# Patient Record
Sex: MALE | Race: WHITE | NOT HISPANIC OR LATINO | Employment: FULL TIME | URBAN - METROPOLITAN AREA
[De-identification: names, ages, dates, MRNs, and addresses within clinical notes are randomized per-mention and may not be internally consistent; named-entity substitution may affect disease eponyms.]

---

## 2018-11-07 ENCOUNTER — OFFICE VISIT (OUTPATIENT)
Dept: FAMILY MEDICINE | Facility: CLINIC | Age: 33
End: 2018-11-07
Payer: COMMERCIAL

## 2018-11-07 VITALS
WEIGHT: 220 LBS | HEIGHT: 70 IN | HEART RATE: 83 BPM | OXYGEN SATURATION: 97 % | DIASTOLIC BLOOD PRESSURE: 96 MMHG | TEMPERATURE: 98 F | SYSTOLIC BLOOD PRESSURE: 140 MMHG | BODY MASS INDEX: 31.5 KG/M2

## 2018-11-07 DIAGNOSIS — E78.2 MIXED HYPERLIPIDEMIA: ICD-10-CM

## 2018-11-07 DIAGNOSIS — I10 ESSENTIAL HYPERTENSION: Primary | ICD-10-CM

## 2018-11-07 PROCEDURE — 3080F DIAST BP >= 90 MM HG: CPT | Mod: ,,, | Performed by: INTERNAL MEDICINE

## 2018-11-07 PROCEDURE — 3008F BODY MASS INDEX DOCD: CPT | Mod: ,,, | Performed by: INTERNAL MEDICINE

## 2018-11-07 PROCEDURE — 3077F SYST BP >= 140 MM HG: CPT | Mod: ,,, | Performed by: INTERNAL MEDICINE

## 2018-11-07 PROCEDURE — 99202 OFFICE O/P NEW SF 15 MIN: CPT | Mod: ,,, | Performed by: INTERNAL MEDICINE

## 2018-11-07 RX ORDER — OXYCODONE AND ACETAMINOPHEN 5; 325 MG/1; MG/1
1 TABLET ORAL EVERY 6 HOURS PRN
Refills: 0 | COMMUNITY
Start: 2018-08-15 | End: 2019-08-13

## 2018-11-07 RX ORDER — SUCRALFATE 1 G/1
TABLET ORAL
Refills: 0 | COMMUNITY
Start: 2018-08-15 | End: 2019-08-13

## 2018-11-07 RX ORDER — AMLODIPINE BESYLATE 10 MG/1
10 TABLET ORAL DAILY
Qty: 30 TABLET | Refills: 3 | Status: SHIPPED | OUTPATIENT
Start: 2018-11-07 | End: 2019-08-13 | Stop reason: SDUPTHER

## 2018-11-07 NOTE — PROGRESS NOTES
Subjective:       Patient ID: Gerardo Ramirez Jr. is a 33 y.o. male.    Chief Complaint: Establish Care (new patient establishment); Hypertension; and ADHD    Here to establish care with me; previously seeing Dr. Ortiz but is looking for a new PCP.  He reports a prior history of HTN and hyperlipidemia which was treated with medications.  His weight at the time was around 240.  He was able to lose some weight and got off the meds around 3 years ago.  He would periodically monitor blood pressure.  Weight has slowly crept up in the last year; up 20-25 pounds.  Not exercising as much or following diet.  BP has been going up along with his weight.  He also reports that Dr. Ortiz gave him a questionnaire and diagnosed him with ADHD earlier this year when he complained of issues with focus and concentration.  He was on meds then but not currently taking anything.   He was adderall and it seemed to help for a few days and would then lose effectiveness.  He reports it is something that seems to be worsening with age starting in late teens.         Review of Systems   Constitutional: Negative for chills, fatigue, fever and unexpected weight change.   HENT: Positive for rhinorrhea and sinus pressure. Negative for congestion, hearing loss, postnasal drip, trouble swallowing and voice change.    Eyes: Negative for photophobia and visual disturbance.   Respiratory: Negative for apnea, cough, choking, chest tightness, shortness of breath and wheezing.    Cardiovascular: Negative for chest pain, palpitations and leg swelling.   Gastrointestinal: Negative for abdominal pain, blood in stool, constipation, diarrhea, nausea, rectal pain and vomiting.   Endocrine: Negative for cold intolerance, heat intolerance, polydipsia and polyuria.   Genitourinary: Negative for decreased urine volume, difficulty urinating, discharge, dysuria, flank pain, frequency, genital sores, hematuria, testicular pain and urgency.   Musculoskeletal:  Negative for arthralgias, back pain, gait problem, joint swelling, myalgias and neck pain.   Skin: Negative for color change, rash and wound.   Allergic/Immunologic: Negative for environmental allergies and food allergies.   Neurological: Negative for dizziness, tremors, seizures, syncope, facial asymmetry, speech difficulty, weakness, light-headedness, numbness and headaches.   Hematological: Negative for adenopathy. Does not bruise/bleed easily.   Psychiatric/Behavioral: Negative for confusion, hallucinations, sleep disturbance and suicidal ideas. The patient is not nervous/anxious.        Past Medical History:   Diagnosis Date    Anxiety     Hyperlipidemia     Hypertension     History reviewed. No pertinent surgical history.    Family History   Problem Relation Age of Onset    Diabetes Mother     Hypertension Mother     Hypertension Sister     Hyperlipidemia Sister     Anxiety disorder Sister     Coronary artery disease Maternal Grandfather     No Known Problems Father        Social History     Socioeconomic History    Marital status:      Spouse name: None    Number of children: None    Years of education: None    Highest education level: None   Social Needs    Financial resource strain: None    Food insecurity - worry: None    Food insecurity - inability: None    Transportation needs - medical: None    Transportation needs - non-medical: None   Occupational History    Occupation: [employer]   Tobacco Use    Smoking status: Current Every Day Smoker     Packs/day: 0.50     Types: Cigarettes    Smokeless tobacco: Never Used   Substance and Sexual Activity    Alcohol use: Yes     Frequency: 2-4 times a month     Drinks per session: 7 to 9     Binge frequency: Weekly    Drug use: No    Sexual activity: Yes     Partners: Female   Other Topics Concern    None   Social History Narrative    LIVE WITH MICHA       Current Outpatient Medications   Medication Sig Dispense Refill     "oxyCODONE-acetaminophen (PERCOCET) 5-325 mg per tablet Take 1 tablet by mouth every 6 (six) hours as needed.  0    sucralfate (CARAFATE) 1 gram tablet TAKE 1 TABLET BY MOUTH 4 TIMES A DAY FOR 4 DAYS AS NEEDED  0    amLODIPine (NORVASC) 10 MG tablet Take 1 tablet (10 mg total) by mouth once daily. 30 tablet 3     No current facility-administered medications for this visit.        Review of patient's allergies indicates:   Allergen Reactions    Amoxicillin Hives     Objective:    HPI     Establish Care      Additional comments: new patient establishment          Last edited by Ramila Hernandez MA on 11/7/2018  2:35 PM. (History)      Blood pressure (!) 140/96, pulse 83, temperature 97.9 °F (36.6 °C), temperature source Temporal, height 5' 10" (1.778 m), weight 99.8 kg (220 lb), SpO2 97 %. Body mass index is 31.57 kg/m².   Physical Exam   Constitutional: He appears well-developed.   Obese although he is also muscular       HENT:   Head: Normocephalic and atraumatic.   Right Ear: Hearing, tympanic membrane, external ear and ear canal normal.   Left Ear: Hearing, tympanic membrane, external ear and ear canal normal.   Nose: Nose normal.   Mouth/Throat: Uvula is midline and oropharynx is clear and moist.   Eyes: Conjunctivae, EOM and lids are normal. Pupils are equal, round, and reactive to light. Right eye exhibits no discharge. Left eye exhibits no discharge. Right conjunctiva is not injected. Right conjunctiva has no hemorrhage. Left conjunctiva is not injected. Left conjunctiva has no hemorrhage. No scleral icterus.   Neck: Carotid bruit is not present. No thyromegaly present.   Cardiovascular: Normal rate, regular rhythm and normal heart sounds. Exam reveals no gallop and no friction rub.   No murmur heard.  Pulses:       Dorsalis pedis pulses are 2+ on the right side, and 2+ on the left side.   Pulmonary/Chest: Effort normal and breath sounds normal. No respiratory distress. He has no wheezes. He has no rhonchi. " He has no rales.   Abdominal: Soft. Bowel sounds are normal. He exhibits no distension, no abdominal bruit, no pulsatile midline mass and no mass. There is no hepatosplenomegaly. There is no tenderness. There is no rebound and no guarding. No hernia.   Musculoskeletal: He exhibits no edema.   Lymphadenopathy:     He has no cervical adenopathy.   Neurological: He is alert. He has normal reflexes. He displays no tremor. No cranial nerve deficit.   Skin: Skin is warm and dry.   Psychiatric: He has a normal mood and affect. His speech is normal and behavior is normal.   Nursing note and vitals reviewed.          Assessment:       1. Essential hypertension    2. Mixed hyperlipidemia        Plan:       Gerardo was seen today for establish care, hypertension and adhd.    Diagnoses and all orders for this visit:    Essential hypertension  Comments:  Low salt diet.  Resume regular exercise.   He thinks he was on lisinopril in the past and had ED  Orders:  -     Comprehensive metabolic panel; Future  -     Lipid panel; Future  -     Urinalysis; Future  -     amLODIPine (NORVASC) 10 MG tablet; Take 1 tablet (10 mg total) by mouth once daily.  -     Comprehensive metabolic panel  -     Lipid panel  -     Urinalysis    Mixed hyperlipidemia  -     Lipid panel; Future  -     Lipid panel

## 2019-02-20 ENCOUNTER — OFFICE VISIT (OUTPATIENT)
Dept: FAMILY MEDICINE | Facility: CLINIC | Age: 34
End: 2019-02-20
Payer: COMMERCIAL

## 2019-02-20 VITALS
TEMPERATURE: 99 F | HEIGHT: 70 IN | WEIGHT: 243 LBS | HEART RATE: 102 BPM | SYSTOLIC BLOOD PRESSURE: 128 MMHG | DIASTOLIC BLOOD PRESSURE: 84 MMHG | OXYGEN SATURATION: 98 % | BODY MASS INDEX: 34.79 KG/M2

## 2019-02-20 DIAGNOSIS — J02.0 STREP PHARYNGITIS: ICD-10-CM

## 2019-02-20 DIAGNOSIS — E78.2 MIXED HYPERLIPIDEMIA: ICD-10-CM

## 2019-02-20 DIAGNOSIS — I10 ESSENTIAL HYPERTENSION: Primary | ICD-10-CM

## 2019-02-20 LAB
CTP QC/QA: YES
S PYO RRNA THROAT QL PROBE: POSITIVE

## 2019-02-20 PROCEDURE — 3008F PR BODY MASS INDEX (BMI) DOCUMENTED: ICD-10-PCS | Mod: ,,, | Performed by: INTERNAL MEDICINE

## 2019-02-20 PROCEDURE — 87880 STREP A ASSAY W/OPTIC: CPT | Mod: QW,,, | Performed by: INTERNAL MEDICINE

## 2019-02-20 PROCEDURE — 1111F PR DISCHARGE MEDS RECONCILED W/ CURRENT OUTPATIENT MED LIST: ICD-10-PCS | Mod: ,,, | Performed by: INTERNAL MEDICINE

## 2019-02-20 PROCEDURE — 3074F PR MOST RECENT SYSTOLIC BLOOD PRESSURE < 130 MM HG: ICD-10-PCS | Mod: ,,, | Performed by: INTERNAL MEDICINE

## 2019-02-20 PROCEDURE — 87880 POCT RAPID STREP A: ICD-10-PCS | Mod: QW,,, | Performed by: INTERNAL MEDICINE

## 2019-02-20 PROCEDURE — 3074F SYST BP LT 130 MM HG: CPT | Mod: ,,, | Performed by: INTERNAL MEDICINE

## 2019-02-20 PROCEDURE — 3079F PR MOST RECENT DIASTOLIC BLOOD PRESSURE 80-89 MM HG: ICD-10-PCS | Mod: ,,, | Performed by: INTERNAL MEDICINE

## 2019-02-20 PROCEDURE — 3079F DIAST BP 80-89 MM HG: CPT | Mod: ,,, | Performed by: INTERNAL MEDICINE

## 2019-02-20 PROCEDURE — 99213 PR OFFICE/OUTPT VISIT, EST, LEVL III, 20-29 MIN: ICD-10-PCS | Mod: ,,, | Performed by: INTERNAL MEDICINE

## 2019-02-20 PROCEDURE — 1111F DSCHRG MED/CURRENT MED MERGE: CPT | Mod: ,,, | Performed by: INTERNAL MEDICINE

## 2019-02-20 PROCEDURE — 99213 OFFICE O/P EST LOW 20 MIN: CPT | Mod: ,,, | Performed by: INTERNAL MEDICINE

## 2019-02-20 PROCEDURE — 3008F BODY MASS INDEX DOCD: CPT | Mod: ,,, | Performed by: INTERNAL MEDICINE

## 2019-02-20 RX ORDER — NAPROXEN SODIUM 220 MG/1
81 TABLET, FILM COATED ORAL DAILY
COMMUNITY
End: 2019-08-13

## 2019-02-20 RX ORDER — ATORVASTATIN CALCIUM 20 MG/1
20 TABLET, FILM COATED ORAL NIGHTLY
Qty: 90 TABLET | Refills: 1 | Status: SHIPPED | OUTPATIENT
Start: 2019-02-20 | End: 2019-08-13 | Stop reason: SDUPTHER

## 2019-02-20 RX ORDER — ATORVASTATIN CALCIUM 10 MG/1
10 TABLET, FILM COATED ORAL NIGHTLY
Refills: 1 | COMMUNITY
Start: 2019-02-09 | End: 2019-02-20 | Stop reason: SDUPTHER

## 2019-02-20 RX ORDER — AZITHROMYCIN 250 MG/1
TABLET, FILM COATED ORAL
Qty: 6 TABLET | Refills: 0 | Status: SHIPPED | OUTPATIENT
Start: 2019-02-20 | End: 2019-02-25

## 2019-02-20 NOTE — PROGRESS NOTES
Subjective:       Patient ID: Gerardo Ramirez Jr. is a 34 y.o. male.    Chief Complaint: Follow-up (hospital followup); Fever (101 x 2 days, chills); Generalized Body Aches; Nasal Congestion (sinus pressure); Sore Throat; and Fatigue    Here for hospital follow up.  He went in because he had been having some chest pain off and on for a couple of weeks.  He had stopped his amlodipine because he heard about a recall and his blood pressure was high.  He was admitted overnight; workup including stress test was negative.  He had resumed amlodipine at that point and was continued on that at discharge.  Woke up yesterday feeling achy all over and noted fever yesterday afternoon and this morning.  He has minimal congestion, rhinorrhea.  Biggest complaint is sore throat and mild headache.  Symptoms are improving.        Review of Systems   Constitutional: Positive for chills, fatigue, fever and unexpected weight change (has gained 20+ pounds since November).   HENT: Positive for congestion, sinus pressure, sneezing, sore throat and trouble swallowing. Negative for hearing loss, postnasal drip, rhinorrhea and voice change.    Eyes: Positive for pain (pressure). Negative for photophobia and visual disturbance.   Respiratory: Negative for apnea, cough, choking, chest tightness, shortness of breath and wheezing.    Cardiovascular: Negative for chest pain, palpitations and leg swelling.   Gastrointestinal: Positive for constipation. Negative for abdominal pain, blood in stool, diarrhea, nausea, rectal pain and vomiting.   Endocrine: Negative for cold intolerance, heat intolerance, polydipsia and polyuria.   Genitourinary: Negative for decreased urine volume, difficulty urinating, discharge, dysuria, flank pain, frequency, genital sores, hematuria, testicular pain and urgency.   Musculoskeletal: Positive for arthralgias, myalgias, neck pain and neck stiffness. Negative for back pain, gait problem and joint swelling.   Skin:  Negative for color change, rash and wound.   Allergic/Immunologic: Negative for environmental allergies and food allergies.   Neurological: Positive for headaches (ocassional headaches). Negative for dizziness, tremors, seizures, syncope, facial asymmetry, speech difficulty, weakness, light-headedness and numbness.        Slight disorientation   Hematological: Negative for adenopathy. Does not bruise/bleed easily.   Psychiatric/Behavioral: Positive for sleep disturbance. Negative for confusion, hallucinations and suicidal ideas. The patient is not nervous/anxious.        Past Medical History:   Diagnosis Date    Anxiety     Hyperlipidemia     Hypertension     History reviewed. No pertinent surgical history.    Family History   Problem Relation Age of Onset    Diabetes Mother     Hypertension Mother     Hypertension Sister     Hyperlipidemia Sister     Anxiety disorder Sister     Coronary artery disease Maternal Grandfather     No Known Problems Father        Social History     Socioeconomic History    Marital status:      Spouse name: None    Number of children: None    Years of education: None    Highest education level: None   Social Needs    Financial resource strain: None    Food insecurity - worry: None    Food insecurity - inability: None    Transportation needs - medical: None    Transportation needs - non-medical: None   Occupational History    Occupation: [employer]   Tobacco Use    Smoking status: Current Every Day Smoker     Packs/day: 0.50     Types: Cigarettes    Smokeless tobacco: Never Used   Substance and Sexual Activity    Alcohol use: Yes     Frequency: 2-4 times a month     Drinks per session: 7 to 9     Binge frequency: Weekly    Drug use: No    Sexual activity: Yes     Partners: Female   Other Topics Concern    None   Social History Narrative    LIVE WITH FIROSALINA       Current Outpatient Medications   Medication Sig Dispense Refill    amLODIPine (NORVASC) 10 MG  "tablet Take 1 tablet (10 mg total) by mouth once daily. 30 tablet 3    aspirin 81 MG Chew Take 81 mg by mouth once daily.      atorvastatin (LIPITOR) 20 MG tablet Take 1 tablet (20 mg total) by mouth nightly. 90 tablet 1    chlorphenir/phenyleph/aspirin (CINDY-SELTZER PLUS COLD, PE, ORAL) Take 2 capsules by mouth 4 (four) times daily as needed.      oxyCODONE-acetaminophen (PERCOCET) 5-325 mg per tablet Take 1 tablet by mouth every 6 (six) hours as needed.  0    sucralfate (CARAFATE) 1 gram tablet TAKE 1 TABLET BY MOUTH 4 TIMES A DAY FOR 4 DAYS AS NEEDED  0     No current facility-administered medications for this visit.        Review of patient's allergies indicates:   Allergen Reactions    Amoxicillin Hives     Objective:    HPI     Follow-up      Additional comments: hospital followup              Fever      Additional comments: 101 x 2 days, chills              Nasal Congestion      Additional comments: sinus pressure          Last edited by Ramila Hernandez MA on 2/20/2019  4:25 PM. (History)      Blood pressure 128/84, pulse 102, temperature 98.6 °F (37 °C), temperature source Temporal, height 5' 10" (1.778 m), weight 110.2 kg (243 lb), SpO2 98 %. Body mass index is 34.87 kg/m².   Physical Exam   Constitutional: He appears well-developed. He is active.  Non-toxic appearance. He does not have a sickly appearance. He does not appear ill. No distress.   HENT:   Head: Normocephalic.   Right Ear: Tympanic membrane, external ear and ear canal normal.   Left Ear: Tympanic membrane, external ear and ear canal normal.   Nose: Nose normal. No rhinorrhea. Right sinus exhibits no maxillary sinus tenderness and no frontal sinus tenderness. Left sinus exhibits no maxillary sinus tenderness and no frontal sinus tenderness.   Mouth/Throat: Mucous membranes are normal. Oropharyngeal exudate and posterior oropharyngeal erythema present. Tonsils are 2+ on the right. Tonsils are 2+ on the left. Tonsillar exudate. "   Cardiovascular: Regular rhythm and normal heart sounds. Tachycardia present. Exam reveals no gallop and no friction rub.   No murmur heard.  Pulmonary/Chest: Effort normal and breath sounds normal. No accessory muscle usage. No tachypnea. No respiratory distress. He has no wheezes. He has no rhonchi. He has no rales.   Lymphadenopathy:     He has cervical adenopathy.   Neurological: He is alert.   Skin: He is not diaphoretic.           Assessment:       1. Essential hypertension    2. Strep pharyngitis    3. Mixed hyperlipidemia        Plan:       Gerardo was seen today for follow-up, fever, generalized body aches, nasal congestion, sore throat and fatigue.    Diagnoses and all orders for this visit:    Essential hypertension    Strep pharyngitis  -     POCT rapid strep A    Mixed hyperlipidemia  Comments:   on hospital labs so will increase lipitor    Orders:  -     atorvastatin (LIPITOR) 20 MG tablet; Take 1 tablet (20 mg total) by mouth nightly.

## 2019-04-08 ENCOUNTER — HISTORICAL (OUTPATIENT)
Dept: ADMINISTRATIVE | Facility: HOSPITAL | Age: 34
End: 2019-04-08

## 2019-04-10 ENCOUNTER — TELEPHONE (OUTPATIENT)
Dept: SURGERY | Facility: CLINIC | Age: 34
End: 2019-04-10

## 2019-04-17 ENCOUNTER — OFFICE VISIT (OUTPATIENT)
Dept: SURGERY | Facility: CLINIC | Age: 34
End: 2019-04-17
Payer: COMMERCIAL

## 2019-04-17 VITALS
BODY MASS INDEX: 34.07 KG/M2 | TEMPERATURE: 98 F | HEIGHT: 70 IN | DIASTOLIC BLOOD PRESSURE: 96 MMHG | HEART RATE: 85 BPM | SYSTOLIC BLOOD PRESSURE: 147 MMHG | WEIGHT: 238 LBS

## 2019-04-17 DIAGNOSIS — K81.2 ACUTE ON CHRONIC CHOLECYSTITIS: Primary | ICD-10-CM

## 2019-04-17 PROCEDURE — 99024 POSTOP FOLLOW-UP VISIT: CPT | Mod: ,,, | Performed by: SURGERY

## 2019-04-17 PROCEDURE — 99024 PR POST-OP FOLLOW-UP VISIT: ICD-10-PCS | Mod: ,,, | Performed by: SURGERY

## 2019-04-17 NOTE — LETTER
April 19, 2019      [facility] - ED  [address]  [address]  [place] [address]           [facility] - General Surgery  [address]  [facility] [address]  Phone: [phone]  Fax: [phone]          Patient: Gerardo Ramirez Jr.   MR Number: [id number]   YOB: 1985   Date of Visit: 4/17/2019       Dear Replaced by [facility] - [facility]:    Thank you for referring Gerardo Ramirez to me for evaluation. Attached you will find relevant portions of my assessment and plan of care.    If you have questions, please do not hesitate to call me. I look forward to following Gerardo Ramirez along with you.    Sincerely,    Willy Lanier III, MD    Enclosure  CC:  No Recipients    If you would like to receive this communication electronically, please contact [email or web address] or [phone] to request more information on [employer] access.    For providers and/or their staff who would like to refer a patient to [facility], please contact us through our one-stop-shop provider referral line, [facility], at [phone].    If you feel you have received this communication in error or would no longer like to receive these types of communications, please e-mail [email or web address]

## 2019-04-19 NOTE — PROGRESS NOTES
Subjective:       Patient ID: Gerardo Ramirez Jr. is a 34 y.o. male.    Chief Complaint: Post-op Evaluation ([facility] DOS 4/8/19 Lap sophie & Umbilical hernia repair )      HPI:  S/p lap sophie for acute cholecystitis.  Path returned acute on chronic calculous cholecystitis.  He is feeling well. No complaints. He also had small umbilical hernia that was repaired during surgery.       Review of Systems    Objective:      Physical Exam   Constitutional: He is oriented to person, place, and time. He is cooperative. No distress.   Pulmonary/Chest: Effort normal. No respiratory distress.   Abdominal: Soft. He exhibits no distension. There is no tenderness. There is no rebound and no guarding.   Neurological: He is alert and oriented to person, place, and time.   Skin:   Incisions are clean, dry and intact  There is no evidence of infection, hematoma or seroma        Assessment/Plan:   Gerardo was seen today for post-op evaluation.    Diagnoses and all orders for this visit:    Acute on chronic cholecystitis      Doing well.  S/p lap sophie.  RTC PRN

## 2019-08-12 DIAGNOSIS — I10 HYPERTENSION: Primary | ICD-10-CM

## 2019-08-13 ENCOUNTER — OFFICE VISIT (OUTPATIENT)
Dept: FAMILY MEDICINE | Facility: CLINIC | Age: 34
End: 2019-08-13
Payer: COMMERCIAL

## 2019-08-13 VITALS
BODY MASS INDEX: 36.08 KG/M2 | WEIGHT: 252 LBS | OXYGEN SATURATION: 97 % | SYSTOLIC BLOOD PRESSURE: 114 MMHG | DIASTOLIC BLOOD PRESSURE: 80 MMHG | TEMPERATURE: 98 F | HEIGHT: 70 IN | HEART RATE: 84 BPM

## 2019-08-13 DIAGNOSIS — I10 ESSENTIAL HYPERTENSION: Primary | ICD-10-CM

## 2019-08-13 DIAGNOSIS — E78.2 MIXED HYPERLIPIDEMIA: ICD-10-CM

## 2019-08-13 PROCEDURE — 99213 OFFICE O/P EST LOW 20 MIN: CPT | Mod: S$GLB,,, | Performed by: INTERNAL MEDICINE

## 2019-08-13 PROCEDURE — 99213 PR OFFICE/OUTPT VISIT, EST, LEVL III, 20-29 MIN: ICD-10-PCS | Mod: S$GLB,,, | Performed by: INTERNAL MEDICINE

## 2019-08-13 RX ORDER — AMLODIPINE BESYLATE 10 MG/1
10 TABLET ORAL DAILY
Qty: 30 TABLET | Refills: 3 | Status: SHIPPED | OUTPATIENT
Start: 2019-08-13 | End: 2019-10-17 | Stop reason: SDUPTHER

## 2019-08-13 RX ORDER — ATORVASTATIN CALCIUM 20 MG/1
20 TABLET, FILM COATED ORAL NIGHTLY
Qty: 90 TABLET | Refills: 1 | Status: SHIPPED | OUTPATIENT
Start: 2019-08-13 | End: 2019-12-18 | Stop reason: SDUPTHER

## 2019-08-13 NOTE — PROGRESS NOTES
Subjective:       Patient ID: Gerardo Ramirez Jr. is a 34 y.o. male.    Chief Complaint: Hypertension (continuity of care and med refill) and Hyperlipidemia    Hypertension   This is a chronic problem. The problem is controlled (as long as he takes meds). Associated symptoms include chest pain (chest tightness for a couple of days after he smokes; no issues if doesn't smoke) and headaches (occasional). Pertinent negatives include no neck pain, palpitations, peripheral edema or shortness of breath. Risk factors for coronary artery disease include obesity, male gender, dyslipidemia and smoking/tobacco exposure. Past treatments include calcium channel blockers. Compliance problems: often forgets it.      Review of Systems   Constitutional: Positive for unexpected weight change (gaining weight; has steel toed boots on today). Negative for chills, fatigue and fever.   HENT: Negative for congestion, hearing loss, postnasal drip, rhinorrhea, trouble swallowing and voice change.    Eyes: Negative for photophobia and visual disturbance.   Respiratory: Positive for chest tightness. Negative for apnea, cough, choking, shortness of breath and wheezing.    Cardiovascular: Positive for chest pain (chest tightness for a couple of days after he smokes; no issues if doesn't smoke). Negative for palpitations and leg swelling.   Gastrointestinal: Negative for abdominal pain, blood in stool, constipation, diarrhea, nausea, rectal pain and vomiting.   Endocrine: Negative for cold intolerance, heat intolerance, polydipsia and polyuria.   Genitourinary: Negative for decreased urine volume, difficulty urinating, discharge, dysuria, flank pain, frequency, genital sores, hematuria, testicular pain and urgency.   Musculoskeletal: Negative for arthralgias, back pain, gait problem, joint swelling, myalgias and neck pain.   Skin: Negative for color change, rash and wound.   Allergic/Immunologic: Negative for environmental allergies and  food allergies.   Neurological: Positive for headaches (occasional). Negative for dizziness, tremors, seizures, syncope, facial asymmetry, speech difficulty, weakness, light-headedness and numbness.   Hematological: Negative for adenopathy. Does not bruise/bleed easily.   Psychiatric/Behavioral: Negative for confusion, hallucinations, sleep disturbance and suicidal ideas. The patient is not nervous/anxious.        Past Medical History:   Diagnosis Date    Anxiety     Hyperlipidemia     Hypertension       Past Surgical History:   Procedure Laterality Date    CHOLECYSTECTOMY  04/08/2019    w/ Small Umbilical hernia rep-     UMBILICAL HERNIA REPAIR  04/08/2019           Family History   Problem Relation Age of Onset    Diabetes Mother     Hypertension Mother     Hypertension Sister     Hyperlipidemia Sister     Anxiety disorder Sister     Coronary artery disease Maternal Grandfather     No Known Problems Father        Social History     Socioeconomic History    Marital status:      Spouse name: Not on file    Number of children: Not on file    Years of education: Not on file    Highest education level: Not on file   Occupational History    Occupation: [employer]   Social Needs    Financial resource strain: Not on file    Food insecurity:     Worry: Not on file     Inability: Not on file    Transportation needs:     Medical: Not on file     Non-medical: Not on file   Tobacco Use    Smoking status: Current Some Day Smoker     Packs/day: 0.50     Types: Cigarettes    Smokeless tobacco: Never Used    Tobacco comment: trying to quit   Substance and Sexual Activity    Alcohol use: Yes     Frequency: 2-4 times a month     Drinks per session: 7 to 9     Binge frequency: Weekly    Drug use: No    Sexual activity: Yes     Partners: Female   Lifestyle    Physical activity:     Days per week: Not on file     Minutes per session: Not on file    Stress: Not on file   Relationships  "   Social connections:     Talks on phone: Not on file     Gets together: Not on file     Attends [employer] service: Not on file     Active member of club or organization: Not on file     Attends meetings of clubs or organizations: Not on file     Relationship status: Not on file   Other Topics Concern    Not on file   Social History Narrative    LIVE WITH MICHA       Current Outpatient Medications   Medication Sig Dispense Refill    amLODIPine (NORVASC) 10 MG tablet Take 1 tablet (10 mg total) by mouth once daily. 30 tablet 3    atorvastatin (LIPITOR) 20 MG tablet Take 1 tablet (20 mg total) by mouth nightly. 90 tablet 1     No current facility-administered medications for this visit.        Review of patient's allergies indicates:   Allergen Reactions    Amoxicillin Hives     Objective:    HPI     Hypertension      Additional comments: continuity of care and med refill          Last edited by Ramila Hernandez MA on 8/13/2019  4:24 PM. (History)      Blood pressure 114/80, pulse 84, temperature 98.2 °F (36.8 °C), temperature source Temporal, height 5' 10" (1.778 m), weight 114.3 kg (252 lb), SpO2 97 %. Body mass index is 36.16 kg/m².   Physical Exam   Constitutional: He appears well-developed. No distress.   Obese     HENT:   Nose: Nose normal.   Mouth/Throat: Oropharynx is clear and moist.   Eyes: Conjunctivae are normal. Right eye exhibits no discharge. Left eye exhibits no discharge. No scleral icterus.   Neck: Carotid bruit is not present.   Cardiovascular: Normal rate, regular rhythm and normal heart sounds.   No murmur heard.  Pulmonary/Chest: Effort normal and breath sounds normal. No respiratory distress. He has no decreased breath sounds. He has no wheezes. He has no rhonchi. He has no rales.   Abdominal: Soft. He exhibits no distension. There is no tenderness. There is no rebound and no guarding.   Musculoskeletal: He exhibits no edema.   Neurological: He is alert. He displays no tremor.   Skin: Skin " is warm and dry.   Psychiatric: He has a normal mood and affect. His speech is normal.   Nursing note and vitals reviewed.          Assessment:       1. Essential hypertension    2. Mixed hyperlipidemia        Plan:       Gerardo was seen today for hypertension and hyperlipidemia.    Diagnoses and all orders for this visit:    Essential hypertension  Comments:  Low salt diet.  Blood pressure looks good today since he has been taking meds regularly last few days  Orders:  -     amLODIPine (NORVASC) 10 MG tablet; Take 1 tablet (10 mg total) by mouth once daily.  -     Lipid panel; Future  -     Urinalysis; Future  -     Comprehensive metabolic panel; Future  -     Lipid panel  -     Urinalysis  -     Comprehensive metabolic panel    Mixed hyperlipidemia  Comments:  Has been out of meds a couple of months so will defer labs until he is back on meds    Orders:  -     atorvastatin (LIPITOR) 20 MG tablet; Take 1 tablet (20 mg total) by mouth nightly.  -     Lipid panel; Future  -     Lipid panel

## 2019-08-29 ENCOUNTER — OFFICE VISIT (OUTPATIENT)
Dept: FAMILY MEDICINE | Facility: CLINIC | Age: 34
End: 2019-08-29
Payer: COMMERCIAL

## 2019-08-29 VITALS
TEMPERATURE: 98 F | HEIGHT: 70 IN | SYSTOLIC BLOOD PRESSURE: 124 MMHG | HEART RATE: 71 BPM | WEIGHT: 243 LBS | DIASTOLIC BLOOD PRESSURE: 86 MMHG | BODY MASS INDEX: 34.79 KG/M2 | OXYGEN SATURATION: 98 %

## 2019-08-29 DIAGNOSIS — R07.89 ATYPICAL CHEST PAIN: ICD-10-CM

## 2019-08-29 DIAGNOSIS — I10 ESSENTIAL HYPERTENSION: Primary | ICD-10-CM

## 2019-08-29 PROCEDURE — 99213 PR OFFICE/OUTPT VISIT, EST, LEVL III, 20-29 MIN: ICD-10-PCS | Mod: S$GLB,,, | Performed by: INTERNAL MEDICINE

## 2019-08-29 PROCEDURE — 99213 OFFICE O/P EST LOW 20 MIN: CPT | Mod: S$GLB,,, | Performed by: INTERNAL MEDICINE

## 2019-08-29 NOTE — PROGRESS NOTES
Subjective:       Patient ID: Gerardo Ramirez Jr. is a 34 y.o. male.    Chief Complaint: Hypertension (continuity of care); Dizziness; and Chest Pain    Here for followup.  He is concerned because he continues to have episodes of chest pain intermittently throughout the day.  It is usually one spot but moves around to different areas; it isn't the same spot each time.  Aching sensation, sometimes with a sharp component.  Seems to radiate to jaw.  Not related to activity; ran 2 miles recently without pain.  He remembers not feeling quite right on BP meds in the past so in last few days, he has started to take half tablet BID and hasn't had any chest discomfort.   Always feels like he has tension in the back of his neck like he is about to get a tension headache but rarely gets an actual headache.  Lightheaded at times.  Checked BP once earlier this week and it was 168/108.  Negative stress test earlier this year.   Has been on other BP meds and had side effects (especially ED) but not sure exactly what he was on.      Review of Systems   Constitutional: Negative for chills, fatigue, fever and unexpected weight change.   HENT: Negative for congestion, hearing loss, postnasal drip, rhinorrhea, trouble swallowing and voice change.    Eyes: Negative for photophobia and visual disturbance.   Respiratory: Positive for chest tightness. Negative for apnea, cough, choking, shortness of breath and wheezing.    Cardiovascular: Positive for chest pain. Negative for palpitations and leg swelling.   Gastrointestinal: Negative for abdominal pain, blood in stool, constipation, diarrhea, nausea, rectal pain and vomiting.   Endocrine: Negative for cold intolerance, heat intolerance, polydipsia and polyuria.   Genitourinary: Negative for decreased urine volume, difficulty urinating, discharge, dysuria, flank pain, frequency, genital sores, hematuria, testicular pain and urgency.   Musculoskeletal: Positive for neck stiffness.  Negative for arthralgias, back pain, gait problem, joint swelling, myalgias and neck pain.   Skin: Negative for color change, rash and wound.   Allergic/Immunologic: Negative for environmental allergies and food allergies.   Neurological: Positive for light-headedness and headaches. Negative for dizziness, tremors, seizures, syncope, facial asymmetry, speech difficulty, weakness and numbness.   Hematological: Negative for adenopathy. Does not bruise/bleed easily.   Psychiatric/Behavioral: Negative for confusion, hallucinations, sleep disturbance and suicidal ideas. The patient is not nervous/anxious.        Past Medical History:   Diagnosis Date    Anxiety     Hyperlipidemia     Hypertension       Past Surgical History:   Procedure Laterality Date    CHOLECYSTECTOMY  04/08/2019    w/ Small Umbilical hernia rep-     UMBILICAL HERNIA REPAIR  04/08/2019           Family History   Problem Relation Age of Onset    Diabetes Mother     Hypertension Mother     Hypertension Sister     Hyperlipidemia Sister     Anxiety disorder Sister     Coronary artery disease Maternal Grandfather     No Known Problems Father        Social History     Socioeconomic History    Marital status:      Spouse name: Not on file    Number of children: Not on file    Years of education: Not on file    Highest education level: Not on file   Occupational History    Occupation: [employer]   Social Needs    Financial resource strain: Not on file    Food insecurity:     Worry: Not on file     Inability: Not on file    Transportation needs:     Medical: Not on file     Non-medical: Not on file   Tobacco Use    Smoking status: Current Some Day Smoker     Packs/day: 0.50     Types: Cigarettes    Smokeless tobacco: Never Used    Tobacco comment: trying to quit   Substance and Sexual Activity    Alcohol use: Yes     Frequency: 2-4 times a month     Drinks per session: 7 to 9     Binge frequency: Weekly    Drug  "use: No    Sexual activity: Yes     Partners: Female   Lifestyle    Physical activity:     Days per week: Not on file     Minutes per session: Not on file    Stress: Only a little   Relationships    Social connections:     Talks on phone: Not on file     Gets together: Not on file     Attends Bahai service: Not on file     Active member of club or organization: Not on file     Attends meetings of clubs or organizations: Not on file     Relationship status: Not on file   Other Topics Concern    Not on file   Social History Narrative    LIVE WITH FIANCE       Current Outpatient Medications   Medication Sig Dispense Refill    amLODIPine (NORVASC) 10 MG tablet Take 1 tablet (10 mg total) by mouth once daily. 30 tablet 3    atorvastatin (LIPITOR) 20 MG tablet Take 1 tablet (20 mg total) by mouth nightly. 90 tablet 1     No current facility-administered medications for this visit.        Review of patient's allergies indicates:   Allergen Reactions    Amoxicillin Hives     Objective:    HPI     Hypertension      Additional comments: continuity of care          Last edited by Galindo Berrios Jr., MD on 8/29/2019  9:47 AM. (History)        Blood pressure 124/86, pulse 71, temperature 98 °F (36.7 °C), temperature source Temporal, height 5' 10" (1.778 m), weight 110.2 kg (243 lb), SpO2 98 %. Body mass index is 34.87 kg/m².   Physical Exam   Constitutional: He appears well-developed. No distress.   Obese     HENT:   Nose: Nose normal.   Mouth/Throat: Oropharynx is clear and moist.   Eyes: Conjunctivae are normal. Right eye exhibits no discharge. Left eye exhibits no discharge. No scleral icterus.   Neck: Carotid bruit is not present.   Cardiovascular: Normal rate, regular rhythm and normal heart sounds.   No murmur heard.  Pulmonary/Chest: Effort normal and breath sounds normal. No respiratory distress. He has no decreased breath sounds. He has no wheezes. He has no rhonchi. He has no rales.   Abdominal: Soft. He " exhibits no distension. There is no tenderness. There is no rebound and no guarding.   Musculoskeletal: He exhibits no edema.   Neurological: He is alert. He displays no tremor.   Skin: Skin is warm and dry.   Psychiatric: He has a normal mood and affect. His speech is normal.   Nursing note and vitals reviewed.          Assessment:       1. Essential hypertension    2. Atypical chest pain        Plan:       Gerardo was seen today for hypertension, dizziness and chest pain.    Diagnoses and all orders for this visit:    Essential hypertension  Comments:  Discussed trying a different med vs giving this one more time to see if symptoms resolve.  Can stop in for BP check here periodically. Need a list of prior meds    Atypical chest pain  Comments:  I doubt this is cardiac.  Unclear if it is r/t BP or possibly a side effect of med

## 2019-10-17 DIAGNOSIS — I10 ESSENTIAL HYPERTENSION: ICD-10-CM

## 2019-10-17 RX ORDER — AMLODIPINE BESYLATE 10 MG/1
TABLET ORAL
Qty: 90 TABLET | Refills: 1 | Status: SHIPPED | OUTPATIENT
Start: 2019-10-17 | End: 2019-12-18 | Stop reason: SDUPTHER

## 2019-11-04 DIAGNOSIS — G47.19 EXCESSIVE DAYTIME SLEEPINESS: ICD-10-CM

## 2019-11-04 DIAGNOSIS — G47.9 FATIGUE DUE TO SLEEP PATTERN DISTURBANCE: ICD-10-CM

## 2019-11-04 DIAGNOSIS — G47.33 OSA (OBSTRUCTIVE SLEEP APNEA): Primary | ICD-10-CM

## 2019-11-04 DIAGNOSIS — R53.83 FATIGUE DUE TO SLEEP PATTERN DISTURBANCE: ICD-10-CM

## 2019-11-04 DIAGNOSIS — I10 HTN (HYPERTENSION): ICD-10-CM

## 2019-11-04 DIAGNOSIS — R06.83 SNORING: ICD-10-CM

## 2019-12-14 LAB
ALBUMIN SERPL-MCNC: 4.3 G/DL (ref 3.5–5.5)
ALBUMIN/GLOB SERPL: 1.3 {RATIO} (ref 1.2–2.2)
ALP SERPL-CCNC: 108 IU/L (ref 39–117)
ALT SERPL-CCNC: 73 IU/L (ref 0–44)
APPEARANCE UR: CLEAR
AST SERPL-CCNC: 35 IU/L (ref 0–40)
BILIRUB SERPL-MCNC: 1 MG/DL (ref 0–1.2)
BILIRUB UR QL STRIP: NEGATIVE
BUN SERPL-MCNC: 13 MG/DL (ref 6–20)
BUN/CREAT SERPL: 15 (ref 9–20)
CALCIUM SERPL-MCNC: 9.5 MG/DL (ref 8.7–10.2)
CHLORIDE SERPL-SCNC: 101 MMOL/L (ref 96–106)
CHOLEST SERPL-MCNC: 119 MG/DL (ref 100–199)
CO2 SERPL-SCNC: 19 MMOL/L (ref 20–29)
COLOR UR: YELLOW
CREAT SERPL-MCNC: 0.89 MG/DL (ref 0.76–1.27)
GLOBULIN SER CALC-MCNC: 3.4 G/DL (ref 1.5–4.5)
GLUCOSE SERPL-MCNC: 96 MG/DL (ref 65–99)
GLUCOSE UR QL: NEGATIVE
HDLC SERPL-MCNC: 36 MG/DL
HGB UR QL STRIP: NEGATIVE
KETONES UR QL STRIP: NEGATIVE
LDLC SERPL CALC-MCNC: 58 MG/DL (ref 0–99)
LEUKOCYTE ESTERASE UR QL STRIP: NEGATIVE
MICRO URNS: NORMAL
NITRITE UR QL STRIP: NEGATIVE
PH UR STRIP: 5 [PH] (ref 5–7.5)
POTASSIUM SERPL-SCNC: 4.4 MMOL/L (ref 3.5–5.2)
PROT SERPL-MCNC: 7.7 G/DL (ref 6–8.5)
PROT UR QL STRIP: NEGATIVE
SODIUM SERPL-SCNC: 136 MMOL/L (ref 134–144)
SP GR UR: 1.02 (ref 1–1.03)
TRIGL SERPL-MCNC: 125 MG/DL (ref 0–149)
UROBILINOGEN UR STRIP-MCNC: 0.2 MG/DL (ref 0.2–1)
VLDLC SERPL CALC-MCNC: 25 MG/DL (ref 5–40)

## 2019-12-18 ENCOUNTER — OFFICE VISIT (OUTPATIENT)
Dept: FAMILY MEDICINE | Facility: CLINIC | Age: 34
End: 2019-12-18
Payer: COMMERCIAL

## 2019-12-18 VITALS
BODY MASS INDEX: 36.08 KG/M2 | HEIGHT: 70 IN | OXYGEN SATURATION: 97 % | SYSTOLIC BLOOD PRESSURE: 140 MMHG | WEIGHT: 252 LBS | TEMPERATURE: 98 F | DIASTOLIC BLOOD PRESSURE: 84 MMHG | HEART RATE: 86 BPM

## 2019-12-18 DIAGNOSIS — I10 ESSENTIAL HYPERTENSION: Primary | ICD-10-CM

## 2019-12-18 DIAGNOSIS — K40.90 RIGHT INGUINAL HERNIA: ICD-10-CM

## 2019-12-18 DIAGNOSIS — E78.2 MIXED HYPERLIPIDEMIA: ICD-10-CM

## 2019-12-18 DIAGNOSIS — R74.01 ELEVATED ALT MEASUREMENT: ICD-10-CM

## 2019-12-18 PROCEDURE — 99214 PR OFFICE/OUTPT VISIT, EST, LEVL IV, 30-39 MIN: ICD-10-PCS | Mod: S$GLB,,, | Performed by: INTERNAL MEDICINE

## 2019-12-18 PROCEDURE — 99214 OFFICE O/P EST MOD 30 MIN: CPT | Mod: S$GLB,,, | Performed by: INTERNAL MEDICINE

## 2019-12-18 RX ORDER — AMLODIPINE BESYLATE 10 MG/1
10 TABLET ORAL DAILY
Qty: 90 TABLET | Refills: 1 | Status: SHIPPED | OUTPATIENT
Start: 2019-12-18 | End: 2020-03-09

## 2019-12-18 RX ORDER — ATORVASTATIN CALCIUM 20 MG/1
20 TABLET, FILM COATED ORAL NIGHTLY
Qty: 90 TABLET | Refills: 1 | Status: SHIPPED | OUTPATIENT
Start: 2019-12-18 | End: 2020-10-16 | Stop reason: SDUPTHER

## 2019-12-18 NOTE — PROGRESS NOTES
Subjective:       Patient ID: Gerardo Ramirez Jr. is a 34 y.o. male.    Chief Complaint: Hypertension (followup labs); Hyperlipidemia; Mass (right groin area); and Abdominal Pain (abdominal discomfort)    Here for routine follow up.   He reports some occasional lower abdomen discomfort and a bulge above his right testicle.  Has been told in the past that he has a umbilical and inguinal hernia.  He reports the umbilical hernia was repaired when he had gallbladder surgery but inguinal hernia has never been addressed.   Worse if he has to exert himself a lot at work.      Hypertension   This is a chronic problem. Associated symptoms include chest pain (usually only if he smokes several days in a row). Pertinent negatives include no headaches, neck pain, palpitations, peripheral edema or shortness of breath. Risk factors for coronary artery disease include obesity, male gender, dyslipidemia and smoking/tobacco exposure. Past treatments include calcium channel blockers. Compliance problems: takes half twice daily; occasionally forgets a dose.    Hyperlipidemia   This is a chronic problem. The problem is controlled. Recent lipid tests were reviewed and are normal. Exacerbating diseases include obesity. Associated symptoms include chest pain (usually only if he smokes several days in a row). Pertinent negatives include no myalgias or shortness of breath. Current antihyperlipidemic treatment includes statins. The current treatment provides significant improvement of lipids. There are no compliance problems.      Review of Systems   Constitutional: Positive for unexpected weight change (wt. gain). Negative for chills, fatigue and fever.   HENT: Positive for postnasal drip. Negative for congestion, hearing loss, rhinorrhea, trouble swallowing and voice change.    Eyes: Negative for photophobia and visual disturbance.   Respiratory: Positive for cough. Negative for apnea, choking, chest tightness, shortness of breath and  wheezing.    Cardiovascular: Positive for chest pain (usually only if he smokes several days in a row). Negative for palpitations and leg swelling.   Gastrointestinal: Positive for abdominal pain. Negative for blood in stool, constipation, diarrhea, nausea, rectal pain and vomiting.   Endocrine: Negative for cold intolerance, heat intolerance, polydipsia and polyuria.   Genitourinary: Negative for decreased urine volume, difficulty urinating, discharge, dysuria, flank pain, frequency, genital sores, hematuria, testicular pain and urgency.   Musculoskeletal: Negative for arthralgias, back pain, gait problem, joint swelling, myalgias and neck pain.   Skin: Negative for color change, rash and wound.   Allergic/Immunologic: Negative for environmental allergies and food allergies.   Neurological: Positive for dizziness and light-headedness. Negative for tremors, seizures, syncope, facial asymmetry, speech difficulty, weakness, numbness and headaches.   Hematological: Negative for adenopathy. Does not bruise/bleed easily.   Psychiatric/Behavioral: Negative for confusion, hallucinations, sleep disturbance and suicidal ideas. The patient is not nervous/anxious.        Past Medical History:   Diagnosis Date    Anxiety     Hyperlipidemia     Hypertension       Past Surgical History:   Procedure Laterality Date    CHOLECYSTECTOMY  04/08/2019    w/ Small Umbilical hernia rep-     UMBILICAL HERNIA REPAIR  04/08/2019           Family History   Problem Relation Age of Onset    Diabetes Mother     Hypertension Mother     Hypertension Sister     Hyperlipidemia Sister     Anxiety disorder Sister     Coronary artery disease Maternal Grandfather     No Known Problems Father        Social History     Socioeconomic History    Marital status:      Spouse name: Not on file    Number of children: Not on file    Years of education: Not on file    Highest education level: Not on file   Occupational  History    Occupation: [employer]   Social Needs    Financial resource strain: Not on file    Food insecurity:     Worry: Not on file     Inability: Not on file    Transportation needs:     Medical: Not on file     Non-medical: Not on file   Tobacco Use    Smoking status: Current Some Day Smoker     Packs/day: 0.50     Types: Cigarettes    Smokeless tobacco: Never Used    Tobacco comment: trying to quit   Substance and Sexual Activity    Alcohol use: Yes     Frequency: 2-4 times a month     Drinks per session: 7 to 9     Binge frequency: Weekly    Drug use: No    Sexual activity: Yes     Partners: Female   Lifestyle    Physical activity:     Days per week: Not on file     Minutes per session: Not on file    Stress: Only a little   Relationships    Social connections:     Talks on phone: Not on file     Gets together: Not on file     Attends Cheondoism service: Not on file     Active member of club or organization: Not on file     Attends meetings of clubs or organizations: Not on file     Relationship status: Not on file   Other Topics Concern    Not on file   Social History Narrative    LIVE WITH MICHA       Current Outpatient Medications   Medication Sig Dispense Refill    amLODIPine (NORVASC) 10 MG tablet Take 1 tablet (10 mg total) by mouth once daily. 90 tablet 1    atorvastatin (LIPITOR) 20 MG tablet Take 1 tablet (20 mg total) by mouth nightly. 90 tablet 1     No current facility-administered medications for this visit.        Review of patient's allergies indicates:   Allergen Reactions    Amoxicillin Hives     Objective:    HPI     Hypertension      Additional comments: followup labs              Mass      Additional comments: right groin area              Abdominal Pain      Additional comments: abdominal discomfort          Last edited by Ramila Hernandez MA on 12/18/2019  2:05 PM. (History)      Blood pressure (!) 140/84, pulse 86, temperature 98.2 °F (36.8 °C), temperature source Temporal,  "height 5' 10" (1.778 m), weight 114.3 kg (252 lb), SpO2 97 %. Body mass index is 36.16 kg/m².   Physical Exam   Constitutional: He appears well-developed. No distress.   Obese     HENT:   Nose: Nose normal.   Mouth/Throat: Oropharynx is clear and moist.   Eyes: Conjunctivae are normal. Right eye exhibits no discharge. Left eye exhibits no discharge. No scleral icterus.   Neck: Carotid bruit is not present.   Cardiovascular: Normal rate, regular rhythm and normal heart sounds.   No murmur heard.  Pulmonary/Chest: Effort normal and breath sounds normal. No respiratory distress. He has no decreased breath sounds. He has no wheezes. He has no rhonchi. He has no rales.   Abdominal: Soft. He exhibits no distension. There is no tenderness. There is no rebound and no guarding. A hernia is present. Hernia confirmed positive in the right inguinal area.   Genitourinary: Testes normal. Right testis shows no mass, no swelling and no tenderness. Left testis shows no mass, no swelling and no tenderness. Circumcised.   Musculoskeletal: He exhibits no edema.   Neurological: He is alert. He displays no tremor.   Skin: Skin is warm and dry.   Psychiatric: He has a normal mood and affect. His speech is normal.   Nursing note and vitals reviewed.        Orders Only on 12/13/2019   Component Date Value Ref Range Status    Glucose 12/13/2019 96  65 - 99 mg/dL Final    BUN, Bld 12/13/2019 13  6 - 20 mg/dL Final    Creatinine 12/13/2019 0.89  0.76 - 1.27 mg/dL Final    eGFR if non African American 12/13/2019 110  >59 mL/min/1.73 Final    eGFR if  12/13/2019 127  >59 mL/min/1.73 Final    BUN/Creatinine Ratio 12/13/2019 15  9 - 20 Final    Sodium 12/13/2019 136  134 - 144 mmol/L Final    Potassium 12/13/2019 4.4  3.5 - 5.2 mmol/L Final    Chloride 12/13/2019 101  96 - 106 mmol/L Final    CO2 12/13/2019 19* 20 - 29 mmol/L Final    Calcium 12/13/2019 9.5  8.7 - 10.2 mg/dL Final    Total Protein 12/13/2019 7.7  6.0 - " 8.5 g/dL Final    Albumin 12/13/2019 4.3  3.5 - 5.5 g/dL Final    Globulin, Total 12/13/2019 3.4  1.5 - 4.5 g/dL Final    Albumin/Globulin Ratio 12/13/2019 1.3  1.2 - 2.2 Final    Total Bilirubin 12/13/2019 1.0  0.0 - 1.2 mg/dL Final    Alkaline Phosphatase 12/13/2019 108  39 - 117 IU/L Final    AST 12/13/2019 35  0 - 40 IU/L Final    ALT 12/13/2019 73* 0 - 44 IU/L Final    Specific [place], UA 12/13/2019 1.023  1.005 - 1.030 Final    pH, UA 12/13/2019 5.0  5.0 - 7.5 Final    Color, UA 12/13/2019 Yellow  Yellow Final    Clarity, UA 12/13/2019 Clear  Clear Final    Leukocytes, UA 12/13/2019 Negative  Negative Final    Protein, UA 12/13/2019 Negative  Negative/Trace Final    Glucose, UA 12/13/2019 Negative  Negative Final    Ketones, UA 12/13/2019 Negative  Negative Final    Occult Blood UA 12/13/2019 Negative  Negative Final    Bilirubin, UA 12/13/2019 Negative  Negative Final    Urobilinogen, UA 12/13/2019 0.2  0.2 - 1.0 mg/dL Final    Nitrite, UA 12/13/2019 Negative  Negative Final    Microscopic Examination 12/13/2019 Comment   Final    Microscopic not indicated and not performed.    Cholesterol 12/13/2019 119  100 - 199 mg/dL Final    Triglycerides 12/13/2019 125  0 - 149 mg/dL Final    HDL 12/13/2019 36* >39 mg/dL Final    VLDL Cholesterol Hank 12/13/2019 25  5 - 40 mg/dL Final    LDL Calculated 12/13/2019 58  0 - 99 mg/dL Final   ]  Assessment:       1. Essential hypertension    2. Mixed hyperlipidemia    3. Right inguinal hernia    4. Elevated ALT measurement        Plan:       Gerardo was seen today for hypertension, hyperlipidemia, mass and abdominal pain.    Diagnoses and all orders for this visit:    Essential hypertension  Comments:  No changes for now  Orders:  -     amLODIPine (NORVASC) 10 MG tablet; Take 1 tablet (10 mg total) by mouth once daily.    Mixed hyperlipidemia  Comments:  Labs look good    Orders:  -     atorvastatin (LIPITOR) 20 MG tablet; Take 1 tablet (20 mg total)  by mouth nightly.    Right inguinal hernia  -     Ambulatory Referral to General Surgery    Elevated ALT measurement  Comments:  Monitor

## 2019-12-21 NOTE — TELEPHONE ENCOUNTER
Path report given to Dr. Lanier to review & sign.   
Path reviewed, signed & scanned into pts chart.  
Never smoker

## 2020-01-09 ENCOUNTER — OFFICE VISIT (OUTPATIENT)
Dept: SURGERY | Facility: CLINIC | Age: 35
End: 2020-01-09
Payer: COMMERCIAL

## 2020-01-09 VITALS
HEART RATE: 88 BPM | HEIGHT: 70 IN | TEMPERATURE: 98 F | BODY MASS INDEX: 36.08 KG/M2 | SYSTOLIC BLOOD PRESSURE: 143 MMHG | DIASTOLIC BLOOD PRESSURE: 102 MMHG | WEIGHT: 252 LBS

## 2020-01-09 DIAGNOSIS — N50.811 RIGHT TESTICULAR PAIN: ICD-10-CM

## 2020-01-09 DIAGNOSIS — K40.90 RIGHT INGUINAL HERNIA: Primary | ICD-10-CM

## 2020-01-09 PROCEDURE — 99213 PR OFFICE/OUTPT VISIT, EST, LEVL III, 20-29 MIN: ICD-10-PCS | Mod: S$GLB,,, | Performed by: SURGERY

## 2020-01-09 PROCEDURE — 99213 OFFICE O/P EST LOW 20 MIN: CPT | Mod: S$GLB,,, | Performed by: SURGERY

## 2020-01-09 NOTE — LETTER
January 15, 2020      Galindo Berrios Jr., MD  [address]  [place] [address]           [facility]  [address]  [place] [address]  Phone: [phone]  Fax: [phone]          Patient: Gerardo Ramirez Jr.   MR Number: [id number]   YOB: 1985   Date of Visit: 1/9/2020       Dear Dr. Galindo Berrios Jr.:    Thank you for referring Gerardo Ramirez to me for evaluation. Attached you will find relevant portions of my assessment and plan of care.    If you have questions, please do not hesitate to call me. I look forward to following Gerardo Ramirez along with you.    Sincerely,    Chantale Munguia  CC:  No Recipients    If you would like to receive this communication electronically, please contact [email or web address] or [phone] to request more information on [facility] access.    For providers and/or their staff who would like to refer a patient to [facility], please contact us through our one-stop-shop provider referral line, [facility] , at [phone].    If you feel you have received this communication in error or would no longer like to receive these types of communications, please e-mail [email or web address]

## 2020-01-09 NOTE — PROGRESS NOTES
Subjective:       Patient ID: Gerardo Ramirez Jr. is a 34 y.o. male.    Chief Complaint: Other (Eval poss [facility])      HPI:  Patient presents to the office with right scrotal and groin pain. He noticed the discomfort several months ago. It is worse with lifting. He states the discomfort is worse in the right testicle. He reports an occasional palpable bulge in the proximal right testicle. He has CT scan from Aug 2018 that shows no evidence of inguinal hernia. He had umbilical hernia repair with cholecystectomy in April 2019.     Past Medical History:   Diagnosis Date    Anxiety     Hyperlipidemia     Hypertension      Past Surgical History:   Procedure Laterality Date    CHOLECYSTECTOMY  04/08/2019    w/ Small Umbilical hernia rep-     UMBILICAL HERNIA REPAIR  04/08/2019         Review of patient's allergies indicates:   Allergen Reactions    Amoxicillin Hives     Medication List with Changes/Refills   Current Medications    AMLODIPINE (NORVASC) 10 MG TABLET    Take 1 tablet (10 mg total) by mouth once daily.    ATORVASTATIN (LIPITOR) 20 MG TABLET    Take 1 tablet (20 mg total) by mouth nightly.     Family History   Problem Relation Age of Onset    Diabetes Mother     Hypertension Mother     Hypertension Sister     Hyperlipidemia Sister     Anxiety disorder Sister     Coronary artery disease Maternal Grandfather     No Known Problems Father      Social History     Socioeconomic History    Marital status:      Spouse name: Not on file    Number of children: Not on file    Years of education: Not on file    Highest education level: Not on file   Occupational History    Occupation: [employer]   Social Needs    Financial resource strain: Not on file    Food insecurity:     Worry: Not on file     Inability: Not on file    Transportation needs:     Medical: Not on file     Non-medical: Not on file   Tobacco Use    Smoking status: Current Some Day Smoker     Packs/day: 0.50      Types: Cigarettes    Smokeless tobacco: Never Used    Tobacco comment: trying to quit   Substance and Sexual Activity    Alcohol use: Yes     Frequency: 2-4 times a month     Drinks per session: 7 to 9     Binge frequency: Weekly    Drug use: No    Sexual activity: Yes     Partners: Female   Lifestyle    Physical activity:     Days per week: Not on file     Minutes per session: Not on file    Stress: Only a little   Relationships    Social connections:     Talks on phone: Not on file     Gets together: Not on file     Attends Hindu service: Not on file     Active member of club or organization: Not on file     Attends meetings of clubs or organizations: Not on file     Relationship status: Not on file   Other Topics Concern    Not on file   Social History Narrative    LIVE WITH FIROSALINA         Review of Systems   Constitutional: Negative for appetite change, chills, fever and unexpected weight change.   HENT: Negative for hearing loss, rhinorrhea, sore throat and voice change.    Eyes: Negative for photophobia and visual disturbance.   Respiratory: Negative for cough, choking and shortness of breath.    Cardiovascular: Negative for chest pain, palpitations and leg swelling.   Gastrointestinal: Negative for abdominal pain, blood in stool, constipation, diarrhea, nausea and vomiting.   Endocrine: Negative for cold intolerance, heat intolerance, polydipsia and polyuria.   Genitourinary: Positive for testicular pain.   Musculoskeletal: Negative for arthralgias, back pain, joint swelling and neck stiffness.   Skin: Negative for color change, pallor and rash.   Neurological: Negative for dizziness, seizures, syncope and headaches.   Hematological: Negative for adenopathy. Does not bruise/bleed easily.   Psychiatric/Behavioral: Negative for agitation, behavioral problems and confusion.       Objective:      Physical Exam   Constitutional: He appears well-developed and well-nourished.  Non-toxic appearance.  No distress.   HENT:   Head: Normocephalic and atraumatic. Head is without abrasion and without laceration.   Right Ear: External ear normal.   Left Ear: External ear normal.   Nose: Nose normal.   Mouth/Throat: Oropharynx is clear and moist.   Eyes: Pupils are equal, round, and reactive to light. EOM are normal.   Neck: Trachea normal and phonation normal. Neck supple. No tracheal deviation and normal range of motion present.   Cardiovascular: Normal rate and regular rhythm.   Pulmonary/Chest: Effort normal. No accessory muscle usage. No tachypnea. No respiratory distress.   Abdominal: Soft. Normal appearance. He exhibits no distension. There is no tenderness. There is no rigidity, no rebound and no guarding. No hernia.   Genitourinary: Right testis shows tenderness. Right testis shows no mass and no swelling. Left testis shows no mass and no tenderness.   Lymphadenopathy:        Right: No inguinal adenopathy present.        Left: No inguinal adenopathy present.   Neurological: He is alert. Coordination and gait normal.   Skin: Skin is warm and intact.   Psychiatric: He has a normal mood and affect. His speech is normal and behavior is normal.       Assessment/Plan:   Right inguinal hernia    Right testicular pain  -     US Scrotum And Testicles; Future; Expected date: 01/09/2020      Will order US testicle to try to rule out any testicular etiology of the pain. I do not appreciate hernia on exam but may CT the groin to further evaluate the pain and in the process evaluate for small hernia that is not appreciated on exam.   He will RTC after US

## 2020-01-10 ENCOUNTER — HOSPITAL ENCOUNTER (OUTPATIENT)
Dept: RADIOLOGY | Facility: HOSPITAL | Age: 35
Discharge: HOME | End: 2020-01-10
Attending: SURGERY
Payer: COMMERCIAL

## 2020-01-10 DIAGNOSIS — N50.811 RIGHT TESTICULAR PAIN: ICD-10-CM

## 2020-01-10 PROCEDURE — 76870 US EXAM SCROTUM: CPT | Mod: TC,PO

## 2020-01-13 ENCOUNTER — TELEPHONE (OUTPATIENT)
Dept: SURGERY | Facility: CLINIC | Age: 35
End: 2020-01-13

## 2020-01-13 NOTE — TELEPHONE ENCOUNTER
Pt notified that per Dr. Lanier, u/s did not show any evidence of hernia. Did show tiny hydroceles which is not a concern. Since CT did show small RIH, he is willing to repair if that is what pt would like to do. Pt states he will call back to make appt to discuss and schedule surgery.

## 2020-01-16 ENCOUNTER — TELEPHONE (OUTPATIENT)
Dept: SURGERY | Facility: CLINIC | Age: 35
End: 2020-01-16

## 2020-01-16 DIAGNOSIS — R10.84 GENERALIZED ABDOMINAL PAIN: ICD-10-CM

## 2020-01-16 DIAGNOSIS — N50.811 RIGHT TESTICULAR PAIN: ICD-10-CM

## 2020-01-16 DIAGNOSIS — K40.90 RIGHT INGUINAL HERNIA: ICD-10-CM

## 2020-01-16 DIAGNOSIS — R10.33 PERIUMBILICAL ABDOMINAL PAIN: Primary | ICD-10-CM

## 2020-01-16 NOTE — TELEPHONE ENCOUNTER
Pt calling requesting CT scan. Last CT to check inguinal hernia was in august of 2018. Pt states he is also having pain near previous umbilical hernia site and he would like that checked out before proceeding with inguinal hernia repair. I notified pt I will d/w Dr. Lanier and call back.

## 2020-01-22 NOTE — TELEPHONE ENCOUNTER
Per talia Dorado to order CT Abd/Pelvis w/ contrast. Pt notified that someone from imaging center will call to schedule. Call office for results.

## 2020-01-30 ENCOUNTER — OFFICE VISIT (OUTPATIENT)
Dept: FAMILY MEDICINE | Facility: CLINIC | Age: 35
End: 2020-01-30
Payer: COMMERCIAL

## 2020-01-30 ENCOUNTER — HOSPITAL ENCOUNTER (OUTPATIENT)
Dept: RADIOLOGY | Facility: HOSPITAL | Age: 35
Discharge: HOME | End: 2020-01-30
Attending: SURGERY
Payer: COMMERCIAL

## 2020-01-30 VITALS
BODY MASS INDEX: 36.51 KG/M2 | WEIGHT: 255 LBS | TEMPERATURE: 98 F | SYSTOLIC BLOOD PRESSURE: 130 MMHG | DIASTOLIC BLOOD PRESSURE: 98 MMHG | HEIGHT: 70 IN | OXYGEN SATURATION: 97 % | HEART RATE: 81 BPM

## 2020-01-30 DIAGNOSIS — N50.811 RIGHT TESTICULAR PAIN: ICD-10-CM

## 2020-01-30 DIAGNOSIS — R51.9 NONINTRACTABLE HEADACHE, UNSPECIFIED CHRONICITY PATTERN, UNSPECIFIED HEADACHE TYPE: ICD-10-CM

## 2020-01-30 DIAGNOSIS — I10 ESSENTIAL HYPERTENSION: Primary | ICD-10-CM

## 2020-01-30 DIAGNOSIS — R10.84 GENERALIZED ABDOMINAL PAIN: ICD-10-CM

## 2020-01-30 DIAGNOSIS — R10.33 PERIUMBILICAL ABDOMINAL PAIN: ICD-10-CM

## 2020-01-30 DIAGNOSIS — K40.90 RIGHT INGUINAL HERNIA: ICD-10-CM

## 2020-01-30 DIAGNOSIS — F17.210 TOBACCO DEPENDENCE DUE TO CIGARETTES: ICD-10-CM

## 2020-01-30 LAB
CREAT SERPL-MCNC: 0.9 MG/DL (ref 0.5–1.4)
SAMPLE: NORMAL

## 2020-01-30 PROCEDURE — 99213 PR OFFICE/OUTPT VISIT, EST, LEVL III, 20-29 MIN: ICD-10-PCS | Mod: S$GLB,,, | Performed by: INTERNAL MEDICINE

## 2020-01-30 PROCEDURE — 99213 OFFICE O/P EST LOW 20 MIN: CPT | Mod: S$GLB,,, | Performed by: INTERNAL MEDICINE

## 2020-01-30 PROCEDURE — 74177 CT ABD & PELVIS W/CONTRAST: CPT | Mod: TC,PO

## 2020-01-30 PROCEDURE — 25500020 PHARM REV CODE 255: Mod: PO | Performed by: SURGERY

## 2020-01-30 RX ORDER — VARENICLINE TARTRATE 1 MG/1
1 TABLET, FILM COATED ORAL 2 TIMES DAILY
Qty: 60 TABLET | Refills: 3 | Status: SHIPPED | OUTPATIENT
Start: 2020-03-01 | End: 2020-05-14

## 2020-01-30 RX ORDER — LOSARTAN POTASSIUM 50 MG/1
50 TABLET ORAL DAILY
Qty: 30 TABLET | Refills: 3 | Status: SHIPPED | OUTPATIENT
Start: 2020-01-30 | End: 2020-03-16

## 2020-01-30 RX ORDER — VARENICLINE TARTRATE 0.5 (11)-1
KIT ORAL
Qty: 1 PACKAGE | Refills: 0 | Status: SHIPPED | OUTPATIENT
Start: 2020-01-30 | End: 2020-02-05 | Stop reason: SDUPTHER

## 2020-01-30 RX ADMIN — IOHEXOL 100 ML: 350 INJECTION, SOLUTION INTRAVENOUS at 03:01

## 2020-01-30 NOTE — PROGRESS NOTES
Subjective:       Patient ID: Gerardo Ramirez Jr. is a 34 y.o. male.    Chief Complaint: Headache (x 5 days)    Here for follow up.  Has had headache on and off last 4-5 days.  Started with a hangover last weekend but has been exacerbated by work stress and lack of sleep.  Starts in the back of his head and radiates to behind right eye.  Waxes and wanes; improves with BC Powder but never seems to go completely away.  Better today than it has been.  Seems to have headaches and chest tightness when his blood pressure is up so made appointment to have it checked.      Review of Systems   Constitutional: Negative for chills, fatigue, fever and unexpected weight change.   HENT: Negative for congestion, hearing loss, postnasal drip, rhinorrhea, trouble swallowing and voice change.    Eyes: Negative for photophobia and visual disturbance.   Respiratory: Positive for chest tightness (felt a little tightness yesterday). Negative for apnea, cough, choking, shortness of breath and wheezing.    Cardiovascular: Negative for chest pain, palpitations and leg swelling.   Gastrointestinal: Positive for abdominal pain. Negative for blood in stool, constipation, diarrhea, nausea, rectal pain and vomiting.   Endocrine: Negative for cold intolerance, heat intolerance, polydipsia and polyuria.   Genitourinary: Negative for decreased urine volume, difficulty urinating, discharge, dysuria, flank pain, frequency, genital sores, hematuria, testicular pain and urgency.   Musculoskeletal: Positive for neck pain. Negative for arthralgias, back pain, gait problem, joint swelling and myalgias.   Skin: Negative for color change, rash and wound.   Allergic/Immunologic: Negative for environmental allergies and food allergies.   Neurological: Positive for light-headedness and headaches. Negative for dizziness, tremors, seizures, syncope, facial asymmetry, speech difficulty, weakness and numbness.   Hematological: Negative for adenopathy. Does  not bruise/bleed easily.   Psychiatric/Behavioral: Negative for confusion, hallucinations, sleep disturbance and suicidal ideas. The patient is not nervous/anxious.        Past Medical History:   Diagnosis Date    Anxiety     Hyperlipidemia     Hypertension       Past Surgical History:   Procedure Laterality Date    CHOLECYSTECTOMY  04/08/2019    w/ Small Umbilical hernia rep-     UMBILICAL HERNIA REPAIR  04/08/2019           Family History   Problem Relation Age of Onset    Diabetes Mother     Hypertension Mother     Hypertension Sister     Hyperlipidemia Sister     Anxiety disorder Sister     Coronary artery disease Maternal Grandfather     No Known Problems Father        Social History     Socioeconomic History    Marital status:      Spouse name: Not on file    Number of children: Not on file    Years of education: Not on file    Highest education level: Not on file   Occupational History    Occupation: [employer]   Social Needs    Financial resource strain: Not on file    Food insecurity:     Worry: Not on file     Inability: Not on file    Transportation needs:     Medical: Not on file     Non-medical: Not on file   Tobacco Use    Smoking status: Current Some Day Smoker     Packs/day: 0.50     Types: Cigarettes    Smokeless tobacco: Never Used    Tobacco comment: trying to quit   Substance and Sexual Activity    Alcohol use: Yes     Frequency: 2-4 times a month     Drinks per session: 7 to 9     Binge frequency: Weekly    Drug use: No    Sexual activity: Yes     Partners: Female   Lifestyle    Physical activity:     Days per week: Not on file     Minutes per session: Not on file    Stress: Only a little   Relationships    Social connections:     Talks on phone: Not on file     Gets together: Not on file     Attends [employer] service: Not on file     Active member of club or organization: Not on file     Attends meetings of clubs or organizations: Not on  "file     Relationship status: Not on file   Other Topics Concern    Not on file   Social History Narrative    LIVE WITH MICHA       Current Outpatient Medications   Medication Sig Dispense Refill    amLODIPine (NORVASC) 10 MG tablet Take 1 tablet (10 mg total) by mouth once daily. 90 tablet 1    atorvastatin (LIPITOR) 20 MG tablet Take 1 tablet (20 mg total) by mouth nightly. 90 tablet 1    losartan (COZAAR) 50 MG tablet Take 1 tablet (50 mg total) by mouth once daily. 30 tablet 3    varenicline (CHANTIX STARTING MONTH BOX) 0.5 mg (11)- 1 mg (42) tablet Take one 0.5mg tab by mouth once daily X3 days,then increase to one 0.5mg tab twice daily X4 days,then increase to one 1mg tab twice daily 1 Package 0    [START ON 3/1/2020] varenicline (CHANTIX) 1 mg Tab Take 1 tablet (1 mg total) by mouth 2 (two) times daily. 60 tablet 3     No current facility-administered medications for this visit.        Review of patient's allergies indicates:   Allergen Reactions    Amoxicillin Hives     Objective:    HPI     Headache      Additional comments: x 5 days          Last edited by Ramila Hernandez MA on 1/30/2020  4:09 PM. (History)      Blood pressure (!) 130/98, pulse 81, temperature 98.1 °F (36.7 °C), temperature source Temporal, height 5' 10" (1.778 m), weight 115.7 kg (255 lb), SpO2 97 %. Body mass index is 36.59 kg/m².   Physical Exam   Constitutional: He appears well-developed. No distress.   Obese     HENT:   Nose: Nose normal.   Mouth/Throat: Oropharynx is clear and moist.   Eyes: Conjunctivae are normal. Right eye exhibits no discharge. Left eye exhibits no discharge. No scleral icterus.   Neck: Carotid bruit is not present.   Cardiovascular: Normal rate, regular rhythm and normal heart sounds.   No murmur heard.  Pulmonary/Chest: Effort normal and breath sounds normal. No respiratory distress. He has no decreased breath sounds. He has no wheezes. He has no rhonchi. He has no rales.   Abdominal: Soft. He exhibits " no distension. There is no tenderness. There is no rebound and no guarding.   Musculoskeletal: He exhibits no edema.   Neurological: He is alert. He displays no tremor.   Skin: Skin is warm and dry.   Psychiatric: He has a normal mood and affect. His speech is normal.   Nursing note and vitals reviewed.          Assessment:       1. Essential hypertension    2. Nonintractable headache, unspecified chronicity pattern, unspecified headache type    3. Tobacco dependence due to cigarettes        Plan:       Gerardo was seen today for headache.    Diagnoses and all orders for this visit:    Essential hypertension  Comments:  Has had trouble with ED on multiple BP meds in the past  Orders:  -     losartan (COZAAR) 50 MG tablet; Take 1 tablet (50 mg total) by mouth once daily.    Nonintractable headache, unspecified chronicity pattern, unspecified headache type  Comments:  Make sure to get plenty of fluids;  started with a hangover so may still be r/t dehydration.      Tobacco dependence due to cigarettes  -     varenicline (CHANTIX STARTING MONTH BOX) 0.5 mg (11)- 1 mg (42) tablet; Take one 0.5mg tab by mouth once daily X3 days,then increase to one 0.5mg tab twice daily X4 days,then increase to one 1mg tab twice daily  -     varenicline (CHANTIX) 1 mg Tab; Take 1 tablet (1 mg total) by mouth 2 (two) times daily.

## 2020-02-05 ENCOUNTER — OFFICE VISIT (OUTPATIENT)
Dept: SURGERY | Facility: CLINIC | Age: 35
End: 2020-02-05
Payer: COMMERCIAL

## 2020-02-05 VITALS
SYSTOLIC BLOOD PRESSURE: 132 MMHG | TEMPERATURE: 98 F | HEIGHT: 70 IN | HEART RATE: 79 BPM | DIASTOLIC BLOOD PRESSURE: 94 MMHG | WEIGHT: 255 LBS | BODY MASS INDEX: 36.51 KG/M2

## 2020-02-05 DIAGNOSIS — K40.20 NON-RECURRENT BILATERAL INGUINAL HERNIA WITHOUT OBSTRUCTION OR GANGRENE: Primary | ICD-10-CM

## 2020-02-05 PROCEDURE — 99213 OFFICE O/P EST LOW 20 MIN: CPT | Mod: S$GLB,,, | Performed by: SURGERY

## 2020-02-05 PROCEDURE — 99213 PR OFFICE/OUTPT VISIT, EST, LEVL III, 20-29 MIN: ICD-10-PCS | Mod: S$GLB,,, | Performed by: SURGERY

## 2020-02-11 NOTE — PROGRESS NOTES
Subjective:       Patient ID: Gerardo Ramirez Jr. is a 35 y.o. male.    Chief Complaint: Other ([facility])      HPI:  Patient returns to the office after CT scan and scrotal US. He has been having right sided scrotal and groin pain. He noticed the discomfort several months ago. It is worse with lifting. He states the discomfort is worse in the right testicle. He reports an occasional palpable bulge in the proximal right testicle. He has CT scan from Aug 2018 that shows no evidence of inguinal hernia. The new CT showed small bilateral inguinal hernias with right larger than left. The scrotal US was normal. He had umbilical hernia repair with cholecystectomy in April 2019.     Past Medical History:   Diagnosis Date    Anxiety     Hyperlipidemia     Hypertension      Past Surgical History:   Procedure Laterality Date    CHOLECYSTECTOMY  04/08/2019    w/ Small Umbilical hernia rep-     UMBILICAL HERNIA REPAIR  04/08/2019         Review of patient's allergies indicates:   Allergen Reactions    Amoxicillin Hives     Medication List with Changes/Refills   Current Medications    AMLODIPINE (NORVASC) 10 MG TABLET    Take 1 tablet (10 mg total) by mouth once daily.    ATORVASTATIN (LIPITOR) 20 MG TABLET    Take 1 tablet (20 mg total) by mouth nightly.    LOSARTAN (COZAAR) 50 MG TABLET    Take 1 tablet (50 mg total) by mouth once daily.    VARENICLINE (CHANTIX) 1 MG TAB    Take 1 tablet (1 mg total) by mouth 2 (two) times daily.   Discontinued Medications    VARENICLINE (CHANTIX STARTING MONTH BOX) 0.5 MG (11)- 1 MG (42) TABLET    Take one 0.5mg tab by mouth once daily X3 days,then increase to one 0.5mg tab twice daily X4 days,then increase to one 1mg tab twice daily     Family History   Problem Relation Age of Onset    Diabetes Mother     Hypertension Mother     Hypertension Sister     Hyperlipidemia Sister     Anxiety disorder Sister     Coronary artery disease Maternal Grandfather      No Known Problems Father      Social History     Socioeconomic History    Marital status:      Spouse name: Not on file    Number of children: Not on file    Years of education: Not on file    Highest education level: Not on file   Occupational History    Occupation: [employer]   Social Needs    Financial resource strain: Not on file    Food insecurity:     Worry: Not on file     Inability: Not on file    Transportation needs:     Medical: Not on file     Non-medical: Not on file   Tobacco Use    Smoking status: Current Some Day Smoker     Packs/day: 0.50     Types: Cigarettes    Smokeless tobacco: Never Used    Tobacco comment: trying to quit   Substance and Sexual Activity    Alcohol use: Yes     Frequency: 2-4 times a month     Drinks per session: 7 to 9     Binge frequency: Weekly    Drug use: No    Sexual activity: Yes     Partners: Female   Lifestyle    Physical activity:     Days per week: Not on file     Minutes per session: Not on file    Stress: Only a little   Relationships    Social connections:     Talks on phone: Not on file     Gets together: Not on file     Attends [employer] service: Not on file     Active member of club or organization: Not on file     Attends meetings of clubs or organizations: Not on file     Relationship status: Not on file   Other Topics Concern    Not on file   Social History Narrative    LIVE WITH FIANCE         Review of Systems   Constitutional: Negative for appetite change, chills, fever and unexpected weight change.   HENT: Negative for hearing loss, rhinorrhea, sore throat and voice change.    Eyes: Negative for photophobia and visual disturbance.   Respiratory: Negative for cough, choking and shortness of breath.    Cardiovascular: Negative for chest pain, palpitations and leg swelling.   Gastrointestinal: Negative for abdominal pain, blood in stool, constipation, diarrhea, nausea and vomiting.   Endocrine: Negative for cold intolerance, heat  intolerance, polydipsia and polyuria.   Genitourinary: Positive for testicular pain.   Musculoskeletal: Negative for arthralgias, back pain, joint swelling and neck stiffness.   Skin: Negative for color change, pallor and rash.   Neurological: Negative for dizziness, seizures, syncope and headaches.   Hematological: Negative for adenopathy. Does not bruise/bleed easily.   Psychiatric/Behavioral: Negative for agitation, behavioral problems and confusion.       Objective:      Physical Exam   Constitutional: He appears well-developed and well-nourished.  Non-toxic appearance. No distress.   HENT:   Head: Normocephalic and atraumatic. Head is without abrasion and without laceration.   Right Ear: External ear normal.   Left Ear: External ear normal.   Nose: Nose normal.   Mouth/Throat: Oropharynx is clear and moist.   Eyes: Pupils are equal, round, and reactive to light. EOM are normal.   Neck: Trachea normal and phonation normal. Neck supple. No tracheal deviation and normal range of motion present.   Cardiovascular: Normal rate and regular rhythm.   Pulmonary/Chest: Effort normal. No accessory muscle usage. No tachypnea. No respiratory distress.   Abdominal: Soft. Normal appearance. He exhibits no distension. There is no tenderness. There is no rigidity, no rebound and no guarding.   The hernia is very difficult to palpate in the right and left.    Genitourinary: Right testis shows tenderness. Right testis shows no mass and no swelling. Left testis shows no mass and no tenderness.   Lymphadenopathy: No inguinal adenopathy noted on the right or left side.        Right: No inguinal adenopathy present.        Left: No inguinal adenopathy present.   Neurological: He is alert. Coordination and gait normal.   Skin: Skin is warm and intact.   Psychiatric: He has a normal mood and affect. His speech is normal and behavior is normal.       Assessment/Plan:   Non-recurrent bilateral inguinal hernia without obstruction or  gangrene  -     Case Request Operating Room: REPAIR, HERNIA, INGUINAL, WITHOUT HISTORY OF PRIOR REPAIR, AGE 5 YEARS OR OLDER  -     Basic metabolic panel; Future; Expected date: 02/05/2020  -     CBC auto differential; Future; Expected date: 02/05/2020  -     EKG 12-lead; Future  -     X-Ray Chest 1 View; Future; Expected date: 02/05/2020    Other orders  -     Full code; Standing  -     Progressive Mobility Protocol (mobilize patient to their highest level of functioning at least twice daily); Standing  -     Insert peripheral IV; Standing    The CT shows moderate size right inguinal hernia with small left inguinal hernia. He would like to proceed with bilateral repair. He will be scheduled for the Tuesday     Impression/Treatment Plan: Bilateral inguina hernia repair.     I discussed the proposed procedures the patient including risks, benefits, indications, alternatives and special concerns.  The patient appears to understand and agrees to go ahead with surgery.  I have made no promises, warranties or verbal agreements beyond what was discussed above.

## 2020-02-18 ENCOUNTER — HOSPITAL ENCOUNTER (OUTPATIENT)
Dept: PREADMISSION TESTING | Facility: HOSPITAL | Age: 35
Discharge: HOME | End: 2020-02-18
Attending: SURGERY
Payer: COMMERCIAL

## 2020-02-18 ENCOUNTER — HOSPITAL ENCOUNTER (OUTPATIENT)
Dept: RADIOLOGY | Facility: HOSPITAL | Age: 35
Discharge: HOME | End: 2020-02-18
Attending: SURGERY
Payer: COMMERCIAL

## 2020-02-18 DIAGNOSIS — K40.20 NON-RECURRENT BILATERAL INGUINAL HERNIA WITHOUT OBSTRUCTION OR GANGRENE: ICD-10-CM

## 2020-02-18 PROCEDURE — 71046 X-RAY EXAM CHEST 2 VIEWS: CPT | Mod: TC

## 2020-02-18 PROCEDURE — 93005 ELECTROCARDIOGRAM TRACING: CPT

## 2020-02-18 RX ORDER — IBUPROFEN 200 MG
200 TABLET ORAL
COMMUNITY
End: 2022-01-26 | Stop reason: ALTCHOICE

## 2020-02-18 NOTE — DISCHARGE INSTRUCTIONS
Preventing Falls: In the Hospital     Make sure you know how to call for help while in the hospital.     At some point, you may need care in a hospital or other facility. People may ask how well you can move around. Answer this question honestly. If you have a high risk of falling, the staff will take extra steps to help keep you safe. Remember, always ask for help when you need it. Here are some tips to keep you safe in the hospital.  Keep things within reach  · Keep the things you use often within easy reach, like tissues, water, remote control, light cord, and call bell.  · With the nurse present, practice using the call button before you really need it. Keep it within reach. And don't be afraid to use it when you need to!  · Know how to turn the light on and off from your bed. Also, know how to use the bed control.  Get help to move around  · Don't get up on your own, even to use the bathroom. Call someone to help.  · Sit up slowly and with help.  · Don't try to move IV poles or other equipment on your own.  · Use your walking aid as instructed by the staff. Be sure to use handrails in bathrooms or in hallways.  · The staff may use a gait belt to keep you safe as you move around. This fits snugly around your waist. It allows another person to support you as you walk together.  A note to family and friends  When someone is ill or in the hospital, falling is more likely. You can help your loved one reduce the risk:  · Keep personal items in the same place. Stick with a routine.  · Learn about the guidelines the staff has in place to prevent falls. Follow them.  · Get guidance on using safety equipment and moving your loved one.  · When directing your loved one, keep it simple. Go one step at a time.  · Notify staff about any mental or physical changes you notice in your loved one.   Date Last Reviewed: 6/13/2015  © 6715-4364 [employer]. [address] [place] [address], [place] [address]. All rights  reserved. This information is not intended as a substitute for professional medical care. Always follow your healthcare professional's instructions.       Incision Care  Remember: Follow-up visits allow your healthcare provider to make sure your incision is healing well. Be sure to keep your appointments.     Stitches (sutures), surgical staples, special strips of surgical tape, or surgical skin glue may be used to close incisions. They also help stop bleeding and speed healing. To help your incision heal, follow the tips on this handout.  Home care  Tips for home care include the following:  · Always wash your hands before touching your incision.  · Keep your incision clean and dry.  · Avoid doing things that could cause dirt or sweat to get on your incision.  · Dont pick at scabs. They help protect the wound.  · Keep your incision out of water.  · Take a sponge bath to avoid getting your incision wet, unless your healthcare provider tells you otherwise.  · Ask your provider when can you take a shower or bathe.  · Ask your provider about the best way to keep your incision dry when bathing or showering.  · Pat stitches dry if they get wet. Dont rub.  · Leave the bandage (dressing) in place until you are told to remove it or change it. Change it only as directed, using clean hands.  · After the first 12 hours, change your dressing every 24 hours, or as directed by your healthcare provider.  · Change your dressing if it gets wet or soiled.  Care for specific closures  Follow these guidelines unless your healthcare provider tells you otherwise:  · Stitches or staples. Once you no longer need to keep these dry, clean the wound daily. First remove the bandage using clean hands. Then wash the area gently with soap and warm water. Use a wet cotton swab to loosen and remove any blood or crust that forms. After cleaning, put a thin layer of antibiotic ointment on. Then put on a new bandage.  · Skin glue. Dont put liquid,  ointment, or cream on your wound while the glue is in place. Avoid activities that cause heavy sweating. Protect the wound from sunlight. Do not scratch, rub, or pick at the glue. Do not put tape directly over the glue. The glue should peel off within 5 to 10 days.  · Surgical tape. Keep the area dry. If it gets wet, blot the area dry with a clean towel. Surgical tape usually falls off within 7 to 10 days. If it has not fallen off after 10 days, contact your healthcare provider before taking it off yourself. If you are told to remove the tape, put mineral oil or petroleum jelly on a cotton ball. Gently rub the tape until it is removed.  Changing your dressing  Leave the dressing (bandage) in place until you are told to remove it or change it. Follow the instructions below unless told otherwise by your healthcare provider:  · Always wash your hands before changing your dressing.  · After the first 48 hours, the incision wound usually will have closed. At this point, leave the incision uncovered and open to the air. If the incision has not closed keep it covered.  · Cover your incision only if your clothing is rubbing it or causing irritation.  · Change your dressing if it gets wet or soiled.  Follow-up care  Follow up with your healthcare provider to ask how long sutures or staples should be left in place. Be sure to return for stitch or staple removal as directed. If dissolving stitches were used in your mouth, these will not need to be removed. They should fall out or dissolve on their own.  If tape closures were used, remove them yourself when your provider recommends if they have not fallen off on their own. If skin glue was used, the glue will wear off by itself.  When to seek medical care  Call your healthcare provider if you have any of the following:  · More pain, redness, swelling, bleeding, or foul-smelling discharge around the incision area  · Fever of 100.4°F (38ºC) or higher, or as directed by your  healthcare provider  · Shaking chills  · Vomiting or nausea that doesn't go away  · Numbness, coldness, or tingling around the incision area, or changes in skin color  · Opening of the sutures or wound  · Stitches or staples come apart or fall out or surgical tape falls off before 7 days or as directed by your healthcare provider   Date Last Reviewed: 12/1/2016  © 2280-2090 [employer]. [address] [place] [address], [place] [address]. All rights reserved. This information is not intended as a substitute for professional medical care. Always follow your healthcare professional's instructions.        How to Quit Smoking  Smoking is one of the hardest habits to break. About half of all people who have ever smoked have been able to quit. Most people who still smoke want to quit. Here are some of the best ways to stop smoking.    Keep trying  Most smokers make many attempts at quitting before they are successful. Its important not to give up.  Go cold turkey  Most former smokers quit cold turkey (all at once). Trying to cut back gradually doesn't seem to work as well, perhaps because it continues the smoking habit. Also, it is possible to inhale more while smoking fewer cigarettes. This results in the same amount of nicotine in your body.  Get support  Support programs can be a big help, especially for heavy smokers. These groups offer lectures, ways to change behavior, and peer support. Here are some ways to find a support program:  · Free national quitline: 800-QUIT-NOW (920-967-1916).  · Hospital quit-smoking programs.  · American Lung Association: (260.316.3513).  · American Cancer Society (131-159-9255).  Support at home is important too. Nonsmokers can offer praise and encouragement. If the smoker in your life finds it hard to quit, encourage them to keep trying.  Over-the-counter medicines  Nicotine replacement therapy may make quitting easier. Certain aids, such as the nicotine patch, gum, and lozenges,  "are available without a prescription. It is best to use these under a doctors care, though. The skin patch provides a steady supply of nicotine. Nicotine gum and lozenges give temporary bursts of low levels of nicotine. Both methods reduce the craving for cigarettes. Warning: If you have nausea, vomiting, dizziness, weakness, or a fast heartbeat, stop using these products and see your doctor.  Prescription medicines  After reviewing your smoking patterns and past attempts to quit, your doctor may offer a prescription medicine such as bupropion, varenicline, a nicotine inhaler, or nasal spray. Each has advantages and side effects. Your doctor can review these with you.  Health benefits of quitting  The benefits of quitting start right away and keep improving the longer you go without smoking. These benefits occur at any age.  So whether you are 17 or 70, quitting is a good decision. Some of the benefits include:  · 20 minutes: Blood pressure and pulse return to normal.  · 8 hours: Oxygen levels return to normal.  · 2 days: Ability to smell and taste begin to improve as damaged nerves regrow.  · 2 to 3 weeks: Circulation and lung function improve.  · 1 to 9 months: Coughing, congestion, and shortness of breath decrease; tiredness decreases.  · 1 year: Risk of heart attack decreases by half.  · 5 years: Risk of lung cancer decreases by half; risk of stroke becomes the same as a nonsmokers.  For more on how to quit smoking, try these online resources:   · Smokefree.gov  · "Clearing the Air" booklet from the National Cancer [place]: smokefree.gov/sites/default/files/pdf/clearing-the-air-accessible.pdf  Date Last Reviewed: 3/1/2017  © 1569-0727 [employer]. [address] [place] [address]. All rights reserved. This information is not intended as a substitute for professional medical care. Always follow your healthcare professional's instructions.        "

## 2020-02-26 ENCOUNTER — TELEPHONE (OUTPATIENT)
Dept: FAMILY MEDICINE | Facility: CLINIC | Age: 35
End: 2020-02-26

## 2020-02-26 ENCOUNTER — OFFICE VISIT (OUTPATIENT)
Dept: FAMILY MEDICINE | Facility: CLINIC | Age: 35
End: 2020-02-26
Payer: COMMERCIAL

## 2020-02-26 VITALS
OXYGEN SATURATION: 97 % | BODY MASS INDEX: 36.58 KG/M2 | DIASTOLIC BLOOD PRESSURE: 84 MMHG | HEIGHT: 69 IN | WEIGHT: 247 LBS | HEART RATE: 87 BPM | SYSTOLIC BLOOD PRESSURE: 118 MMHG | TEMPERATURE: 98 F

## 2020-02-26 DIAGNOSIS — I10 ESSENTIAL HYPERTENSION: Primary | ICD-10-CM

## 2020-02-26 PROCEDURE — 99213 OFFICE O/P EST LOW 20 MIN: CPT | Mod: S$GLB,,, | Performed by: INTERNAL MEDICINE

## 2020-02-26 PROCEDURE — 99213 PR OFFICE/OUTPT VISIT, EST, LEVL III, 20-29 MIN: ICD-10-PCS | Mod: S$GLB,,, | Performed by: INTERNAL MEDICINE

## 2020-02-26 RX ORDER — VALACYCLOVIR HYDROCHLORIDE 1 G/1
2000 TABLET, FILM COATED ORAL EVERY 12 HOURS
Qty: 4 TABLET | Refills: 6 | Status: SHIPPED | OUTPATIENT
Start: 2020-02-26 | End: 2020-07-28

## 2020-02-26 NOTE — PROGRESS NOTES
Subjective:       Patient ID: Gerardo Ramirez Jr. is a 35 y.o. male.    Chief Complaint: Hypertension (continuity of care); Hyperlipidemia; and restless legs    Hypertension   This is a chronic problem. Associated symptoms include headaches. Pertinent negatives include no chest pain, neck pain, palpitations, peripheral edema or shortness of breath. Risk factors for coronary artery disease include obesity, male gender, dyslipidemia and smoking/tobacco exposure. Past treatments include calcium channel blockers and angiotensin blockers.     Review of Systems   Constitutional: Negative for chills, fatigue, fever and unexpected weight change.   HENT: Negative for congestion, hearing loss, postnasal drip, rhinorrhea, trouble swallowing and voice change.    Eyes: Negative for photophobia and visual disturbance.   Respiratory: Negative for apnea, cough, choking, chest tightness, shortness of breath and wheezing.    Cardiovascular: Negative for chest pain, palpitations and leg swelling.   Gastrointestinal: Negative for abdominal pain, blood in stool, constipation, diarrhea, nausea, rectal pain and vomiting.   Endocrine: Negative for cold intolerance, heat intolerance, polydipsia and polyuria.   Genitourinary: Positive for decreased urine volume. Negative for difficulty urinating, discharge, dysuria, flank pain, frequency, genital sores, hematuria, testicular pain and urgency.   Musculoskeletal: Negative for arthralgias, back pain, gait problem, joint swelling, myalgias and neck pain.   Skin: Negative for color change, rash and wound.   Allergic/Immunologic: Negative for environmental allergies and food allergies.   Neurological: Positive for dizziness, light-headedness (mild) and headaches. Negative for tremors, seizures, syncope, facial asymmetry, speech difficulty, weakness and numbness.   Hematological: Negative for adenopathy. Does not bruise/bleed easily.   Psychiatric/Behavioral: Negative for confusion,  hallucinations, sleep disturbance and suicidal ideas. The patient is nervous/anxious.        Past Medical History:   Diagnosis Date    Anxiety     Hyperlipidemia     Hypertension 2013    Inguinal hernia bilateral, non-recurrent 02/2020    right side more pronounced- surg scheduled    Pneumonia     age 11    Sleep apnea-like behavior     needs sleep study- wife says he stops breathing while sleeping      Past Surgical History:   Procedure Laterality Date    CHOLECYSTECTOMY  04/08/2019    w/ Small Umbilical hernia rep-     UMBILICAL HERNIA REPAIR  04/08/2019           Family History   Problem Relation Age of Onset    Diabetes Mother     Hypertension Mother     Hypertension Sister     Hyperlipidemia Sister     Anxiety disorder Sister     Coronary artery disease Maternal Grandfather     No Known Problems Father        Social History     Socioeconomic History    Marital status:      Spouse name: Not on file    Number of children: Not on file    Years of education: Not on file    Highest education level: Not on file   Occupational History    Occupation: [employer]   Social Needs    Financial resource strain: Not on file    Food insecurity:     Worry: Not on file     Inability: Not on file    Transportation needs:     Medical: Not on file     Non-medical: Not on file   Tobacco Use    Smoking status: Current Some Day Smoker     Packs/day: 1.00     Years: 20.00     Pack years: 20.00     Types: Cigarettes    Smokeless tobacco: Never Used    Tobacco comment: trying to quit--02/2020 inst not to smoke dos    Substance and Sexual Activity    Alcohol use: Yes     Frequency: 2-4 times a month     Drinks per session: 7 to 9     Binge frequency: Weekly     Comment: occ    Drug use: No    Sexual activity: Yes     Partners: Female   Lifestyle    Physical activity:     Days per week: Not on file     Minutes per session: Not on file    Stress: Only a little   Relationships     "Social connections:     Talks on phone: Not on file     Gets together: Not on file     Attends Jewish service: Not on file     Active member of club or organization: Not on file     Attends meetings of clubs or organizations: Not on file     Relationship status: Not on file   Other Topics Concern    Not on file   Social History Narrative    LIVE WITH MICHA       Current Outpatient Medications   Medication Sig Dispense Refill    amLODIPine (NORVASC) 10 MG tablet Take 1 tablet (10 mg total) by mouth once daily. 90 tablet 1    atorvastatin (LIPITOR) 20 MG tablet Take 1 tablet (20 mg total) by mouth nightly. 90 tablet 1    ibuprofen (ADVIL,MOTRIN) 200 MG tablet Take 200 mg by mouth as needed for Pain.      losartan (COZAAR) 50 MG tablet Take 1 tablet (50 mg total) by mouth once daily. 30 tablet 3    aspirin/salicylamide/caffeine (BC HEADACHE POWDER ORAL) Take 1 tablet by mouth as needed.      [START ON 3/1/2020] varenicline (CHANTIX) 1 mg Tab Take 1 tablet (1 mg total) by mouth 2 (two) times daily. (Patient not taking: Reported on 2/5/2020) 60 tablet 3     No current facility-administered medications for this visit.        Review of patient's allergies indicates:   Allergen Reactions    Amoxicillin Hives     Objective:    HPI     Hypertension      Additional comments: continuity of care          Last edited by Ramila Hernandez MA on 2/26/2020  1:23 PM. (History)      Blood pressure 118/84, pulse 87, temperature 97.7 °F (36.5 °C), temperature source Temporal, height 5' 9" (1.753 m), weight 112 kg (247 lb), SpO2 97 %. Body mass index is 36.48 kg/m².   Physical Exam   Constitutional: He appears well-developed. No distress.   Obese     HENT:   Nose: Nose normal.   Mouth/Throat: Oropharynx is clear and moist.   Eyes: Conjunctivae are normal. Right eye exhibits no discharge. Left eye exhibits no discharge. No scleral icterus.   Neck: Carotid bruit is not present.   Cardiovascular: Normal rate, regular rhythm and " normal heart sounds.   No murmur heard.  Pulmonary/Chest: Effort normal and breath sounds normal. No respiratory distress. He has no decreased breath sounds. He has no wheezes. He has no rhonchi. He has no rales.   Abdominal: Soft. He exhibits no distension. There is no tenderness. There is no rebound and no guarding.   Musculoskeletal: He exhibits no edema.   Neurological: He is alert. He displays no tremor.   Skin: Skin is warm and dry.   Psychiatric: He has a normal mood and affect. His speech is normal.   Nursing note and vitals reviewed.        Lab Visit on 02/18/2020   Component Date Value Ref Range Status    Sodium 02/18/2020 140  136 - 145 mmol/L Final    Potassium 02/18/2020 3.9  3.5 - 5.1 mmol/L Final    Chloride 02/18/2020 103  95 - 110 mmol/L Final    CO2 02/18/2020 28  23 - 29 mmol/L Final    Glucose 02/18/2020 143* 70 - 110 mg/dL Final    BUN, Bld 02/18/2020 15  6 - 20 mg/dL Final    Creatinine 02/18/2020 1.0  0.5 - 1.4 mg/dL Final    Calcium 02/18/2020 9.4  8.7 - 10.5 mg/dL Final    Anion Gap 02/18/2020 9  8 - 16 mmol/L Final    eGFR if African American 02/18/2020 >60.0  >60 mL/min/1.73 m^2 Final    eGFR if non African American 02/18/2020 >60.0  >60 mL/min/1.73 m^2 Final    Comment: Calculation used to obtain the estimated glomerular filtration  rate (eGFR) is the CKD-EPI equation.       WBC 02/18/2020 5.42  3.90 - 12.70 K/uL Final    RBC 02/18/2020 5.26  4.60 - 6.20 M/uL Final    Hemoglobin 02/18/2020 14.3  14.0 - 18.0 g/dL Final    Hematocrit 02/18/2020 43.9  40.0 - 54.0 % Final    Mean Corpuscular Volume 02/18/2020 84  82 - 98 fL Final    Mean Corpuscular Hemoglobin 02/18/2020 27.2  27.0 - 31.0 pg Final    Mean Corpuscular Hemoglobin Conc 02/18/2020 32.6  32.0 - 36.0 g/dL Final    RDW 02/18/2020 13.1  11.5 - 14.5 % Final    Platelets 02/18/2020 222  150 - 350 K/uL Final    MPV 02/18/2020 9.9  9.2 - 12.9 fL Final    Immature Granulocytes 02/18/2020 0.2  0.0 - 0.5 % Final     Gran # (ANC) 02/18/2020 3.0  1.8 - 7.7 K/uL Final    Immature Grans (Abs) 02/18/2020 0.01  0.00 - 0.04 K/uL Final    Comment: Mild elevation in immature granulocytes is non specific and   can be seen in a variety of conditions including stress response,   acute inflammation, trauma and pregnancy. Correlation with other   laboratory and clinical findings is essential.      Lymph # 02/18/2020 1.6  1.0 - 4.8 K/uL Final    Mono # 02/18/2020 0.4  0.3 - 1.0 K/uL Final    Eos # 02/18/2020 0.4  0.0 - 0.5 K/uL Final    Baso # 02/18/2020 0.04  0.00 - 0.20 K/uL Final    nRBC 02/18/2020 0  0 /100 WBC Final    Gran% 02/18/2020 55.5  38.0 - 73.0 % Final    Lymph% 02/18/2020 29.2  18.0 - 48.0 % Final    Mono% 02/18/2020 6.8  4.0 - 15.0 % Final    Eosinophil% 02/18/2020 7.6  0.0 - 8.0 % Final    Basophil% 02/18/2020 0.7  0.0 - 1.9 % Final    Differential Method 02/18/2020 Automated   Final   Hospital Outpatient Visit on 01/30/2020   Component Date Value Ref Range Status    POC Creatinine 01/30/2020 0.9  0.5 - 1.4 mg/dL Final    Sample 01/30/2020 VENOUS   Final   ]  Assessment:       1. Essential hypertension        Plan:       Gerardo was seen today for hypertension, hyperlipidemia and restless legs.    Diagnoses and all orders for this visit:    Essential hypertension  Comments:  BP looks better

## 2020-03-02 ENCOUNTER — HOSPITAL ENCOUNTER (OUTPATIENT)
Facility: HOSPITAL | Age: 35
Discharge: HOME | End: 2020-03-02
Attending: SURGERY | Admitting: SURGERY
Payer: COMMERCIAL

## 2020-03-02 ENCOUNTER — ANESTHESIA (OUTPATIENT)
Dept: SURGERY | Facility: HOSPITAL | Age: 35
End: 2020-03-02

## 2020-03-02 ENCOUNTER — ANESTHESIA EVENT (OUTPATIENT)
Dept: SURGERY | Facility: HOSPITAL | Age: 35
End: 2020-03-02

## 2020-03-02 VITALS
HEART RATE: 96 BPM | SYSTOLIC BLOOD PRESSURE: 140 MMHG | WEIGHT: 249.56 LBS | BODY MASS INDEX: 36.96 KG/M2 | RESPIRATION RATE: 18 BRPM | TEMPERATURE: 99 F | HEIGHT: 69 IN | DIASTOLIC BLOOD PRESSURE: 93 MMHG | OXYGEN SATURATION: 97 %

## 2020-03-02 DIAGNOSIS — I49.9 IRREGULAR CARDIAC RHYTHM: ICD-10-CM

## 2020-03-02 DIAGNOSIS — I49.9 IRREGULAR HEART BEAT: ICD-10-CM

## 2020-03-02 DIAGNOSIS — K40.20 NON-RECURRENT BILATERAL INGUINAL HERNIA WITHOUT OBSTRUCTION OR GANGRENE: ICD-10-CM

## 2020-03-02 PROCEDURE — 93005 ELECTROCARDIOGRAM TRACING: CPT

## 2020-03-02 RX ORDER — CLINDAMYCIN PHOSPHATE 900 MG/50ML
900 INJECTION, SOLUTION INTRAVENOUS
Status: DISCONTINUED | OUTPATIENT
Start: 2020-03-02 | End: 2020-03-02 | Stop reason: HOSPADM

## 2020-03-02 NOTE — PROGRESS NOTES
"Pt scheduled for bilateral Inguinal hernia repair as outpatient. Upon arrival in [facility] and connection to cardiac monitor, pt noted to be in atrial fibrillation by [facility] nurse.  A 12 lead EKG was obtained but by that time the patient was in sinus rhythm.  Pt does report hx of "strange chest tightness" associated with the irregular rhythm and that he has had similar episodes over the past few weeks.    PMHx is significant for HTN and has multiple risk factors for [facility].    Discussed with surgeon (Dr. Lanier) and agree to postpone elective surgery and obtain a cardiac evaluation.    Also discussed with patient and family who understand and agree with treatment plan.    Attempting to obtain cardiology consult or clinic appt. ASAP.    Please reconsult if needed.  "

## 2020-03-02 NOTE — PLAN OF CARE
"0535  Upon assessing patient, heart tones irregular, pt placed on cardiac monitor, rhythm appears to be afib, irregular rate .  Pt reports has had "funny feeling and chest tightness off/on."  Dr. Choudhary notified, EKG ordered.    0600 Prior to EKG pt converted back to NSR rate 70-80's.   Dr. Lanier notified, surgery cancelled.    0615  Dr. Locke at bedside to discuss plan of care with patient, pt verbalized understanding.  Consult called into Dr. Mauricio, awaiting call back.   0700  Cardiologist in procedure, message left with cath lab to have MD call back   0800 Spoke with Dr. Mauricio, pt to follow up in office tomorrow  0925  Spoke with Esperanza at Dr. Mauricio's office, pt to see MD tomorrow at 1445.  Pt wife notified of appt. Time, verbalized understanding.  Reinforced to pt and wife prior to discharge to return to ER for chest pain, palpitations, SOB or any other symptoms, pt and wife verbalized understanding  "

## 2020-03-04 ENCOUNTER — LAB VISIT (OUTPATIENT)
Dept: LAB | Facility: HOSPITAL | Age: 35
End: 2020-03-04
Attending: INTERNAL MEDICINE
Payer: COMMERCIAL

## 2020-03-04 ENCOUNTER — PROCEDURE VISIT (OUTPATIENT)
Dept: SLEEP MEDICINE | Facility: HOSPITAL | Age: 35
End: 2020-03-04
Attending: INTERNAL MEDICINE
Payer: COMMERCIAL

## 2020-03-04 ENCOUNTER — TELEPHONE (OUTPATIENT)
Dept: SURGERY | Facility: CLINIC | Age: 35
End: 2020-03-04

## 2020-03-04 DIAGNOSIS — I48.0 PAROXYSMAL ATRIAL FIBRILLATION: ICD-10-CM

## 2020-03-04 DIAGNOSIS — G47.33 OSA (OBSTRUCTIVE SLEEP APNEA): Primary | ICD-10-CM

## 2020-03-04 DIAGNOSIS — R07.9 CHEST PAIN, UNSPECIFIED: Primary | ICD-10-CM

## 2020-03-04 DIAGNOSIS — E87.5 HYPERPOTASSEMIA: ICD-10-CM

## 2020-03-04 DIAGNOSIS — I10 ESSENTIAL HYPERTENSION, MALIGNANT: ICD-10-CM

## 2020-03-04 LAB — TSH SERPL DL<=0.005 MIU/L-ACNC: 2.46 UIU/ML (ref 0.34–5.6)

## 2020-03-04 PROCEDURE — 95810 POLYSOM 6/> YRS 4/> PARAM: CPT

## 2020-03-04 PROCEDURE — 84443 ASSAY THYROID STIM HORMONE: CPT

## 2020-03-04 PROCEDURE — 36415 COLL VENOUS BLD VENIPUNCTURE: CPT

## 2020-03-04 NOTE — TELEPHONE ENCOUNTER
Pt came into office to r/s [facility] repair. Surgery was cx'd due to new onset a-fib. Pt states he is scheduled to have stress test Friday morning with . If all good, he would like to have surgery next week. Surgery r/s'd to 3/10/20. Pt understands clearance must be obtained prior to surgery date. Clearance request faxed to Dr. Mauricio office.

## 2020-03-05 ENCOUNTER — CLINICAL SUPPORT (OUTPATIENT)
Dept: CARDIOLOGY | Facility: HOSPITAL | Age: 35
End: 2020-03-05
Attending: INTERNAL MEDICINE
Payer: COMMERCIAL

## 2020-03-05 VITALS — WEIGHT: 249.56 LBS | BODY MASS INDEX: 36.96 KG/M2 | HEIGHT: 69 IN

## 2020-03-05 DIAGNOSIS — I48.0 PAROXYSMAL ATRIAL FIBRILLATION: ICD-10-CM

## 2020-03-05 DIAGNOSIS — R07.9 CHEST PAIN, UNSPECIFIED: ICD-10-CM

## 2020-03-05 PROCEDURE — 93306 TTE W/DOPPLER COMPLETE: CPT

## 2020-03-06 LAB
AORTIC ROOT ANNULUS: 3.3 CM
AORTIC VALVE CUSP SEPERATION: 2.52 CM
AV INDEX (PROSTH): 0.78
AV MEAN GRADIENT: 3 MMHG
AV PEAK GRADIENT: 6 MMHG
AV VALVE AREA: 3.34 CM2
AV VELOCITY RATIO: 72.09
BSA FOR ECHO PROCEDURE: 2.35 M2
CV ECHO LV RWT: 0.44 CM
DOP CALC AO PEAK VEL: 1.18 M/S
DOP CALC AO VTI: 20.68 CM
DOP CALC LVOT AREA: 4.3 CM2
DOP CALC LVOT DIAMETER: 2.34 CM
DOP CALC LVOT PEAK VEL: 85.07 M/S
DOP CALC LVOT STROKE VOLUME: 68.99 CM3
DOP CALCLVOT PEAK VEL VTI: 16.05 CM
E WAVE DECELERATION TIME: 186.47 MSEC
E/A RATIO: 1.5
E/E' RATIO: 7.67 M/S
ECHO LV POSTERIOR WALL: 1.13 CM (ref 0.6–1.1)
FRACTIONAL SHORTENING: 27 % (ref 28–44)
INTERVENTRICULAR SEPTUM: 1.12 CM (ref 0.6–1.1)
IVRT: 74.59 MSEC
LEFT ATRIUM SIZE: 3.66 CM
LEFT INTERNAL DIMENSION IN SYSTOLE: 3.73 CM (ref 2.1–4)
LEFT VENTRICLE MASS INDEX: 97 G/M2
LEFT VENTRICULAR INTERNAL DIMENSION IN DIASTOLE: 5.09 CM (ref 3.5–6)
LEFT VENTRICULAR MASS: 219.89 G
LV LATERAL E/E' RATIO: 6.9 M/S
LV SEPTAL E/E' RATIO: 8.63 M/S
MV PEAK A VEL: 0.46 M/S
MV PEAK E VEL: 0.69 M/S
PV PEAK VELOCITY: 88.81 CM/S
RA PRESSURE: 3 MMHG
RIGHT VENTRICULAR END-DIASTOLIC DIMENSION: 220 CM
TDI LATERAL: 0.1 M/S
TDI SEPTAL: 0.08 M/S
TDI: 0.09 M/S

## 2020-03-09 RX ORDER — DILTIAZEM HYDROCHLORIDE 120 MG/1
120 CAPSULE, EXTENDED RELEASE ORAL DAILY
COMMUNITY
End: 2020-11-11 | Stop reason: ALTCHOICE

## 2020-03-10 ENCOUNTER — ANESTHESIA (OUTPATIENT)
Dept: SURGERY | Facility: HOSPITAL | Age: 35
End: 2020-03-10
Payer: COMMERCIAL

## 2020-03-10 ENCOUNTER — ANESTHESIA EVENT (OUTPATIENT)
Dept: SURGERY | Facility: HOSPITAL | Age: 35
End: 2020-03-10
Payer: COMMERCIAL

## 2020-03-10 ENCOUNTER — HOSPITAL ENCOUNTER (OUTPATIENT)
Facility: HOSPITAL | Age: 35
Discharge: HOME | End: 2020-03-10
Attending: SURGERY | Admitting: SURGERY
Payer: COMMERCIAL

## 2020-03-10 VITALS
BODY MASS INDEX: 36.29 KG/M2 | OXYGEN SATURATION: 95 % | WEIGHT: 245 LBS | TEMPERATURE: 98 F | RESPIRATION RATE: 18 BRPM | HEIGHT: 69 IN | SYSTOLIC BLOOD PRESSURE: 132 MMHG | HEART RATE: 90 BPM | DIASTOLIC BLOOD PRESSURE: 60 MMHG

## 2020-03-10 DIAGNOSIS — K40.20 NON-RECURRENT BILATERAL INGUINAL HERNIA WITHOUT OBSTRUCTION OR GANGRENE: Primary | ICD-10-CM

## 2020-03-10 PROCEDURE — 71000039 HC RECOVERY, EACH ADD'L HOUR: Performed by: SURGERY

## 2020-03-10 PROCEDURE — 63600175 PHARM REV CODE 636 W HCPCS: Performed by: STUDENT IN AN ORGANIZED HEALTH CARE EDUCATION/TRAINING PROGRAM

## 2020-03-10 PROCEDURE — 27000653 HC CATH, IV CATHLN: Performed by: NURSE ANESTHETIST, CERTIFIED REGISTERED

## 2020-03-10 PROCEDURE — 49505 PRP I/HERN INIT REDUC >5 YR: CPT | Mod: 50,,, | Performed by: SURGERY

## 2020-03-10 PROCEDURE — 25000003 PHARM REV CODE 250: Performed by: SURGERY

## 2020-03-10 PROCEDURE — 27201423 OPTIME MED/SURG SUP & DEVICES STERILE SUPPLY: Performed by: SURGERY

## 2020-03-10 PROCEDURE — 27000284 HC CANNULA NASAL: Performed by: NURSE ANESTHETIST, CERTIFIED REGISTERED

## 2020-03-10 PROCEDURE — C9290 INJ, BUPIVACAINE LIPOSOME: HCPCS | Performed by: SURGERY

## 2020-03-10 PROCEDURE — 27000673 HC TUBING BLOOD Y: Performed by: NURSE ANESTHETIST, CERTIFIED REGISTERED

## 2020-03-10 PROCEDURE — 63600175 PHARM REV CODE 636 W HCPCS: Performed by: SURGERY

## 2020-03-10 PROCEDURE — 36000706: Performed by: SURGERY

## 2020-03-10 PROCEDURE — C1781 MESH (IMPLANTABLE): HCPCS | Performed by: SURGERY

## 2020-03-10 PROCEDURE — 36000707: Performed by: SURGERY

## 2020-03-10 PROCEDURE — 25000003 PHARM REV CODE 250: Performed by: STUDENT IN AN ORGANIZED HEALTH CARE EDUCATION/TRAINING PROGRAM

## 2020-03-10 PROCEDURE — 27000671 HC TUBING MICROBORE EXT: Performed by: NURSE ANESTHETIST, CERTIFIED REGISTERED

## 2020-03-10 PROCEDURE — 49505 PR REPAIR ING HERNIA,5+Y/O,REDUCIBL: ICD-10-PCS | Mod: 50,,, | Performed by: SURGERY

## 2020-03-10 PROCEDURE — S0028 INJECTION, FAMOTIDINE, 20 MG: HCPCS | Performed by: NURSE ANESTHETIST, CERTIFIED REGISTERED

## 2020-03-10 PROCEDURE — 71000033 HC RECOVERY, INTIAL HOUR: Performed by: SURGERY

## 2020-03-10 PROCEDURE — 37000009 HC ANESTHESIA EA ADD 15 MINS: Performed by: SURGERY

## 2020-03-10 PROCEDURE — 25000003 PHARM REV CODE 250: Performed by: NURSE ANESTHETIST, CERTIFIED REGISTERED

## 2020-03-10 PROCEDURE — 37000008 HC ANESTHESIA 1ST 15 MINUTES: Performed by: SURGERY

## 2020-03-10 PROCEDURE — 63600175 PHARM REV CODE 636 W HCPCS: Performed by: NURSE ANESTHETIST, CERTIFIED REGISTERED

## 2020-03-10 PROCEDURE — 27000650 HC AIRWAY EXCHANGE: Performed by: NURSE ANESTHETIST, CERTIFIED REGISTERED

## 2020-03-10 DEVICE — MESH LARGE PLUG/PATCH: Type: IMPLANTABLE DEVICE | Site: INGUINAL | Status: FUNCTIONAL

## 2020-03-10 RX ORDER — DEXAMETHASONE SODIUM PHOSPHATE 4 MG/ML
INJECTION, SOLUTION INTRA-ARTICULAR; INTRALESIONAL; INTRAMUSCULAR; INTRAVENOUS; SOFT TISSUE
Status: DISCONTINUED | OUTPATIENT
Start: 2020-03-10 | End: 2020-03-10

## 2020-03-10 RX ORDER — ONDANSETRON 2 MG/ML
4 INJECTION INTRAMUSCULAR; INTRAVENOUS DAILY PRN
Status: DISCONTINUED | OUTPATIENT
Start: 2020-03-10 | End: 2020-03-10 | Stop reason: HOSPADM

## 2020-03-10 RX ORDER — SODIUM CHLORIDE, SODIUM LACTATE, POTASSIUM CHLORIDE, CALCIUM CHLORIDE 600; 310; 30; 20 MG/100ML; MG/100ML; MG/100ML; MG/100ML
INJECTION, SOLUTION INTRAVENOUS CONTINUOUS PRN
Status: DISCONTINUED | OUTPATIENT
Start: 2020-03-10 | End: 2020-03-10

## 2020-03-10 RX ORDER — SODIUM CHLORIDE 0.9 % (FLUSH) 0.9 %
10 SYRINGE (ML) INJECTION
Status: DISCONTINUED | OUTPATIENT
Start: 2020-03-10 | End: 2020-03-10 | Stop reason: HOSPADM

## 2020-03-10 RX ORDER — ROCURONIUM BROMIDE 10 MG/ML
INJECTION, SOLUTION INTRAVENOUS
Status: DISCONTINUED | OUTPATIENT
Start: 2020-03-10 | End: 2020-03-10

## 2020-03-10 RX ORDER — KETAMINE HYDROCHLORIDE 50 MG/ML
INJECTION, SOLUTION INTRAMUSCULAR; INTRAVENOUS
Status: DISCONTINUED | OUTPATIENT
Start: 2020-03-10 | End: 2020-03-10

## 2020-03-10 RX ORDER — BUPIVACAINE HYDROCHLORIDE AND EPINEPHRINE 2.5; 5 MG/ML; UG/ML
INJECTION, SOLUTION EPIDURAL; INFILTRATION; INTRACAUDAL; PERINEURAL
Status: DISCONTINUED | OUTPATIENT
Start: 2020-03-10 | End: 2020-03-10 | Stop reason: HOSPADM

## 2020-03-10 RX ORDER — HYDROMORPHONE HYDROCHLORIDE 1 MG/ML
0.2 INJECTION, SOLUTION INTRAMUSCULAR; INTRAVENOUS; SUBCUTANEOUS
Status: COMPLETED | OUTPATIENT
Start: 2020-03-10 | End: 2020-03-10

## 2020-03-10 RX ORDER — CEFOXITIN SODIUM 2 G/50ML
INJECTION, SOLUTION INTRAVENOUS
Status: DISCONTINUED | OUTPATIENT
Start: 2020-03-10 | End: 2020-03-10

## 2020-03-10 RX ORDER — SUCCINYLCHOLINE CHLORIDE 20 MG/ML
INJECTION INTRAMUSCULAR; INTRAVENOUS
Status: DISCONTINUED | OUTPATIENT
Start: 2020-03-10 | End: 2020-03-10

## 2020-03-10 RX ORDER — LIDOCAINE HYDROCHLORIDE 20 MG/ML
INJECTION, SOLUTION EPIDURAL; INFILTRATION; INTRACAUDAL; PERINEURAL
Status: DISCONTINUED | OUTPATIENT
Start: 2020-03-10 | End: 2020-03-10

## 2020-03-10 RX ORDER — PROPOFOL 10 MG/ML
VIAL (ML) INTRAVENOUS
Status: DISCONTINUED | OUTPATIENT
Start: 2020-03-10 | End: 2020-03-10

## 2020-03-10 RX ORDER — OXYCODONE AND ACETAMINOPHEN 5; 325 MG/1; MG/1
1 TABLET ORAL EVERY 4 HOURS PRN
Qty: 30 TABLET | Refills: 0
Start: 2020-03-10 | End: 2020-07-28

## 2020-03-10 RX ORDER — OXYCODONE HYDROCHLORIDE 5 MG/1
5 TABLET ORAL
Status: DISCONTINUED | OUTPATIENT
Start: 2020-03-10 | End: 2020-03-10 | Stop reason: HOSPADM

## 2020-03-10 RX ORDER — FENTANYL CITRATE 50 UG/ML
INJECTION, SOLUTION INTRAMUSCULAR; INTRAVENOUS
Status: DISCONTINUED | OUTPATIENT
Start: 2020-03-10 | End: 2020-03-10

## 2020-03-10 RX ORDER — MIDAZOLAM HYDROCHLORIDE 1 MG/ML
INJECTION INTRAMUSCULAR; INTRAVENOUS
Status: DISCONTINUED | OUTPATIENT
Start: 2020-03-10 | End: 2020-03-10

## 2020-03-10 RX ORDER — MEPERIDINE HYDROCHLORIDE 50 MG/ML
12.5 INJECTION INTRAMUSCULAR; INTRAVENOUS; SUBCUTANEOUS EVERY 10 MIN PRN
Status: DISCONTINUED | OUTPATIENT
Start: 2020-03-10 | End: 2020-03-10 | Stop reason: HOSPADM

## 2020-03-10 RX ORDER — NEOSTIGMINE METHYLSULFATE 1 MG/ML
INJECTION, SOLUTION INTRAVENOUS
Status: DISCONTINUED | OUTPATIENT
Start: 2020-03-10 | End: 2020-03-10

## 2020-03-10 RX ORDER — DIPHENHYDRAMINE HYDROCHLORIDE 50 MG/ML
12.5 INJECTION INTRAMUSCULAR; INTRAVENOUS
Status: DISCONTINUED | OUTPATIENT
Start: 2020-03-10 | End: 2020-03-10 | Stop reason: HOSPADM

## 2020-03-10 RX ORDER — GLYCOPYRROLATE 0.2 MG/ML
INJECTION INTRAMUSCULAR; INTRAVENOUS
Status: DISCONTINUED | OUTPATIENT
Start: 2020-03-10 | End: 2020-03-10

## 2020-03-10 RX ORDER — FAMOTIDINE 10 MG/ML
INJECTION INTRAVENOUS
Status: DISCONTINUED | OUTPATIENT
Start: 2020-03-10 | End: 2020-03-10

## 2020-03-10 RX ORDER — ONDANSETRON 2 MG/ML
INJECTION INTRAMUSCULAR; INTRAVENOUS
Status: DISCONTINUED | OUTPATIENT
Start: 2020-03-10 | End: 2020-03-10

## 2020-03-10 RX ADMIN — FAMOTIDINE 20 MG: 10 INJECTION, SOLUTION INTRAVENOUS at 01:03

## 2020-03-10 RX ADMIN — LIDOCAINE HYDROCHLORIDE 100 MG: 20 INJECTION, SOLUTION EPIDURAL; INFILTRATION; INTRACAUDAL; PERINEURAL at 01:03

## 2020-03-10 RX ADMIN — KETAMINE HYDROCHLORIDE 25 MG: 50 INJECTION INTRAMUSCULAR; INTRAVENOUS at 01:03

## 2020-03-10 RX ADMIN — HYDROMORPHONE HYDROCHLORIDE 0.2 MG: 1 INJECTION, SOLUTION INTRAMUSCULAR; INTRAVENOUS; SUBCUTANEOUS at 05:03

## 2020-03-10 RX ADMIN — ONDANSETRON 4 MG: 2 INJECTION INTRAMUSCULAR; INTRAVENOUS at 01:03

## 2020-03-10 RX ADMIN — HYDROMORPHONE HYDROCHLORIDE 0.2 MG: 1 INJECTION, SOLUTION INTRAMUSCULAR; INTRAVENOUS; SUBCUTANEOUS at 04:03

## 2020-03-10 RX ADMIN — DEXAMETHASONE SODIUM PHOSPHATE 8 MG: 4 INJECTION, SOLUTION INTRAMUSCULAR; INTRAVENOUS at 02:03

## 2020-03-10 RX ADMIN — OXYCODONE HYDROCHLORIDE 5 MG: 5 TABLET ORAL at 04:03

## 2020-03-10 RX ADMIN — FENTANYL CITRATE 100 MCG: 50 INJECTION INTRAMUSCULAR; INTRAVENOUS at 03:03

## 2020-03-10 RX ADMIN — CEFOXITIN SODIUM 2 G: 2 INJECTION, SOLUTION INTRAVENOUS at 01:03

## 2020-03-10 RX ADMIN — GLYCOPYRROLATE 0.6 MG: 0.2 INJECTION INTRAMUSCULAR; INTRAVENOUS at 04:03

## 2020-03-10 RX ADMIN — ROCURONIUM BROMIDE 45 MG: 10 INJECTION, SOLUTION INTRAVENOUS at 01:03

## 2020-03-10 RX ADMIN — MIDAZOLAM HYDROCHLORIDE 2 MG: 1 INJECTION, SOLUTION INTRAMUSCULAR; INTRAVENOUS at 01:03

## 2020-03-10 RX ADMIN — SODIUM CHLORIDE, SODIUM LACTATE, POTASSIUM CHLORIDE, AND CALCIUM CHLORIDE: .6; .31; .03; .02 INJECTION, SOLUTION INTRAVENOUS at 01:03

## 2020-03-10 RX ADMIN — NEOSTIGMINE METHYLSULFATE 4 MG: 1 INJECTION INTRAVENOUS at 04:03

## 2020-03-10 RX ADMIN — SUCCINYLCHOLINE CHLORIDE 200 MG: 20 INJECTION, SOLUTION INTRAMUSCULAR; INTRAVENOUS at 01:03

## 2020-03-10 RX ADMIN — OXYCODONE HYDROCHLORIDE 5 MG: 5 TABLET ORAL at 05:03

## 2020-03-10 RX ADMIN — PROPOFOL 200 MG: 10 INJECTION, EMULSION INTRAVENOUS at 01:03

## 2020-03-10 RX ADMIN — KETAMINE HYDROCHLORIDE 25 MG: 50 INJECTION INTRAMUSCULAR; INTRAVENOUS at 02:03

## 2020-03-10 RX ADMIN — ROCURONIUM BROMIDE 5 MG: 10 INJECTION, SOLUTION INTRAVENOUS at 01:03

## 2020-03-10 RX ADMIN — FENTANYL CITRATE 100 MCG: 50 INJECTION INTRAMUSCULAR; INTRAVENOUS at 01:03

## 2020-03-10 RX ADMIN — SODIUM CHLORIDE, SODIUM LACTATE, POTASSIUM CHLORIDE, AND CALCIUM CHLORIDE: .6; .31; .03; .02 INJECTION, SOLUTION INTRAVENOUS at 03:03

## 2020-03-10 RX ADMIN — KETAMINE HYDROCHLORIDE 25 MG: 50 INJECTION INTRAMUSCULAR; INTRAVENOUS at 03:03

## 2020-03-10 NOTE — DISCHARGE SUMMARY
Discharge Summary  General Surgery      Admit Date: 3/10/2020    Discharge Date :3/10/2020    Attending Physician: Willy Lanier III     Discharge Physician: Willy Lanier III    Discharged Condition: good    Discharge Diagnosis: Non-recurrent bilateral inguinal hernia without obstruction or gangrene [K40.20]    Treatments/Procedures: Procedure(s) (LRB):  REPAIR, HERNIA, INGUINAL, WITHOUT HISTORY OF PRIOR REPAIR, AGE 3 YEARS OR OLDER (Bilateral)    Hospital Course: Uneventful; Discharged home from [facility]    Significant Diagnostic Studies: none    Disposition: Home or Self Care    Diet: Regular    Follow up: Office 10-14 days    Activity: No heavy lifting till seen in office.    Patient Instructions:   Current Discharge Medication List      CONTINUE these medications which have NOT CHANGED    Details   aspirin/salicylamide/caffeine (BC HEADACHE POWDER ORAL) Take 1 tablet by mouth as needed.      atorvastatin (LIPITOR) 20 MG tablet Take 1 tablet (20 mg total) by mouth nightly.  Qty: 90 tablet, Refills: 1    Associated Diagnoses: Mixed hyperlipidemia      diltiaZEM (DILACOR XR) 120 MG CDCR Take 120 mg by mouth once daily.      ibuprofen (ADVIL,MOTRIN) 200 MG tablet Take 200 mg by mouth as needed for Pain.      losartan (COZAAR) 50 MG tablet Take 1 tablet (50 mg total) by mouth once daily.  Qty: 30 tablet, Refills: 3    Associated Diagnoses: Essential hypertension      valACYclovir (VALTREX) 1000 MG tablet Take 2 tablets (2,000 mg total) by mouth every 12 (twelve) hours. for 2 doses  Qty: 4 tablet, Refills: 6      varenicline (CHANTIX) 1 mg Tab Take 1 tablet (1 mg total) by mouth 2 (two) times daily.  Qty: 60 tablet, Refills: 3    Associated Diagnoses: Tobacco dependence due to cigarettes             Discharge Procedure Orders   Diet Adult Regular     Lifting restrictions   Order Comments: No lifting over twenty pounds for six weeks     Notify your health care provider if you experience any of the following:   temperature >100.4     Notify your health care provider if you experience any of the following:  persistent nausea and vomiting or diarrhea     Notify your health care provider if you experience any of the following:  redness, tenderness, or signs of infection (pain, swelling, redness, odor or green/yellow discharge around incision site)     Notify your health care provider if you experience any of the following:  increased confusion or weakness     Remove dressing in 48 hours     Shower on day dressing removed (No bath)

## 2020-03-10 NOTE — ANESTHESIA POSTPROCEDURE EVALUATION
Anesthesia Post Evaluation    Patient: Gerardo Ramirez JrYisel    Procedure(s) Performed: Procedure(s) (LRB):  REPAIR, HERNIA, INGUINAL, WITHOUT HISTORY OF PRIOR REPAIR, AGE 5 YEARS OR OLDER (Bilateral)    Final Anesthesia Type: general    Patient location during evaluation: PACU  Patient participation: Yes- Able to Participate  Level of consciousness: awake and alert  Post-procedure vital signs: reviewed and stable  Pain management: adequate  Airway patency: patent  EASTON mitigation strategies: Multimodal analgesia, Extubation while patient is awake, Verification of full reversal of neuromuscular block and Extubation and recovery carried out in lateral, semiupright, or other nonsupine position  PONV status at discharge: No PONV  Anesthetic complications: no      Cardiovascular status: hemodynamically stable  Respiratory status: unassisted, spontaneous ventilation and room air  Hydration status: euvolemic  Follow-up not needed.          Vitals Value Taken Time   /64 3/10/2020  5:00 PM   Temp 36.8 °C (98.3 °F) 3/10/2020  4:42 PM   Pulse 85 3/10/2020  5:06 PM   Resp 22 3/10/2020  5:06 PM   SpO2 96 % 3/10/2020  5:06 PM   Vitals shown include unvalidated device data.      No case tracking events are documented in the log.      Pain/Tarsha Score: Pain Rating Prior to Med Admin: 8 (3/10/2020  4:56 PM)  Tarsha Score: 9 (3/10/2020  5:00 PM)

## 2020-03-10 NOTE — OP NOTE
Surgery Date: 3/10/2020     Surgeon(s) and Role:     * Willy Lanier III, MD - Primary    Assisting Surgeon: None    Pre-op Diagnosis:  Non-recurrent bilateral inguinal hernia without obstruction or gangrene [K40.20]    Post-op Diagnosis:  Post-Op Diagnosis Codes:     * Non-recurrent bilateral inguinal hernia without obstruction or gangrene [K40.20]    Procedure(s) (LRB):  REPAIR, HERNIA, INGUINAL, WITHOUT HISTORY OF PRIOR REPAIR, AGE 3 YEARS OR OLDER (Bilateral)    Anesthesia: General    Description of Procedure: Patient was brought to the operating room and placed on operating room table in the supine position. He was given general anesthesia and intubated. The groins and abdomen were sterilely prepped and draped.  A left inguinal incision was made. Dissection was carried down through the skin and subcutaneous tissue. I entered the Shikha's fascia and encountered the external oblique aponeurosis. The external oblique aponeurosis was opened parallel to its fibers from lateral to medial through the ring. This opened up to the contents of the inguinal canal. The spermatic cord was mobilized near the pubic tubercle. Penrose drain was placed around the spermatic cord. The vas deferens and gonadal vessels were palpated and were included in the Penrose drain. I did not appreciate a direct hernia. I located a small indirect hernia sac along the spermatic cord as well as a large cord lipoma. The hernia sac and cord lipoma were  from the spermatic structures. The cord lipoma was resected. High ligation of the hernia sac was performed. The spermatic cord was retracted inferiorly. The hernia mesh was sutured down to the tissue directly anterior the pubic tubercle. The hernia mesh was sutured to the conjoin tendon superiorly with a running Prolene suture going medial to lateral. The spermatic cord was retracted superiorly and the hernia mesh was sutured to the shelving edge of the inguinal ligament going medially  to laterally. The Penrose drain was removed. The hernia mesh was wrapped around the spermatic cord. The mesh was then sutured around the cord leaving a fingerbreadth space between cord and mesh. After this, the surgical site was irrigated. The external oblique aponeurosis was closed over hernia mesh and cord with a running Vicryl suture. Shikha's fascia was closed with another running Vicryl. The skin was closed with 4 Monocryl in a subcuticular fashion. I then turned my attention to the right side.       A right inguinal incision was made. Dissection was carried down through the skin and subcutaneous tissue. I entered the Shikha's fascia and encountered the external oblique aponeurosis. The external oblique aponeurosis was opened parallel to its fibers from lateral to medial through the ring. This opened up to the contents of the inguinal canal. The spermatic cord was mobilized near the pubic tubercle. Penrose drain was placed around the spermatic cord. The vas deferens and gonadal vessels were palpated and were included in the Penrose drain. I again did not appreciate a direct hernia. I located a small indirect hernia sac along the spermatic cord as well as a large cord lipoma. The hernia sac and cord lipoma were  from the spermatic structures. The cord lipoma was resected. High ligation of the hernia sac was performed. The spermatic cord was retracted inferiorly. The hernia mesh was sutured down to the tissue directly anterior the pubic tubercle. The hernia mesh was sutured to the conjoin tendon superiorly with a running Prolene suture going medial to lateral. The spermatic cord was retracted superiorly and the hernia mesh was sutured to the shelving edge of the inguinal ligament going medially to laterally. The Penrose drain was removed. The hernia mesh was wrapped around the spermatic cord. The mesh was then sutured around the cord leaving a fingerbreadth space between cord and mesh. After this, the  surgical site was irrigated. The external oblique aponeurosis was closed over hernia mesh and cord with a running Vicryl suture. Shikha's fascia was closed with another running Vicryl. The skin was closed with 4 Monocryl in a subcuticular fashion. Marcaine was infiltrated around the incision site. Patient tolerated the procedure well. He was extubated and taken the recovery room in stable condition.  Marcaine and exparel were infiltrated around the incision sites. Patient tolerated the procedure well. He was extubated and taken the recovery room in stable condition.     Description of the findings of the procedure: bilateral indirect hernia with large cord lipomas     Estimated Blood Loss: 25mL    Complications none     Instrument counts correct          Specimens:   Specimen (12h ago, onward)     Start     Ordered    03/10/20 1613  Specimen to Pathology - Surgery  Once     Comments:  Pre-op Diagnosis: Non-recurrent bilateral inguinal hernia without obstruction or gangrene [K40.20]Procedure(s):REPAIR, HERNIA, INGUINAL, WITHOUT HISTORY OF PRIOR REPAIR, AGE 5 YEARS OR OLDER Number of specimens: 2Name of specimens: 1) RIGHT INGUINAL HERNIA SAC WITH CORD LIPOMA  2) LEFT INGUINAL HERNIA SAC WITH CORD LIPOMA      03/10/20 1614

## 2020-03-10 NOTE — PLAN OF CARE
Voided 200ml urine , awake alert kitchen walking and no nausea , discharge instructions and pain perscription given to pt and wife , dc via wheelchair to private car tolerated well with bilat groin ice packs . Release by dr west

## 2020-03-10 NOTE — BRIEF OP NOTE
[facility]  Brief Operative Note    SUMMARY     Surgery Date: 3/10/2020     Surgeon(s) and Role:     * Willy Lanier III, MD - Primary    Assisting Surgeon: None    Pre-op Diagnosis:  Non-recurrent bilateral inguinal hernia without obstruction or gangrene [K40.20]    Post-op Diagnosis:  Post-Op Diagnosis Codes:     * Non-recurrent bilateral inguinal hernia without obstruction or gangrene [K40.20]    Procedure(s) (LRB):  REPAIR, HERNIA, INGUINAL, WITHOUT HISTORY OF PRIOR REPAIR, AGE 3 YEARS OR OLDER (Bilateral)    Anesthesia: General    Description of Procedure: Patient was brought to the operating room and placed on operating room table in the supine position. He was given general anesthesia and intubated. The groins and abdomen were sterilely prepped and draped.  A left inguinal incision was made. Dissection was carried down through the skin and subcutaneous tissue. I entered the Shikha's fascia and encountered the external oblique aponeurosis. The external oblique aponeurosis was opened parallel to its fibers from lateral to medial through the ring. This opened up to the contents of the inguinal canal. The spermatic cord was mobilized near the pubic tubercle. Penrose drain was placed around the spermatic cord. The vas deferens and gonadal vessels were palpated and were included in the Penrose drain. I did not appreciate a direct hernia. I located a small indirect hernia sac along the spermatic cord as well as a large cord lipoma. The hernia sac and cord lipoma were  from the spermatic structures. The cord lipoma was resected. High ligation of the hernia sac was performed. The spermatic cord was retracted inferiorly. The hernia mesh was sutured down to the tissue directly anterior the pubic tubercle. The hernia mesh was sutured to the conjoin tendon superiorly with a running Prolene suture going medial to lateral. The spermatic cord was retracted superiorly and the hernia mesh was  sutured to the shelving edge of the inguinal ligament going medially to laterally. The Penrose drain was removed. The hernia mesh was wrapped around the spermatic cord. The mesh was then sutured around the cord leaving a fingerbreadth space between cord and mesh. After this, the surgical site was irrigated. The external oblique aponeurosis was closed over hernia mesh and cord with a running Vicryl suture. Shikha's fascia was closed with another running Vicryl. The skin was closed with 4 Monocryl in a subcuticular fashion. I then turned my attention to the right side.       A right inguinal incision was made. Dissection was carried down through the skin and subcutaneous tissue. I entered the Shikha's fascia and encountered the external oblique aponeurosis. The external oblique aponeurosis was opened parallel to its fibers from lateral to medial through the ring. This opened up to the contents of the inguinal canal. The spermatic cord was mobilized near the pubic tubercle. Penrose drain was placed around the spermatic cord. The vas deferens and gonadal vessels were palpated and were included in the Penrose drain. I again did not appreciate a direct hernia. I located a small indirect hernia sac along the spermatic cord as well as a large cord lipoma. The hernia sac and cord lipoma were  from the spermatic structures. The cord lipoma was resected. High ligation of the hernia sac was performed. The spermatic cord was retracted inferiorly. The hernia mesh was sutured down to the tissue directly anterior the pubic tubercle. The hernia mesh was sutured to the conjoin tendon superiorly with a running Prolene suture going medial to lateral. The spermatic cord was retracted superiorly and the hernia mesh was sutured to the shelving edge of the inguinal ligament going medially to laterally. The Penrose drain was removed. The hernia mesh was wrapped around the spermatic cord. The mesh was then sutured around the cord  leaving a fingerbreadth space between cord and mesh. After this, the surgical site was irrigated. The external oblique aponeurosis was closed over hernia mesh and cord with a running Vicryl suture. Sihkha's fascia was closed with another running Vicryl. The skin was closed with 4 Monocryl in a subcuticular fashion. Marcaine was infiltrated around the incision site. Patient tolerated the procedure well. He was extubated and taken the recovery room in stable condition.  Marcaine and exparel were infiltrated around the incision sites. Patient tolerated the procedure well. He was extubated and taken the recovery room in stable condition.     Description of the findings of the procedure: bilateral indirect hernia with large cord lipomas     Estimated Blood Loss: 25mL    Complications none     Instrument counts correct          Specimens:   Specimen (12h ago, onward)     Start     Ordered    03/10/20 1613  Specimen to Pathology - Surgery  Once     Comments:  Pre-op Diagnosis: Non-recurrent bilateral inguinal hernia without obstruction or gangrene [K40.20]Procedure(s):REPAIR, HERNIA, INGUINAL, WITHOUT HISTORY OF PRIOR REPAIR, AGE 5 YEARS OR OLDER Number of specimens: 2Name of specimens: 1) RIGHT INGUINAL HERNIA SAC WITH CORD LIPOMA  2) LEFT INGUINAL HERNIA SAC WITH CORD LIPOMA      03/10/20 1614

## 2020-03-10 NOTE — H&P
Patient ID: Gerrado Ramirez Jr. is a 35 y.o. male.     Chief Complaint: Other (ReEval RI)        HPI:  Patient returns to the office after CT scan and scrotal US. He has been having right sided scrotal and groin pain. He noticed the discomfort several months ago. It is worse with lifting. He states the discomfort is worse in the right testicle. He reports an occasional palpable bulge in the proximal right testicle. He has CT scan from Aug 2018 that shows no evidence of inguinal hernia. The new CT showed small bilateral inguinal hernias with right larger than left. The scrotal US was normal. He had umbilical hernia repair with cholecystectomy in April 2019. He was scheduled for repair last week but was found to have A-fib. He has been seen and cleared with cardiology.           Past Medical History:   Diagnosis Date    Anxiety      Hyperlipidemia      Hypertension              Past Surgical History:   Procedure Laterality Date    CHOLECYSTECTOMY   04/08/2019     w/ Small Umbilical hernia rep-     UMBILICAL HERNIA REPAIR   04/08/2019                Review of patient's allergies indicates:   Allergen Reactions    Amoxicillin Hives           Medication List with Changes/Refills   Current Medications     AMLODIPINE (NORVASC) 10 MG TABLET    Take 1 tablet (10 mg total) by mouth once daily.     ATORVASTATIN (LIPITOR) 20 MG TABLET    Take 1 tablet (20 mg total) by mouth nightly.     LOSARTAN (COZAAR) 50 MG TABLET    Take 1 tablet (50 mg total) by mouth once daily.     VARENICLINE (CHANTIX) 1 MG TAB    Take 1 tablet (1 mg total) by mouth 2 (two) times daily.   Discontinued Medications     VARENICLINE (CHANTIX STARTING MONTH BOX) 0.5 MG (11)- 1 MG (42) TABLET    Take one 0.5mg tab by mouth once daily X3 days,then increase to one 0.5mg tab twice daily X4 days,then increase to one 1mg tab twice daily            Family History   Problem Relation Age of Onset    Diabetes Mother      Hypertension  Mother      Hypertension Sister      Hyperlipidemia Sister      Anxiety disorder Sister      Coronary artery disease Maternal Grandfather      No Known Problems Father        Social History            Socioeconomic History    Marital status:        Spouse name: Not on file    Number of children: Not on file    Years of education: Not on file    Highest education level: Not on file   Occupational History    Occupation: [employer]   Social Needs    Financial resource strain: Not on file    Food insecurity:       Worry: Not on file       Inability: Not on file    Transportation needs:       Medical: Not on file       Non-medical: Not on file   Tobacco Use    Smoking status: Current Some Day Smoker       Packs/day: 0.50       Types: Cigarettes    Smokeless tobacco: Never Used    Tobacco comment: trying to quit   Substance and Sexual Activity    Alcohol use: Yes       Frequency: 2-4 times a month       Drinks per session: 7 to 9       Binge frequency: Weekly    Drug use: No    Sexual activity: Yes       Partners: Female   Lifestyle    Physical activity:       Days per week: Not on file       Minutes per session: Not on file    Stress: Only a little   Relationships    Social connections:       Talks on phone: Not on file       Gets together: Not on file       Attends Taoism service: Not on file       Active member of club or organization: Not on file       Attends meetings of clubs or organizations: Not on file       Relationship status: Not on file   Other Topics Concern    Not on file   Social History Narrative     LIVE WITH FIROSALINA            Review of Systems   Constitutional: Negative for appetite change, chills, fever and unexpected weight change.   HENT: Negative for hearing loss, rhinorrhea, sore throat and voice change.    Eyes: Negative for photophobia and visual disturbance.   Respiratory: Negative for cough, choking and shortness of breath.    Cardiovascular: Negative for chest  pain, palpitations and leg swelling.   Gastrointestinal: Negative for abdominal pain, blood in stool, constipation, diarrhea, nausea and vomiting.   Endocrine: Negative for cold intolerance, heat intolerance, polydipsia and polyuria.   Genitourinary: Positive for testicular pain.   Musculoskeletal: Negative for arthralgias, back pain, joint swelling and neck stiffness.   Skin: Negative for color change, pallor and rash.   Neurological: Negative for dizziness, seizures, syncope and headaches.   Hematological: Negative for adenopathy. Does not bruise/bleed easily.   Psychiatric/Behavioral: Negative for agitation, behavioral problems and confusion.       Objective:   Physical Exam   Constitutional: He appears well-developed and well-nourished.  Non-toxic appearance. No distress.   HENT:   Head: Normocephalic and atraumatic. Head is without abrasion and without laceration.   Right Ear: External ear normal.   Left Ear: External ear normal.   Nose: Nose normal.   Mouth/Throat: Oropharynx is clear and moist.   Eyes: Pupils are equal, round, and reactive to light. EOM are normal.   Neck: Trachea normal and phonation normal. Neck supple. No tracheal deviation and normal range of motion present.   Cardiovascular: Normal rate and regular rhythm.   Pulmonary/Chest: Effort normal. No accessory muscle usage. No tachypnea. No respiratory distress.   Abdominal: Soft. Normal appearance. He exhibits no distension. There is no tenderness. There is no rigidity, no rebound and no guarding.   The hernia is very difficult to palpate in the right and left.    Genitourinary: Right testis shows tenderness. Right testis shows no mass and no swelling. Left testis shows no mass and no tenderness.   Lymphadenopathy: No inguinal adenopathy noted on the right or left side.        Right: No inguinal adenopathy present.        Left: No inguinal adenopathy present.   Neurological: He is alert. Coordination and gait normal.   Skin: Skin is warm and  intact.   Psychiatric: He has a normal mood and affect. His speech is normal and behavior is normal.       Assessment/Plan:   Non-recurrent bilateral inguinal hernia without obstruction or gangrene  -     Case Request Operating Room: REPAIR, HERNIA, INGUINAL, WITHOUT HISTORY OF PRIOR REPAIR, AGE 5 YEARS OR OLDER  -     Basic metabolic panel; Future; Expected date: 02/05/2020  -     CBC auto differential; Future; Expected date: 02/05/2020  -     EKG 12-lead; Future  -     X-Ray Chest 1 View; Future; Expected date: 02/05/2020     Other orders  -     Full code; Standing  -     Progressive Mobility Protocol (mobilize patient to their highest level of functioning at least twice daily); Standing  -     Insert peripheral IV; Standing     The CT shows moderate size right inguinal hernia with small left inguinal hernia. He would like to proceed with bilateral repair.   Impression/Treatment Plan: Bilateral inguina hernia repair.      I discussed the proposed procedures the patient including risks, benefits, indications, alternatives and special concerns.  The patient appears to understand and agrees to go ahead with surgery.  I have made no promises, warranties or verbal agreements beyond what was discussed above.

## 2020-03-10 NOTE — ANESTHESIA PREPROCEDURE EVALUATION
03/10/2020  Gerardo Ramirez Jr. is a 35 y.o., male.    Patient Active Problem List   Diagnosis    Obesity (BMI 30-39.9)    Essential hypertension    Hypertension    Hyperlipidemia    Anxiety    Non-recurrent bilateral inguinal hernia without obstruction or gangrene       Past Surgical History:   Procedure Laterality Date    CHOLECYSTECTOMY  04/08/2019    w/ Small Umbilical hernia rep-     UMBILICAL HERNIA REPAIR  04/08/2019            Tobacco Use:  The patient  reports that he has been smoking cigarettes. He has a 20.00 pack-year smoking history. He has never used smokeless tobacco.     Results for orders placed or performed during the hospital encounter of 03/02/20   EKG 12-lead    Collection Time: 03/02/20  6:11 AM    Narrative    Test Reason : I49.9,    Vent. Rate : 080 BPM     Atrial Rate : 080 BPM     P-R Int : 178 ms          QRS Dur : 084 ms      QT Int : 372 ms       P-R-T Axes : 032 018 027 degrees     QTc Int : 429 ms    Normal sinus rhythm  Normal ECG  When compared with ECG of 18-FEB-2020 09:53,  No significant change was found  Confirmed by Marcus Alfaro MD [id number]) on 3/2/2020 8:51:02 AM    Referred By: ISABEL MORSE           Confirmed By:Marcus Alfaro MD             Lab Results   Component Value Date    WBC 5.42 02/18/2020    HGB 14.3 02/18/2020    HCT 43.9 02/18/2020    MCV 84 02/18/2020     02/18/2020     BMP  Lab Results   Component Value Date     02/18/2020    K 3.9 02/18/2020     02/18/2020    CO2 28 02/18/2020    BUN 15 02/18/2020    CREATININE 1.0 02/18/2020    CALCIUM 9.4 02/18/2020    ANIONGAP 9 02/18/2020    ESTGFRAFRICA >60.0 02/18/2020    EGFRNONAA >60.0 02/18/2020             Anesthesia Evaluation    I have reviewed the Patient Summary Reports.     I have reviewed the Medications.     Review of Systems  Anesthesia Hx:  No  problems with previous Anesthesia Denies Hx of Anesthetic complications  Denies Family Hx of Anesthesia complications.   Denies Personal Hx of Anesthesia complications.   Hematology/Oncology:  Hematology Normal   Oncology Normal     EENT/Dental:EENT/Dental Normal   Cardiovascular:   Hypertension    Pulmonary:   Denies Shortness of breath. Sleep Apnea (pt recently completed sleep study, f/u scheduled to review results)    Education provided regarding risk of obstructive sleep apnea     Renal/:  Renal/ Normal     Hepatic/GI:  Hepatic/GI Normal    Musculoskeletal:  Musculoskeletal Normal    Neurological:  Neurology Normal    Endocrine:  Endocrine Normal    Psych:  Psychiatric Normal           Physical Exam  General:  Well nourished    Airway/Jaw/Neck:  Airway Findings: Mouth Opening: Normal Tongue: Normal  General Airway Assessment: Adult  Mallampati: II  TM Distance: Normal, at least 6 cm  Jaw/Neck Findings:  Neck ROM: Normal ROM      Dental:  Dental Findings: In tact   Chest/Lungs:  Chest/Lungs Findings: Clear to auscultation, Normal Respiratory Rate     Heart/Vascular:  Heart Findings: Rate: Normal  Rhythm: Regular Rhythm  Sounds: Normal        Mental Status:  Mental Status Findings:  Alert and Oriented         Anesthesia Plan  Type of Anesthesia, risks & benefits discussed:  Anesthesia Type:  general  Patient's Preference:   Intra-op Monitoring Plan: standard ASA monitors  Intra-op Monitoring Plan Comments:   Post Op Pain Control Plan: multimodal analgesia  Post Op Pain Control Plan Comments:   Induction:   IV  Beta Blocker:         Informed Consent: Patient understands risks and agrees with Anesthesia plan.  Questions answered. Anesthesia consent signed with patient.  ASA Score: 2     Day of Surgery Review of History & Physical:        Anesthesia Plan Notes: Tylenol 1g, celebrex 200 mg (both given in holding), ketamine 25 q1hr intraop        Ready For Surgery From Anesthesia Perspective.

## 2020-03-10 NOTE — TRANSFER OF CARE
"Anesthesia Transfer of Care Note    Patient: Gerardo Ramirez Jr.    Procedure(s) Performed: Procedure(s) (LRB):  REPAIR, HERNIA, INGUINAL, WITHOUT HISTORY OF PRIOR REPAIR, AGE 3 YEARS OR OLDER (Bilateral)    Patient location: PACU    Anesthesia Type: general    Transport from OR: Transported from OR on 2-3 L/min O2 by NC with adequate spontaneous ventilation    Post pain: adequate analgesia    Post assessment: no apparent anesthetic complications    Post vital signs: stable    Level of consciousness: awake and alert    Nausea/Vomiting: no nausea/vomiting    Complications: none    Transfer of care protocol was followed      Last vitals:   Visit Vitals  BP (!) 140/92 (BP Location: Left arm, Patient Position: Lying)   Pulse 67   Temp 36.8 °C (98.3 °F) (Oral)   Resp 12   Ht 5' 9" (1.753 m)   Wt 111.1 kg (245 lb)   SpO2 98%   BMI 36.18 kg/m²     "

## 2020-03-11 ENCOUNTER — TELEPHONE (OUTPATIENT)
Dept: SURGERY | Facility: CLINIC | Age: 35
End: 2020-03-11

## 2020-03-11 NOTE — TELEPHONE ENCOUNTER
Pt spouse calling stating despite taking pain meds as prescribed, pt still in significant amount of pain. She was instructed to have pt increase dosage to 2 tablets every 4 hours as needed per Dr. Lanier.

## 2020-03-13 ENCOUNTER — TELEPHONE (OUTPATIENT)
Dept: SURGERY | Facility: CLINIC | Age: 35
End: 2020-03-13

## 2020-03-13 NOTE — TELEPHONE ENCOUNTER
Pt spouse Rishabh calling for refill on Percocet 5/325 1-2 every 4 hours as needed for pain. Rx up front for . Copy scanned into chart. Rishabh notified.

## 2020-03-16 DIAGNOSIS — I10 ESSENTIAL HYPERTENSION: ICD-10-CM

## 2020-03-16 RX ORDER — LOSARTAN POTASSIUM 50 MG/1
TABLET ORAL
Qty: 90 TABLET | Refills: 1 | Status: SHIPPED | OUTPATIENT
Start: 2020-03-16 | End: 2020-10-05

## 2020-03-19 ENCOUNTER — TELEPHONE (OUTPATIENT)
Dept: SURGERY | Facility: CLINIC | Age: 35
End: 2020-03-19

## 2020-03-19 NOTE — TELEPHONE ENCOUNTER
Spoke to pt spouse Rishabh to offer virtual post op appt due to Covid 19 concerns. Rishabh states incision looks great and pt is not having any problems..does not think pt needs to be seen. Will call if pt starts having any issues.

## 2020-04-14 ENCOUNTER — TELEPHONE (OUTPATIENT)
Dept: SURGERY | Facility: CLINIC | Age: 35
End: 2020-04-14

## 2020-04-14 NOTE — TELEPHONE ENCOUNTER
Pt returned to work on light duty 3/30/20. Is supposed to return to full duty on 4/21/20, however he does not feel like he is ready to do so. 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 He is healing well, but still has minor aches and pains around area of incisions. Would like to extend light duty for additional 2 weeks. States Dr. Lanier told him that it may take 6-8 weeks to heal. New RTW letter with 5/5/20 as date to return to full duty faxed to pts employer @ [phone].

## 2020-05-14 DIAGNOSIS — F17.210 TOBACCO DEPENDENCE DUE TO CIGARETTES: ICD-10-CM

## 2020-05-14 RX ORDER — VARENICLINE TARTRATE 1 MG/1
TABLET, FILM COATED ORAL
Qty: 180 TABLET | Refills: 0 | Status: SHIPPED | OUTPATIENT
Start: 2020-05-14 | End: 2020-07-28

## 2020-06-23 DIAGNOSIS — Z01.818 OTHER SPECIFIED PRE-OPERATIVE EXAMINATION: Primary | ICD-10-CM

## 2020-06-23 DIAGNOSIS — R06.83 SNORING: ICD-10-CM

## 2020-06-23 DIAGNOSIS — G47.9 FATIGUE DUE TO SLEEP PATTERN DISTURBANCE: ICD-10-CM

## 2020-06-23 DIAGNOSIS — G47.19 EXCESSIVE DAYTIME SLEEPINESS: ICD-10-CM

## 2020-06-23 DIAGNOSIS — R53.83 FATIGUE DUE TO SLEEP PATTERN DISTURBANCE: ICD-10-CM

## 2020-06-23 DIAGNOSIS — G47.33 OSA (OBSTRUCTIVE SLEEP APNEA): Primary | ICD-10-CM

## 2020-07-05 ENCOUNTER — LAB VISIT (OUTPATIENT)
Dept: PRIMARY CARE CLINIC | Facility: CLINIC | Age: 35
End: 2020-07-05
Payer: COMMERCIAL

## 2020-07-05 DIAGNOSIS — Z01.818 OTHER SPECIFIED PRE-OPERATIVE EXAMINATION: ICD-10-CM

## 2020-07-05 PROCEDURE — U0003 INFECTIOUS AGENT DETECTION BY NUCLEIC ACID (DNA OR RNA); SEVERE ACUTE RESPIRATORY SYNDROME CORONAVIRUS 2 (SARS-COV-2) (CORONAVIRUS DISEASE [COVID-19]), AMPLIFIED PROBE TECHNIQUE, MAKING USE OF HIGH THROUGHPUT TECHNOLOGIES AS DESCRIBED BY CMS-2020-01-R: HCPCS

## 2020-07-06 ENCOUNTER — PROCEDURE VISIT (OUTPATIENT)
Dept: SLEEP MEDICINE | Facility: HOSPITAL | Age: 35
End: 2020-07-06
Attending: INTERNAL MEDICINE
Payer: COMMERCIAL

## 2020-07-06 DIAGNOSIS — G47.33 OSA (OBSTRUCTIVE SLEEP APNEA): ICD-10-CM

## 2020-07-06 DIAGNOSIS — G47.19 EXCESSIVE DAYTIME SLEEPINESS: ICD-10-CM

## 2020-07-06 DIAGNOSIS — G47.9 FATIGUE DUE TO SLEEP PATTERN DISTURBANCE: ICD-10-CM

## 2020-07-06 DIAGNOSIS — R06.83 SNORING: ICD-10-CM

## 2020-07-06 DIAGNOSIS — R53.83 FATIGUE DUE TO SLEEP PATTERN DISTURBANCE: ICD-10-CM

## 2020-07-06 LAB — SARS-COV-2 RNA RESP QL NAA+PROBE: NOT DETECTED

## 2020-07-06 PROCEDURE — 95811 POLYSOM 6/>YRS CPAP 4/> PARM: CPT

## 2020-07-28 ENCOUNTER — OFFICE VISIT (OUTPATIENT)
Dept: SURGERY | Facility: CLINIC | Age: 35
End: 2020-07-28
Payer: COMMERCIAL

## 2020-07-28 VITALS
SYSTOLIC BLOOD PRESSURE: 154 MMHG | WEIGHT: 250 LBS | HEIGHT: 69 IN | TEMPERATURE: 97 F | BODY MASS INDEX: 37.03 KG/M2 | HEART RATE: 70 BPM | DIASTOLIC BLOOD PRESSURE: 101 MMHG

## 2020-07-28 DIAGNOSIS — R10.31 RIGHT INGUINAL PAIN: Primary | ICD-10-CM

## 2020-07-28 PROCEDURE — 99213 OFFICE O/P EST LOW 20 MIN: CPT | Mod: S$GLB,,, | Performed by: SURGERY

## 2020-07-28 PROCEDURE — 99213 PR OFFICE/OUTPT VISIT, EST, LEVL III, 20-29 MIN: ICD-10-PCS | Mod: S$GLB,,, | Performed by: SURGERY

## 2020-07-29 NOTE — PROGRESS NOTES
Subjective:       Patient ID: Gerardo Ramirez Jr. is a 35 y.o. male.    Chief Complaint: Other (Follow up - D/O/S 3/10/20 - [facility].Ing Hernia Repair - Pain at surgical site)      HPI:  Patient is 35-year-old male who is status post bilateral inguinal hernia repair in March of this year.  He states that since surgery he has had a dull right groin pain that is worse with certain movements.  He states at rest there is no pain.  He can feel the pain the most with flexion at the hip.  He states that the pain is fairly mild but it is present and he presents today for evaluation.  He has had no fevers or chills.  He reports no swelling or skin changes.    Past Medical History:   Diagnosis Date    Anxiety     Atrial fibrillation 03/2020    new onset- meds changed    Hyperlipidemia     Hypertension 2013    Inguinal hernia bilateral, non-recurrent 02/2020    right side more pronounced- surg scheduled    Pneumonia     age 11    Sleep apnea-like behavior     needs sleep study- wife says he stops breathing while sleeping     Past Surgical History:   Procedure Laterality Date    CHOLECYSTECTOMY  04/08/2019    w/ Small Umbilical hernia rep-     HERNIA REPAIR Bilateral 03/10/2020    Dr. Lanier - [facility]    UMBILICAL HERNIA REPAIR  04/08/2019         Review of patient's allergies indicates:   Allergen Reactions    Amoxicillin Hives     Medication List with Changes/Refills   Current Medications    ASPIRIN/SALICYLAMIDE/CAFFEINE (BC HEADACHE POWDER ORAL)    Take 1 tablet by mouth as needed.    ATORVASTATIN (LIPITOR) 20 MG TABLET    Take 1 tablet (20 mg total) by mouth nightly.    DILTIAZEM (DILACOR XR) 120 MG CDCR    Take 120 mg by mouth once daily.    IBUPROFEN (ADVIL,MOTRIN) 200 MG TABLET    Take 200 mg by mouth as needed for Pain.    LOSARTAN (COZAAR) 50 MG TABLET    TAKE 1 TABLET BY MOUTH EVERY DAY   Discontinued Medications    CHANTIX 1 MG TAB    TAKE 1 TABLET BY MOUTH TWICE A DAY     OXYCODONE-ACETAMINOPHEN (PERCOCET) 5-325 MG PER TABLET    Take 1 tablet by mouth every 4 (four) hours as needed for Pain.    VALACYCLOVIR (VALTREX) 1000 MG TABLET    Take 2 tablets (2,000 mg total) by mouth every 12 (twelve) hours. for 2 doses     Family History   Problem Relation Age of Onset    Diabetes Mother     Hypertension Mother     Hypertension Sister     Hyperlipidemia Sister     Anxiety disorder Sister     Coronary artery disease Maternal Grandfather     No Known Problems Father      Social History     Socioeconomic History    Marital status:      Spouse name: Not on file    Number of children: Not on file    Years of education: Not on file    Highest education level: Not on file   Occupational History    Occupation: [employer]   Social Needs    Financial resource strain: Not on file    Food insecurity     Worry: Not on file     Inability: Not on file    Transportation needs     Medical: Not on file     Non-medical: Not on file   Tobacco Use    Smoking status: Current Some Day Smoker     Packs/day: 1.00     Years: 20.00     Pack years: 20.00     Types: Cigarettes    Smokeless tobacco: Never Used    Tobacco comment: trying to quit--03/2020 inst not to smoke dos    Substance and Sexual Activity    Alcohol use: Yes     Frequency: 2-4 times a month     Drinks per session: 7 to 9     Binge frequency: Weekly     Comment: occ    Drug use: No    Sexual activity: Yes     Partners: Female   Lifestyle    Physical activity     Days per week: Not on file     Minutes per session: Not on file    Stress: Only a little   Relationships    Social connections     Talks on phone: Not on file     Gets together: Not on file     Attends Holiness service: Not on file     Active member of club or organization: Not on file     Attends meetings of clubs or organizations: Not on file     Relationship status: Not on file   Other Topics Concern    Not on file   Social History Narrative    LIVE WITH MICHA          Review of Systems   Constitutional: Negative for appetite change, chills, fever and unexpected weight change.   HENT: Negative for hearing loss, rhinorrhea, sore throat and voice change.    Eyes: Negative for photophobia and visual disturbance.   Respiratory: Negative for cough, choking and shortness of breath.    Cardiovascular: Negative for chest pain, palpitations and leg swelling.   Gastrointestinal: Negative for abdominal pain, blood in stool, constipation, diarrhea, nausea and vomiting.        Groin pain right worse than left   Endocrine: Negative for cold intolerance, heat intolerance, polydipsia and polyuria.   Musculoskeletal: Negative for arthralgias, back pain, joint swelling and neck stiffness.   Skin: Negative for color change, pallor and rash.   Neurological: Negative for dizziness, seizures, syncope and headaches.   Hematological: Negative for adenopathy. Does not bruise/bleed easily.   Psychiatric/Behavioral: Negative for agitation, behavioral problems and confusion.       Objective:      Physical Exam  Constitutional:       General: He is not in acute distress.     Appearance: Normal appearance. He is well-developed. He is not toxic-appearing.   HENT:      Head: Normocephalic and atraumatic. No abrasion or laceration.      Right Ear: External ear normal.      Left Ear: External ear normal.      Nose: Nose normal.   Eyes:      Pupils: Pupils are equal, round, and reactive to light.   Neck:      Musculoskeletal: Neck supple. Normal range of motion.      Trachea: Trachea and phonation normal. No tracheal deviation.   Cardiovascular:      Rate and Rhythm: Normal rate and regular rhythm.   Pulmonary:      Effort: Pulmonary effort is normal. No tachypnea, accessory muscle usage or respiratory distress.   Abdominal:      General: There is no distension.      Palpations: Abdomen is soft. Abdomen is not rigid.      Tenderness: There is no abdominal tenderness. There is no guarding or rebound.           Comments: Did not appreciate any definite hernia on exam.  The incisions are clean dry and well healed.  There are no erythematous areas.  There is no indurated area.  I cannot reproduce the pain with palpation.   Genitourinary:     Scrotum/Testes:         Right: Tenderness present. Mass or swelling not present.         Left: Mass or tenderness not present.   Lymphadenopathy:      Lower Body: No right inguinal adenopathy. No left inguinal adenopathy.   Skin:     General: Skin is warm.   Neurological:      Mental Status: He is alert and oriented to person, place, and time.      Coordination: Coordination normal.      Gait: Gait normal.   Psychiatric:         Speech: Speech normal.         Behavior: Behavior normal.         Assessment/Plan:   Right inguinal pain  -     CT Pelvis With Contrast; Future; Expected date: 07/28/2020    Patient has a mild dull right groin pain at the site of the incision of his right inguinal hernia.  I did not appreciate any recurrent hernia on exam.  I do not appreciate any obvious fluid collection or induration.  Will order CT scan to make sure nothing obvious is present.  I suspect that the dull pain is from the surgery itself.  It may be a pain related to the mesh or potential nerve type pain.  Patient states that the pain is at a 1-2 at the worst out of 10, but would still like to try to determine if anything could help.  He will return to the office after CT.

## 2020-08-09 ENCOUNTER — HOSPITAL ENCOUNTER (EMERGENCY)
Facility: HOSPITAL | Age: 35
Discharge: HOME | End: 2020-08-09
Attending: EMERGENCY MEDICINE
Payer: COMMERCIAL

## 2020-08-09 VITALS
SYSTOLIC BLOOD PRESSURE: 156 MMHG | WEIGHT: 250 LBS | TEMPERATURE: 99 F | OXYGEN SATURATION: 98 % | DIASTOLIC BLOOD PRESSURE: 95 MMHG | RESPIRATION RATE: 16 BRPM | HEART RATE: 88 BPM | BODY MASS INDEX: 37.03 KG/M2 | HEIGHT: 69 IN

## 2020-08-09 DIAGNOSIS — M79.5 FOREIGN BODY (FB) IN SOFT TISSUE: Primary | ICD-10-CM

## 2020-08-09 PROCEDURE — 25000003 PHARM REV CODE 250: Performed by: EMERGENCY MEDICINE

## 2020-08-09 PROCEDURE — 99283 EMERGENCY DEPT VISIT LOW MDM: CPT | Mod: 25

## 2020-08-09 PROCEDURE — 20103 EXPL PENTRG WOUND EXTREMITY: CPT | Mod: F5

## 2020-08-09 RX ORDER — LIDOCAINE HYDROCHLORIDE 10 MG/ML
5 INJECTION, SOLUTION EPIDURAL; INFILTRATION; INTRACAUDAL; PERINEURAL
Status: COMPLETED | OUTPATIENT
Start: 2020-08-09 | End: 2020-08-09

## 2020-08-09 RX ORDER — DOXYCYCLINE 100 MG/1
100 CAPSULE ORAL 2 TIMES DAILY
Qty: 20 CAPSULE | Refills: 0 | Status: SHIPPED | OUTPATIENT
Start: 2020-08-09 | End: 2020-08-16

## 2020-08-09 RX ADMIN — LIDOCAINE HYDROCHLORIDE 50 MG: 10 INJECTION, SOLUTION EPIDURAL; INFILTRATION; INTRACAUDAL; PERINEURAL at 02:08

## 2020-08-09 NOTE — ED PROVIDER NOTES
Encounter Date: 8/9/2020       History     Chief Complaint   Patient presents with    FB TO FINGER     FISH HOOK TO RT THUMB     HPI     Seen and evaluated.  Presents with a fishhook stuck in his thumb.  This happened acutely earlier today while cleaning out his tackle box.  He denies any alleviating factors the who gives stuck in the lateral portion just next to the nail bed.  No active bleeding.  No exacerbating factors except for manipulation of the fishhook.    Review of patient's allergies indicates:   Allergen Reactions    Amoxicillin Hives     Past Medical History:   Diagnosis Date    Anxiety     Atrial fibrillation 03/2020    new onset- meds changed    Hyperlipidemia     Hypertension 2013    Inguinal hernia bilateral, non-recurrent 02/2020    right side more pronounced- surg scheduled    Pneumonia     age 11    Sleep apnea-like behavior     needs sleep study- wife says he stops breathing while sleeping     Past Surgical History:   Procedure Laterality Date    CHOLECYSTECTOMY  04/08/2019    w/ Small Umbilical hernia rep-     HERNIA REPAIR Bilateral 03/10/2020    Dr[facility]    UMBILICAL HERNIA REPAIR  04/08/2019         Family History   Problem Relation Age of Onset    Diabetes Mother     Hypertension Mother     Hypertension Sister     Hyperlipidemia Sister     Anxiety disorder Sister     Coronary artery disease Maternal Grandfather     No Known Problems Father      Social History     Tobacco Use    Smoking status: Current Some Day Smoker     Packs/day: 1.00     Years: 20.00     Pack years: 20.00     Types: Cigarettes    Smokeless tobacco: Never Used    Tobacco comment: trying to quit--03/2020 inst not to smoke dos    Substance Use Topics    Alcohol use: Yes     Frequency: 2-4 times a month     Drinks per session: 7 to 9     Binge frequency: Weekly     Comment: occ    Drug use: No     Review of Systems   Constitutional: Negative for activity change.   Skin:  Positive for wound. Negative for color change.   All other systems reviewed and are negative.      Physical Exam     Initial Vitals [08/09/20 1404]   BP Pulse Resp Temp SpO2   (!) 147/99 88 18 98.7 °F (37.1 °C) 97 %      MAP       --         Physical Exam    Nursing note and vitals reviewed.  Constitutional: He appears well-developed and well-nourished.   HENT:   Head: Normocephalic.   Musculoskeletal: Normal range of motion. Tenderness present.      Comments: [place] to right thumb between nail bed and finger margin   Neurological: He is alert and oriented to person, place, and time.   Skin: Skin is warm and dry.         ED Course   Foreign Body    Date/Time: 8/13/2020 5:09 PM  Performed by: Chang Schwab Jr., MD  Authorized by: Chang Schwab Jr., MD   Consent Done: Yes  Consent: Verbal consent obtained.  Risks and benefits: risks, benefits and alternatives were discussed  Consent given by: patient  Patient understanding: patient states understanding of the procedure being performed  Body area: skin  General location: upper extremity  Location details: right thumb  Anesthesia: local infiltration    Anesthesia:  Local Anesthetic: lidocaine 1% without epinephrine  Anesthetic total: 2 mL  Complexity: simple  1 objects recovered.  Post-procedure assessment: foreign body removed  Patient tolerance: Patient tolerated the procedure well with no immediate complications      Labs Reviewed - No data to display       Imaging Results    None          Medical Decision Making:   Initial Assessment:   Presented with facial stuck in his thumb.  Will get x-ray to ensure nail bed is not fractured.  Will also remove after anesthetizing with lidocaine.    Chang Schwab MD MPH  08/09/2020 2:18 PM                                     Clinical Impression:       ICD-10-CM ICD-9-CM   1. Foreign body (FB) in soft tissue  M79.5 729.6                                Chang Schwab Jr., MD  08/10/20 0324       Chang Schwab Jr., MD  08/13/20  [id number]

## 2020-08-09 NOTE — ED NOTES
FISH HOOK REMOVED FROM R THUMB PER DR HERNANDEZ.  TOLERATES WELL.  MINIMAL BLEEDING.  DRESSED WITH BETADINE.  WI WASHES HANDS WITH SOAP AND WATER POST PROCEDURE.

## 2020-08-13 ENCOUNTER — HOSPITAL ENCOUNTER (OUTPATIENT)
Dept: RADIOLOGY | Facility: HOSPITAL | Age: 35
Discharge: HOME | End: 2020-08-13
Attending: SURGERY
Payer: COMMERCIAL

## 2020-08-13 DIAGNOSIS — R10.31 RIGHT INGUINAL PAIN: ICD-10-CM

## 2020-08-13 PROCEDURE — 25500020 PHARM REV CODE 255: Performed by: SURGERY

## 2020-08-13 PROCEDURE — 72193 CT PELVIS W/DYE: CPT | Mod: TC

## 2020-08-13 RX ADMIN — IOHEXOL 100 ML: 350 INJECTION, SOLUTION INTRAVENOUS at 09:08

## 2020-09-29 ENCOUNTER — TELEPHONE (OUTPATIENT)
Dept: CARDIOLOGY | Facility: CLINIC | Age: 35
End: 2020-09-29
Payer: COMMERCIAL

## 2020-09-29 NOTE — TELEPHONE ENCOUNTER
Patient had holter back in July but he had covid the next day and had some minor stuff on it. Per  he was advised to have a holter repeated in few months. Can you put order in ?

## 2020-09-29 NOTE — TELEPHONE ENCOUNTER
----- Message from Keya Berg sent at 9/29/2020 10:57 AM CDT -----  Regarding: Holter Monitor  Says he's supposed to have another holter monitor ordered I don't see anything are you aware of this?   ----- Message -----  From: Hanane Renteria  Sent: 9/29/2020   9:06 AM CDT  To: Keya Berg  Subject: Holter Mpnitor Appt                              Please call patient to schedule [phone]

## 2020-10-05 ENCOUNTER — PATIENT MESSAGE (OUTPATIENT)
Dept: FAMILY MEDICINE | Facility: CLINIC | Age: 35
End: 2020-10-05

## 2020-10-14 ENCOUNTER — TELEPHONE (OUTPATIENT)
Dept: CARDIOLOGY | Facility: CLINIC | Age: 35
End: 2020-10-14
Payer: COMMERCIAL

## 2020-10-14 NOTE — TELEPHONE ENCOUNTER
----- Message from Hanane Renteria sent at 10/14/2020 11:48 AM CDT -----  Regarding: holter monitor  Please call patient regarding Holter monitor [phone]

## 2020-10-15 ENCOUNTER — TELEPHONE (OUTPATIENT)
Dept: FAMILY MEDICINE | Facility: CLINIC | Age: 35
End: 2020-10-15

## 2020-10-15 ENCOUNTER — TELEPHONE (OUTPATIENT)
Dept: CARDIOLOGY | Facility: CLINIC | Age: 35
End: 2020-10-15

## 2020-10-15 DIAGNOSIS — R00.2 PALPITATIONS: Primary | ICD-10-CM

## 2020-10-15 NOTE — TELEPHONE ENCOUNTER
Spoke with Dr. Lopez and he okd for another holter to be put on. Spoke with patient wife and she would make sure patient is here for appointment.

## 2020-10-15 NOTE — TELEPHONE ENCOUNTER
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

## 2020-10-16 ENCOUNTER — OFFICE VISIT (OUTPATIENT)
Dept: FAMILY MEDICINE | Facility: CLINIC | Age: 35
End: 2020-10-16
Payer: COMMERCIAL

## 2020-10-16 VITALS
HEART RATE: 70 BPM | HEIGHT: 69 IN | TEMPERATURE: 98 F | WEIGHT: 263 LBS | DIASTOLIC BLOOD PRESSURE: 90 MMHG | OXYGEN SATURATION: 97 % | SYSTOLIC BLOOD PRESSURE: 128 MMHG | BODY MASS INDEX: 38.95 KG/M2

## 2020-10-16 DIAGNOSIS — E78.2 MIXED HYPERLIPIDEMIA: ICD-10-CM

## 2020-10-16 DIAGNOSIS — N52.2 DRUG-INDUCED ERECTILE DYSFUNCTION: ICD-10-CM

## 2020-10-16 DIAGNOSIS — I10 ESSENTIAL HYPERTENSION: Primary | ICD-10-CM

## 2020-10-16 DIAGNOSIS — I48.0 PAROXYSMAL ATRIAL FIBRILLATION: ICD-10-CM

## 2020-10-16 PROCEDURE — 99214 PR OFFICE/OUTPT VISIT, EST, LEVL IV, 30-39 MIN: ICD-10-PCS | Mod: S$GLB,,, | Performed by: INTERNAL MEDICINE

## 2020-10-16 PROCEDURE — 99214 OFFICE O/P EST MOD 30 MIN: CPT | Mod: S$GLB,,, | Performed by: INTERNAL MEDICINE

## 2020-10-16 RX ORDER — TADALAFIL 5 MG/1
5 TABLET ORAL DAILY PRN
Qty: 30 TABLET | Refills: 3 | Status: SHIPPED | OUTPATIENT
Start: 2020-10-16 | End: 2021-04-20 | Stop reason: SDUPTHER

## 2020-10-16 RX ORDER — LOSARTAN POTASSIUM 100 MG/1
100 TABLET ORAL DAILY
Qty: 90 TABLET | Refills: 1 | Status: SHIPPED | OUTPATIENT
Start: 2020-10-16 | End: 2021-04-19

## 2020-10-16 RX ORDER — ASPIRIN 81 MG/1
81 TABLET ORAL DAILY
COMMUNITY
End: 2021-04-20

## 2020-10-16 RX ORDER — ATORVASTATIN CALCIUM 20 MG/1
20 TABLET, FILM COATED ORAL NIGHTLY
Qty: 90 TABLET | Refills: 1 | Status: SHIPPED | OUTPATIENT
Start: 2020-10-16 | End: 2021-04-19

## 2020-10-16 NOTE — PROGRESS NOTES
Report received from [address], care assumed. Subjective:       Patient ID: Gerardo Ramirez Jr. is a 35 y.o. male.    Chief Complaint: Hypertension (6 months followup and med refill)    Here for follow up.  Back in March he went for bilateral inguinal hernia surgery and was found to be in Afib.  He did stress test and ECHO and has been followed by Cardiology since then.  He also had a Holter but they want to repeat it because it tested positive for COVID at the same time and it may have affected results.   Also recently diagnosed with sleep apnea and has a CPAP which he is using but has a hard time keeping it on all night.     Hypertension  This is a chronic problem. The problem is uncontrolled. Associated symptoms include headaches (occasional). Pertinent negatives include no anxiety, chest pain, malaise/fatigue, neck pain, palpitations, peripheral edema or shortness of breath. Risk factors for coronary artery disease include obesity, male gender, dyslipidemia and smoking/tobacco exposure. Past treatments include calcium channel blockers and angiotensin blockers. The current treatment provides mild improvement. There are no compliance problems (having more issues with ED).      Review of Systems   Constitutional: Negative for chills, fatigue, fever, malaise/fatigue and unexpected weight change.   HENT: Negative for congestion, hearing loss, postnasal drip, rhinorrhea, trouble swallowing and voice change.    Eyes: Negative for photophobia and visual disturbance.   Respiratory: Positive for apnea. Negative for cough, choking, chest tightness, shortness of breath and wheezing.    Cardiovascular: Negative for chest pain, palpitations and leg swelling (occaional).   Gastrointestinal: Negative for abdominal pain, blood in stool, constipation, diarrhea, nausea, rectal pain and vomiting.   Endocrine: Negative for cold intolerance, heat intolerance, polydipsia and polyuria.   Genitourinary: Negative for decreased urine volume, difficulty urinating, discharge,  dysuria, flank pain, frequency, genital sores, hematuria, testicular pain and urgency.   Musculoskeletal: Negative for arthralgias, back pain, gait problem, joint swelling, myalgias and neck pain.   Skin: Negative for color change, rash and wound.   Allergic/Immunologic: Negative for environmental allergies and food allergies.   Neurological: Positive for dizziness (occasional), light-headedness and headaches (occasional). Negative for tremors, seizures, syncope, facial asymmetry, speech difficulty, weakness and numbness.   Hematological: Negative for adenopathy. Does not bruise/bleed easily.   Psychiatric/Behavioral: Negative for confusion, hallucinations, sleep disturbance and suicidal ideas. The patient is not nervous/anxious.        Past Medical History:   Diagnosis Date    Anxiety     Atrial fibrillation 03/2020    new onset- meds changed    Hyperlipidemia     Hypertension 2013    Inguinal hernia bilateral, non-recurrent 02/2020    right side more pronounced- surg scheduled    Pneumonia     age 11    Sleep apnea-like behavior     needs sleep study- wife says he stops breathing while sleeping      Past Surgical History:   Procedure Laterality Date    CHOLECYSTECTOMY  04/08/2019    w/ Small Umbilical hernia rep-     HERNIA REPAIR Bilateral 03/10/2020    [facility]    UMBILICAL HERNIA REPAIR  04/08/2019           Family History   Problem Relation Age of Onset    Diabetes Mother     Hypertension Mother     Hypertension Sister     Hyperlipidemia Sister     Anxiety disorder Sister     Coronary artery disease Maternal Grandfather     No Known Problems Father        Social History     Socioeconomic History    Marital status:      Spouse name: Not on file    Number of children: Not on file    Years of education: Not on file    Highest education level: Not on file   Occupational History    Occupation: [employer]   Social Needs    Financial resource strain: Not on file     Food insecurity     Worry: Not on file     Inability: Not on file    Transportation needs     Medical: Not on file     Non-medical: Not on file   Tobacco Use    Smoking status: Former Smoker     Packs/day: 1.00     Years: 20.00     Pack years: 20.00     Types: Cigarettes     Quit date: 2020     Years since quittin.3    Smokeless tobacco: Never Used   Substance and Sexual Activity    Alcohol use: Yes     Frequency: 2-4 times a month     Drinks per session: 7 to 9     Binge frequency: Weekly     Comment: occ    Drug use: No    Sexual activity: Yes     Partners: Female   Lifestyle    Physical activity     Days per week: Not on file     Minutes per session: Not on file    Stress: To some extent   Relationships    Social connections     Talks on phone: Not on file     Gets together: Not on file     Attends Anabaptism service: Not on file     Active member of club or organization: Not on file     Attends meetings of clubs or organizations: Not on file     Relationship status: Not on file   Other Topics Concern    Not on file   Social History Narrative    LIVE WITH [employer]       Current Outpatient Medications   Medication Sig Dispense Refill    aspirin (ECOTRIN) 81 MG EC tablet Take 81 mg by mouth once daily.      aspirin/salicylamide/caffeine (BC HEADACHE POWDER ORAL) Take 1 tablet by mouth as needed.      atorvastatin (LIPITOR) 20 MG tablet Take 1 tablet (20 mg total) by mouth nightly. 90 tablet 1    diltiaZEM (DILACOR XR) 120 MG CDCR Take 120 mg by mouth once daily.      ibuprofen (ADVIL,MOTRIN) 200 MG tablet Take 200 mg by mouth as needed for Pain.      losartan (COZAAR) 100 MG tablet Take 1 tablet (100 mg total) by mouth once daily. 90 tablet 1    tadalafiL (CIALIS) 5 MG tablet Take 1 tablet (5 mg total) by mouth daily as needed for Erectile Dysfunction. 30 tablet 3     No current facility-administered medications for this visit.        Review of patient's allergies indicates:   Allergen  "Reactions    Amoxicillin Hives     Objective:    HPI     Hypertension      Additional comments: 6 months followup and med refill          Last edited by Ramila Hernandez MA on 10/16/2020  9:20 AM. (History)      Blood pressure (!) 128/90, pulse 70, temperature 97.7 °F (36.5 °C), temperature source Temporal, height 5' 8.5" (1.74 m), weight 119.3 kg (263 lb), SpO2 97 %. Body mass index is 39.41 kg/m².   Physical Exam  Vitals signs and nursing note reviewed.   Constitutional:       General: He is not in acute distress.     Appearance: He is well-developed. He is obese. He is not ill-appearing, toxic-appearing or diaphoretic.   HENT:      Head: Normocephalic and atraumatic.   Eyes:      General: No scleral icterus.        Right eye: No discharge.         Left eye: No discharge.      Conjunctiva/sclera: Conjunctivae normal.   Neck:      Vascular: No carotid bruit.   Cardiovascular:      Rate and Rhythm: Normal rate and regular rhythm.      Heart sounds: Normal heart sounds. No murmur.   Pulmonary:      Effort: Pulmonary effort is normal. No respiratory distress.      Breath sounds: Normal breath sounds. No decreased breath sounds, wheezing, rhonchi or rales.   Abdominal:      General: There is no distension.      Palpations: Abdomen is soft.      Tenderness: There is no abdominal tenderness. There is no guarding or rebound.   Musculoskeletal:      Right lower leg: No edema.      Left lower leg: No edema.   Skin:     General: Skin is warm and dry.   Neurological:      Mental Status: He is alert.      Motor: No tremor.   Psychiatric:         Mood and Affect: Mood normal.         Speech: Speech normal.         Behavior: Behavior normal.             Assessment:       1. Essential hypertension    2. Mixed hyperlipidemia    3. Paroxysmal atrial fibrillation    4. Drug-induced erectile dysfunction        Plan:       Gerardo was seen today for hypertension.    Diagnoses and all orders for this visit:    Essential hypertension  -  "    Comprehensive Metabolic Panel; Future  -     Lipid Panel; Future  -     Urinalysis; Future  -     losartan (COZAAR) 100 MG tablet; Take 1 tablet (100 mg total) by mouth once daily.  -     Comprehensive Metabolic Panel  -     Lipid Panel  -     Urinalysis    Mixed hyperlipidemia  Comments:  Labs look good    Orders:  -     atorvastatin (LIPITOR) 20 MG tablet; Take 1 tablet (20 mg total) by mouth nightly.  -     Comprehensive Metabolic Panel; Future  -     Lipid Panel; Future  -     Comprehensive Metabolic Panel  -     Lipid Panel    Paroxysmal atrial fibrillation    Drug-induced erectile dysfunction  -     tadalafiL (CIALIS) 5 MG tablet; Take 1 tablet (5 mg total) by mouth daily as needed for Erectile Dysfunction.

## 2020-11-11 ENCOUNTER — TELEPHONE (OUTPATIENT)
Dept: CARDIOLOGY | Facility: CLINIC | Age: 35
End: 2020-11-11

## 2020-11-11 ENCOUNTER — OFFICE VISIT (OUTPATIENT)
Dept: UROLOGY | Facility: CLINIC | Age: 35
End: 2020-11-11
Payer: COMMERCIAL

## 2020-11-11 VITALS
DIASTOLIC BLOOD PRESSURE: 89 MMHG | TEMPERATURE: 99 F | HEART RATE: 79 BPM | BODY MASS INDEX: 38.95 KG/M2 | HEIGHT: 69 IN | RESPIRATION RATE: 18 BRPM | WEIGHT: 263 LBS | SYSTOLIC BLOOD PRESSURE: 142 MMHG

## 2020-11-11 DIAGNOSIS — Z30.09 VASECTOMY EVALUATION: Primary | ICD-10-CM

## 2020-11-11 PROCEDURE — 99204 OFFICE O/P NEW MOD 45 MIN: CPT | Mod: S$GLB,,, | Performed by: UROLOGY

## 2020-11-11 PROCEDURE — 99204 PR OFFICE/OUTPT VISIT, NEW, LEVL IV, 45-59 MIN: ICD-10-PCS | Mod: S$GLB,,, | Performed by: UROLOGY

## 2020-11-11 PROCEDURE — 99999 PR PBB SHADOW E&M-EST. PATIENT-LVL III: ICD-10-PCS | Mod: PBBFAC,,, | Performed by: UROLOGY

## 2020-11-11 PROCEDURE — 99999 PR PBB SHADOW E&M-EST. PATIENT-LVL III: CPT | Mod: PBBFAC,,, | Performed by: UROLOGY

## 2020-11-11 RX ORDER — DILTIAZEM HYDROCHLORIDE 120 MG/1
120 CAPSULE, COATED, EXTENDED RELEASE ORAL DAILY
COMMUNITY
Start: 2020-10-08 | End: 2021-01-20

## 2020-11-11 NOTE — PROGRESS NOTES
"[unfilled]  [id number]  AGE:  35 y.o.     11/11/2020      DRUG ALLERGIES:  Amoxicillin    CHIEF COMPLAINT:  Elective vasectomy for sterilization    HISTORY OF PRESENT ILLNESS:  This is a 35-year-old male  with 2 daughters and 1 son is coming requesting elective vasectomy for sterilization as they do not want any more children.   history essentially negative other than being treated for epididymitis some years ago.    MEDICAL HISTORY:  Hypertension, atrial fibrillation, hyperlipidemia     PAST SURGICAL HISTORY:  Cholecystectomy, bilateral inguinal hernia repairs    PRESENT MEDICATIONS:  Aspirin, Cialis, Cozaar, Lipitor, Cardizem, ibuprofen    FAMILY HISTORY:  Hypertension in some family members    SOCIAL HISTORY:  Oil refinery worker.   with 2 daughters and 1 son in good health.  Tobacco quit smoking 6 months ago .  Alcohol social.    REVIEW OF SYMPTOMS:  GENERAL:  No weight change.  Good appetite.  HEAD AND NECK:  No headaches.  No visual problems.  CARDIORESPIRATORY:  No chest pain or shortness of breath no cough   GASTROINTESTINAL:  No trouble swallowing no constipation or diarrhea.  MUSCULOSKELETAL:  No joint or back problems.    PHYSICAL EXAMINATION:  VITAL SIGNS:  BP (!) 142/89   Pulse 79   Temp 98.6 °F (37 °C) (Oral)   Resp 18   Ht 5' 8.5" (1.74 m)   Wt 119.3 kg (263 lb 0.1 oz)   BMI 39.41 kg/m²   GENERAL:  Well-developed, well-nourished 35 y.o.  -year-old male, alert, oriented, cooperative, in no acute distress.  HEAD AND NECK:  Throat clear.  No adenopathy.  CHEST:  Clear.  HEART:  Regular.  No murmur.  ABDOMEN:  Soft, benign and nontender.  No masses.  No hernias.  No organomegaly.  EXTERNAL GENITAL:  Normal phallus with adequate meatus.  Testes descended and   feel normal.  No scrotal masses.  Both vas are palpable and feel normal  RECTAL:  A 20 g smooth prostate.  No nodules.  Normal sphincter tone.    UA negative pH 7.0    FINAL IMPRESSION:  Elective vasectomy for " sterilization    RECOMMENDATIONS:  In view of his cardiac history patient instructed to obtain a cardiac clearance before and subsequently will be scheduled for vasectomy under general.

## 2020-11-11 NOTE — TELEPHONE ENCOUNTER
Pt wife is going to call dr altamirano office again and see if they faxed clearance. Pt trying to get done before end of year due to deductible being met.

## 2020-11-11 NOTE — TELEPHONE ENCOUNTER
----- Message from Brody Bermudez sent at 11/11/2020  1:18 PM CST -----  Regarding: r/s holter from 10/30 and clearance  R/s holter from 10/30  Pt also needs surgical clearance for vasectomy with dr altamirano   Told them we need clearance request first   [phone] Rishabh soares

## 2020-11-12 ENCOUNTER — TELEPHONE (OUTPATIENT)
Dept: UROLOGY | Facility: CLINIC | Age: 35
End: 2020-11-12

## 2020-11-12 ENCOUNTER — TELEPHONE (OUTPATIENT)
Dept: CARDIOLOGY | Facility: CLINIC | Age: 35
End: 2020-11-12

## 2020-11-12 NOTE — TELEPHONE ENCOUNTER
----- Message from Roseann Salmeron MA sent at 11/12/2020  3:47 PM CST -----  Patient wife Rishabh is calling our office stating patient needs cardiac clearance for Vasectomy can you please send me a clearance request in order for Dr. Mauricio to review for clearance. Thank you.

## 2020-11-12 NOTE — TELEPHONE ENCOUNTER
----- Message from Brody Bermudez sent at 11/12/2020  3:24 PM CST -----  Regarding: clearance  Did you get cardiac clearance request for dr velarde      [phone]

## 2020-11-12 NOTE — TELEPHONE ENCOUNTER
Cardiac clearance request sent through [employer] and faxed to Dr Mauricio's office. I will call the pt to schedule procedure after it is received. Thank you

## 2020-11-12 NOTE — TELEPHONE ENCOUNTER
----- Message from Roseann Salmeron MA sent at 11/12/2020  4:07 PM CST -----  Can you fax it [phone] and send through [facility] so I know we for sure get it. Thanks.  ----- Message -----  From: Dinah De La O MA  Sent: 11/12/2020   4:00 PM CST  To: Roseann Salmeron MA    Do you want me to fax it or send through Epic? If you want me to fax it please let me know the fax number.     Thanks,  Dinah  ----- Message -----  From: Roseann Salmeron MA  Sent: 11/12/2020   3:47 PM CST  To: Micheline Porter Staff    Patient wife Rishabh is calling our office stating patient needs cardiac clearance for Vasectomy can you please send me a clearance request in order for Dr. Mauricio to review for clearance. Thank you.

## 2020-11-12 NOTE — TELEPHONE ENCOUNTER
Spoke with Rishabh pt wife and told her I would send message to Dr Tobias staff and try to get clearance. Patient understood.

## 2020-11-13 ENCOUNTER — TELEPHONE (OUTPATIENT)
Dept: UROLOGY | Facility: CLINIC | Age: 35
End: 2020-11-13

## 2020-11-13 ENCOUNTER — TELEPHONE (OUTPATIENT)
Dept: CARDIOLOGY | Facility: CLINIC | Age: 35
End: 2020-11-13

## 2020-11-13 ENCOUNTER — TELEPHONE (OUTPATIENT)
Dept: CARDIOLOGY | Facility: CLINIC | Age: 35
End: 2020-11-13
Payer: COMMERCIAL

## 2020-11-13 NOTE — TELEPHONE ENCOUNTER
----- Message from Hanane Renteria sent at 11/13/2020  3:16 PM CST -----  Regarding: clearance  please call patient wife at [phone] clearance

## 2020-11-13 NOTE — LETTER
2020    Gerardo Ramirez Jr.  [address] [facility]  [place] [address]             [facility] [place]  [address], [address]  [place] [address]  Phone: [phone]  Fax: [phone] Patient: Gerardo Ramirez Jr.  : 1985  Referring Doctor: Dr. Tobias  Consulting Doctor: Dr. Mauricio  Procedure: Vasectomy    This patient has been assessed for risk factors for clearance of surgery with the following stipulations:    ___ No contraindications  ___ Recommendations for antiplatelet/anticoagulant medications:  _x__ Cleared for surgery with the following contraindications/precautions:  ___ Not cleared for surgery due to the following reasons:      If you have any questions regarding the above, please contact my office at   [phone].    Sincerely,        Vivien Mauricio MD

## 2020-11-13 NOTE — TELEPHONE ENCOUNTER
Pt has a h/o A Fib and is requesting a vasectomy. Per Dr Tobias he needs cardiac clearance.Can you please send cardiac clearance.     Thanks,  Dinah

## 2020-11-13 NOTE — TELEPHONE ENCOUNTER
I spoke with Rishabh and advised her the clearance request has been sent. Understanding was verbalized.

## 2020-11-13 NOTE — TELEPHONE ENCOUNTER
----- Message from Roseann Salmeron MA sent at 11/13/2020  3:33 PM CST -----  Regarding: FW: clearance  The clearance was never received our fax machine is down. Can you send it thru epic?  ----- Message -----  From: Hanane Renteria  Sent: 11/13/2020   3:16 PM CST  To: Hang Puga Staff  Subject: clearance                                        please call patient wife at [phone] clearance

## 2020-11-13 NOTE — TELEPHONE ENCOUNTER
----- Message from Francisca Huber sent at 11/13/2020  8:24 AM CST -----  Contact: Wife Rishabh  Patients wife is req a call to back to find out if you sent over the cardio clearance request form to Dr Mauricio's ofc.  Call back at [phone] to advise

## 2020-11-16 ENCOUNTER — TELEPHONE (OUTPATIENT)
Dept: UROLOGY | Facility: CLINIC | Age: 35
End: 2020-11-16

## 2020-11-16 ENCOUNTER — TELEPHONE (OUTPATIENT)
Dept: CARDIOLOGY | Facility: CLINIC | Age: 35
End: 2020-11-16

## 2020-11-16 NOTE — TELEPHONE ENCOUNTER
Spoke with patient, informed we can schedule on a Monday, he needs to check with his work schedule and will call back with date he will like to do, patient verbally understood.

## 2020-11-16 NOTE — TELEPHONE ENCOUNTER
----- Message from Hanane Renteria sent at 11/16/2020  2:39 PM CST -----  Regarding: clearance  please call patient regarding clearance [phone]

## 2020-11-16 NOTE — TELEPHONE ENCOUNTER
----- Message from Keya  sent at 11/16/2020  2:44 PM CST -----  Regarding: appt access  Contact: spouse  Type: Needs Medical Advice    Who Called:      Best Call Back Number:     Additional Information: Requesting a call back from Nurse, regarding pt needs a call back to receive appt for VAS ,please call back

## 2020-11-17 ENCOUNTER — TELEPHONE (OUTPATIENT)
Dept: UROLOGY | Facility: CLINIC | Age: 35
End: 2020-11-17

## 2020-11-17 NOTE — TELEPHONE ENCOUNTER
Returned call and spoke to patient's wife, patient ready to schedule vasectomy, put on book for 11/30/20. Pre-admit and covid test scheduled, she will inform the patient, she verbally understood.

## 2020-11-17 NOTE — TELEPHONE ENCOUNTER
----- Message from Marii Crowe sent at 11/17/2020  1:55 PM CST -----  Type:  Patient Returning Call    Who Called:  Wife   Who Left Message for Patient:  Gloria   Does the patient know what this is regarding?: yes   Best Call Back Number:     Additional Information:  Please advise-thank you

## 2020-11-17 NOTE — TELEPHONE ENCOUNTER
----- Message from Vanesa Rubalcava sent at 11/17/2020  1:04 PM CST -----  Regarding: RT call to Marlena  Type:  Patient Returning Call    Who Called: Wife  Who Left Message for Patient:  Gloria  Does the patient know what this is regarding?:  procedure  Best Call Back Number: .  Additional Information:

## 2020-11-18 DIAGNOSIS — Z03.818 ENCOUNTER FOR OBSERVATION FOR SUSPECTED EXPOSURE TO OTHER BIOLOGICAL AGENTS RULED OUT: ICD-10-CM

## 2020-11-19 ENCOUNTER — TELEPHONE (OUTPATIENT)
Dept: CARDIOLOGY | Facility: CLINIC | Age: 35
End: 2020-11-19
Payer: COMMERCIAL

## 2020-11-19 NOTE — TELEPHONE ENCOUNTER
So that clearance is not right. It is not signed by physician. I do not know why it was sent to [facility]. I am asking Dr. Mauricio to finish it now.

## 2020-11-19 NOTE — TELEPHONE ENCOUNTER
----- Message from Gloria Pickard LPN sent at 11/19/2020  1:42 PM CST -----  Hello, patient has letter for cardiac clearance for vasectomy, its says it has a a contraindication and precaution but nothing is listed there. Can you tell me what is suppose to be there or the clearance is fine as is ?       Thank you, Gloria

## 2020-11-24 ENCOUNTER — HOSPITAL ENCOUNTER (OUTPATIENT)
Dept: PREADMISSION TESTING | Facility: HOSPITAL | Age: 35
Discharge: HOME | End: 2020-11-24
Attending: UROLOGY
Payer: COMMERCIAL

## 2020-11-24 VITALS — BODY MASS INDEX: 37.03 KG/M2 | WEIGHT: 250 LBS | HEIGHT: 69 IN

## 2020-11-24 DIAGNOSIS — Z01.818 PREOP TESTING: ICD-10-CM

## 2020-11-24 LAB
ALBUMIN SERPL BCP-MCNC: 4.2 G/DL (ref 3.5–5.2)
ALP SERPL-CCNC: 83 U/L (ref 55–135)
ALT SERPL W/O P-5'-P-CCNC: 86 U/L (ref 10–44)
ANION GAP SERPL CALC-SCNC: 12 MMOL/L (ref 8–16)
APTT BLDCRRT: 31.2 SEC (ref 21–32)
AST SERPL-CCNC: 33 U/L (ref 10–40)
BASOPHILS # BLD AUTO: 0.02 K/UL (ref 0–0.2)
BASOPHILS NFR BLD: 0.4 % (ref 0–1.9)
BILIRUB SERPL-MCNC: 0.8 MG/DL (ref 0.1–1)
BUN SERPL-MCNC: 16 MG/DL (ref 6–20)
CALCIUM SERPL-MCNC: 9 MG/DL (ref 8.7–10.5)
CHLORIDE SERPL-SCNC: 104 MMOL/L (ref 95–110)
CO2 SERPL-SCNC: 23 MMOL/L (ref 23–29)
CREAT SERPL-MCNC: 1.5 MG/DL (ref 0.5–1.4)
DIFFERENTIAL METHOD: NORMAL
EOSINOPHIL # BLD AUTO: 0.2 K/UL (ref 0–0.5)
EOSINOPHIL NFR BLD: 3.8 % (ref 0–8)
ERYTHROCYTE [DISTWIDTH] IN BLOOD BY AUTOMATED COUNT: 12.6 % (ref 11.5–14.5)
EST. GFR  (AFRICAN AMERICAN): >60 ML/MIN/1.73 M^2
EST. GFR  (NON AFRICAN AMERICAN): 59 ML/MIN/1.73 M^2
GLUCOSE SERPL-MCNC: 106 MG/DL (ref 70–110)
HCT VFR BLD AUTO: 43 % (ref 40–54)
HGB BLD-MCNC: 14.5 G/DL (ref 14–18)
IMM GRANULOCYTES # BLD AUTO: 0.01 K/UL (ref 0–0.04)
IMM GRANULOCYTES NFR BLD AUTO: 0.2 % (ref 0–0.5)
INR PPP: 1 (ref 0.8–1.2)
LYMPHOCYTES # BLD AUTO: 1.5 K/UL (ref 1–4.8)
LYMPHOCYTES NFR BLD: 29.3 % (ref 18–48)
MCH RBC QN AUTO: 27.9 PG (ref 27–31)
MCHC RBC AUTO-ENTMCNC: 33.7 G/DL (ref 32–36)
MCV RBC AUTO: 83 FL (ref 82–98)
MONOCYTES # BLD AUTO: 0.3 K/UL (ref 0.3–1)
MONOCYTES NFR BLD: 5.7 % (ref 4–15)
NEUTROPHILS # BLD AUTO: 3.2 K/UL (ref 1.8–7.7)
NEUTROPHILS NFR BLD: 60.6 % (ref 38–73)
NRBC BLD-RTO: 0 /100 WBC
PLATELET # BLD AUTO: 204 K/UL (ref 150–350)
PMV BLD AUTO: 9.9 FL (ref 9.2–12.9)
POTASSIUM SERPL-SCNC: 4 MMOL/L (ref 3.5–5.1)
PROT SERPL-MCNC: 7.1 G/DL (ref 6–8.4)
PROTHROMBIN TIME: 10.9 SEC (ref 9–12.5)
RBC # BLD AUTO: 5.19 M/UL (ref 4.6–6.2)
SODIUM SERPL-SCNC: 139 MMOL/L (ref 136–145)
WBC # BLD AUTO: 5.23 K/UL (ref 3.9–12.7)

## 2020-11-24 PROCEDURE — 36415 COLL VENOUS BLD VENIPUNCTURE: CPT

## 2020-11-24 PROCEDURE — 93010 ELECTROCARDIOGRAM REPORT: CPT | Mod: ,,, | Performed by: INTERNAL MEDICINE

## 2020-11-24 PROCEDURE — 85610 PROTHROMBIN TIME: CPT

## 2020-11-24 PROCEDURE — 85730 THROMBOPLASTIN TIME PARTIAL: CPT

## 2020-11-24 PROCEDURE — 93005 ELECTROCARDIOGRAM TRACING: CPT

## 2020-11-24 PROCEDURE — 93010 EKG 12-LEAD: ICD-10-PCS | Mod: ,,, | Performed by: INTERNAL MEDICINE

## 2020-11-24 PROCEDURE — 80053 COMPREHEN METABOLIC PANEL: CPT

## 2020-11-24 PROCEDURE — 99900104 DSU ONLY-NO CHARGE-EA ADD'L HR (STAT)

## 2020-11-24 PROCEDURE — 99900103 DSU ONLY-NO CHARGE-INITIAL HR (STAT)

## 2020-11-24 PROCEDURE — 85025 COMPLETE CBC W/AUTO DIFF WBC: CPT

## 2020-11-24 NOTE — DISCHARGE INSTRUCTIONS
To confirm, Your doctor has instructed you that surgery is scheduled for: 11/30/2020    Please report to [facility], Registration the morning of surgery. You must check-in and receive a wristband before going to your procedure.    Pre-Op will call the FRIDAY prior to surgery between 1:00 and 6:00 PM with the final arrival time.  Phone number: [phone]    PLEASE NOTE:  The surgery schedule has many variables which may affect the time of your surgery case.  Family members should be available if your surgery time changes.  Plan to be here the day of your procedure between 4-6 hours.    MEDICATIONS:  TAKE ONLY THESE MEDICATIONS WITH A SMALL SIP OF WATER THE MORNING OF YOUR PROCEDURE:  DILTIAZEM    DO NOT TAKE THESE MEDICATIONS 5-7 DAYS PRIOR to your procedure or per your surgeon's request:   ASPIRIN, ALEVE, ADVIL, IBUPROFEN, FISH OIL VITAMIN E, HERBALS  (May take Tylenol)    ONLY if you are prescribed any types of blood thinners such as:  Aspirin, Coumadin, Plavix, Pradaxa, Xarelto, Aggrenox, Effient, Eliquis, Savasya, Brilinta, or any other, ask your surgeon whether you should stop taking them and how long before surgery you should stop.  You may also need to verify with the prescribing physician if it is ok to stop your medication.      INSTRUCTIONS IMPORTANT!!  · Do not eat or drink anything between midnight and the time of your procedure- this includes gum, mints, and candy.  · Do not smoke or drink alcoholic beverages 24 hours prior to your procedure.  · Shower the night before AND the morning of your procedure with a Chlorhexidine wash such as Hibiclens or Dial antibacterial soap from the neck down.  Do not get it on your face or in your eyes.  You may use your own shampoo and face wash. This helps your skin to be as bacteria free as possible.    · If you wear contact lenses, dentures, hearing aids or glasses, bring a container to put them in during surgery and give to a family member  for safe keeping.  Please leave all jewelry, piercing's and valuables at home.   · DO NOT remove hair from the surgery site.  Do not shave the incision site unless you are given specific instructions to do so.    · ONLY if you have been diagnosed with sleep apnea please bring your C-PAP machine.  · ONLY if you wear home oxygen please bring your portable oxygen tank the day of your procedure.  · ONLY if you have a history of OPEN HEART SURGERY you will need a clearance from your Cardiologist per Anesthesia.      · ONLY for patients requiring bowel prep, written instructions will be given by your doctor's office.  · ONLY if you have a neuro stimulator, please bring the controller with you the morning of surgery  · ONLY if a type and screen test is needed before surgery, please return: N/A  · If your doctor has scheduled you for an overnight stay, bring a small overnight bag with any personal items you need.  · Make arrangements in advance for transportation home by a responsible adult.  It is not safe to drive a vehicle during the 24 hours after anesthesia.     · ONLY ONE VISITOR PER PATIENT IS ALLOWED TO COME IN THE HOSPITAL THE DAY OF PROCEDURE.   · Visiting hours are 8:00AM-6:00PM. For the safety of all patients, visitors under the age of 12 are not allowed above the first floor of the hospital.    · All [facility] facilities and properties are tobacco free.  Smoking is NOT allowed.       If you have any questions about these instructions, call Pre-Op Admit  Nursing at [phone] or the Pre-Op Day Surgery Unit at [phone].

## 2020-11-25 DIAGNOSIS — Z30.2 ENCOUNTER FOR STERILIZATION: Primary | ICD-10-CM

## 2020-11-25 RX ORDER — CIPROFLOXACIN 2 MG/ML
400 INJECTION, SOLUTION INTRAVENOUS
Status: CANCELLED | OUTPATIENT
Start: 2020-11-30

## 2020-11-25 NOTE — H&P
Subjective:       Patient ID: Gerardo Ramirez Jr. is a 35 y.o. male.    Chief Complaint:   with 2 daughters and 1 son is requesting elective vasectomy for sterilization as they do not want to have anymore children.  Negative  history other than being treated for epididymitis some years ago.    Past Medical History:   Diagnosis Date    Anxiety     Atrial fibrillation 2020    new onset- meds changed    Hyperlipidemia     Hypertension 2013    Inguinal hernia bilateral, non-recurrent 2020    right side more pronounced- surg scheduled    Pneumonia     age 11    Sleep apnea-like behavior     needs sleep study- wife says he stops breathing while sleeping     Past Surgical History:   Procedure Laterality Date    CHOLECYSTECTOMY  2019    w/ Small Umbilical hernia rep-     HERNIA REPAIR Bilateral 03/10/2020    Dr[facility]    UMBILICAL HERNIA REPAIR  2019         Family History   Problem Relation Age of Onset    Diabetes Mother     Hypertension Mother     Hypertension Sister     Hyperlipidemia Sister     Anxiety disorder Sister     Coronary artery disease Maternal Grandfather     No Known Problems Father      Social History     Socioeconomic History    Marital status:      Spouse name: Not on file    Number of children: Not on file    Years of education: Not on file    Highest education level: Not on file   Occupational History    Occupation: [employer]   Social Needs    Financial resource strain: Not on file    Food insecurity     Worry: Not on file     Inability: Not on file    Transportation needs     Medical: Not on file     Non-medical: Not on file   Tobacco Use    Smoking status: Former Smoker     Packs/day: 1.00     Years: 20.00     Pack years: 20.00     Types: Cigarettes     Quit date: 2020     Years since quittin.4    Smokeless tobacco: Never Used   Substance and Sexual Activity    Alcohol use: Yes     Frequency: 2-4  times a month     Drinks per session: 7 to 9     Binge frequency: Weekly     Comment: occ    Drug use: No    Sexual activity: Yes     Partners: Female   Lifestyle    Physical activity     Days per week: Not on file     Minutes per session: Not on file    Stress: To some extent   Relationships    Social connections     Talks on phone: Not on file     Gets together: Not on file     Attends Adventism service: Not on file     Active member of club or organization: Not on file     Attends meetings of clubs or organizations: Not on file     Relationship status: Not on file   Other Topics Concern    Not on file   Social History Narrative    LIVE WITH FIANCE       Current Outpatient Medications   Medication Sig Dispense Refill    aspirin (ECOTRIN) 81 MG EC tablet Take 81 mg by mouth once daily.      atorvastatin (LIPITOR) 20 MG tablet Take 1 tablet (20 mg total) by mouth nightly. 90 tablet 1    diltiaZEM (CARDIZEM CD) 120 MG Cp24 Take 120 mg by mouth once daily.      ibuprofen (ADVIL,MOTRIN) 200 MG tablet Take 200 mg by mouth as needed for Pain.      losartan (COZAAR) 100 MG tablet Take 1 tablet (100 mg total) by mouth once daily. (Patient taking differently: Take 100 mg by mouth every evening. ) 90 tablet 1    tadalafiL (CIALIS) 5 MG tablet Take 1 tablet (5 mg total) by mouth daily as needed for Erectile Dysfunction. 30 tablet 3     No current facility-administered medications for this visit.      Review of patient's allergies indicates:   Allergen Reactions    Amoxicillin Hives       Review of Systems   All other systems reviewed and are negative.      Objective:      There were no vitals filed for this visit.  Physical Exam  Cardiovascular:      Rate and Rhythm: Normal rate.   Pulmonary:      Effort: Pulmonary effort is normal.   Abdominal:      General: Abdomen is flat.   Genitourinary:     Penis: Normal.               Assessment:       1. Encounter for sterilization        Plan:       Vasectomy

## 2020-11-25 NOTE — H&P (VIEW-ONLY)
Subjective:       Patient ID: Gerardo Ramirez Jr. is a 35 y.o. male.    Chief Complaint:   with 2 daughters and 1 son is requesting elective vasectomy for sterilization as they do not want to have anymore children.  Negative  history other than being treated for epididymitis some years ago.    Past Medical History:   Diagnosis Date    Anxiety     Atrial fibrillation 2020    new onset- meds changed    Hyperlipidemia     Hypertension 2013    Inguinal hernia bilateral, non-recurrent 2020    right side more pronounced- surg scheduled    Pneumonia     age 11    Sleep apnea-like behavior     needs sleep study- wife says he stops breathing while sleeping     Past Surgical History:   Procedure Laterality Date    CHOLECYSTECTOMY  2019    w/ Small Umbilical hernia rep-     HERNIA REPAIR Bilateral 03/10/2020    Dr. Lanier - [facility]    UMBILICAL HERNIA REPAIR  2019         Family History   Problem Relation Age of Onset    Diabetes Mother     Hypertension Mother     Hypertension Sister     Hyperlipidemia Sister     Anxiety disorder Sister     Coronary artery disease Maternal Grandfather     No Known Problems Father      Social History     Socioeconomic History    Marital status:      Spouse name: Not on file    Number of children: Not on file    Years of education: Not on file    Highest education level: Not on file   Occupational History    Occupation: [employer]   Social Needs    Financial resource strain: Not on file    Food insecurity     Worry: Not on file     Inability: Not on file    Transportation needs     Medical: Not on file     Non-medical: Not on file   Tobacco Use    Smoking status: Former Smoker     Packs/day: 1.00     Years: 20.00     Pack years: 20.00     Types: Cigarettes     Quit date: 2020     Years since quittin.4    Smokeless tobacco: Never Used   Substance and Sexual Activity    Alcohol use: Yes     Frequency: 2-4  times a month     Drinks per session: 7 to 9     Binge frequency: Weekly     Comment: occ    Drug use: No    Sexual activity: Yes     Partners: Female   Lifestyle    Physical activity     Days per week: Not on file     Minutes per session: Not on file    Stress: To some extent   Relationships    Social connections     Talks on phone: Not on file     Gets together: Not on file     Attends Episcopalian service: Not on file     Active member of club or organization: Not on file     Attends meetings of clubs or organizations: Not on file     Relationship status: Not on file   Other Topics Concern    Not on file   Social History Narrative    LIVE WITH FIANCE       Current Outpatient Medications   Medication Sig Dispense Refill    aspirin (ECOTRIN) 81 MG EC tablet Take 81 mg by mouth once daily.      atorvastatin (LIPITOR) 20 MG tablet Take 1 tablet (20 mg total) by mouth nightly. 90 tablet 1    diltiaZEM (CARDIZEM CD) 120 MG Cp24 Take 120 mg by mouth once daily.      ibuprofen (ADVIL,MOTRIN) 200 MG tablet Take 200 mg by mouth as needed for Pain.      losartan (COZAAR) 100 MG tablet Take 1 tablet (100 mg total) by mouth once daily. (Patient taking differently: Take 100 mg by mouth every evening. ) 90 tablet 1    tadalafiL (CIALIS) 5 MG tablet Take 1 tablet (5 mg total) by mouth daily as needed for Erectile Dysfunction. 30 tablet 3     No current facility-administered medications for this visit.      Review of patient's allergies indicates:   Allergen Reactions    Amoxicillin Hives       Review of Systems   All other systems reviewed and are negative.      Objective:      There were no vitals filed for this visit.  Physical Exam  Cardiovascular:      Rate and Rhythm: Normal rate.   Pulmonary:      Effort: Pulmonary effort is normal.   Abdominal:      General: Abdomen is flat.   Genitourinary:     Penis: Normal.               Assessment:       1. Encounter for sterilization        Plan:       Vasectomy

## 2020-11-27 ENCOUNTER — LAB VISIT (OUTPATIENT)
Dept: PRIMARY CARE CLINIC | Facility: CLINIC | Age: 35
End: 2020-11-27
Payer: COMMERCIAL

## 2020-11-27 ENCOUNTER — ANESTHESIA EVENT (OUTPATIENT)
Dept: SURGERY | Facility: HOSPITAL | Age: 35
End: 2020-11-27
Payer: COMMERCIAL

## 2020-11-27 DIAGNOSIS — Z03.818 ENCOUNTER FOR OBSERVATION FOR SUSPECTED EXPOSURE TO OTHER BIOLOGICAL AGENTS RULED OUT: ICD-10-CM

## 2020-11-27 PROCEDURE — U0003 INFECTIOUS AGENT DETECTION BY NUCLEIC ACID (DNA OR RNA); SEVERE ACUTE RESPIRATORY SYNDROME CORONAVIRUS 2 (SARS-COV-2) (CORONAVIRUS DISEASE [COVID-19]), AMPLIFIED PROBE TECHNIQUE, MAKING USE OF HIGH THROUGHPUT TECHNOLOGIES AS DESCRIBED BY CMS-2020-01-R: HCPCS

## 2020-11-28 LAB — SARS-COV-2 RNA RESP QL NAA+PROBE: NOT DETECTED

## 2020-11-30 ENCOUNTER — ANESTHESIA (OUTPATIENT)
Dept: SURGERY | Facility: HOSPITAL | Age: 35
End: 2020-11-30
Payer: COMMERCIAL

## 2020-11-30 ENCOUNTER — HOSPITAL ENCOUNTER (OUTPATIENT)
Facility: HOSPITAL | Age: 35
Discharge: HOME | End: 2020-11-30
Attending: UROLOGY | Admitting: UROLOGY
Payer: COMMERCIAL

## 2020-11-30 VITALS
WEIGHT: 250 LBS | OXYGEN SATURATION: 99 % | DIASTOLIC BLOOD PRESSURE: 74 MMHG | RESPIRATION RATE: 14 BRPM | HEART RATE: 72 BPM | BODY MASS INDEX: 37.03 KG/M2 | TEMPERATURE: 98 F | HEIGHT: 69 IN | SYSTOLIC BLOOD PRESSURE: 129 MMHG

## 2020-11-30 DIAGNOSIS — Z30.2 ENCOUNTER FOR STERILIZATION: ICD-10-CM

## 2020-11-30 PROCEDURE — 71000033 HC RECOVERY, INTIAL HOUR: Performed by: UROLOGY

## 2020-11-30 PROCEDURE — 63600175 PHARM REV CODE 636 W HCPCS: Performed by: ANESTHESIOLOGY

## 2020-11-30 PROCEDURE — 36000705 HC OR TIME LEV I EA ADD 15 MIN: Performed by: UROLOGY

## 2020-11-30 PROCEDURE — D9220A PRA ANESTHESIA: ICD-10-PCS | Mod: ANES,,, | Performed by: ANESTHESIOLOGY

## 2020-11-30 PROCEDURE — 37000008 HC ANESTHESIA 1ST 15 MINUTES: Performed by: UROLOGY

## 2020-11-30 PROCEDURE — 55250 PR REMOVAL OF SPERM DUCT(S): ICD-10-PCS | Mod: ,,, | Performed by: UROLOGY

## 2020-11-30 PROCEDURE — 27200651 HC AIRWAY, LMA: Performed by: ANESTHESIOLOGY

## 2020-11-30 PROCEDURE — 36000704 HC OR TIME LEV I 1ST 15 MIN: Performed by: UROLOGY

## 2020-11-30 PROCEDURE — 99900103 DSU ONLY-NO CHARGE-INITIAL HR (STAT): Performed by: UROLOGY

## 2020-11-30 PROCEDURE — 71000039 HC RECOVERY, EACH ADD'L HOUR: Performed by: UROLOGY

## 2020-11-30 PROCEDURE — 25000003 PHARM REV CODE 250: Performed by: NURSE ANESTHETIST, CERTIFIED REGISTERED

## 2020-11-30 PROCEDURE — 88302 TISSUE EXAM BY PATHOLOGIST: CPT | Performed by: PATHOLOGY

## 2020-11-30 PROCEDURE — 63600175 PHARM REV CODE 636 W HCPCS: Performed by: NURSE ANESTHETIST, CERTIFIED REGISTERED

## 2020-11-30 PROCEDURE — 99900104 DSU ONLY-NO CHARGE-EA ADD'L HR (STAT): Performed by: UROLOGY

## 2020-11-30 PROCEDURE — 71000015 HC POSTOP RECOV 1ST HR: Performed by: UROLOGY

## 2020-11-30 PROCEDURE — D9220A PRA ANESTHESIA: Mod: CRNA,,, | Performed by: NURSE ANESTHETIST, CERTIFIED REGISTERED

## 2020-11-30 PROCEDURE — 25000003 PHARM REV CODE 250: Performed by: ANESTHESIOLOGY

## 2020-11-30 PROCEDURE — 88302 TISSUE EXAM BY PATHOLOGIST: CPT | Mod: 26,,, | Performed by: PATHOLOGY

## 2020-11-30 PROCEDURE — D9220A PRA ANESTHESIA: ICD-10-PCS | Mod: CRNA,,, | Performed by: NURSE ANESTHETIST, CERTIFIED REGISTERED

## 2020-11-30 PROCEDURE — D9220A PRA ANESTHESIA: Mod: ANES,,, | Performed by: ANESTHESIOLOGY

## 2020-11-30 PROCEDURE — 88302 PR  SURG PATH,LEVEL II: ICD-10-PCS | Mod: 26,,, | Performed by: PATHOLOGY

## 2020-11-30 PROCEDURE — 63600175 PHARM REV CODE 636 W HCPCS: Performed by: UROLOGY

## 2020-11-30 PROCEDURE — 37000009 HC ANESTHESIA EA ADD 15 MINS: Performed by: UROLOGY

## 2020-11-30 PROCEDURE — 55250 REMOVAL OF SPERM DUCT(S): CPT | Mod: ,,, | Performed by: UROLOGY

## 2020-11-30 RX ORDER — FENTANYL CITRATE 50 UG/ML
INJECTION, SOLUTION INTRAMUSCULAR; INTRAVENOUS
Status: DISCONTINUED | OUTPATIENT
Start: 2020-11-30 | End: 2020-11-30

## 2020-11-30 RX ORDER — HYDROCODONE BITARTRATE AND ACETAMINOPHEN 5; 325 MG/1; MG/1
1 TABLET ORAL
Qty: 20 TABLET | Refills: 0 | Status: SHIPPED | OUTPATIENT
Start: 2020-11-30 | End: 2020-12-07 | Stop reason: ALTCHOICE

## 2020-11-30 RX ORDER — FENTANYL CITRATE 50 UG/ML
25 INJECTION, SOLUTION INTRAMUSCULAR; INTRAVENOUS EVERY 5 MIN PRN
Status: COMPLETED | OUTPATIENT
Start: 2020-11-30 | End: 2020-11-30

## 2020-11-30 RX ORDER — ONDANSETRON 2 MG/ML
4 INJECTION INTRAMUSCULAR; INTRAVENOUS ONCE AS NEEDED
Status: DISCONTINUED | OUTPATIENT
Start: 2020-11-30 | End: 2021-04-29

## 2020-11-30 RX ORDER — SODIUM CHLORIDE 0.9 % (FLUSH) 0.9 %
3 SYRINGE (ML) INJECTION
Status: DISCONTINUED | OUTPATIENT
Start: 2020-11-30 | End: 2021-04-29

## 2020-11-30 RX ORDER — PHENAZOPYRIDINE HYDROCHLORIDE 200 MG/1
200 TABLET, FILM COATED ORAL ONCE
Status: DISCONTINUED | OUTPATIENT
Start: 2020-11-30 | End: 2020-11-30 | Stop reason: HOSPADM

## 2020-11-30 RX ORDER — SODIUM CHLORIDE, SODIUM LACTATE, POTASSIUM CHLORIDE, CALCIUM CHLORIDE 600; 310; 30; 20 MG/100ML; MG/100ML; MG/100ML; MG/100ML
75 INJECTION, SOLUTION INTRAVENOUS CONTINUOUS
Status: DISCONTINUED | OUTPATIENT
Start: 2020-11-30 | End: 2021-04-29

## 2020-11-30 RX ORDER — CIPROFLOXACIN 2 MG/ML
400 INJECTION, SOLUTION INTRAVENOUS
Status: COMPLETED | OUTPATIENT
Start: 2020-11-30 | End: 2020-11-30

## 2020-11-30 RX ORDER — CIPROFLOXACIN 500 MG/1
500 TABLET ORAL EVERY 12 HOURS
Qty: 14 TABLET | Refills: 0 | Status: SHIPPED | OUTPATIENT
Start: 2020-11-30 | End: 2020-12-07

## 2020-11-30 RX ORDER — SODIUM CHLORIDE, SODIUM LACTATE, POTASSIUM CHLORIDE, CALCIUM CHLORIDE 600; 310; 30; 20 MG/100ML; MG/100ML; MG/100ML; MG/100ML
INJECTION, SOLUTION INTRAVENOUS CONTINUOUS
Status: DISCONTINUED | OUTPATIENT
Start: 2020-11-30 | End: 2021-04-29

## 2020-11-30 RX ORDER — OXYCODONE HYDROCHLORIDE 5 MG/1
5 TABLET ORAL
Status: COMPLETED | OUTPATIENT
Start: 2020-11-30 | End: 2020-11-30

## 2020-11-30 RX ORDER — LIDOCAINE HYDROCHLORIDE 20 MG/ML
INJECTION INTRAVENOUS
Status: DISCONTINUED | OUTPATIENT
Start: 2020-11-30 | End: 2020-11-30

## 2020-11-30 RX ORDER — HYDROMORPHONE HYDROCHLORIDE 2 MG/ML
0.2 INJECTION, SOLUTION INTRAMUSCULAR; INTRAVENOUS; SUBCUTANEOUS EVERY 5 MIN PRN
Status: DISCONTINUED | OUTPATIENT
Start: 2020-11-30 | End: 2021-04-29

## 2020-11-30 RX ORDER — MIDAZOLAM HYDROCHLORIDE 1 MG/ML
INJECTION, SOLUTION INTRAMUSCULAR; INTRAVENOUS
Status: DISCONTINUED | OUTPATIENT
Start: 2020-11-30 | End: 2020-11-30

## 2020-11-30 RX ORDER — PROPOFOL 10 MG/ML
VIAL (ML) INTRAVENOUS
Status: DISCONTINUED | OUTPATIENT
Start: 2020-11-30 | End: 2020-11-30

## 2020-11-30 RX ORDER — ACETAMINOPHEN 10 MG/ML
INJECTION, SOLUTION INTRAVENOUS
Status: DISCONTINUED | OUTPATIENT
Start: 2020-11-30 | End: 2020-11-30

## 2020-11-30 RX ORDER — ONDANSETRON 2 MG/ML
INJECTION INTRAMUSCULAR; INTRAVENOUS
Status: DISCONTINUED | OUTPATIENT
Start: 2020-11-30 | End: 2020-11-30

## 2020-11-30 RX ORDER — DEXAMETHASONE SODIUM PHOSPHATE 4 MG/ML
INJECTION, SOLUTION INTRA-ARTICULAR; INTRALESIONAL; INTRAMUSCULAR; INTRAVENOUS; SOFT TISSUE
Status: DISCONTINUED | OUTPATIENT
Start: 2020-11-30 | End: 2020-11-30

## 2020-11-30 RX ORDER — LIDOCAINE HYDROCHLORIDE 10 MG/ML
0.5 INJECTION, SOLUTION EPIDURAL; INFILTRATION; INTRACAUDAL; PERINEURAL ONCE
Status: DISCONTINUED | OUTPATIENT
Start: 2020-11-30 | End: 2021-04-29

## 2020-11-30 RX ORDER — DIPHENHYDRAMINE HYDROCHLORIDE 50 MG/ML
25 INJECTION INTRAMUSCULAR; INTRAVENOUS EVERY 6 HOURS PRN
Status: DISCONTINUED | OUTPATIENT
Start: 2020-11-30 | End: 2021-04-29

## 2020-11-30 RX ORDER — HYDROCODONE BITARTRATE AND ACETAMINOPHEN 5; 325 MG/1; MG/1
1 TABLET ORAL EVERY 4 HOURS PRN
Status: DISCONTINUED | OUTPATIENT
Start: 2020-11-30 | End: 2020-11-30 | Stop reason: HOSPADM

## 2020-11-30 RX ADMIN — FENTANYL CITRATE 25 MCG: 50 INJECTION INTRAMUSCULAR; INTRAVENOUS at 01:11

## 2020-11-30 RX ADMIN — FENTANYL CITRATE 25 MCG: 50 INJECTION INTRAMUSCULAR; INTRAVENOUS at 12:11

## 2020-11-30 RX ADMIN — PROPOFOL 200 MG: 10 INJECTION, EMULSION INTRAVENOUS at 10:11

## 2020-11-30 RX ADMIN — CIPROFLOXACIN 400 MG: 2 INJECTION INTRAVENOUS at 10:11

## 2020-11-30 RX ADMIN — FENTANYL CITRATE 50 MCG: 50 INJECTION, SOLUTION INTRAMUSCULAR; INTRAVENOUS at 10:11

## 2020-11-30 RX ADMIN — FENTANYL CITRATE 50 MCG: 50 INJECTION, SOLUTION INTRAMUSCULAR; INTRAVENOUS at 11:11

## 2020-11-30 RX ADMIN — SODIUM CHLORIDE, SODIUM GLUCONATE, SODIUM ACETATE, POTASSIUM CHLORIDE, MAGNESIUM CHLORIDE, SODIUM PHOSPHATE, DIBASIC, AND POTASSIUM PHOSPHATE: .53; .5; .37; .037; .03; .012; .00082 INJECTION, SOLUTION INTRAVENOUS at 09:11

## 2020-11-30 RX ADMIN — OXYCODONE HYDROCHLORIDE 5 MG: 5 TABLET ORAL at 12:11

## 2020-11-30 RX ADMIN — DEXAMETHASONE SODIUM PHOSPHATE 8 MG: 4 INJECTION, SOLUTION INTRA-ARTICULAR; INTRALESIONAL; INTRAMUSCULAR; INTRAVENOUS; SOFT TISSUE at 11:11

## 2020-11-30 RX ADMIN — LIDOCAINE HYDROCHLORIDE 100 MG: 20 INJECTION, SOLUTION INTRAVENOUS at 10:11

## 2020-11-30 RX ADMIN — MIDAZOLAM 2 MG: 1 INJECTION INTRAMUSCULAR; INTRAVENOUS at 10:11

## 2020-11-30 RX ADMIN — ACETAMINOPHEN 1000 MG: 10 INJECTION, SOLUTION INTRAVENOUS at 11:11

## 2020-11-30 RX ADMIN — ONDANSETRON 4 MG: 2 INJECTION, SOLUTION INTRAMUSCULAR; INTRAVENOUS at 11:11

## 2020-11-30 NOTE — PLAN OF CARE
Discharge instructions given to pt and wife who voice understanding.  Taking po fluids without nausea.  Pain 4/10 and tolerable per pt.  Scrotal dressing;support intact.  Voiding without difficulty.  All personal belongings returned to pt, all questions answered

## 2020-11-30 NOTE — ANESTHESIA POSTPROCEDURE EVALUATION
Anesthesia Post Evaluation    Patient: Gerardo Ramirez JrYisel    Procedure(s) Performed: Procedure(s) (LRB):  VASECTOMY (Bilateral)    Final Anesthesia Type: general    Patient location during evaluation: PACU  Patient participation: Yes- Able to Participate  Level of consciousness: awake and alert and oriented  Post-procedure vital signs: reviewed and stable  Pain management: adequate  Airway patency: patent    PONV status at discharge: No PONV  Anesthetic complications: no      Cardiovascular status: blood pressure returned to baseline  Respiratory status: unassisted, spontaneous ventilation and room air  Hydration status: euvolemic  Follow-up not needed.          Vitals Value Taken Time   /58 11/30/20 1209   Temp 36.7 °C (98.1 °F) 11/30/20 1155   Pulse 72 11/30/20 1213   Resp 12 11/30/20 1213   SpO2 96 % 11/30/20 1213   Vitals shown include unvalidated device data.      No case tracking events are documented in the log.      Pain/Tarsha Score: Tarsha Score: 4 (11/30/2020 12:00 PM)

## 2020-11-30 NOTE — DISCHARGE INSTRUCTIONS
General Information:    1.  Do not drink alcoholic beverages including beer for 24 hours or as long as you are on pain medication..  2.  Do not drive a motor vehicle, operate machinery or power tools, or signs legal papers for 24 hours or as long as you are on pain medication.   3.  You may experience light-headedness, dizziness, and sleepiness following surgery. Please do not stay alone. A responsible adult should be with you for this 24 hour period.  4.  Go home and rest.    5. Progress slowly to a normal diet unless instructed.  Otherwise, begin with liquids such as soft drinks, then soup and crackers working up to solid foods. Drink plenty of nonalcoholic fluids.  6.  Certain anesthetics and pain medications produce nausea and vomiting in certain       individuals. If nausea becomes a problem at home, call you doctor.    7. A nurse will be calling you sometime after surgery. Do not be alarmed. This is our way of finding out how you are doing.    8. Several times every hour while you are awake, take 2-3 deep breaths and cough. If you had stomach surgery hold a pillow or rolled towel firmly against your stomach before you cough. This will help with any pain the cough might cause.  9. Several times every hour while you are awake, pump and flex your feet 5-6 times and do foot circles. This will help prevent blood clots.    10.Call your doctor for severe pain, bleeding, fever, or signs or symptoms of infection (pain, swelling, redness, foul odor, drainage).        ischarge Instructions: After Your Surgery/Procedure  Youve just had surgery. During surgery you were given medicine called anesthesia to keep you relaxed and free of pain. After surgery you may have some pain or nausea. This is common. Here are some tips for feeling better and getting well after surgery.     Stay on schedule with your medication.   Going home  Your doctor or nurse will show you how to take care of yourself when you go home. He or she will  "also answer your questions. Have an adult family member or friend drive you home.      For your safety we recommend these precaution for the first 24 hours after your procedure:  · Do not drive or use heavy equipment.  · Do not make important decisions or sign legal papers.  · Do not drink alcohol.  · Have someone stay with you, if needed. He or she can watch for problems and help keep you safe.  · Your concentration, balance, coordination, and judgement may be impaired for many hours after anesthesia.  Use caution when ambulating or standing up.     · You may feel weak and "washed out" after anesthesia and surgery.      Subtle residual effects of general anesthesia or sedation with regional / local anesthesia can last more than 24 hours.  Rest for the remainder of the day or longer if your Doctor/Surgeon has advised you to do so.  Although you may feel normal within the first 24 hours, your reflexes and mental ability may be impaired without you realizing it.  You may feel dizzy, lightheaded or sleepy for 24 hours or longer.      Be sure to go to all follow-up visits with your doctor. And rest after your surgery for as long as your doctor tells you to.  Coping with pain  If you have pain after surgery, pain medicine will help you feel better. Take it as told, before pain becomes severe. Also, ask your doctor or pharmacist about other ways to control pain. This might be with heat, ice, or relaxation. And follow any other instructions your surgeon or nurse gives you.  Tips for taking pain medicine  To get the best relief possible, remember these points:  · Pain medicines can upset your stomach. Taking them with a little food may help.  · Most pain relievers taken by mouth need at least 20 to 30 minutes to start to work.  · Taking medicine on a schedule can help you remember to take it. Try to time your medicine so that you can take it before starting an activity. This might be before you get dressed, go for a walk, " or sit down for dinner.  · Constipation is a common side effect of pain medicines. Call your doctor before taking any medicines such as laxatives or stool softeners to help ease constipation. Also ask if you should skip any foods. Drinking lots of fluids and eating foods such as fruits and vegetables that are high in fiber can also help. Remember, do not take laxatives unless your surgeon has prescribed them.  · Drinking alcohol and taking pain medicine can cause dizziness and slow your breathing. It can even be deadly. Do not drink alcohol while taking pain medicine.  · Pain medicine can make you react more slowly to things. Do not drive or run machinery while taking pain medicine.  Your health care provider may tell you to take acetaminophen to help ease your pain. Ask him or her how much you are supposed to take each day. Acetaminophen or other pain relievers may interact with your prescription medicines or other over-the-counter (OTC) drugs. Some prescription medicines have acetaminophen and other ingredients. Using both prescription and OTC acetaminophen for pain can cause you to overdose. Read the labels on your OTC medicines with care. This will help you to clearly know the list of ingredients, how much to take, and any warnings. It may also help you not take too much acetaminophen. If you have questions or do not understand the information, ask your pharmacist or health care provider to explain it to you before you take the OTC medicine.  Managing nausea  Some people have an upset stomach after surgery. This is often because of anesthesia, pain, or pain medicine, or the stress of surgery. These tips will help you handle nausea and eat healthy foods as you get better. If you were on a special food plan before surgery, ask your doctor if you should follow it while you get better. These tips may help:  · Do not push yourself to eat. Your body will tell you when to eat and how much.  · Start off with clear  liquids and soup. They are easier to digest.  · Next try semi-solid foods, such as mashed potatoes, applesauce, and gelatin, as you feel ready.  · Slowly move to solid foods. Dont eat fatty, rich, or spicy foods at first.  · Do not force yourself to have 3 large meals a day. Instead eat smaller amounts more often.  · Take pain medicines with a small amount of solid food, such as crackers or toast, to avoid nausea.     Call your surgeon if  · You still have pain an hour after taking medicine. The medicine may not be strong enough.  · You feel too sleepy, dizzy, or groggy. The medicine may be too strong.  · You have side effects like nausea, vomiting, or skin changes, such as rash, itching, or hives.       If you have obstructive sleep apnea  You were given anesthesia medicine during surgery to keep you comfortable and free of pain. After surgery, you may have more apnea spells because of this medicine and other medicines you were given. The spells may last longer than usual.   At home:  · Keep using the continuous positive airway pressure (CPAP) device when you sleep. Unless your health care provider tells you not to, use it when you sleep, day or night. CPAP is a common device used to treat obstructive sleep apnea.  · Talk with your provider before taking any pain medicine, muscle relaxants, or sedatives. Your provider will tell you about the possible dangers of taking these medicines.  © 7245-5980 [employer]. [address] [place] [address]. All rights reserved. This information is not intended as a substitute for professional medical care. Always follow your healthcare professional's instructions.          Post op instructions for prevention of DVT  What is deep vein thrombosis?  Deep vein thrombosis (DVT) is the medical term for blood clots in the deep veins of the leg.  These blood clots can be dangerous.  A DVT can block a blood vessel and keep blood from getting where it needs to go.   Another problem is that the clot can travel to other parts of the body such as the lungs.  A clot that travels to the lungs is called a pulmonary embolus (PE) and can cause serious problems with breathing which can lead to death.  Am I at risk for DVT/PE?  If you are not very active, you are at risk of DVT.  Anyone confined to bed, sitting for long periods of time, recovering from surgery, etc. increases the risk of DVT.  Other risk factors are cancer diagnosis, certain medications, estrogen replacement in any form,older age, obesity, pregnancy, smoking, history of clotting disorders, and dehydration.  How will I know if I have a DVT?   Swelling in the lower leg   Pain   Warmth, redness, hardness or bulging of the vein  If you have any of these symptoms, call your doctors office right away.  Some people will not have any symptoms until the clot moves to the lungs.  What are the symptoms of a PE?   Panting, shortness of breath, or trouble breathing   Sharp, knife-like chest pain when you breathe   Coughing or coughing up blood   Rapid heartbeat  If you have any of these symptoms or get worse quickly, call 911 for emergency treatment.  How can I prevent a DVT?   Avoid long periods of inactivity and dont cross your legs--get up and walk around every hour or so.   Stay active--walking after surgery is highly encouraged.  This means you should get out of the house and walk in the neighborhood.  Going up and down stairs will not impair healing (unless advised against such activity by your doctor).     Drink plenty of noncaffeinated, nonalcoholic fluids each day to prevent dehydration.   Wear special support stockings, if they have been advised by your doctor.   If you travel, stop at least once an hour and walk around.   Avoid smoking (assistance with stopping is available through your healthcare provider)  Always notify your doctor if you are not able to follow the post operative instructions that are  given to you at the time of discharge.  It may be necessary to prescribe one of the medications available to prevent DVT.

## 2020-11-30 NOTE — INTERVAL H&P NOTE
The patient has been examined and the H&P has been reviewed:    I concur with the findings and no changes have occurred since H&P was written.    Anesthesia/Surgery risks, benefits and alternative options discussed and understood by patient/family.          Active Hospital Problems    Diagnosis  POA    Encounter for sterilization [Z30.2]  Not Applicable      Resolved Hospital Problems   No resolved problems to display.

## 2020-11-30 NOTE — ANESTHESIA PREPROCEDURE EVALUATION
2020  Gerardo Ramirez Jr. is a 35 y.o., male.    Anesthesia Evaluation    I have reviewed the Patient Summary Reports.    I have reviewed the Nursing Notes. I have reviewed the NPO Status.   I have reviewed the Medications.     Review of Systems  Social:  Former Smoker Smoking Status: Former Smoker - 20 pack years  Quit Smokin20  Smokeless Tobacco Status: Never Used  Alcohol use: Yes, unspecified volume  Drug use: No       Cardiovascular:   Hypertension    Pulmonary:   Pneumonia Sleep Apnea, CPAP        Physical Exam  General:  Morbid Obesity    Airway/Jaw/Neck:  Airway Findings: Mouth Opening: Normal Tongue: Normal  General Airway Assessment: Adult, Good  Mallampati: II  Improves to II with phonation.  TM Distance: 4-6 cm      Dental:  Dental Findings: In tact   Chest/Lungs:  Chest/Lungs Findings: Clear to auscultation, Normal Respiratory Rate     Heart/Vascular:  Heart Findings: Rate: Normal  Rhythm: Regular Rhythm  Sounds: Normal  Heart murmur: negative       Mental Status:  Mental Status Findings:  Cooperative, Alert and Oriented         Anesthesia Plan  Type of Anesthesia, risks & benefits discussed:  Anesthesia Type:  general  Patient's Preference:   Intra-op Monitoring Plan: standard ASA monitors  Intra-op Monitoring Plan Comments:   Post Op Pain Control Plan:   Post Op Pain Control Plan Comments:   Induction:   IV  Beta Blocker:  Patient is not currently on a Beta-Blocker (No further documentation required).       Informed Consent: Patient understands risks and agrees with Anesthesia plan.  Questions answered. Anesthesia consent signed with patient.  ASA Score: 2     Day of Surgery Review of History & Physical: I have interviewed and examined the patient. I have reviewed the patient's H&P dated:  There are no significant changes.  H&P update referred to the surgeon.  H&P completed  by Anesthesiologist.       Ready For Surgery From Anesthesia Perspective.

## 2020-11-30 NOTE — ANESTHESIA PROCEDURE NOTES
Intubation  Performed by: Rukhsana Almanza CRNA  Authorized by: Arcenio Ledezma MD     Intubation:     Induction:  Intravenous    Intubated:  Postinduction    Attempts:  1    Attempted By:  CRNA    Difficult Airway Encountered?: No      Complications:  None    Airway Device:  Supraglottic airway/LMA    Airway Device Size:  4.0    Style/Cuff Inflation:  Cuffed (inflated to minimal occlusive pressure)    Secured at:  The lips    Placement Verified By:  Capnometry    Complicating Factors:  Obesity    Findings Post-Intubation:  BS equal bilateral and atraumatic/condition of teeth unchanged

## 2020-11-30 NOTE — OP NOTE
VASECTOMY REPORT  Patient Name:  Gerardo Ramirez Jr.   YOB: 1985     Date - 11/30/2020      DIAGNOSIS: Desired sterilization.    OPERATION: Bilateral vasectomy.    SURGEON: Dr. Tobias    Anesthesia - general    TECHNIQUE: No scalpel, 2 incisions, 2-0 chromic for ligature of the stumps. Removal of approximately 1 inch of vas from each side. Skin incision closed with 4-0 chromic.  Sheath of vas closed with 4-0 chromic over proximal stumps to separate stumps.  ANESTHESIA:  General  FINDINGS: Normal vas.  COMPLICATIONS: None  PRECAUTION DISCUSSED: Rest, ice pack, no ejaculation for 5-7 days, activity restrictions, and protected intercourse until 2 negative sperm checks.    CURRENT MEDICATIONS:  Keflex 500 mg p.o. t.i.d. and Vicodin every 4 hours prn pain.  Recheck in 1 week.

## 2020-11-30 NOTE — TRANSFER OF CARE
"Anesthesia Transfer of Care Note    Patient: Gerardo Ramirez Jr.    Procedure(s) Performed: Procedure(s) (LRB):  VASECTOMY (Bilateral)    Patient location: PACU    Anesthesia Type: general    Transport from OR: Transported from OR on 6-10 L/min O2 by face mask with adequate spontaneous ventilation    Post pain: adequate analgesia    Post assessment: no apparent anesthetic complications    Post vital signs: stable    Level of consciousness: sedated    Nausea/Vomiting: no nausea/vomiting    Complications: none    Transfer of care protocol was followed      Last vitals:   Visit Vitals  BP (!) 142/89 (BP Location: Left arm, Patient Position: Lying)   Pulse 80   Temp 36.9 °C (98.4 °F)   Resp 18   Ht 5' 8.5" (1.74 m)   Wt 113.4 kg (250 lb)   SpO2 95%   BMI 37.46 kg/m²     "

## 2020-11-30 NOTE — BRIEF OP NOTE
Operative Note     SUMMARY     Surgery Date: 11/30/2020     Surgeon(s) and Role:     * German Tobias MD - Primary    Pre-op Diagnosis:  Encounter for sterilization [Z30.2]    Post-op Diagnosis:  Encounter for sterilization [Z30.2]    Procedure(s) (LRB):  VASECTOMY (Bilateral)    Anesthesia: General    Findings/Key Components:  See Op Note      Estimated Blood Loss: * No values recorded between 11/30/2020 11:04 AM and 11/30/2020 11:47 AM *         Specimens (From admission, onward)     Start     Ordered    11/30/20 1129  Specimen to Pathology, Surgery Urology  Once     Question Answer Comment   Procedure Type: Urology    Specimen Class: Routine/Screening        11/30/20 1132                  Discharge Note      SUMMARY     Admit Date: 11/30/2020    Attending Physician: German Tobias MD     Discharge Physician: German Tobias MD    Discharge Date: 11/30/2020     Final Diagnosis: Encounter for sterilization [Z30.2]    Hospital Course: uneventful, going home same day    Disposition: Home or Self Care    Patient Instructions:   Current Discharge Medication List      START taking these medications    Details   ciprofloxacin HCl (CIPRO) 500 MG tablet Take 1 tablet (500 mg total) by mouth every 12 (twelve) hours.  Qty: 14 tablet, Refills: 0      HYDROcodone-acetaminophen (NORCO) 5-325 mg per tablet Take 1 tablet by mouth every 4 to 6 hours as needed for Pain.  Qty: 20 tablet, Refills: 0         CONTINUE these medications which have NOT CHANGED    Details   atorvastatin (LIPITOR) 20 MG tablet Take 1 tablet (20 mg total) by mouth nightly.  Qty: 90 tablet, Refills: 1    Associated Diagnoses: Mixed hyperlipidemia      diltiaZEM (CARDIZEM CD) 120 MG Cp24 Take 120 mg by mouth once daily.    Comments: .      losartan (COZAAR) 100 MG tablet Take 1 tablet (100 mg total) by mouth once daily.  Qty: 90 tablet, Refills: 1    Comments: .  Associated Diagnoses: Essential hypertension      aspirin (ECOTRIN) 81 MG EC tablet Take 81  mg by mouth once daily.      ibuprofen (ADVIL,MOTRIN) 200 MG tablet Take 200 mg by mouth as needed for Pain.      tadalafiL (CIALIS) 5 MG tablet Take 1 tablet (5 mg total) by mouth daily as needed for Erectile Dysfunction.  Qty: 30 tablet, Refills: 3    Associated Diagnoses: Drug-induced erectile dysfunction             Discharge Procedure Orders (must include Diet, Follow-up, Activity)  Resume previous diet.  Follow up with PCP as needed.

## 2020-12-02 ENCOUNTER — TELEPHONE (OUTPATIENT)
Dept: UROLOGY | Facility: CLINIC | Age: 35
End: 2020-12-02

## 2020-12-02 LAB
FINAL PATHOLOGIC DIAGNOSIS: NORMAL
GROSS: NORMAL
Lab: NORMAL

## 2020-12-02 NOTE — TELEPHONE ENCOUNTER
----- Message from Fanny Mendiola sent at 12/2/2020  8:30 AM CST -----  Contact: self   Caller is requesting an earlier appt than we can schedule.  Caller declined first available appointment listed below. Caller will not accept being placed on the wait list and is requesting a message be sent to the provider.  When is the next available appointment:  12/23  Reason for the appointment:  follow up from procedure  Patient preference of timeframe to be scheduled:  today or tomorrow  Comments:    Pt states he needs to come in to obtain a letter so that he may return to work. Please call and advise. Thank you

## 2020-12-02 NOTE — TELEPHONE ENCOUNTER
Spoke with patient, he state he needs a return to work letter, informed he does not need an appt. The letter will be done, RTW 12/8/20, and he can  from the front  Desk, patient verbally understood.

## 2020-12-03 ENCOUNTER — OFFICE VISIT (OUTPATIENT)
Dept: UROLOGY | Facility: CLINIC | Age: 35
End: 2020-12-03
Payer: COMMERCIAL

## 2020-12-03 ENCOUNTER — TELEPHONE (OUTPATIENT)
Dept: UROLOGY | Facility: CLINIC | Age: 35
End: 2020-12-03

## 2020-12-03 VITALS
RESPIRATION RATE: 18 BRPM | BODY MASS INDEX: 37.03 KG/M2 | TEMPERATURE: 98 F | WEIGHT: 250 LBS | HEART RATE: 69 BPM | DIASTOLIC BLOOD PRESSURE: 83 MMHG | HEIGHT: 69 IN | SYSTOLIC BLOOD PRESSURE: 143 MMHG

## 2020-12-03 DIAGNOSIS — Z09 POSTOP CHECK: Primary | ICD-10-CM

## 2020-12-03 PROCEDURE — 99999 PR PBB SHADOW E&M-EST. PATIENT-LVL III: CPT | Mod: PBBFAC,,, | Performed by: UROLOGY

## 2020-12-03 PROCEDURE — 99999 PR PBB SHADOW E&M-EST. PATIENT-LVL III: ICD-10-PCS | Mod: PBBFAC,,, | Performed by: UROLOGY

## 2020-12-03 PROCEDURE — 99024 PR POST-OP FOLLOW-UP VISIT: ICD-10-PCS | Mod: S$GLB,,, | Performed by: UROLOGY

## 2020-12-03 PROCEDURE — 99024 POSTOP FOLLOW-UP VISIT: CPT | Mod: S$GLB,,, | Performed by: UROLOGY

## 2020-12-03 NOTE — TELEPHONE ENCOUNTER
----- Message from Erica Quezada sent at 12/3/2020  7:10 AM CST -----  Regarding: advice  Contact: Patient/[phone]  Type: Needs Medical Advice  Who Called:  Patient/[phone]    Pharmacy name and phone #:    [id number] - [place] - [address]  [address] [address]  Phone: [phone] Fax: [phone]       Additional Information: Had a vasectomy on Monday. He is still very swollen, black and blue, painful. He is wanting to know if this is normal. Please call to advise.

## 2020-12-03 NOTE — TELEPHONE ENCOUNTER
Returned call and spoke to patient's wife, she states the patient is really swollen on the left side and the bruising has spread to his abdomen area. appt made to see the MD this morning, she will inform the patient, she verbally understood.

## 2020-12-03 NOTE — PROGRESS NOTES
POST OPERATIVE UROLOGY NOTE    PATIENT NAME: Gerardo Ramirez Jr.  : 1985    PROCEDURE/DATE OF PROCEDURE:  Bilateral vasectomy on 2020    COMPLAINTS/COMMENTS:  Has developed some significant scrotal swelling and bruising although he feels better when he uses scrotal elevation and scrotal support    PHYSICAL EXAM:  Scrotal swelling with some ecchymotic changes and bruising on the most dependent portion of the scrotum and in the groin areas.  Testes otherwise feel normal.    RECOMMENDATIONS:  Scrotal elevation.  He can begin Sitz baths next week.  Continue with Cipro until completed and continue with Norco for pain control.  Keep follow-up appointment.

## 2020-12-07 ENCOUNTER — OFFICE VISIT (OUTPATIENT)
Dept: UROLOGY | Facility: CLINIC | Age: 35
End: 2020-12-07
Payer: COMMERCIAL

## 2020-12-07 VITALS
HEART RATE: 78 BPM | SYSTOLIC BLOOD PRESSURE: 147 MMHG | DIASTOLIC BLOOD PRESSURE: 92 MMHG | TEMPERATURE: 99 F | WEIGHT: 250 LBS | BODY MASS INDEX: 37.89 KG/M2 | RESPIRATION RATE: 18 BRPM | HEIGHT: 68 IN

## 2020-12-07 DIAGNOSIS — N50.819 EVALUATION OF POSTOPERATIVE TESTICULAR PAIN WITHIN 30 DAYS OF VASECTOMY: ICD-10-CM

## 2020-12-07 DIAGNOSIS — G89.18 EVALUATION OF POSTOPERATIVE TESTICULAR PAIN WITHIN 30 DAYS OF VASECTOMY: ICD-10-CM

## 2020-12-07 DIAGNOSIS — T14.8XXA HEMATOMA: ICD-10-CM

## 2020-12-07 DIAGNOSIS — Z09 POSTOP CHECK: Primary | ICD-10-CM

## 2020-12-07 PROCEDURE — 99999 PR PBB SHADOW E&M-EST. PATIENT-LVL V: CPT | Mod: PBBFAC,,, | Performed by: NURSE PRACTITIONER

## 2020-12-07 PROCEDURE — 99024 PR POST-OP FOLLOW-UP VISIT: ICD-10-PCS | Mod: S$GLB,,, | Performed by: NURSE PRACTITIONER

## 2020-12-07 PROCEDURE — 99999 PR PBB SHADOW E&M-EST. PATIENT-LVL V: ICD-10-PCS | Mod: PBBFAC,,, | Performed by: NURSE PRACTITIONER

## 2020-12-07 PROCEDURE — 99024 POSTOP FOLLOW-UP VISIT: CPT | Mod: S$GLB,,, | Performed by: NURSE PRACTITIONER

## 2020-12-07 RX ORDER — SULFAMETHOXAZOLE AND TRIMETHOPRIM 800; 160 MG/1; MG/1
1 TABLET ORAL 2 TIMES DAILY
Qty: 28 TABLET | Refills: 0 | Status: SHIPPED | OUTPATIENT
Start: 2020-12-07 | End: 2020-12-16

## 2020-12-07 RX ORDER — OXYCODONE AND ACETAMINOPHEN 5; 325 MG/1; MG/1
1 TABLET ORAL
Qty: 30 TABLET | Refills: 0 | Status: SHIPPED | OUTPATIENT
Start: 2020-12-07 | End: 2020-12-17

## 2020-12-07 NOTE — PROGRESS NOTES
[facility] Note  Staff: JAY Wilburn-C    PCP: Dr. Galindo Berrios Jr.  Urologist:  Dr. Tobias    Chief Complaint: Follow-up/Recheck:  S/P Vasectomy    Subjective:        HPI: Gerardo Ramirez Jr. is a 35 y.o. male presents today in clinic with c/o     The pt was last evaluated by Dr. Tobias on 12/03/2020 for postop f/up visit.  Pt had a Bilateral vasectomy on 11/30/2020 performed by Dr. Tobias.     Last OV the pt had developed some significant scrotal swelling and bruising although he felt better when he uses scrotal elevation and scrotal support.     MD'S PHYSICAL EXAM last visit (12/03/2020) showed:    Scrotal swelling with some ecchymotic changes and bruising on the most dependent portion of the scrotum and in the groin areas.  Testes otherwise feel normal.    TODAY:  Pt states he has had no problems with urination.  No gross hematuria or dysuria at this time.  Pt states today the RIGHT side has decreased in swelling but the RIGHT side has worsened in swelling and also the intensity of pain at this time since comparing to last ov.  Pt is currently wearing a jock strap 24/7, applying ice and alternating with no improvement at this time.  He finished last dose of Cipro this am.  Pt states the Norco pain medication is not helping relieve any pain.     REVIEW OF SYSTEMS:  A comprehensive 10 system review was performed and is negative except as noted above in HPI    PMHx:  Past Medical History:   Diagnosis Date    Anxiety     Atrial fibrillation 03/2020    new onset- meds changed    Hyperlipidemia     Hypertension 2013    Inguinal hernia bilateral, non-recurrent 02/2020    right side more pronounced- surg scheduled    Pneumonia     age 11    Sleep apnea     Sleep apnea-like behavior     needs sleep study- wife says he stops breathing while sleeping     PSHx:  Past Surgical History:   Procedure Laterality Date    CHOLECYSTECTOMY  04/08/2019    w/ Small Umbilical hernia  rep-     HERNIA REPAIR Bilateral 03/10/2020    [facility]    UMBILICAL HERNIA REPAIR  04/08/2019        VASECTOMY Bilateral 11/30/2020    Procedure: VASECTOMY;  Surgeon: German Tobias MD;  [facility];  Service: Urology;  Laterality: Bilateral;     Allergies:  Amoxicillin    Medications: reviewed   Objective:     Vitals:    12/07/20 0928   BP: (!) 147/92   Pulse: 78   Resp: 18   Temp: 99 °F (37.2 °C)     General:WDWN in NAD  Eyes: PERRLA, normal conjunctiva  Respiratory: no increased work on breathing, clear to auscultation  Cardiovascular: regular rate and rhythm. No obvious extremity edema.  GI: palpation of masses. No tenderness. No hepatosplenomegaly to palpation.  Musculoskeletal: normal range of motion of bilateral upper extremities. Normal muscle strength and tone.  Skin: no obvious rashes or lesions. No tightening of skin noted.  Neurologic: CN grossly normal. Normal sensation.   Psychiatric: awake, alert and oriented x 3. Mood and affect normal. Cooperative.    :  Inspection of anus and perineum normal-bruising observed around area.  Significant increased left sided Scrotal swelling with some ecchymotic changes and bruising on the most dependent portion of the scrotum and in the groin areas.   Bilateral Testes normal and size, no masses, ++Tenderness noted with palpation.  Urethral meatus normal without discharge  Penis is circumcised.  Dr. Yvette Ngo--Urologist examined pt with me during ov today.    Assessment:       1. Postop check    2. Hematoma    3. Evaluation of postoperative testicular pain within 30 days of vasectomy          Plan:   S/p Vasectomy, bilateral postop swelling/hematoma:    The following was thoroughly discussed with pt and wife during office visit today:  STOP the Ibuprofen at this time.  May take Tylenol prn.  We discontinued the Norco; Percocet 5-325 mg prn pain prescribed today as alternate for pain relief.  Bactrim DS prescribed to pt  today.    The following instructions were reviewed with patient and family to assist with discomfort:  -Use jockstrap or the best supportive underwear he can get for relief of pressure.  -Alternate ice packs/heating pad to areas.  -Take OTC Tylenol   -Sit in a warm tub of water greater than 15 minutes  -Elevate Scrotal Sac    F/u with Dr. Tobias as scheduled in one week.  BUT, pt and family were advised by me today should swelling continue, pain continue, or he begins to run fever, go to nearest ER for further evaluation.    [facility]: Active    Zohra Monreal, CHRISC

## 2020-12-16 ENCOUNTER — OFFICE VISIT (OUTPATIENT)
Dept: UROLOGY | Facility: CLINIC | Age: 35
End: 2020-12-16
Payer: COMMERCIAL

## 2020-12-16 VITALS
BODY MASS INDEX: 37.89 KG/M2 | TEMPERATURE: 98 F | HEIGHT: 68 IN | HEART RATE: 83 BPM | SYSTOLIC BLOOD PRESSURE: 133 MMHG | WEIGHT: 250 LBS | DIASTOLIC BLOOD PRESSURE: 76 MMHG | RESPIRATION RATE: 18 BRPM

## 2020-12-16 DIAGNOSIS — N50.89 SCROTAL SWELLING: Primary | ICD-10-CM

## 2020-12-16 DIAGNOSIS — Z09 POSTOP CHECK: ICD-10-CM

## 2020-12-16 PROCEDURE — 99024 POSTOP FOLLOW-UP VISIT: CPT | Mod: S$GLB,,, | Performed by: UROLOGY

## 2020-12-16 PROCEDURE — 99999 PR PBB SHADOW E&M-EST. PATIENT-LVL III: CPT | Mod: PBBFAC,,, | Performed by: UROLOGY

## 2020-12-16 PROCEDURE — 99999 PR PBB SHADOW E&M-EST. PATIENT-LVL III: ICD-10-PCS | Mod: PBBFAC,,, | Performed by: UROLOGY

## 2020-12-16 PROCEDURE — 99024 PR POST-OP FOLLOW-UP VISIT: ICD-10-PCS | Mod: S$GLB,,, | Performed by: UROLOGY

## 2020-12-16 NOTE — PROGRESS NOTES
POST OPERATIVE UROLOGY NOTE    PATIENT NAME: Gerardo Ramirez Jr.  : 1985    PROCEDURE/DATE OF PROCEDURE:  Bilateral vasectomy on 2020    COMPLAINTS/COMMENTS:  Postoperatively he developed left testicular pain and swelling although the pain has improved according to the patient    PHYSICAL EXAM:  Firm tender enlarged left scrotal swelling    RECOMMENDATIONS:  Scrotal ultrasound, Sitz baths, consider subsequent antibiotic treatment pending the findings and the progress.  Patient instructed to hold off from work for approximately 2 weeks.

## 2020-12-17 ENCOUNTER — HOSPITAL ENCOUNTER (OUTPATIENT)
Dept: RADIOLOGY | Facility: HOSPITAL | Age: 35
Discharge: HOME | End: 2020-12-17
Attending: UROLOGY
Payer: COMMERCIAL

## 2020-12-17 DIAGNOSIS — N50.89 SCROTAL SWELLING: ICD-10-CM

## 2020-12-17 PROCEDURE — 76870 US SCROTUM AND TESTICLES: ICD-10-PCS | Mod: 26,,, | Performed by: RADIOLOGY

## 2020-12-17 PROCEDURE — 76870 US EXAM SCROTUM: CPT | Mod: TC

## 2020-12-17 PROCEDURE — 76870 US EXAM SCROTUM: CPT | Mod: 26,,, | Performed by: RADIOLOGY

## 2020-12-18 ENCOUNTER — TELEPHONE (OUTPATIENT)
Dept: UROLOGY | Facility: CLINIC | Age: 35
End: 2020-12-18

## 2020-12-18 RX ORDER — DOXYCYCLINE 100 MG/1
100 CAPSULE ORAL 2 TIMES DAILY
Qty: 30 CAPSULE | Refills: 0 | Status: SHIPPED | OUTPATIENT
Start: 2020-12-18 | End: 2021-01-21

## 2020-12-18 NOTE — TELEPHONE ENCOUNTER
----- Message from Marii Crowe sent at 12/18/2020 10:16 AM CST -----  Type:  Same Day Appointment Request    Caller is requesting a same day appointment.  Caller declined first available appointment listed below.      Name of Caller:  Patient   When is the first available appointment?  12/29  Symptoms:  Post procedure complications   Best Call Back Number:     Additional Information:   Please advise-thank you

## 2020-12-18 NOTE — TELEPHONE ENCOUNTER
Spoke with patient, US results given with advisement and follow up appt made, patient verbally understood.

## 2021-01-06 ENCOUNTER — OFFICE VISIT (OUTPATIENT)
Dept: UROLOGY | Facility: CLINIC | Age: 36
End: 2021-01-06
Payer: COMMERCIAL

## 2021-01-06 VITALS
HEIGHT: 68 IN | RESPIRATION RATE: 18 BRPM | WEIGHT: 250 LBS | HEART RATE: 89 BPM | SYSTOLIC BLOOD PRESSURE: 161 MMHG | DIASTOLIC BLOOD PRESSURE: 103 MMHG | BODY MASS INDEX: 37.89 KG/M2

## 2021-01-06 DIAGNOSIS — Z09 POSTOP CHECK: ICD-10-CM

## 2021-01-06 DIAGNOSIS — N50.89 SCROTAL SWELLING: Primary | ICD-10-CM

## 2021-01-06 PROCEDURE — 99999 PR PBB SHADOW E&M-EST. PATIENT-LVL III: ICD-10-PCS | Mod: PBBFAC,,, | Performed by: UROLOGY

## 2021-01-06 PROCEDURE — 99024 POSTOP FOLLOW-UP VISIT: CPT | Mod: S$GLB,,, | Performed by: UROLOGY

## 2021-01-06 PROCEDURE — 99024 PR POST-OP FOLLOW-UP VISIT: ICD-10-PCS | Mod: S$GLB,,, | Performed by: UROLOGY

## 2021-01-06 PROCEDURE — 99999 PR PBB SHADOW E&M-EST. PATIENT-LVL III: CPT | Mod: PBBFAC,,, | Performed by: UROLOGY

## 2021-01-19 ENCOUNTER — HOSPITAL ENCOUNTER (OUTPATIENT)
Dept: RADIOLOGY | Facility: HOSPITAL | Age: 36
Discharge: HOME | End: 2021-01-19
Attending: UROLOGY
Payer: COMMERCIAL

## 2021-01-19 DIAGNOSIS — N50.89 SCROTAL SWELLING: ICD-10-CM

## 2021-01-19 PROCEDURE — 76870 US EXAM SCROTUM: CPT | Mod: 26,,, | Performed by: RADIOLOGY

## 2021-01-19 PROCEDURE — 76870 US SCROTUM AND TESTICLES: ICD-10-PCS | Mod: 26,,, | Performed by: RADIOLOGY

## 2021-01-19 PROCEDURE — 76870 US EXAM SCROTUM: CPT | Mod: TC

## 2021-01-21 ENCOUNTER — OFFICE VISIT (OUTPATIENT)
Dept: UROLOGY | Facility: CLINIC | Age: 36
End: 2021-01-21
Payer: COMMERCIAL

## 2021-01-21 VITALS
BODY MASS INDEX: 37.89 KG/M2 | SYSTOLIC BLOOD PRESSURE: 137 MMHG | DIASTOLIC BLOOD PRESSURE: 95 MMHG | HEIGHT: 68 IN | RESPIRATION RATE: 18 BRPM | HEART RATE: 101 BPM | WEIGHT: 250 LBS

## 2021-01-21 DIAGNOSIS — Z09 POSTOP CHECK: ICD-10-CM

## 2021-01-21 DIAGNOSIS — N50.89 SCROTAL SWELLING: Primary | ICD-10-CM

## 2021-01-21 PROCEDURE — 99999 PR PBB SHADOW E&M-EST. PATIENT-LVL III: CPT | Mod: PBBFAC,,, | Performed by: UROLOGY

## 2021-01-21 PROCEDURE — 99999 PR PBB SHADOW E&M-EST. PATIENT-LVL III: ICD-10-PCS | Mod: PBBFAC,,, | Performed by: UROLOGY

## 2021-01-21 PROCEDURE — 99024 PR POST-OP FOLLOW-UP VISIT: ICD-10-PCS | Mod: S$GLB,,, | Performed by: UROLOGY

## 2021-01-21 PROCEDURE — 99024 POSTOP FOLLOW-UP VISIT: CPT | Mod: S$GLB,,, | Performed by: UROLOGY

## 2021-02-15 ENCOUNTER — HOSPITAL ENCOUNTER (OUTPATIENT)
Dept: RADIOLOGY | Facility: HOSPITAL | Age: 36
Discharge: HOME | End: 2021-02-15
Attending: UROLOGY
Payer: COMMERCIAL

## 2021-02-15 DIAGNOSIS — N50.89 SCROTAL SWELLING: ICD-10-CM

## 2021-02-15 PROCEDURE — 76870 US EXAM SCROTUM: CPT | Mod: 26,,, | Performed by: RADIOLOGY

## 2021-02-15 PROCEDURE — 76870 US EXAM SCROTUM: CPT | Mod: TC

## 2021-02-15 PROCEDURE — 76870 US SCROTUM AND TESTICLES: ICD-10-PCS | Mod: 26,,, | Performed by: RADIOLOGY

## 2021-02-19 ENCOUNTER — OFFICE VISIT (OUTPATIENT)
Dept: UROLOGY | Facility: CLINIC | Age: 36
End: 2021-02-19
Payer: COMMERCIAL

## 2021-02-19 VITALS
WEIGHT: 250 LBS | HEART RATE: 74 BPM | RESPIRATION RATE: 18 BRPM | HEIGHT: 68 IN | DIASTOLIC BLOOD PRESSURE: 74 MMHG | SYSTOLIC BLOOD PRESSURE: 150 MMHG | BODY MASS INDEX: 37.89 KG/M2

## 2021-02-19 DIAGNOSIS — Z09 POSTOP CHECK: ICD-10-CM

## 2021-02-19 DIAGNOSIS — Z09 POSTOP CHECK: Primary | ICD-10-CM

## 2021-02-19 DIAGNOSIS — N50.89 SCROTAL SWELLING: Primary | ICD-10-CM

## 2021-02-19 PROCEDURE — 99024 PR POST-OP FOLLOW-UP VISIT: ICD-10-PCS | Mod: S$GLB,,, | Performed by: UROLOGY

## 2021-02-19 PROCEDURE — 99024 POSTOP FOLLOW-UP VISIT: CPT | Mod: S$GLB,,, | Performed by: UROLOGY

## 2021-02-19 PROCEDURE — 99999 PR PBB SHADOW E&M-EST. PATIENT-LVL III: CPT | Mod: PBBFAC,,, | Performed by: UROLOGY

## 2021-02-19 PROCEDURE — 99999 PR PBB SHADOW E&M-EST. PATIENT-LVL III: ICD-10-PCS | Mod: PBBFAC,,, | Performed by: UROLOGY

## 2021-03-12 ENCOUNTER — TELEPHONE (OUTPATIENT)
Dept: UROLOGY | Facility: CLINIC | Age: 36
End: 2021-03-12

## 2021-03-25 ENCOUNTER — TELEPHONE (OUTPATIENT)
Dept: UROLOGY | Facility: CLINIC | Age: 36
End: 2021-03-25

## 2021-03-29 ENCOUNTER — TELEPHONE (OUTPATIENT)
Dept: UROLOGY | Facility: CLINIC | Age: 36
End: 2021-03-29

## 2021-03-30 ENCOUNTER — OFFICE VISIT (OUTPATIENT)
Dept: UROLOGY | Facility: CLINIC | Age: 36
End: 2021-03-30
Payer: COMMERCIAL

## 2021-03-30 VITALS
WEIGHT: 264.75 LBS | HEIGHT: 68 IN | HEART RATE: 94 BPM | DIASTOLIC BLOOD PRESSURE: 107 MMHG | BODY MASS INDEX: 40.12 KG/M2 | RESPIRATION RATE: 18 BRPM | SYSTOLIC BLOOD PRESSURE: 146 MMHG

## 2021-03-30 DIAGNOSIS — T14.8XXA HEMATOMA: ICD-10-CM

## 2021-03-30 DIAGNOSIS — N50.89 SCROTAL SWELLING: Primary | ICD-10-CM

## 2021-03-30 DIAGNOSIS — N53.12 PAINFUL EJACULATION: ICD-10-CM

## 2021-03-30 PROCEDURE — 99999 PR PBB SHADOW E&M-EST. PATIENT-LVL V: CPT | Mod: PBBFAC,,, | Performed by: NURSE PRACTITIONER

## 2021-03-30 PROCEDURE — 99214 OFFICE O/P EST MOD 30 MIN: CPT | Mod: S$GLB,,, | Performed by: NURSE PRACTITIONER

## 2021-03-30 PROCEDURE — 99214 PR OFFICE/OUTPT VISIT, EST, LEVL IV, 30-39 MIN: ICD-10-PCS | Mod: S$GLB,,, | Performed by: NURSE PRACTITIONER

## 2021-03-30 PROCEDURE — 99999 PR PBB SHADOW E&M-EST. PATIENT-LVL V: ICD-10-PCS | Mod: PBBFAC,,, | Performed by: NURSE PRACTITIONER

## 2021-04-07 ENCOUNTER — HOSPITAL ENCOUNTER (OUTPATIENT)
Dept: RADIOLOGY | Facility: HOSPITAL | Age: 36
Discharge: HOME | End: 2021-04-07
Attending: NURSE PRACTITIONER
Payer: COMMERCIAL

## 2021-04-07 DIAGNOSIS — N50.89 SCROTAL SWELLING: ICD-10-CM

## 2021-04-07 DIAGNOSIS — T14.8XXA HEMATOMA: ICD-10-CM

## 2021-04-07 DIAGNOSIS — N53.12 PAINFUL EJACULATION: ICD-10-CM

## 2021-04-07 PROCEDURE — 76870 US EXAM SCROTUM: CPT | Mod: 26,,, | Performed by: RADIOLOGY

## 2021-04-07 PROCEDURE — 76870 US EXAM SCROTUM: CPT | Mod: TC

## 2021-04-07 PROCEDURE — 76870 US SCROTUM AND TESTICLES: ICD-10-PCS | Mod: 26,,, | Performed by: RADIOLOGY

## 2021-04-20 ENCOUNTER — OFFICE VISIT (OUTPATIENT)
Dept: FAMILY MEDICINE | Facility: CLINIC | Age: 36
End: 2021-04-20
Payer: COMMERCIAL

## 2021-04-20 VITALS
HEIGHT: 68 IN | WEIGHT: 264 LBS | SYSTOLIC BLOOD PRESSURE: 130 MMHG | HEART RATE: 108 BPM | TEMPERATURE: 98 F | BODY MASS INDEX: 40.01 KG/M2 | OXYGEN SATURATION: 97 % | DIASTOLIC BLOOD PRESSURE: 88 MMHG

## 2021-04-20 DIAGNOSIS — I48.0 PAROXYSMAL ATRIAL FIBRILLATION: ICD-10-CM

## 2021-04-20 DIAGNOSIS — E78.2 MIXED HYPERLIPIDEMIA: ICD-10-CM

## 2021-04-20 DIAGNOSIS — I10 ESSENTIAL HYPERTENSION: Primary | ICD-10-CM

## 2021-04-20 DIAGNOSIS — N52.2 DRUG-INDUCED ERECTILE DYSFUNCTION: ICD-10-CM

## 2021-04-20 PROCEDURE — 99214 OFFICE O/P EST MOD 30 MIN: CPT | Mod: S$GLB,,, | Performed by: INTERNAL MEDICINE

## 2021-04-20 PROCEDURE — 99214 PR OFFICE/OUTPT VISIT, EST, LEVL IV, 30-39 MIN: ICD-10-PCS | Mod: S$GLB,,, | Performed by: INTERNAL MEDICINE

## 2021-04-20 RX ORDER — DILTIAZEM HYDROCHLORIDE 180 MG/1
180 CAPSULE, COATED, EXTENDED RELEASE ORAL DAILY
Qty: 90 CAPSULE | Refills: 1 | Status: SHIPPED | OUTPATIENT
Start: 2021-04-20 | End: 2021-04-29 | Stop reason: ALTCHOICE

## 2021-04-20 RX ORDER — TADALAFIL 5 MG/1
5 TABLET ORAL DAILY PRN
Qty: 30 TABLET | Refills: 3 | Status: SHIPPED | OUTPATIENT
Start: 2021-04-20 | End: 2021-12-17 | Stop reason: SDUPTHER

## 2021-04-20 RX ORDER — ATORVASTATIN CALCIUM 20 MG/1
20 TABLET, FILM COATED ORAL NIGHTLY
Qty: 90 TABLET | Refills: 1 | Status: SHIPPED | OUTPATIENT
Start: 2021-04-20 | End: 2021-11-15

## 2021-04-20 RX ORDER — LOSARTAN POTASSIUM 100 MG/1
100 TABLET ORAL DAILY
Qty: 90 TABLET | Refills: 1 | Status: SHIPPED | OUTPATIENT
Start: 2021-04-20 | End: 2021-12-17 | Stop reason: ALTCHOICE

## 2021-04-21 ENCOUNTER — TELEPHONE (OUTPATIENT)
Dept: CARDIOLOGY | Facility: HOSPITAL | Age: 36
End: 2021-04-21

## 2021-04-22 ENCOUNTER — CLINICAL SUPPORT (OUTPATIENT)
Dept: CARDIOLOGY | Facility: HOSPITAL | Age: 36
End: 2021-04-22
Attending: INTERNAL MEDICINE
Payer: COMMERCIAL

## 2021-04-22 DIAGNOSIS — I48.0 PAROXYSMAL ATRIAL FIBRILLATION: ICD-10-CM

## 2021-04-22 PROCEDURE — 93226 XTRNL ECG REC<48 HR SCAN A/R: CPT

## 2021-04-22 PROCEDURE — 93227 XTRNL ECG REC<48 HR R&I: CPT | Mod: ,,, | Performed by: SPECIALIST

## 2021-04-22 PROCEDURE — 93227 HOLTER MONITOR - 24 HOUR (CUPID ONLY): ICD-10-PCS | Mod: ,,, | Performed by: SPECIALIST

## 2021-04-28 LAB
ALBUMIN SERPL-MCNC: 4.6 G/DL (ref 4–5)
ALBUMIN/GLOB SERPL: 1.6 {RATIO} (ref 1.2–2.2)
ALP SERPL-CCNC: 93 IU/L (ref 39–117)
ALT SERPL-CCNC: 95 IU/L (ref 0–44)
APPEARANCE UR: CLEAR
AST SERPL-CCNC: 37 IU/L (ref 0–40)
BILIRUB SERPL-MCNC: 0.8 MG/DL (ref 0–1.2)
BILIRUB UR QL STRIP: NEGATIVE
BUN SERPL-MCNC: 17 MG/DL (ref 6–20)
BUN/CREAT SERPL: 18 (ref 9–20)
CALCIUM SERPL-MCNC: 9.5 MG/DL (ref 8.7–10.2)
CHLORIDE SERPL-SCNC: 101 MMOL/L (ref 96–106)
CHOLEST SERPL-MCNC: 149 MG/DL (ref 100–199)
CO2 SERPL-SCNC: 22 MMOL/L (ref 20–29)
COLOR UR: YELLOW
CREAT SERPL-MCNC: 0.92 MG/DL (ref 0.76–1.27)
GLOBULIN SER CALC-MCNC: 2.9 G/DL (ref 1.5–4.5)
GLUCOSE SERPL-MCNC: 108 MG/DL (ref 65–99)
GLUCOSE UR QL: NEGATIVE
HDLC SERPL-MCNC: 35 MG/DL
HGB UR QL STRIP: NEGATIVE
KETONES UR QL STRIP: NEGATIVE
LDLC SERPL CALC-MCNC: 71 MG/DL (ref 0–99)
LEUKOCYTE ESTERASE UR QL STRIP: NEGATIVE
MICRO URNS: NORMAL
NITRITE UR QL STRIP: NEGATIVE
PH UR STRIP: 5.5 [PH] (ref 5–7.5)
POTASSIUM SERPL-SCNC: 4.6 MMOL/L (ref 3.5–5.2)
PROT SERPL-MCNC: 7.5 G/DL (ref 6–8.5)
PROT UR QL STRIP: NEGATIVE
SODIUM SERPL-SCNC: 138 MMOL/L (ref 134–144)
SP GR UR: 1.03 (ref 1–1.03)
TRIGL SERPL-MCNC: 263 MG/DL (ref 0–149)
TSH SERPL DL<=0.005 MIU/L-ACNC: 1.81 UIU/ML (ref 0.45–4.5)
UROBILINOGEN UR STRIP-MCNC: 0.2 MG/DL (ref 0.2–1)
VLDLC SERPL CALC-MCNC: 43 MG/DL (ref 5–40)

## 2021-04-29 ENCOUNTER — PATIENT MESSAGE (OUTPATIENT)
Dept: RESEARCH | Facility: HOSPITAL | Age: 36
End: 2021-04-29

## 2021-04-29 ENCOUNTER — OFFICE VISIT (OUTPATIENT)
Dept: FAMILY MEDICINE | Facility: CLINIC | Age: 36
End: 2021-04-29
Payer: COMMERCIAL

## 2021-04-29 VITALS
HEART RATE: 79 BPM | HEIGHT: 68 IN | BODY MASS INDEX: 39.86 KG/M2 | WEIGHT: 263 LBS | DIASTOLIC BLOOD PRESSURE: 94 MMHG | OXYGEN SATURATION: 97 % | SYSTOLIC BLOOD PRESSURE: 130 MMHG | TEMPERATURE: 98 F

## 2021-04-29 DIAGNOSIS — R74.01 ELEVATED ALT MEASUREMENT: ICD-10-CM

## 2021-04-29 DIAGNOSIS — I49.3 PVC'S (PREMATURE VENTRICULAR CONTRACTIONS): Primary | ICD-10-CM

## 2021-04-29 DIAGNOSIS — R73.01 IMPAIRED FASTING GLUCOSE: ICD-10-CM

## 2021-04-29 DIAGNOSIS — I10 ESSENTIAL HYPERTENSION: ICD-10-CM

## 2021-04-29 LAB
OHS CV EVENT MONITOR DAY: 0
OHS CV HOLTER LENGTH DECIMAL HOURS: 24
OHS CV HOLTER LENGTH HOURS: 24
OHS CV HOLTER LENGTH MINUTES: 0

## 2021-04-29 PROCEDURE — 99213 PR OFFICE/OUTPT VISIT, EST, LEVL III, 20-29 MIN: ICD-10-PCS | Mod: S$GLB,,, | Performed by: INTERNAL MEDICINE

## 2021-04-29 PROCEDURE — 99213 OFFICE O/P EST LOW 20 MIN: CPT | Mod: S$GLB,,, | Performed by: INTERNAL MEDICINE

## 2021-04-29 RX ORDER — METOPROLOL SUCCINATE 50 MG/1
50 TABLET, EXTENDED RELEASE ORAL DAILY
Qty: 90 TABLET | Refills: 1 | Status: SHIPPED | OUTPATIENT
Start: 2021-04-29 | End: 2021-10-22

## 2021-06-02 ENCOUNTER — HOSPITAL ENCOUNTER (OUTPATIENT)
Dept: RADIOLOGY | Facility: HOSPITAL | Age: 36
Discharge: HOME | End: 2021-06-02
Attending: INTERNAL MEDICINE
Payer: COMMERCIAL

## 2021-06-02 DIAGNOSIS — R74.01 ELEVATED ALT MEASUREMENT: ICD-10-CM

## 2021-06-02 PROCEDURE — 76705 ECHO EXAM OF ABDOMEN: CPT | Mod: TC,PO

## 2021-07-15 ENCOUNTER — OFFICE VISIT (OUTPATIENT)
Dept: FAMILY MEDICINE | Facility: CLINIC | Age: 36
End: 2021-07-15
Payer: COMMERCIAL

## 2021-07-15 VITALS
HEART RATE: 63 BPM | OXYGEN SATURATION: 98 % | HEIGHT: 68 IN | WEIGHT: 255 LBS | SYSTOLIC BLOOD PRESSURE: 128 MMHG | TEMPERATURE: 97 F | DIASTOLIC BLOOD PRESSURE: 88 MMHG | BODY MASS INDEX: 38.65 KG/M2

## 2021-07-15 DIAGNOSIS — I10 ESSENTIAL HYPERTENSION: Primary | ICD-10-CM

## 2021-07-15 DIAGNOSIS — K76.0 HEPATIC STEATOSIS: ICD-10-CM

## 2021-07-15 PROCEDURE — 99213 PR OFFICE/OUTPT VISIT, EST, LEVL III, 20-29 MIN: ICD-10-PCS | Mod: S$GLB,,, | Performed by: INTERNAL MEDICINE

## 2021-07-15 PROCEDURE — 99213 OFFICE O/P EST LOW 20 MIN: CPT | Mod: S$GLB,,, | Performed by: INTERNAL MEDICINE

## 2021-07-21 RX ORDER — DILTIAZEM HYDROCHLORIDE 120 MG/1
120 CAPSULE, EXTENDED RELEASE ORAL DAILY
Qty: 90 CAPSULE | Refills: 0 | Status: CANCELLED | OUTPATIENT
Start: 2021-07-21

## 2021-07-21 RX ORDER — DILTIAZEM HYDROCHLORIDE 120 MG/1
120 CAPSULE, COATED, EXTENDED RELEASE ORAL DAILY
Qty: 90 CAPSULE | Refills: 1 | Status: SHIPPED | OUTPATIENT
Start: 2021-07-21 | End: 2021-08-03

## 2021-07-21 RX ORDER — DILTIAZEM HYDROCHLORIDE 120 MG/1
120 CAPSULE, EXTENDED RELEASE ORAL DAILY
COMMUNITY
End: 2021-07-21 | Stop reason: CLARIF

## 2021-08-03 ENCOUNTER — HOSPITAL ENCOUNTER (OUTPATIENT)
Dept: RADIOLOGY | Facility: HOSPITAL | Age: 36
Discharge: HOME | End: 2021-08-03
Attending: INTERNAL MEDICINE
Payer: COMMERCIAL

## 2021-08-03 ENCOUNTER — OFFICE VISIT (OUTPATIENT)
Dept: FAMILY MEDICINE | Facility: CLINIC | Age: 36
End: 2021-08-03
Payer: COMMERCIAL

## 2021-08-03 VITALS
HEIGHT: 68 IN | WEIGHT: 255 LBS | SYSTOLIC BLOOD PRESSURE: 124 MMHG | OXYGEN SATURATION: 97 % | TEMPERATURE: 97 F | BODY MASS INDEX: 38.65 KG/M2 | DIASTOLIC BLOOD PRESSURE: 90 MMHG | HEART RATE: 67 BPM

## 2021-08-03 DIAGNOSIS — R07.81 PLEURITIC CHEST PAIN: Primary | ICD-10-CM

## 2021-08-03 DIAGNOSIS — R07.81 PLEURITIC CHEST PAIN: ICD-10-CM

## 2021-08-03 PROCEDURE — 99213 OFFICE O/P EST LOW 20 MIN: CPT | Mod: S$GLB,,, | Performed by: INTERNAL MEDICINE

## 2021-08-03 PROCEDURE — 99213 PR OFFICE/OUTPT VISIT, EST, LEVL III, 20-29 MIN: ICD-10-PCS | Mod: S$GLB,,, | Performed by: INTERNAL MEDICINE

## 2021-08-03 PROCEDURE — 71046 X-RAY EXAM CHEST 2 VIEWS: CPT | Mod: TC,PO

## 2021-08-20 ENCOUNTER — CLINICAL SUPPORT (OUTPATIENT)
Dept: DIABETES | Facility: CLINIC | Age: 36
End: 2021-08-20
Payer: COMMERCIAL

## 2021-08-20 VITALS — WEIGHT: 258.94 LBS | BODY MASS INDEX: 39.24 KG/M2 | HEIGHT: 68 IN

## 2021-08-20 DIAGNOSIS — Z71.3 ENCOUNTER FOR NUTRITIONAL COUNSELING: ICD-10-CM

## 2021-08-20 PROCEDURE — 97803 PR MED NUTR THER, SUBSQ, INDIV, EA 15 MIN: ICD-10-PCS | Mod: S$GLB,,, | Performed by: DIETITIAN, REGISTERED

## 2021-08-20 PROCEDURE — 99999 PR PBB SHADOW E&M-EST. PATIENT-LVL II: CPT | Mod: PBBFAC,,, | Performed by: DIETITIAN, REGISTERED

## 2021-08-20 PROCEDURE — 99999 PR PBB SHADOW E&M-EST. PATIENT-LVL II: ICD-10-PCS | Mod: PBBFAC,,, | Performed by: DIETITIAN, REGISTERED

## 2021-08-20 PROCEDURE — 97803 MED NUTRITION INDIV SUBSEQ: CPT | Mod: S$GLB,,, | Performed by: DIETITIAN, REGISTERED

## 2021-11-29 ENCOUNTER — OFFICE VISIT (OUTPATIENT)
Dept: URGENT CARE | Facility: CLINIC | Age: 36
End: 2021-11-29
Payer: COMMERCIAL

## 2021-11-29 VITALS
RESPIRATION RATE: 14 BRPM | BODY MASS INDEX: 38.95 KG/M2 | TEMPERATURE: 98 F | SYSTOLIC BLOOD PRESSURE: 146 MMHG | HEIGHT: 69 IN | OXYGEN SATURATION: 97 % | WEIGHT: 263 LBS | DIASTOLIC BLOOD PRESSURE: 89 MMHG | HEART RATE: 79 BPM

## 2021-11-29 DIAGNOSIS — R10.9 ABDOMINAL PAIN, UNSPECIFIED ABDOMINAL LOCATION: Primary | ICD-10-CM

## 2021-11-29 PROCEDURE — 99213 OFFICE O/P EST LOW 20 MIN: CPT | Mod: S$GLB,,, | Performed by: NURSE PRACTITIONER

## 2021-11-29 PROCEDURE — 99213 PR OFFICE/OUTPT VISIT, EST, LEVL III, 20-29 MIN: ICD-10-PCS | Mod: S$GLB,,, | Performed by: NURSE PRACTITIONER

## 2021-11-29 PROCEDURE — 74019 XR ABDOMEN FLAT AND ERECT: ICD-10-PCS | Mod: S$GLB,,, | Performed by: RADIOLOGY

## 2021-11-29 PROCEDURE — 74019 RADEX ABDOMEN 2 VIEWS: CPT | Mod: S$GLB,,, | Performed by: RADIOLOGY

## 2021-11-30 ENCOUNTER — TELEPHONE (OUTPATIENT)
Dept: URGENT CARE | Facility: CLINIC | Age: 36
End: 2021-11-30
Payer: COMMERCIAL

## 2021-12-17 ENCOUNTER — OFFICE VISIT (OUTPATIENT)
Dept: FAMILY MEDICINE | Facility: CLINIC | Age: 36
End: 2021-12-17
Payer: COMMERCIAL

## 2021-12-17 ENCOUNTER — TELEPHONE (OUTPATIENT)
Dept: FAMILY MEDICINE | Facility: CLINIC | Age: 36
End: 2021-12-17

## 2021-12-17 VITALS
BODY MASS INDEX: 38.21 KG/M2 | HEIGHT: 69 IN | DIASTOLIC BLOOD PRESSURE: 92 MMHG | SYSTOLIC BLOOD PRESSURE: 128 MMHG | WEIGHT: 258 LBS

## 2021-12-17 DIAGNOSIS — N52.2 DRUG-INDUCED ERECTILE DYSFUNCTION: ICD-10-CM

## 2021-12-17 DIAGNOSIS — E78.2 MIXED HYPERLIPIDEMIA: ICD-10-CM

## 2021-12-17 DIAGNOSIS — I10 ESSENTIAL HYPERTENSION: Primary | ICD-10-CM

## 2021-12-17 PROCEDURE — 99213 PR OFFICE/OUTPT VISIT, EST, LEVL III, 20-29 MIN: ICD-10-PCS | Mod: S$GLB,,, | Performed by: INTERNAL MEDICINE

## 2021-12-17 PROCEDURE — 99213 OFFICE O/P EST LOW 20 MIN: CPT | Mod: S$GLB,,, | Performed by: INTERNAL MEDICINE

## 2021-12-17 RX ORDER — OLMESARTAN MEDOXOMIL 40 MG/1
40 TABLET ORAL DAILY
Qty: 90 TABLET | Refills: 1 | Status: SHIPPED | OUTPATIENT
Start: 2021-12-17 | End: 2022-06-16 | Stop reason: SDUPTHER

## 2021-12-17 RX ORDER — DEXLANSOPRAZOLE 60 MG/1
60 CAPSULE, DELAYED RELEASE ORAL DAILY
COMMUNITY
End: 2022-01-26

## 2021-12-17 RX ORDER — TADALAFIL 10 MG/1
10 TABLET ORAL DAILY PRN
Qty: 30 TABLET | Refills: 3 | Status: SHIPPED | OUTPATIENT
Start: 2021-12-17 | End: 2022-06-16 | Stop reason: SDUPTHER

## 2021-12-17 RX ORDER — TADALAFIL 10 MG/1
10 TABLET ORAL DAILY PRN
Qty: 30 TABLET | Refills: 3 | Status: SHIPPED | OUTPATIENT
Start: 2021-12-17 | End: 2021-12-17 | Stop reason: SDUPTHER

## 2022-01-26 ENCOUNTER — OFFICE VISIT (OUTPATIENT)
Dept: FAMILY MEDICINE | Facility: CLINIC | Age: 37
End: 2022-01-26
Payer: COMMERCIAL

## 2022-01-26 VITALS
DIASTOLIC BLOOD PRESSURE: 70 MMHG | SYSTOLIC BLOOD PRESSURE: 136 MMHG | BODY MASS INDEX: 39.1 KG/M2 | OXYGEN SATURATION: 96 % | TEMPERATURE: 97 F | WEIGHT: 264 LBS | HEART RATE: 62 BPM | HEIGHT: 69 IN

## 2022-01-26 DIAGNOSIS — S86.912A STRAIN OF LEFT KNEE, INITIAL ENCOUNTER: Primary | ICD-10-CM

## 2022-01-26 PROCEDURE — 99213 OFFICE O/P EST LOW 20 MIN: CPT | Mod: S$GLB,,, | Performed by: INTERNAL MEDICINE

## 2022-01-26 PROCEDURE — 99213 PR OFFICE/OUTPT VISIT, EST, LEVL III, 20-29 MIN: ICD-10-PCS | Mod: S$GLB,,, | Performed by: INTERNAL MEDICINE

## 2022-01-26 RX ORDER — METHYLPREDNISOLONE 4 MG/1
TABLET ORAL
Qty: 21 EACH | Refills: 0 | Status: SHIPPED | OUTPATIENT
Start: 2022-01-26 | End: 2022-02-16

## 2022-01-26 RX ORDER — NAPROXEN 500 MG/1
500 TABLET ORAL 2 TIMES DAILY
Qty: 60 TABLET | Refills: 1 | Status: SHIPPED | OUTPATIENT
Start: 2022-01-26 | End: 2022-01-26 | Stop reason: ALTCHOICE

## 2022-01-26 RX ORDER — MELOXICAM 15 MG/1
15 TABLET ORAL DAILY
Qty: 30 TABLET | Refills: 1 | Status: SHIPPED | OUTPATIENT
Start: 2022-01-26 | End: 2022-06-16

## 2022-01-26 NOTE — PROGRESS NOTES
Subjective:       Patient ID: Gerardo Ramirez Jr. is a 36 y.o. male.    Chief Complaint: Knee Pain (Left knee pain and popping)    Knee Pain   Incident location: no specific incident but started back in the gym last month, using treadmill, etc. There was no injury mechanism. The pain is present in the left knee. The quality of the pain is described as aching. The pain is at a severity of 3/10. The pain has been fluctuating since onset. Pertinent negatives include no inability to bear weight, loss of motion or numbness. The symptoms are aggravated by weight bearing (gets worse the more active he is; will start to limp after awhile if really busy at work). He has tried rest for the symptoms. The treatment provided moderate (improves after a few hours of rest) relief.     Review of Systems   Constitutional: Negative for activity change, appetite change, chills, diaphoresis, fatigue, fever and unexpected weight change.   HENT: Negative for congestion, ear discharge, ear pain, hearing loss, nosebleeds, postnasal drip, rhinorrhea, sinus pressure, sinus pain, sneezing, sore throat, tinnitus, trouble swallowing and voice change.    Eyes: Negative for photophobia, pain, discharge, redness, itching and visual disturbance.   Respiratory: Negative for apnea, cough, choking, chest tightness, shortness of breath and wheezing.    Cardiovascular: Negative for chest pain, palpitations and leg swelling.   Gastrointestinal: Negative for abdominal distention, abdominal pain, blood in stool, constipation, diarrhea, nausea and vomiting.   Endocrine: Negative for cold intolerance, heat intolerance, polydipsia and polyuria.   Genitourinary: Positive for frequency. Negative for decreased urine volume, difficulty urinating, dysuria, enuresis, flank pain, genital sores, hematuria, penile discharge, penile pain, scrotal swelling, testicular pain and urgency.   Musculoskeletal: Positive for arthralgias (left knee pain). Negative for back  pain, gait problem, joint swelling, myalgias, neck pain and neck stiffness.   Skin: Negative for rash and wound.   Allergic/Immunologic: Negative for environmental allergies, food allergies and immunocompromised state.   Neurological: Negative for dizziness, tremors, seizures, syncope, facial asymmetry, speech difficulty, weakness, light-headedness, numbness and headaches.   Hematological: Negative for adenopathy. Does not bruise/bleed easily.   Psychiatric/Behavioral: Negative for confusion, decreased concentration, hallucinations, self-injury, sleep disturbance and suicidal ideas. The patient is not nervous/anxious.        Past Medical History:   Diagnosis Date    Anxiety     Atrial fibrillation 03/2020    new onset- meds changed    Hyperlipidemia     Hypertension 2013    Inguinal hernia bilateral, non-recurrent 02/2020    right side more pronounced- surg scheduled    Pneumonia     age 11    Sleep apnea     Sleep apnea-like behavior     needs sleep study- wife says he stops breathing while sleeping      Past Surgical History:   Procedure Laterality Date    CHOLECYSTECTOMY  04/08/2019    w/ Small Umbilical hernia rep-     HERNIA REPAIR Bilateral 03/10/2020    Dr[facility]    UMBILICAL HERNIA REPAIR  04/08/2019        VASECTOMY Bilateral 11/30/2020    Procedure: VASECTOMY;  Surgeon: German Tobias MD;  [facility];  Service: Urology;  Laterality: Bilateral;       Family History   Problem Relation Age of Onset    Diabetes Mother     Hypertension Mother     Hypertension Sister     Hyperlipidemia Sister     Anxiety disorder Sister     Coronary artery disease Maternal Grandfather     No Known Problems Father        Social History     Socioeconomic History    Marital status:    Occupational History    Occupation: [employer]   Tobacco Use    Smoking status: Current Some Day Smoker     Packs/day: 1.00     Years: 20.00     Pack years: 20.00     Types: Vaping with  "nicotine     Last attempt to quit: 2020     Years since quittin.6    Smokeless tobacco: Never Used   Substance and Sexual Activity    Alcohol use: Yes     Comment: occ    Drug use: No    Sexual activity: Yes     Partners: Female   Social History Narrative    LIVE WITH MICHA       Current Outpatient Medications   Medication Sig Dispense Refill    atorvastatin (LIPITOR) 20 MG tablet TAKE 1 TABLET BY MOUTH EVERY DAY AT NIGHT 90 tablet 1    metoprolol succinate (TOPROL-XL) 50 MG 24 hr tablet TAKE 1 TABLET BY MOUTH EVERY DAY 90 tablet 1    olmesartan (BENICAR) 40 MG tablet Take 1 tablet (40 mg total) by mouth once daily. 90 tablet 1    tadalafiL (CIALIS) 10 MG tablet Take 1 tablet (10 mg total) by mouth daily as needed for Erectile Dysfunction. 30 tablet 3    meloxicam (MOBIC) 15 MG tablet Take 1 tablet (15 mg total) by mouth once daily. 30 tablet 1    methylPREDNISolone (MEDROL DOSEPACK) 4 mg tablet use as directed 21 each 0     No current facility-administered medications for this visit.       Review of patient's allergies indicates:   Allergen Reactions    Amoxicillin Hives     Objective:    HPI     Knee Pain      Additional comments: Left knee pain and popping          Last edited by Ramila Hernandez MA on 2022  4:15 PM. (History)      Blood pressure 136/70, pulse 62, temperature 97 °F (36.1 °C), temperature source Temporal, height 5' 9" (1.753 m), weight 119.7 kg (264 lb), SpO2 96 %. Body mass index is 38.99 kg/m².   Physical Exam  Vitals and nursing note reviewed.   Constitutional:       General: He is not in acute distress.     Appearance: Normal appearance. He is obese. He is not ill-appearing, toxic-appearing or diaphoretic.   Musculoskeletal:      Left knee: No swelling, deformity, effusion or erythema. Normal range of motion. No tenderness.      Comments: No laxity noted but has some discomfort with varus/valgus testing.   Neurological:      Mental Status: He is alert.       "       Assessment:       1. Strain of left knee, initial encounter        Plan:       Gerardo was seen today for knee pain.    Diagnoses and all orders for this visit:    Strain of left knee, initial encounter  Comments:  Needs to avoid high impact exercises; discussed low impact alternatives  Orders:  -     methylPREDNISolone (MEDROL DOSEPACK) 4 mg tablet; use as directed  -     Discontinue: naproxen (NAPROSYN) 500 MG tablet; Take 1 tablet (500 mg total) by mouth 2 (two) times daily.  -     meloxicam (MOBIC) 15 MG tablet; Take 1 tablet (15 mg total) by mouth once daily.

## 2022-03-10 ENCOUNTER — TELEPHONE (OUTPATIENT)
Dept: FAMILY MEDICINE | Facility: CLINIC | Age: 37
End: 2022-03-10
Payer: COMMERCIAL

## 2022-03-10 DIAGNOSIS — E78.2 MIXED HYPERLIPIDEMIA: ICD-10-CM

## 2022-03-10 DIAGNOSIS — I10 ESSENTIAL HYPERTENSION: Primary | ICD-10-CM

## 2022-03-10 NOTE — TELEPHONE ENCOUNTER
----- Message from Ramila Hernandez MA sent at 3/10/2022  9:12 AM CST -----  Regarding: FW: lab orders    ----- Message -----  From: Debbie Petit  Sent: 3/10/2022   9:10 AM CST  To: [facility] Staff  Subject: lab orders                                       canceled appt needed to have labs but no orders in chart uses [facility]  thanks

## 2022-06-15 LAB
ALBUMIN SERPL-MCNC: 4.6 G/DL (ref 4–5)
ALBUMIN/GLOB SERPL: 1.6 {RATIO} (ref 1.2–2.2)
ALP SERPL-CCNC: 85 IU/L (ref 44–121)
ALT SERPL-CCNC: 80 IU/L (ref 0–44)
APPEARANCE UR: CLEAR
AST SERPL-CCNC: 57 IU/L (ref 0–40)
BILIRUB SERPL-MCNC: 1 MG/DL (ref 0–1.2)
BILIRUB UR QL STRIP: NEGATIVE
BUN SERPL-MCNC: 12 MG/DL (ref 6–20)
BUN/CREAT SERPL: 14 (ref 9–20)
CALCIUM SERPL-MCNC: 9.4 MG/DL (ref 8.7–10.2)
CHLORIDE SERPL-SCNC: 101 MMOL/L (ref 96–106)
CHOLEST SERPL-MCNC: 177 MG/DL (ref 100–199)
CO2 SERPL-SCNC: 20 MMOL/L (ref 20–29)
COLOR UR: YELLOW
CREAT SERPL-MCNC: 0.85 MG/DL (ref 0.76–1.27)
EST. GFR  (NO RACE VARIABLE): 113 ML/MIN/1.73
GLOBULIN SER CALC-MCNC: 2.9 G/DL (ref 1.5–4.5)
GLUCOSE SERPL-MCNC: 97 MG/DL (ref 65–99)
GLUCOSE UR QL STRIP: NEGATIVE
HDLC SERPL-MCNC: 35 MG/DL
HGB UR QL STRIP: NEGATIVE
KETONES UR QL STRIP: NEGATIVE
LDLC SERPL CALC-MCNC: 85 MG/DL (ref 0–99)
LEUKOCYTE ESTERASE UR QL STRIP: NEGATIVE
MICRO URNS: NORMAL
NITRITE UR QL STRIP: NEGATIVE
PH UR STRIP: 6 [PH] (ref 5–7.5)
POTASSIUM SERPL-SCNC: 4.6 MMOL/L (ref 3.5–5.2)
PROT SERPL-MCNC: 7.5 G/DL (ref 6–8.5)
PROT UR QL STRIP: NEGATIVE
SODIUM SERPL-SCNC: 135 MMOL/L (ref 134–144)
SP GR UR STRIP: 1.02 (ref 1–1.03)
TRIGL SERPL-MCNC: 345 MG/DL (ref 0–149)
UROBILINOGEN UR STRIP-MCNC: 0.2 MG/DL (ref 0.2–1)
VLDLC SERPL CALC-MCNC: 57 MG/DL (ref 5–40)

## 2022-06-16 ENCOUNTER — OFFICE VISIT (OUTPATIENT)
Dept: FAMILY MEDICINE | Facility: CLINIC | Age: 37
End: 2022-06-16
Payer: COMMERCIAL

## 2022-06-16 VITALS
HEIGHT: 69 IN | BODY MASS INDEX: 38.95 KG/M2 | WEIGHT: 263 LBS | DIASTOLIC BLOOD PRESSURE: 70 MMHG | OXYGEN SATURATION: 97 % | SYSTOLIC BLOOD PRESSURE: 120 MMHG | TEMPERATURE: 97 F | HEART RATE: 99 BPM

## 2022-06-16 DIAGNOSIS — N52.2 DRUG-INDUCED ERECTILE DYSFUNCTION: ICD-10-CM

## 2022-06-16 DIAGNOSIS — E78.2 MIXED HYPERLIPIDEMIA: ICD-10-CM

## 2022-06-16 DIAGNOSIS — I10 ESSENTIAL HYPERTENSION: Primary | ICD-10-CM

## 2022-06-16 DIAGNOSIS — K76.0 HEPATIC STEATOSIS: ICD-10-CM

## 2022-06-16 PROCEDURE — 99213 OFFICE O/P EST LOW 20 MIN: CPT | Mod: S$GLB,,, | Performed by: INTERNAL MEDICINE

## 2022-06-16 PROCEDURE — 99213 PR OFFICE/OUTPT VISIT, EST, LEVL III, 20-29 MIN: ICD-10-PCS | Mod: S$GLB,,, | Performed by: INTERNAL MEDICINE

## 2022-06-16 RX ORDER — OLMESARTAN MEDOXOMIL 40 MG/1
40 TABLET ORAL DAILY
Qty: 90 TABLET | Refills: 1 | Status: SHIPPED | OUTPATIENT
Start: 2022-06-16 | End: 2022-11-28

## 2022-06-16 RX ORDER — TADALAFIL 10 MG/1
10 TABLET ORAL DAILY PRN
Qty: 30 TABLET | Refills: 3 | Status: SHIPPED | OUTPATIENT
Start: 2022-06-16 | End: 2023-04-26

## 2022-06-16 RX ORDER — NAPROXEN 500 MG/1
500 TABLET ORAL 2 TIMES DAILY PRN
COMMUNITY
Start: 2022-03-27 | End: 2022-06-27

## 2022-06-16 RX ORDER — ATORVASTATIN CALCIUM 20 MG/1
20 TABLET, FILM COATED ORAL NIGHTLY
Qty: 90 TABLET | Refills: 1 | Status: SHIPPED | OUTPATIENT
Start: 2022-06-16 | End: 2022-11-28

## 2022-06-16 NOTE — PATIENT INSTRUCTIONS
Your overall health would benefit from weight loss.  There are many different systems out there to achieve weight loss but they are often hard to stick with over long periods of time.  I try to keep it simple.  Try and do some form of exercise, even if it is just walking, 30 minutes per day.  Try to limit your sugar intake by cutting back on soft drinks, sweet tea, desserts, snacks, etc.  Drink more water, especially while preparing a meal and with a meal.  This occupies space in your stomach and makes you feel full faster so you eat less.  Put less food on the plate; most of us eat double or triple the recommended serving sizes.  Even if you are eating the right things, you could still be eating too much.  Don't go back for seconds immediately.  There is a delay between your stomach and brain of about 20-30 minutes.  You may still feel hungry even though you really have had enough to eat.  Let your brain catch up to your stomach.  At the end of the day, it doesn't matter which system you use, it's about calories in and calories out.

## 2022-06-16 NOTE — PROGRESS NOTES
Subjective:       Patient ID: Gerardo Ramirez Jr. is a 37 y.o. male.    Chief Complaint: Hypertension and Hyperlipidemia    Here for routine follow up.      Hypertension  This is a chronic problem. The problem is controlled. Associated symptoms include headaches. Pertinent negatives include no anxiety, chest pain, malaise/fatigue, neck pain, palpitations, peripheral edema or shortness of breath. Risk factors for coronary artery disease include obesity, male gender, dyslipidemia and smoking/tobacco exposure. Past treatments include angiotensin blockers and beta blockers. There are no compliance problems.    Hyperlipidemia  This is a chronic problem. Recent lipid tests were reviewed and are variable (triglycerides high, cholesterol fine). Exacerbating diseases include obesity. Pertinent negatives include no chest pain, myalgias or shortness of breath. Current antihyperlipidemic treatment includes diet change.     Review of Systems   Constitutional: Negative for activity change, appetite change, chills, diaphoresis, fatigue, fever, malaise/fatigue and unexpected weight change.   HENT: Negative for congestion, ear discharge, ear pain, hearing loss, nosebleeds, postnasal drip, rhinorrhea, sinus pressure, sinus pain, sneezing, sore throat, tinnitus, trouble swallowing and voice change.    Eyes: Positive for redness and itching. Negative for photophobia, pain, discharge and visual disturbance.   Respiratory: Positive for apnea. Negative for cough, choking, chest tightness, shortness of breath and wheezing.    Cardiovascular: Negative for chest pain, palpitations and leg swelling.   Gastrointestinal: Positive for abdominal pain and diarrhea. Negative for abdominal distention, blood in stool, constipation, nausea and vomiting.   Endocrine: Negative for cold intolerance, heat intolerance, polydipsia and polyuria.   Genitourinary: Negative for decreased urine volume, difficulty urinating, dysuria, enuresis, flank pain,  frequency, genital sores, hematuria, penile discharge, penile pain, scrotal swelling, testicular pain and urgency.   Musculoskeletal: Negative for arthralgias, back pain, gait problem, joint swelling, myalgias, neck pain and neck stiffness.   Skin: Negative for rash and wound.   Allergic/Immunologic: Negative for environmental allergies, food allergies and immunocompromised state.   Neurological: Positive for light-headedness and headaches. Negative for dizziness, tremors, seizures, syncope, facial asymmetry, speech difficulty, weakness and numbness.   Hematological: Negative for adenopathy. Does not bruise/bleed easily.   Psychiatric/Behavioral: Negative for confusion, decreased concentration, hallucinations, self-injury, sleep disturbance and suicidal ideas. The patient is nervous/anxious.        Past Medical History:   Diagnosis Date    Anxiety     Atrial fibrillation 03/2020    new onset- meds changed    Hyperlipidemia     Hypertension 2013    Inguinal hernia bilateral, non-recurrent 02/2020    right side more pronounced- surg scheduled    Pneumonia     age 11    Sleep apnea     Sleep apnea-like behavior     needs sleep study- wife says he stops breathing while sleeping      Past Surgical History:   Procedure Laterality Date    CHOLECYSTECTOMY  04/08/2019    w/ Small Umbilical hernia rep-     HERNIA REPAIR Bilateral 03/10/2020    [facility]    UMBILICAL HERNIA REPAIR  04/08/2019        VASECTOMY Bilateral 11/30/2020    Procedure: VASECTOMY;  Surgeon: German Tobias MD;  Location: [facility];  Service: Urology;  Laterality: Bilateral;       Family History   Problem Relation Age of Onset    Diabetes Mother     Hypertension Mother     Hypertension Sister     Hyperlipidemia Sister     Anxiety disorder Sister     Coronary artery disease Maternal Grandfather     No Known Problems Father        Social History     Socioeconomic History    Marital status:   "  Occupational History    Occupation: [employer]   Tobacco Use    Smoking status: Current Some Day Smoker     Packs/day: 1.00     Years: 20.00     Pack years: 20.00     Types: Vaping with nicotine     Last attempt to quit: 2020     Years since quittin.0    Smokeless tobacco: Never Used   Substance and Sexual Activity    Alcohol use: Yes     Comment: occ    Drug use: No    Sexual activity: Yes     Partners: Female   Social History Narrative    LIVE WITH FIROSALINA       Current Outpatient Medications   Medication Sig Dispense Refill    metoprolol succinate (TOPROL-XL) 50 MG 24 hr tablet TAKE 1 TABLET BY MOUTH EVERY DAY 90 tablet 1    naproxen (NAPROSYN) 500 MG tablet Take 500 mg by mouth 2 (two) times daily as needed.      atorvastatin (LIPITOR) 20 MG tablet Take 1 tablet (20 mg total) by mouth nightly. 90 tablet 1    olmesartan (BENICAR) 40 MG tablet Take 1 tablet (40 mg total) by mouth once daily. 90 tablet 1    tadalafiL (CIALIS) 10 MG tablet Take 1 tablet (10 mg total) by mouth daily as needed for Erectile Dysfunction. 30 tablet 3     No current facility-administered medications for this visit.       Review of patient's allergies indicates:   Allergen Reactions    Amoxicillin Hives     Objective:      Blood pressure 120/70, pulse 99, temperature 96.7 °F (35.9 °C), temperature source Temporal, height 5' 9" (1.753 m), weight 119.3 kg (263 lb), SpO2 97 %. Body mass index is 38.84 kg/m².   Physical Exam        Assessment:       1. Essential hypertension    2. Mixed hyperlipidemia    3. Drug-induced erectile dysfunction    4. Hepatic steatosis        Plan:       Gerardo was seen today for hypertension and hyperlipidemia.    Diagnoses and all orders for this visit:    Essential hypertension  -     olmesartan (BENICAR) 40 MG tablet; Take 1 tablet (40 mg total) by mouth once daily.    Mixed hyperlipidemia  -     atorvastatin (LIPITOR) 20 MG tablet; Take 1 tablet (20 mg total) by mouth nightly.    Drug-induced " erectile dysfunction  -     tadalafiL (CIALIS) 10 MG tablet; Take 1 tablet (10 mg total) by mouth daily as needed for Erectile Dysfunction.    Hepatic steatosis  Comments:  Discussed weight loss

## 2022-07-08 ENCOUNTER — HOSPITAL ENCOUNTER (EMERGENCY)
Facility: HOSPITAL | Age: 37
Discharge: HOME | End: 2022-07-08
Attending: EMERGENCY MEDICINE
Payer: COMMERCIAL

## 2022-07-08 VITALS
DIASTOLIC BLOOD PRESSURE: 96 MMHG | RESPIRATION RATE: 18 BRPM | OXYGEN SATURATION: 99 % | BODY MASS INDEX: 38.51 KG/M2 | HEIGHT: 69 IN | WEIGHT: 260 LBS | TEMPERATURE: 98 F | HEART RATE: 61 BPM | SYSTOLIC BLOOD PRESSURE: 142 MMHG

## 2022-07-08 DIAGNOSIS — G43.001 MIGRAINE WITHOUT AURA AND WITH STATUS MIGRAINOSUS, NOT INTRACTABLE: Primary | ICD-10-CM

## 2022-07-08 DIAGNOSIS — H61.20 IMPACTED CERUMEN, UNSPECIFIED LATERALITY: ICD-10-CM

## 2022-07-08 PROCEDURE — 63600175 PHARM REV CODE 636 W HCPCS: Performed by: EMERGENCY MEDICINE

## 2022-07-08 PROCEDURE — 96375 TX/PRO/DX INJ NEW DRUG ADDON: CPT

## 2022-07-08 PROCEDURE — 25000003 PHARM REV CODE 250: Performed by: EMERGENCY MEDICINE

## 2022-07-08 PROCEDURE — 96374 THER/PROPH/DIAG INJ IV PUSH: CPT

## 2022-07-08 PROCEDURE — 99284 EMERGENCY DEPT VISIT MOD MDM: CPT | Mod: 25

## 2022-07-08 RX ORDER — PROCHLORPERAZINE EDISYLATE 5 MG/ML
10 INJECTION INTRAMUSCULAR; INTRAVENOUS
Status: COMPLETED | OUTPATIENT
Start: 2022-07-08 | End: 2022-07-08

## 2022-07-08 RX ORDER — KETOROLAC TROMETHAMINE 30 MG/ML
10 INJECTION, SOLUTION INTRAMUSCULAR; INTRAVENOUS
Status: COMPLETED | OUTPATIENT
Start: 2022-07-08 | End: 2022-07-08

## 2022-07-08 RX ORDER — DIPHENHYDRAMINE HYDROCHLORIDE 50 MG/ML
12.5 INJECTION INTRAMUSCULAR; INTRAVENOUS
Status: COMPLETED | OUTPATIENT
Start: 2022-07-08 | End: 2022-07-08

## 2022-07-08 RX ADMIN — Medication 5 DROP: at 06:07

## 2022-07-08 RX ADMIN — PROCHLORPERAZINE EDISYLATE 10 MG: 5 INJECTION INTRAMUSCULAR; INTRAVENOUS at 06:07

## 2022-07-08 RX ADMIN — DIPHENHYDRAMINE HYDROCHLORIDE 12.5 MG: 50 INJECTION, SOLUTION INTRAMUSCULAR; INTRAVENOUS at 06:07

## 2022-07-08 RX ADMIN — KETOROLAC TROMETHAMINE 10 MG: 30 INJECTION, SOLUTION INTRAMUSCULAR; INTRAVENOUS at 08:07

## 2022-07-10 NOTE — ED PROVIDER NOTES
Encounter Date: 7/8/2022       History     Chief Complaint   Patient presents with    Headache     Migraine since Tuesday. + nausea.      HPI     Seen evaluated presented with chief complaint of headache and cerumen impaction.  Reports feeling headache since Tuesday and having difficulty hearing out of his right ear.  No associated fevers or neck stiffness.  He denies any alleviating or exacerbating factors related this process.  Symptoms are moderate.  This is an acute episodic process has been ongoing since Tuesday.  The cerumen impaction occurred after using Q-tip to try to clean his ears he has a Ki packed cerumen in.  No change in vision no associated vomiting.  Symptoms moderate      Review of patient's allergies indicates:   Allergen Reactions    Amoxicillin Hives     Past Medical History:   Diagnosis Date    Anxiety     Atrial fibrillation 03/2020    new onset- meds changed    Hyperlipidemia     Hypertension 2013    Inguinal hernia bilateral, non-recurrent 02/2020    right side more pronounced- surg scheduled    Pneumonia     age 11    Sleep apnea     Sleep apnea-like behavior     needs sleep study- wife says he stops breathing while sleeping     Past Surgical History:   Procedure Laterality Date    CHOLECYSTECTOMY  04/08/2019    w/ Small Umbilical hernia rep-     HERNIA REPAIR Bilateral 03/10/2020    [facility]    UMBILICAL HERNIA REPAIR  04/08/2019        VASECTOMY Bilateral 11/30/2020    Procedure: VASECTOMY;  Surgeon: German Tobias MD;  Location: [facility];  Service: Urology;  Laterality: Bilateral;     Family History   Problem Relation Age of Onset    Diabetes Mother     Hypertension Mother     Hypertension Sister     Hyperlipidemia Sister     Anxiety disorder Sister     Coronary artery disease Maternal Grandfather     No Known Problems Father      Social History     Tobacco Use    Smoking status: Current Some Day Smoker     Packs/day: 1.00     Years:  20.00     Pack years: 20.00     Types: Vaping with nicotine     Last attempt to quit: 2020     Years since quittin.1    Smokeless tobacco: Never Used   Substance Use Topics    Alcohol use: Yes     Comment: occ    Drug use: No     Review of Systems   Constitutional: Negative for fever.   HENT: Positive for ear pain. Negative for sore throat.    Respiratory: Negative for shortness of breath.    Cardiovascular: Negative for chest pain.   Gastrointestinal: Negative for nausea.   Genitourinary: Negative for dysuria.   Musculoskeletal: Negative for back pain.   Skin: Negative for rash.   Neurological: Positive for headaches. Negative for weakness.   Psychiatric/Behavioral: Negative for confusion.       Physical Exam     Initial Vitals [22 1717]   BP Pulse Resp Temp SpO2   (!) 146/105 76 16 98.3 °F (36.8 °C) 97 %      MAP       --         Physical Exam    Nursing note and vitals reviewed.  Constitutional: He appears well-developed and well-nourished.   HENT:   Head: Normocephalic and atraumatic.   Right cerumen impaction   Eyes: Conjunctivae are normal.   Cardiovascular: Normal rate and regular rhythm.   Abdominal: Abdomen is soft.   Musculoskeletal:         General: Normal range of motion.     Neurological: He is alert and oriented to person, place, and time.   Skin: Skin is warm and dry.   Psychiatric: He has a normal mood and affect. His speech is normal.         ED Course   Procedures  Labs Reviewed - No data to display       Imaging Results          CT Head Without Contrast (Final result)  Result time 22 19:52:13    Final result by Abdoul Edwards MD (22 19:52:13)                 Narrative:    CMS MANDATED QUALITY DATA - CT RADIATION  436    All CT scans at this facility utilize dose modulation, iterative reconstruction, and/or weight based dosing when appropriate to reduce radiation dose to as low as reasonably achievable.    CT HEAD WITHOUT IV CONTRAST    CLINICAL HISTORY:  37 years  Male headache    COMPARISON: None    FINDINGS: Negative for acute intracranial hemorrhage, midline shift, or mass effect. Ventricles and sulci are normal in size. Gray-white differentiation is maintained. Cerebellar hemispheres and brainstem are unremarkable.    No calvarial lesion or fracture. Mastoid air cells are clear. Mucosal thickening within the maxillary sinuses bilaterally.    IMPRESSION:    No CT evidence of acute intracranial pathology.    Electronically signed by:  Abdoul Edwards MD  7/8/2022 7:52 PM CDT Workstation: [id number]                               Medications   prochlorperazine injection Soln 10 mg (10 mg Intravenous Given 7/8/22 1803)   diphenhydrAMINE injection 12.5 mg (12.5 mg Intravenous Given 7/8/22 1803)   carbamide peroxide 6.5 % otic solution 5 drop (5 drops Right Ear Given 7/8/22 1803)   ketorolac injection 9.999 mg (9.999 mg Intravenous Given 7/8/22 2028)     Medical Decision Making:   Initial Assessment:   Seen and evaluated.  Presented with headache and cerumen impaction.  Symptoms improved after cerumen impaction was cleared.  Also treated with medication.  Headache resolved.  He feels well and was ready for discharge.  Did not have fever meningismus or additional concerns.  Given this is the 1st time I had of a headache like this, did get CT head which was stable.  Discharged in fair condition                      Clinical Impression:   Final diagnoses:  [G43.001] Migraine without aura and with status migrainosus, not intractable (Primary)  [H61.20] Impacted cerumen, unspecified laterality          ED Disposition Condition    Discharge Stable        ED Prescriptions     None        Follow-up Information     Follow up With Specialties Details Why Contact Info Additional Information    Galindo Berrios Jr., MD Internal Medicine   [address] [facility] [address]  [phone]       [facility] - Emergency Dept Emergency Medicine   [address] [place] [address]  [place] [address]  [phone] 1st floor           Chang Schwab Jr., MD  07/09/22 2018

## 2022-07-11 ENCOUNTER — OFFICE VISIT (OUTPATIENT)
Dept: FAMILY MEDICINE | Facility: CLINIC | Age: 37
End: 2022-07-11
Payer: COMMERCIAL

## 2022-07-11 VITALS
WEIGHT: 264 LBS | TEMPERATURE: 97 F | BODY MASS INDEX: 39.1 KG/M2 | OXYGEN SATURATION: 98 % | HEIGHT: 69 IN | SYSTOLIC BLOOD PRESSURE: 120 MMHG | DIASTOLIC BLOOD PRESSURE: 84 MMHG | HEART RATE: 64 BPM

## 2022-07-11 DIAGNOSIS — T80.1XXA PHLEBITIS AFTER INFUSION, INITIAL ENCOUNTER: ICD-10-CM

## 2022-07-11 DIAGNOSIS — I80.9 PHLEBITIS AFTER INFUSION, INITIAL ENCOUNTER: ICD-10-CM

## 2022-07-11 DIAGNOSIS — R51.9 ACUTE NONINTRACTABLE HEADACHE, UNSPECIFIED HEADACHE TYPE: Primary | ICD-10-CM

## 2022-07-11 PROCEDURE — 99213 OFFICE O/P EST LOW 20 MIN: CPT | Mod: S$GLB,,, | Performed by: INTERNAL MEDICINE

## 2022-07-11 PROCEDURE — 99213 PR OFFICE/OUTPT VISIT, EST, LEVL III, 20-29 MIN: ICD-10-PCS | Mod: S$GLB,,, | Performed by: INTERNAL MEDICINE

## 2022-07-11 RX ORDER — BUTALBITAL, ACETAMINOPHEN AND CAFFEINE 300; 40; 50 MG/1; MG/1; MG/1
1-2 CAPSULE ORAL 3 TIMES DAILY
COMMUNITY
Start: 2022-07-08 | End: 2022-12-08 | Stop reason: SDUPTHER

## 2022-07-11 NOTE — PROGRESS NOTES
Subjective:       Patient ID: Gerardo Ramirez Jr. is a 37 y.o. male.    Chief Complaint: Headache (X 6 days/Went to ER 3 days ago /Negative for headache) and Hand Pain (Left hand pains)    Here for ER follow up.  He had been having a headache for several days that wouldn't resolve.  He went to urgent care first and was given fioricet but when that didn't work he went to ER.  CT was negative.  Ears were flushed of cerumen; ER note states HA improved but patient reports HA didn't resolve until after he was given IV toradol later.   Slight headache today but overall has been better.  Has some left hand pain and swelling at IV site.   He does note that he had been drinking and vaping the day before and vaping does tend to give him a headahce.    Review of Systems   Constitutional: Negative for activity change, appetite change, chills, diaphoresis, fatigue, fever and unexpected weight change.   HENT: Negative for congestion, ear discharge, ear pain, hearing loss, nosebleeds, postnasal drip, rhinorrhea, sinus pressure, sinus pain, sneezing, sore throat, tinnitus, trouble swallowing and voice change.    Eyes: Negative for photophobia, pain, discharge, redness, itching and visual disturbance.   Respiratory: Positive for apnea. Negative for cough, choking, chest tightness, shortness of breath and wheezing.    Cardiovascular: Negative for chest pain, palpitations and leg swelling.   Gastrointestinal: Positive for diarrhea. Negative for abdominal distention, abdominal pain, blood in stool, constipation, nausea and vomiting.   Endocrine: Negative for cold intolerance, heat intolerance, polydipsia and polyuria.   Genitourinary: Negative for decreased urine volume, difficulty urinating, dysuria, enuresis, flank pain, frequency, genital sores, hematuria, penile discharge, penile pain, scrotal swelling, testicular pain and urgency.   Musculoskeletal: Positive for arthralgias (left hands) and joint swelling (left hand).  Negative for back pain, gait problem, myalgias, neck pain and neck stiffness.   Skin: Negative for rash and wound.   Allergic/Immunologic: Negative for environmental allergies, food allergies and immunocompromised state.   Neurological: Positive for light-headedness. Negative for dizziness, tremors, seizures, syncope, facial asymmetry, speech difficulty, weakness, numbness and headaches.   Hematological: Negative for adenopathy. Does not bruise/bleed easily.   Psychiatric/Behavioral: Negative for confusion, decreased concentration, hallucinations, self-injury, sleep disturbance and suicidal ideas. The patient is nervous/anxious.        Past Medical History:   Diagnosis Date    Anxiety     Atrial fibrillation 03/2020    new onset- meds changed    Hyperlipidemia     Hypertension 2013    Inguinal hernia bilateral, non-recurrent 02/2020    right side more pronounced- surg scheduled    Pneumonia     age 11    Sleep apnea     Sleep apnea-like behavior     needs sleep study- wife says he stops breathing while sleeping      Past Surgical History:   Procedure Laterality Date    CHOLECYSTECTOMY  04/08/2019    w/ Small Umbilical hernia rep-     HERNIA REPAIR Bilateral 03/10/2020    Dr. Lanier - [facility]    UMBILICAL HERNIA REPAIR  04/08/2019        VASECTOMY Bilateral 11/30/2020    Procedure: VASECTOMY;  Surgeon: German Tobias MD;  [facility]: [facility];  Service: Urology;  Laterality: Bilateral;       Family History   Problem Relation Age of Onset    Diabetes Mother     Hypertension Mother     Hypertension Sister     Hyperlipidemia Sister     Anxiety disorder Sister     Coronary artery disease Maternal Grandfather     No Known Problems Father        Social History     Socioeconomic History    Marital status:    Occupational History    Occupation: [employer]   Tobacco Use    Smoking status: Current Some Day Smoker     Packs/day: 1.00     Years: 20.00     Pack years: 20.00     Types:  "Vaping with nicotine     Last attempt to quit: 2020     Years since quittin.1    Smokeless tobacco: Never Used   Substance and Sexual Activity    Alcohol use: Yes     Comment: occ    Drug use: No    Sexual activity: Yes     Partners: Female   Social History Narrative    LIVE WITH MICHA       Current Outpatient Medications   Medication Sig Dispense Refill    atorvastatin (LIPITOR) 20 MG tablet Take 1 tablet (20 mg total) by mouth nightly. 90 tablet 1    butalbital-acetaminophen-caff -40 mg Cap Take 1-2 capsules by mouth 3 (three) times daily.      metoprolol succinate (TOPROL-XL) 50 MG 24 hr tablet TAKE 1 TABLET BY MOUTH EVERY DAY 90 tablet 1    naproxen (NAPROSYN) 500 MG tablet TAKE 1 TABLET BY MOUTH TWICE A DAY 60 tablet 1    olmesartan (BENICAR) 40 MG tablet Take 1 tablet (40 mg total) by mouth once daily. 90 tablet 1    tadalafiL (CIALIS) 10 MG tablet Take 1 tablet (10 mg total) by mouth daily as needed for Erectile Dysfunction. 30 tablet 3     No current facility-administered medications for this visit.       Review of patient's allergies indicates:   Allergen Reactions    Amoxicillin Hives     Objective:    HPI     Headache      Additional comments: X 6 days  Went to ER 3 days ago   Negative for headache              Hand Pain      Additional comments: Left hand pains          Last edited by Ramila Hernandez MA on 2022  9:55 AM. (History)      Blood pressure 120/84, pulse 64, temperature 96.8 °F (36 °C), temperature source Temporal, height 5' 9" (1.753 m), weight 119.7 kg (264 lb), SpO2 98 %. Body mass index is 38.99 kg/m².   Physical Exam  Vitals and nursing note reviewed.   Constitutional:       General: He is not in acute distress.     Appearance: He is well-developed. He is obese. He is not ill-appearing, toxic-appearing or diaphoretic.   HENT:      Head: Normocephalic and atraumatic.   Eyes:      General: No scleral icterus.        Right eye: No discharge.         Left eye: No " discharge.      Conjunctiva/sclera: Conjunctivae normal.   Neck:      Vascular: No carotid bruit.   Cardiovascular:      Rate and Rhythm: Normal rate and regular rhythm.      Heart sounds: Normal heart sounds. No murmur heard.  Pulmonary:      Effort: Pulmonary effort is normal. No respiratory distress.      Breath sounds: Normal breath sounds. No decreased breath sounds, wheezing, rhonchi or rales.   Abdominal:      General: There is no distension.      Palpations: Abdomen is soft.      Tenderness: There is no abdominal tenderness. There is no guarding or rebound.   Musculoskeletal:      Right lower leg: No edema.      Left lower leg: No edema.   Skin:     General: Skin is warm and dry.      Comments: He has some swelling and tenderness of dorsum of left hand.  No evidence of cellulitis   Neurological:      Mental Status: He is alert.      Motor: No tremor.   Psychiatric:         Mood and Affect: Mood normal.         Speech: Speech normal.         Behavior: Behavior normal.             Assessment:       1. Acute nonintractable headache, unspecified headache type    2. Phlebitis after infusion, initial encounter        Plan:       Gerardo was seen today for headache and hand pain.    Diagnoses and all orders for this visit:    Acute nonintractable headache, unspecified headache type  Comments:  Better now.  Discussed maintaining hydration, avoiding nicotine    Phlebitis after infusion, initial encounter  Comments:  Warm compresses, NSAIDs

## 2022-11-30 ENCOUNTER — PATIENT MESSAGE (OUTPATIENT)
Dept: FAMILY MEDICINE | Facility: CLINIC | Age: 37
End: 2022-11-30

## 2022-11-30 DIAGNOSIS — E78.2 MIXED HYPERLIPIDEMIA: Primary | ICD-10-CM

## 2022-11-30 DIAGNOSIS — I10 ESSENTIAL HYPERTENSION: ICD-10-CM

## 2022-12-06 LAB
ALBUMIN SERPL-MCNC: 4.7 G/DL (ref 4–5)
ALBUMIN/GLOB SERPL: 1.7 {RATIO} (ref 1.2–2.2)
ALP SERPL-CCNC: 79 IU/L (ref 44–121)
ALT SERPL-CCNC: 61 IU/L (ref 0–44)
AST SERPL-CCNC: 19 IU/L (ref 0–40)
BILIRUB SERPL-MCNC: 0.7 MG/DL (ref 0–1.2)
BUN SERPL-MCNC: 17 MG/DL (ref 6–20)
BUN/CREAT SERPL: 18 (ref 9–20)
CALCIUM SERPL-MCNC: 9.6 MG/DL (ref 8.7–10.2)
CHLORIDE SERPL-SCNC: 101 MMOL/L (ref 96–106)
CHOLEST SERPL-MCNC: 141 MG/DL (ref 100–199)
CO2 SERPL-SCNC: 21 MMOL/L (ref 20–29)
CREAT SERPL-MCNC: 0.96 MG/DL (ref 0.76–1.27)
EST. GFR  (NO RACE VARIABLE): 103 ML/MIN/1.73
GLOBULIN SER CALC-MCNC: 2.7 G/DL (ref 1.5–4.5)
GLUCOSE SERPL-MCNC: 115 MG/DL (ref 70–99)
HDLC SERPL-MCNC: 36 MG/DL
LDLC SERPL CALC-MCNC: 66 MG/DL (ref 0–99)
POTASSIUM SERPL-SCNC: 4.3 MMOL/L (ref 3.5–5.2)
PROT SERPL-MCNC: 7.4 G/DL (ref 6–8.5)
SODIUM SERPL-SCNC: 135 MMOL/L (ref 134–144)
TRIGL SERPL-MCNC: 237 MG/DL (ref 0–149)
VLDLC SERPL CALC-MCNC: 39 MG/DL (ref 5–40)

## 2022-12-08 ENCOUNTER — OFFICE VISIT (OUTPATIENT)
Dept: FAMILY MEDICINE | Facility: CLINIC | Age: 37
End: 2022-12-08
Payer: COMMERCIAL

## 2022-12-08 VITALS
OXYGEN SATURATION: 96 % | BODY MASS INDEX: 38.8 KG/M2 | HEIGHT: 69 IN | WEIGHT: 262 LBS | TEMPERATURE: 98 F | HEART RATE: 90 BPM | DIASTOLIC BLOOD PRESSURE: 80 MMHG | SYSTOLIC BLOOD PRESSURE: 110 MMHG

## 2022-12-08 DIAGNOSIS — G47.33 OSA ON CPAP: ICD-10-CM

## 2022-12-08 DIAGNOSIS — I10 ESSENTIAL HYPERTENSION: Primary | ICD-10-CM

## 2022-12-08 DIAGNOSIS — R73.03 PREDIABETES: ICD-10-CM

## 2022-12-08 DIAGNOSIS — I49.3 PVC'S (PREMATURE VENTRICULAR CONTRACTIONS): ICD-10-CM

## 2022-12-08 DIAGNOSIS — E78.2 MIXED HYPERLIPIDEMIA: ICD-10-CM

## 2022-12-08 DIAGNOSIS — R73.01 IMPAIRED FASTING GLUCOSE: ICD-10-CM

## 2022-12-08 LAB — HBA1C MFR BLD: 6.1 %

## 2022-12-08 PROCEDURE — 83036 HEMOGLOBIN GLYCOSYLATED A1C: CPT | Mod: QW,,, | Performed by: INTERNAL MEDICINE

## 2022-12-08 PROCEDURE — 83036 POCT HEMOGLOBIN A1C: ICD-10-PCS | Mod: QW,,, | Performed by: INTERNAL MEDICINE

## 2022-12-08 PROCEDURE — 99214 OFFICE O/P EST MOD 30 MIN: CPT | Mod: S$GLB,,, | Performed by: INTERNAL MEDICINE

## 2022-12-08 PROCEDURE — 99214 PR OFFICE/OUTPT VISIT, EST, LEVL IV, 30-39 MIN: ICD-10-PCS | Mod: S$GLB,,, | Performed by: INTERNAL MEDICINE

## 2022-12-08 RX ORDER — TIRZEPATIDE 2.5 MG/.5ML
2.5 INJECTION, SOLUTION SUBCUTANEOUS WEEKLY
COMMUNITY
Start: 2022-12-01 | End: 2023-04-26

## 2022-12-08 RX ORDER — BUTALBITAL, ACETAMINOPHEN AND CAFFEINE 300; 40; 50 MG/1; MG/1; MG/1
1-2 CAPSULE ORAL 3 TIMES DAILY
Qty: 30 CAPSULE | Refills: 1 | Status: SHIPPED | OUTPATIENT
Start: 2022-12-08 | End: 2023-04-26 | Stop reason: SDUPTHER

## 2022-12-08 RX ORDER — NAPROXEN 500 MG/1
500 TABLET ORAL 2 TIMES DAILY
Qty: 60 TABLET | Refills: 1 | Status: SHIPPED | OUTPATIENT
Start: 2022-12-08 | End: 2023-02-27

## 2022-12-08 RX ORDER — METOPROLOL SUCCINATE 50 MG/1
50 TABLET, EXTENDED RELEASE ORAL DAILY
Qty: 90 TABLET | Refills: 1 | Status: SHIPPED | OUTPATIENT
Start: 2022-12-08 | End: 2023-04-26 | Stop reason: SDUPTHER

## 2022-12-08 NOTE — PROGRESS NOTES
IP Acute Occupational Therapy Progress Note  Plan of Care Note  Patient seen in ICU nursing unit    SUBJECTIVE: Subjective: Pt found sititng up in recliner in ICU; states \"I don't want to move\", but agrees to grooming tasks while seated  (03/27/18 1235)    Diagnosis:  1. Acute renal failure, unspecified acute renal failure type (CMS/HCC)    2. Urinary tract infection without hematuria, site unspecified    3. Sepsis, due to unspecified organism (CMS/[facility])        Co morbidities:   Patient Active Problem List   Diagnosis   • Severe sepsis (CMS/HCC)   • Acute pancreatitis   • ARF (acute renal failure) (CMS/HCC)   • UTI (urinary tract infection)   • Confused   • Hypokalemia   • Metabolic acidosis       OT Task Modification: No task modification    Precautions:  Precautions  Other Precautions: fall risk  (03/27/18 1100)    Prior Living Situation:  Type of Home: Other (comment) ([facility] ) (03/21/18 1422)     Bathing: Minimal Assist (Min) (03/21/18 1422)  Upper Body Dressing: Minimal Assist (Min) (03/21/18 1422)  Lower Body Dressing: Minimal Assist (Min) (03/21/18 1422)  Toileting: Minimal Assist (Min) (03/21/18 1422)    Objective:  Below is key objective and subjective information from the last 24 hours.  For further details and goals, please refer to the OT Assess/Treat/Goals flowsheet.    ADLs:  Self Cares/ADL's  Grooming Assistance: Supervision;Set-up;Chair (03/27/18 1235)  Oral Hygiene Assistance: Supervision;Set-up;Chair (03/27/18 1235)  Grooming/Oral Hygiene Deficit: Wash/dry hands;Wash/dry face;Teeth care;Brushing hair (applying lotion to lower arms ; cleans nails w/ extra time ) (03/27/18 1235)  Self Cares/ADL's Comments #1: refuses further self care tasks ; wants to rest  (03/27/18 1235)    Household Mobility:  Household Mobility  Household Mobility Comments #1: pt seen seated in recliner ; refuses fucntional trnasfers due to feeling poorly  (03/27/18 1235)    Home Management:       Assessment:  Subjective:       Patient ID: Gerardo Ramirez Jr. is a 37 y.o. male.    Chief Complaint: Hypertension and Hyperlipidemia    Here for routine follow up.   Stopped smoking/vaping about 1.5 weeks ago.   He went to [facility] and they started him on Mounjaro.      Hypertension  This is a chronic problem. The problem is controlled. Associated symptoms include chest pain (when using CPAP) and palpitations (sometimes when rolls over at night). Pertinent negatives include no anxiety, headaches, malaise/fatigue, neck pain, peripheral edema or shortness of breath. Risk factors for coronary artery disease include obesity, male gender, dyslipidemia and smoking/tobacco exposure. Past treatments include angiotensin blockers and beta blockers. There are no compliance problems.    Hyperlipidemia  This is a chronic problem. Recent lipid tests were reviewed and are variable (triglycerides high, cholesterol fine). Exacerbating diseases include obesity. Associated symptoms include chest pain (when using CPAP) and myalgias. Pertinent negatives include no shortness of breath. Current antihyperlipidemic treatment includes diet change.   Review of Systems   Constitutional:  Positive for fatigue. Negative for activity change, appetite change, chills, diaphoresis, fever, malaise/fatigue and unexpected weight change.   HENT:  Negative for congestion, ear discharge, ear pain, hearing loss, nosebleeds, postnasal drip, rhinorrhea, sinus pressure, sinus pain, sneezing, sore throat, tinnitus, trouble swallowing and voice change.    Eyes:  Negative for photophobia, pain, discharge, redness, itching and visual disturbance.   Respiratory:  Positive for apnea (inconsistent CPAP use; makes his chest hurt) and chest tightness (improving since stopped smoking). Negative for cough, choking, shortness of breath and wheezing.    Cardiovascular:  Positive for chest pain (when using CPAP) and palpitations (sometimes when rolls over at night). Negative  Patient reassessment this date due to ICU transfers yesterday w/ increased fever and sepsis.  Patient presents below baseline which was minimal assist with ADLs. Emphasis of session included reassessment of UE AROM, coordination for light grooming tasks while seated, direction following.  Patient w/ low motivation for functional transfers.  Will continue to assess.  Fatigues quickly w/ light grooming activity.  Will continue skilled OT for general strengthening, review safe functional transfers and progress standing tolerance for ADL\"s.  Anticipate return to [facility] when medically stable.     OT Identified Barriers to Discharge: medical acuity, activity tolerance    Education:   On this date, the patient was educated on UE ROM for stregntehning.    The response to education was: Needs reinforcement.    Equipment:  PT/OT ADL Equipment for Discharge: return to [facility]  (03/27/18 7917)       PLAN:   Continue skilled OT, including the following Treatment Interventions: ADL retraining;Functional transfer training;Patient/Family training (03/27/18 1237)    Frequency Comments: 0/4 by 4/3; lives at [facility] ; amie poole for self care prior  (03/27/18 1238)    Treatment Plan for Next Session: self feeding; UE ROM, activity toelrnace; commode use when appropriate     GOALS:  Short Term Goals to Be Reviewed On: 04/03/18  Goal Agreement: Patient agrees with goals and treatment plan     Feeding Discharge Goal 1: set up; eating 1/3 of meal         Grooming Discharge Goal 1: set up, extra time and cuing                           Dressing Discharge Goal 1: UE Dressing, min assist                        Home Setting Transfer Discharge Goal 1: min assist bedside trnasfers                     RECOMMENDATIONS FOR DISCHARGE:  Recommendations for Discharge: OT: SNF ([facility] )     Treatment Time:  OT Time Spent: 49 minutes (03/27/18 4967)     for leg swelling.   Gastrointestinal:  Positive for diarrhea. Negative for abdominal distention, abdominal pain, blood in stool, constipation, nausea and vomiting.   Endocrine: Negative for cold intolerance, heat intolerance, polydipsia and polyuria.   Genitourinary:  Negative for decreased urine volume, difficulty urinating, dysuria, enuresis, flank pain, frequency, genital sores, hematuria, penile discharge, penile pain, scrotal swelling, testicular pain and urgency.   Musculoskeletal:  Positive for myalgias. Negative for arthralgias, back pain, gait problem, joint swelling, neck pain and neck stiffness.   Skin:  Negative for rash and wound.   Allergic/Immunologic: Negative for environmental allergies, food allergies and immunocompromised state.   Neurological:  Positive for dizziness and light-headedness. Negative for tremors, seizures, syncope, facial asymmetry, speech difficulty, weakness, numbness and headaches.   Hematological:  Negative for adenopathy. Does not bruise/bleed easily.   Psychiatric/Behavioral:  Positive for sleep disturbance. Negative for confusion, decreased concentration, hallucinations, self-injury and suicidal ideas. The patient is nervous/anxious.      Past Medical History:   Diagnosis Date    Anxiety     Atrial fibrillation 03/2020    new onset- meds changed    Hyperlipidemia     Hypertension 2013    Inguinal hernia bilateral, non-recurrent 02/2020    right side more pronounced- surg scheduled    Pneumonia     age 11    Sleep apnea     Sleep apnea-like behavior     needs sleep study- wife says he stops breathing while sleeping      Past Surgical History:   Procedure Laterality Date    CHOLECYSTECTOMY  04/08/2019    w/ Small Umbilical hernia rep-     HERNIA REPAIR Bilateral 03/10/2020    Dr. Lanier - [facility]    UMBILICAL HERNIA REPAIR  04/08/2019        VASECTOMY Bilateral 11/30/2020    Procedure: VASECTOMY;  Surgeon: German Tobias MD;  [facility];  Service:  "Urology;  Laterality: Bilateral;       Family History   Problem Relation Age of Onset    Diabetes Mother     Hypertension Mother     Hypertension Sister     Hyperlipidemia Sister     Anxiety disorder Sister     Coronary artery disease Maternal Grandfather     No Known Problems Father        Social History     Socioeconomic History    Marital status:    Occupational History    Occupation: [employer]   Tobacco Use    Smoking status: Former     Types: Cigarettes, Vaping with nicotine     Quit date: 2022     Years since quittin.0    Smokeless tobacco: Never   Substance and Sexual Activity    Alcohol use: Yes     Comment: occ    Drug use: No    Sexual activity: Yes     Partners: Female   Social History Narrative    LIVE WITH MICHA       Current Outpatient Medications   Medication Sig Dispense Refill    atorvastatin (LIPITOR) 20 MG tablet TAKE 1 TABLET BY MOUTH EVERY DAY AT NIGHT 90 tablet 1    olmesartan (BENICAR) 40 MG tablet TAKE 1 TABLET BY MOUTH EVERY DAY 90 tablet 1    tadalafiL (CIALIS) 10 MG tablet Take 1 tablet (10 mg total) by mouth daily as needed for Erectile Dysfunction. 30 tablet 3    butalbital-acetaminophen-caff -40 mg Cap Take 1-2 capsules by mouth 3 (three) times daily. 30 capsule 1    metoprolol succinate (TOPROL-XL) 50 MG 24 hr tablet Take 1 tablet (50 mg total) by mouth once daily. 90 tablet 1    MOUNJARO 2.5 mg/0.5 mL PnIj Inject 2.5 mg into the skin once a week.      naproxen (NAPROSYN) 500 MG tablet Take 1 tablet (500 mg total) by mouth 2 (two) times daily. 60 tablet 1     No current facility-administered medications for this visit.       Review of patient's allergies indicates:   Allergen Reactions    Amoxicillin Hives     Objective:      Blood pressure 110/80, pulse 90, temperature 98.1 °F (36.7 °C), temperature source Temporal, height 5' 9" (1.753 m), weight 118.8 kg (262 lb), SpO2 96 %. Body mass index is 38.69 kg/m².   Physical Exam  Vitals and nursing note reviewed. "   Constitutional:       General: He is not in acute distress.     Appearance: He is well-developed. He is obese. He is not ill-appearing, toxic-appearing or diaphoretic.   HENT:      Head: Normocephalic and atraumatic.   Eyes:      General: No scleral icterus.        Right eye: No discharge.         Left eye: No discharge.      Conjunctiva/sclera: Conjunctivae normal.   Neck:      Vascular: No carotid bruit.   Cardiovascular:      Rate and Rhythm: Normal rate and regular rhythm.      Heart sounds: Normal heart sounds. No murmur heard.  Pulmonary:      Effort: Pulmonary effort is normal. No respiratory distress.      Breath sounds: Normal breath sounds. No decreased breath sounds, wheezing, rhonchi or rales.   Abdominal:      General: There is no distension.      Palpations: Abdomen is soft.      Tenderness: There is no abdominal tenderness. There is no guarding or rebound.   Musculoskeletal:      Right lower leg: No edema.      Left lower leg: No edema.   Skin:     General: Skin is warm and dry.   Neurological:      Mental Status: He is alert.      Motor: No tremor.   Psychiatric:         Mood and Affect: Mood normal.         Speech: Speech normal.         Behavior: Behavior normal.           Assessment:       1. Essential hypertension    2. PVC's (premature ventricular contractions)    3. Impaired fasting glucose    4. Mixed hyperlipidemia    5. EASTON on CPAP    6. Prediabetes        Plan:       Gerardo was seen today for hypertension and hyperlipidemia.    Diagnoses and all orders for this visit:    Essential hypertension  Comments:  Well controlled  Orders:  -     metoprolol succinate (TOPROL-XL) 50 MG 24 hr tablet; Take 1 tablet (50 mg total) by mouth once daily.    PVC's (premature ventricular contractions)  -     metoprolol succinate (TOPROL-XL) 50 MG 24 hr tablet; Take 1 tablet (50 mg total) by mouth once daily.    Impaired fasting glucose  -     Hemoglobin A1C, POCT    Mixed hyperlipidemia  Comments:  LDL fine  although triglycerides still high.  He is refocusing on diet, exercise    EASTON on CPAP  Comments:  He feels like settings are wrong causes chest to burn.  Encouraged to f/u with [facility] to reassess settings needed and possibly try auto CPAP.    Prediabetes  Comments:  Mounjaro will help.  Discussed diet, exercise    Other orders  -     naproxen (NAPROSYN) 500 MG tablet; Take 1 tablet (500 mg total) by mouth 2 (two) times daily.  -     butalbital-acetaminophen-caff -40 mg Cap; Take 1-2 capsules by mouth 3 (three) times daily.

## 2023-02-01 ENCOUNTER — OFFICE VISIT (OUTPATIENT)
Dept: FAMILY MEDICINE | Facility: CLINIC | Age: 38
End: 2023-02-01
Payer: COMMERCIAL

## 2023-02-01 VITALS
DIASTOLIC BLOOD PRESSURE: 82 MMHG | HEART RATE: 65 BPM | SYSTOLIC BLOOD PRESSURE: 122 MMHG | HEIGHT: 69 IN | OXYGEN SATURATION: 98 % | BODY MASS INDEX: 36.14 KG/M2 | WEIGHT: 244 LBS | TEMPERATURE: 98 F

## 2023-02-01 DIAGNOSIS — M79.10 MYALGIA: Primary | ICD-10-CM

## 2023-02-01 DIAGNOSIS — M72.2 PLANTAR FASCIITIS, BILATERAL: ICD-10-CM

## 2023-02-01 PROCEDURE — 99213 OFFICE O/P EST LOW 20 MIN: CPT | Mod: S$GLB,,, | Performed by: INTERNAL MEDICINE

## 2023-02-01 PROCEDURE — 99213 PR OFFICE/OUTPT VISIT, EST, LEVL III, 20-29 MIN: ICD-10-PCS | Mod: S$GLB,,, | Performed by: INTERNAL MEDICINE

## 2023-02-01 RX ORDER — ACETAMINOPHEN 160 MG/5ML
200 SUSPENSION, ORAL (FINAL DOSE FORM) ORAL DAILY
COMMUNITY
Start: 2023-02-01 | End: 2024-02-01

## 2023-02-01 NOTE — PROGRESS NOTES
Subjective:       Patient ID: Gerardo Ramirez Jr. is a 37 y.o. male.    Chief Complaint: Follow-up (Meds and weight loss)    Here for routine follow up; last visit note, most recent available labs, and health maintenance topics reviewed.    He is getting Mounjaro from [facility] and is down about 20 pounds.   He is having some issues with fatigue and muscle soreness.  Muscle soreness mostly occurs after any sort of strenuous activity, including sex.    He has known [facility] but doesn't use CPAP.    Feet have been bothering him several months.   Improving over time but bother him the more he is on his feet.  Typically wears steel toed boots at work and flip flops at home.    Occasionally gets some right sided abdominal pain.  Seems to occur more often after drinking.   He does have hepatic steatosis and has had cholecystectomy.    Review of Systems   Constitutional:  Positive for fatigue. Negative for activity change, appetite change, chills, diaphoresis, fever and unexpected weight change.   HENT:  Negative for congestion, ear discharge, ear pain, hearing loss, nosebleeds, postnasal drip, rhinorrhea, sinus pressure, sinus pain, sneezing, sore throat, tinnitus, trouble swallowing and voice change.    Eyes:  Negative for photophobia, pain, discharge, redness, itching and visual disturbance.   Respiratory:  Positive for apnea. Negative for cough, choking, chest tightness, shortness of breath and wheezing.    Cardiovascular:  Negative for chest pain, palpitations and leg swelling.   Gastrointestinal:  Positive for abdominal pain, constipation and nausea. Negative for abdominal distention, blood in stool, diarrhea and vomiting.   Endocrine: Negative for cold intolerance, heat intolerance, polydipsia and polyuria.   Genitourinary:  Positive for flank pain and frequency. Negative for decreased urine volume, difficulty urinating, dysuria, enuresis, genital sores, hematuria, penile discharge, penile pain, scrotal  swelling, testicular pain and urgency.   Musculoskeletal:  Positive for back pain and myalgias. Negative for arthralgias, gait problem, joint swelling, neck pain and neck stiffness.   Skin:  Negative for rash and wound.   Allergic/Immunologic: Negative for environmental allergies, food allergies and immunocompromised state.   Neurological:  Positive for dizziness and light-headedness. Negative for tremors, seizures, syncope, facial asymmetry, speech difficulty, weakness, numbness and headaches.   Hematological:  Negative for adenopathy. Does not bruise/bleed easily.   Psychiatric/Behavioral:  Negative for confusion, decreased concentration, hallucinations, self-injury, sleep disturbance and suicidal ideas. The patient is nervous/anxious.      Past Medical History:   Diagnosis Date    Anxiety     Atrial fibrillation 03/2020    new onset- meds changed    Hyperlipidemia     Hypertension 2013    Inguinal hernia bilateral, non-recurrent 02/2020    right side more pronounced- surg scheduled    Pneumonia     age 11    Sleep apnea     Sleep apnea-like behavior     needs sleep study- wife says he stops breathing while sleeping      Past Surgical History:   Procedure Laterality Date    CHOLECYSTECTOMY  04/08/2019    w/ Small Umbilical hernia rep-     HERNIA REPAIR Bilateral 03/10/2020    Dr. Lanier - [facility]    UMBILICAL HERNIA REPAIR  04/08/2019        VASECTOMY Bilateral 11/30/2020    Procedure: VASECTOMY;  Surgeon: German Tobias MD;  Location: [facility];  Service: Urology;  Laterality: Bilateral;       Family History   Problem Relation Age of Onset    Diabetes Mother     Hypertension Mother     Hypertension Sister     Hyperlipidemia Sister     Anxiety disorder Sister     Coronary artery disease Maternal Grandfather     No Known Problems Father        Social History     Socioeconomic History    Marital status:    Occupational History    Occupation: [employer]   Tobacco Use    Smoking status: Former      "Types: Cigarettes, Vaping with nicotine     Quit date: 2022     Years since quittin.1    Smokeless tobacco: Never   Substance and Sexual Activity    Alcohol use: Yes     Comment: occ    Drug use: No    Sexual activity: Yes     Partners: Female   Social History Narrative    LIVE WITH MICHA       Current Outpatient Medications   Medication Sig Dispense Refill    atorvastatin (LIPITOR) 20 MG tablet TAKE 1 TABLET BY MOUTH EVERY DAY AT NIGHT 90 tablet 1    butalbital-acetaminophen-caff -40 mg Cap Take 1-2 capsules by mouth 3 (three) times daily. 30 capsule 1    metoprolol succinate (TOPROL-XL) 50 MG 24 hr tablet Take 1 tablet (50 mg total) by mouth once daily. 90 tablet 1    MOUNJARO 2.5 mg/0.5 mL PnIj Inject 2.5 mg into the skin once a week.      naproxen (NAPROSYN) 500 MG tablet Take 1 tablet (500 mg total) by mouth 2 (two) times daily. 60 tablet 1    olmesartan (BENICAR) 40 MG tablet TAKE 1 TABLET BY MOUTH EVERY DAY 90 tablet 1    tadalafiL (CIALIS) 10 MG tablet Take 1 tablet (10 mg total) by mouth daily as needed for Erectile Dysfunction. 30 tablet 3    coenzyme Q10 200 mg capsule Take 200 mg by mouth once daily.       No current facility-administered medications for this visit.       Review of patient's allergies indicates:   Allergen Reactions    Amoxicillin Hives     Objective:    HPI     Follow-up     Additional comments: Meds and weight loss          Last edited by Ramila Hernandez MA on 2023 11:20 AM.      Blood pressure 122/82, pulse 65, temperature 97.8 °F (36.6 °C), temperature source Temporal, height 5' 9" (1.753 m), weight 110.7 kg (244 lb), SpO2 98 %. Body mass index is 36.03 kg/m².   Physical Exam  Vitals and nursing note reviewed.   Constitutional:       General: He is not in acute distress.     Appearance: He is well-developed. He is obese. He is not ill-appearing, toxic-appearing or diaphoretic.   HENT:      Head: Normocephalic and atraumatic.   Eyes:      General: No scleral " icterus.        Right eye: No discharge.         Left eye: No discharge.      Conjunctiva/sclera: Conjunctivae normal.   Neck:      Vascular: No carotid bruit.   Cardiovascular:      Rate and Rhythm: Normal rate and regular rhythm.      Heart sounds: Normal heart sounds. No murmur heard.  Pulmonary:      Effort: Pulmonary effort is normal. No respiratory distress.      Breath sounds: Normal breath sounds. No decreased breath sounds, wheezing, rhonchi or rales.   Abdominal:      General: There is no distension.      Palpations: Abdomen is soft.      Tenderness: There is no abdominal tenderness. There is no guarding or rebound.   Musculoskeletal:      Right lower leg: No edema.      Left lower leg: No edema.      Right foot: Tenderness present.      Left foot: Tenderness present.      Comments: Bilateral plantar tenderness     Skin:     General: Skin is warm and dry.   Neurological:      Mental Status: He is alert.      Motor: No tremor.   Psychiatric:         Mood and Affect: Mood normal.         Speech: Speech normal.         Behavior: Behavior normal.           Assessment:       1. Myalgia    2. Plantar fasciitis, bilateral        Plan:       Gerardo was seen today for follow-up.    Diagnoses and all orders for this visit:    Myalgia  Comments:  Lili r/t statin.  Try CoQ10.  If not effective, may try a different statin.  Orders:  -     coenzyme Q10 200 mg capsule; Take 200 mg by mouth once daily.    Plantar fasciitis, bilateral  Comments:  Avoid barefoot, flat soled shoes.  Discussed shoe inserts

## 2023-04-26 ENCOUNTER — OFFICE VISIT (OUTPATIENT)
Dept: FAMILY MEDICINE | Facility: CLINIC | Age: 38
End: 2023-04-26
Payer: COMMERCIAL

## 2023-04-26 VITALS
OXYGEN SATURATION: 97 % | SYSTOLIC BLOOD PRESSURE: 122 MMHG | HEIGHT: 69 IN | TEMPERATURE: 98 F | DIASTOLIC BLOOD PRESSURE: 74 MMHG | BODY MASS INDEX: 34.51 KG/M2 | HEART RATE: 68 BPM | WEIGHT: 233 LBS

## 2023-04-26 DIAGNOSIS — I49.3 PVC'S (PREMATURE VENTRICULAR CONTRACTIONS): ICD-10-CM

## 2023-04-26 DIAGNOSIS — I10 ESSENTIAL HYPERTENSION: Primary | ICD-10-CM

## 2023-04-26 DIAGNOSIS — R73.01 IMPAIRED FASTING GLUCOSE: ICD-10-CM

## 2023-04-26 DIAGNOSIS — E78.2 MIXED HYPERLIPIDEMIA: ICD-10-CM

## 2023-04-26 LAB — HBA1C MFR BLD: 5.4 %

## 2023-04-26 PROCEDURE — 83036 POCT HEMOGLOBIN A1C: ICD-10-PCS | Mod: QW,,, | Performed by: INTERNAL MEDICINE

## 2023-04-26 PROCEDURE — 99214 OFFICE O/P EST MOD 30 MIN: CPT | Mod: S$GLB,,, | Performed by: INTERNAL MEDICINE

## 2023-04-26 PROCEDURE — 83036 HEMOGLOBIN GLYCOSYLATED A1C: CPT | Mod: QW,,, | Performed by: INTERNAL MEDICINE

## 2023-04-26 PROCEDURE — 99214 PR OFFICE/OUTPT VISIT, EST, LEVL IV, 30-39 MIN: ICD-10-PCS | Mod: S$GLB,,, | Performed by: INTERNAL MEDICINE

## 2023-04-26 RX ORDER — METOPROLOL SUCCINATE 50 MG/1
50 TABLET, EXTENDED RELEASE ORAL DAILY
Qty: 90 TABLET | Refills: 1 | Status: SHIPPED | OUTPATIENT
Start: 2023-04-26 | End: 2024-02-15 | Stop reason: SDUPTHER

## 2023-04-26 RX ORDER — BUTALBITAL, ACETAMINOPHEN AND CAFFEINE 300; 40; 50 MG/1; MG/1; MG/1
1-2 CAPSULE ORAL 3 TIMES DAILY
Qty: 30 CAPSULE | Refills: 1 | Status: SHIPPED | OUTPATIENT
Start: 2023-04-26 | End: 2023-09-26 | Stop reason: SDUPTHER

## 2023-04-26 RX ORDER — OLMESARTAN MEDOXOMIL 40 MG/1
40 TABLET ORAL DAILY
Qty: 90 TABLET | Refills: 1 | Status: SHIPPED | OUTPATIENT
Start: 2023-04-26 | End: 2023-09-26 | Stop reason: SDUPTHER

## 2023-04-26 RX ORDER — ATORVASTATIN CALCIUM 20 MG/1
20 TABLET, FILM COATED ORAL NIGHTLY
Qty: 90 TABLET | Refills: 1 | Status: SHIPPED | OUTPATIENT
Start: 2023-04-26 | End: 2023-09-26 | Stop reason: SDUPTHER

## 2023-04-26 NOTE — PROGRESS NOTES
Subjective:       Patient ID: Gerardo Ramirez Jr. is a 38 y.o. male.    Chief Complaint: Hypertension (3 months follow up), Hyperlipidemia, and Cyst (Cyst on back of left hand)    Here for routine follow up; last visit note, most recent available labs, and health maintenance topics reviewed.   He got off the Mounjaro about a month ago;  has regained a couple of pounds but still down about 30 pounds overall.       Hypertension  This is a chronic problem. The problem is controlled. Pertinent negatives include no anxiety, chest pain, headaches, malaise/fatigue, neck pain, palpitations, peripheral edema or shortness of breath. Risk factors for coronary artery disease include obesity, male gender, dyslipidemia and smoking/tobacco exposure. Past treatments include angiotensin blockers and beta blockers. There are no compliance problems.    Hyperlipidemia  This is a chronic problem. Recent lipid tests were reviewed and are variable (triglycerides high, cholesterol fine). Exacerbating diseases include obesity. Pertinent negatives include no chest pain, myalgias or shortness of breath. Current antihyperlipidemic treatment includes diet change.   Review of Systems   Constitutional:  Negative for activity change, appetite change, chills, diaphoresis, fatigue, fever, malaise/fatigue and unexpected weight change.   HENT:  Negative for congestion, ear discharge, ear pain, hearing loss, nosebleeds, postnasal drip, rhinorrhea, sinus pressure, sinus pain, sneezing, sore throat, tinnitus, trouble swallowing and voice change.    Eyes:  Negative for photophobia, pain, discharge, redness, itching and visual disturbance.   Respiratory:  Positive for apnea. Negative for cough, choking, chest tightness, shortness of breath and wheezing.    Cardiovascular:  Negative for chest pain, palpitations and leg swelling.   Gastrointestinal:  Positive for constipation, diarrhea and rectal pain (hemmorhoids). Negative for abdominal distention,  abdominal pain, blood in stool, nausea and vomiting.   Endocrine: Negative for cold intolerance, heat intolerance, polydipsia and polyuria.   Genitourinary:  Negative for decreased urine volume, difficulty urinating, dysuria, enuresis, flank pain, frequency, genital sores, hematuria, penile discharge, penile pain, scrotal swelling, testicular pain and urgency.   Musculoskeletal:  Negative for arthralgias, back pain, gait problem, joint swelling, myalgias, neck pain and neck stiffness.   Skin:  Negative for rash and wound.   Allergic/Immunologic: Negative for environmental allergies, food allergies and immunocompromised state.   Neurological:  Positive for dizziness. Negative for tremors, seizures, syncope, facial asymmetry, speech difficulty, weakness, light-headedness, numbness and headaches.   Hematological:  Negative for adenopathy. Does not bruise/bleed easily.   Psychiatric/Behavioral:  Negative for confusion, decreased concentration, hallucinations, self-injury, sleep disturbance and suicidal ideas. The patient is nervous/anxious.      Past Medical History:   Diagnosis Date    Anxiety     Atrial fibrillation 03/2020    new onset- meds changed    Hyperlipidemia     Hypertension 2013    Inguinal hernia bilateral, non-recurrent 02/2020    right side more pronounced- surg scheduled    Pneumonia     age 11    Sleep apnea     Sleep apnea-like behavior     needs sleep study- wife says he stops breathing while sleeping      Past Surgical History:   Procedure Laterality Date    CHOLECYSTECTOMY  04/08/2019    w/ Small Umbilical hernia rep-     HERNIA REPAIR Bilateral 03/10/2020    Dr. Lanier - [facility]    UMBILICAL HERNIA REPAIR  04/08/2019        VASECTOMY Bilateral 11/30/2020    Procedure: VASECTOMY;  Surgeon: German Tobias MD;  Location: [facility];  Service: Urology;  Laterality: Bilateral;       Family History   Problem Relation Age of Onset    Diabetes Mother     Hypertension Mother      "Hypertension Sister     Hyperlipidemia Sister     Anxiety disorder Sister     Coronary artery disease Maternal Grandfather     No Known Problems Father        Social History     Socioeconomic History    Marital status:    Occupational History    Occupation: [employer]   Tobacco Use    Smoking status: Former     Types: Cigarettes, Vaping with nicotine     Quit date: 2022     Years since quittin.4    Smokeless tobacco: Never   Substance and Sexual Activity    Alcohol use: Yes     Comment: occ    Drug use: No    Sexual activity: Yes     Partners: Female   Social History Narrative    LIVE WITH FIANCE       Current Outpatient Medications   Medication Sig Dispense Refill    coenzyme Q10 200 mg capsule Take 200 mg by mouth once daily.      naproxen (NAPROSYN) 500 MG tablet TAKE 1 TABLET BY MOUTH TWICE A DAY 60 tablet 1    atorvastatin (LIPITOR) 20 MG tablet Take 1 tablet (20 mg total) by mouth every evening. 90 tablet 1    butalbital-acetaminophen-caff -40 mg Cap Take 1-2 capsules by mouth 3 (three) times daily. 30 capsule 1    metoprolol succinate (TOPROL-XL) 50 MG 24 hr tablet Take 1 tablet (50 mg total) by mouth once daily. 90 tablet 1    olmesartan (BENICAR) 40 MG tablet Take 1 tablet (40 mg total) by mouth once daily. 90 tablet 1     No current facility-administered medications for this visit.       Review of patient's allergies indicates:   Allergen Reactions    Amoxicillin Hives     Objective:    HPI     Hypertension     Additional comments: 3 months follow up           Cyst     Additional comments: Cyst on back of left hand          Last edited by Ramila Hernandez MA on 2023  8:41 AM.      Blood pressure 122/74, pulse 68, temperature 98.1 °F (36.7 °C), temperature source Temporal, height 5' 9" (1.753 m), weight 105.7 kg (233 lb), SpO2 97 %. Body mass index is 34.41 kg/m².   Physical Exam  Vitals and nursing note reviewed.   Constitutional:       General: He is not in acute distress.     " Appearance: He is well-developed. He is obese. He is not ill-appearing, toxic-appearing or diaphoretic.   HENT:      Head: Normocephalic and atraumatic.   Eyes:      General: No scleral icterus.        Right eye: No discharge.         Left eye: No discharge.      Conjunctiva/sclera: Conjunctivae normal.   Neck:      Vascular: No carotid bruit.   Cardiovascular:      Rate and Rhythm: Normal rate and regular rhythm.      Heart sounds: Normal heart sounds. No murmur heard.  Pulmonary:      Effort: Pulmonary effort is normal. No respiratory distress.      Breath sounds: Normal breath sounds. No decreased breath sounds, wheezing, rhonchi or rales.   Abdominal:      General: There is no distension.      Palpations: Abdomen is soft.      Tenderness: There is no abdominal tenderness. There is no guarding or rebound.   Musculoskeletal:      Right lower leg: No edema.      Left lower leg: No edema.   Skin:     General: Skin is warm and dry.   Neurological:      Mental Status: He is alert.      Motor: No tremor.   Psychiatric:         Mood and Affect: Mood normal.         Speech: Speech normal.         Behavior: Behavior normal.         Office Visit on 04/26/2023   Component Date Value Ref Range Status    Hemoglobin A1C, POC 04/26/2023 5.4  % Final   ]  Assessment:       1. Essential hypertension    2. Mixed hyperlipidemia    3. PVC's (premature ventricular contractions)    4. Impaired fasting glucose        Plan:       Gerardo was seen today for hypertension, hyperlipidemia and cyst.    Diagnoses and all orders for this visit:    Essential hypertension  -     olmesartan (BENICAR) 40 MG tablet; Take 1 tablet (40 mg total) by mouth once daily.  -     metoprolol succinate (TOPROL-XL) 50 MG 24 hr tablet; Take 1 tablet (50 mg total) by mouth once daily.    Mixed hyperlipidemia  -     atorvastatin (LIPITOR) 20 MG tablet; Take 1 tablet (20 mg total) by mouth every evening.    PVC's (premature ventricular contractions)  -      metoprolol succinate (TOPROL-XL) 50 MG 24 hr tablet; Take 1 tablet (50 mg total) by mouth once daily.    Impaired fasting glucose  Comments:  No longer in prediabetes range.    Orders:  -     Hemoglobin A1C, POCT    Other orders  -     butalbital-acetaminophen-caff -40 mg Cap; Take 1-2 capsules by mouth 3 (three) times daily.

## 2023-06-01 ENCOUNTER — PATIENT MESSAGE (OUTPATIENT)
Dept: FAMILY MEDICINE | Facility: CLINIC | Age: 38
End: 2023-06-01

## 2023-06-01 DIAGNOSIS — R73.01 IMPAIRED FASTING GLUCOSE: Primary | ICD-10-CM

## 2023-06-05 RX ORDER — TIRZEPATIDE 5 MG/.5ML
5 INJECTION, SOLUTION SUBCUTANEOUS
Qty: 4 PEN | Refills: 3 | Status: SHIPPED | OUTPATIENT
Start: 2023-06-05 | End: 2023-09-26

## 2023-06-06 ENCOUNTER — TELEPHONE (OUTPATIENT)
Dept: FAMILY MEDICINE | Facility: CLINIC | Age: 38
End: 2023-06-06

## 2023-06-06 NOTE — TELEPHONE ENCOUNTER
The following medication needs a prior authorization:     Medication Name: tirzepatide (MOUNJARO    Dosage: 5 mg/0.5 mL PnIj    Frequency: Inject 5 mg into the skin every 7 days. - Subcutaneous    Directions for use: Inject 5 mg into the skin every 7 days. - Subcutaneous    Diagnosis: Impaired fasting glucose  - Primary     Is the request for a reauthorization? no    Is the patient currently stable on therapy? new    Please list all therapeutic alternatives previously used with start/end dates and outcome:

## 2023-08-30 ENCOUNTER — OFFICE VISIT (OUTPATIENT)
Dept: URGENT CARE | Facility: CLINIC | Age: 38
End: 2023-08-30
Payer: COMMERCIAL

## 2023-08-30 VITALS
HEIGHT: 69 IN | OXYGEN SATURATION: 97 % | SYSTOLIC BLOOD PRESSURE: 148 MMHG | RESPIRATION RATE: 16 BRPM | DIASTOLIC BLOOD PRESSURE: 102 MMHG | HEART RATE: 85 BPM | WEIGHT: 233 LBS | BODY MASS INDEX: 34.51 KG/M2 | TEMPERATURE: 97 F

## 2023-08-30 DIAGNOSIS — S16.1XXA CERVICAL STRAIN, ACUTE, INITIAL ENCOUNTER: ICD-10-CM

## 2023-08-30 DIAGNOSIS — R51.9 GENERALIZED HEADACHE: Primary | ICD-10-CM

## 2023-08-30 PROCEDURE — 99213 OFFICE O/P EST LOW 20 MIN: CPT | Mod: 25,S$GLB,, | Performed by: STUDENT IN AN ORGANIZED HEALTH CARE EDUCATION/TRAINING PROGRAM

## 2023-08-30 PROCEDURE — 96372 THER/PROPH/DIAG INJ SC/IM: CPT | Mod: S$GLB,,, | Performed by: STUDENT IN AN ORGANIZED HEALTH CARE EDUCATION/TRAINING PROGRAM

## 2023-08-30 PROCEDURE — 99213 PR OFFICE/OUTPT VISIT, EST, LEVL III, 20-29 MIN: ICD-10-PCS | Mod: 25,S$GLB,, | Performed by: STUDENT IN AN ORGANIZED HEALTH CARE EDUCATION/TRAINING PROGRAM

## 2023-08-30 PROCEDURE — 96372 PR INJECTION,THERAP/PROPH/DIAG2ST, IM OR SUBCUT: ICD-10-PCS | Mod: S$GLB,,, | Performed by: STUDENT IN AN ORGANIZED HEALTH CARE EDUCATION/TRAINING PROGRAM

## 2023-08-30 RX ORDER — NAPROXEN 500 MG/1
500 TABLET ORAL 2 TIMES DAILY WITH MEALS
Qty: 20 TABLET | Refills: 0 | Status: SHIPPED | OUTPATIENT
Start: 2023-08-31 | End: 2023-09-10

## 2023-08-30 RX ORDER — KETOROLAC TROMETHAMINE 30 MG/ML
60 INJECTION, SOLUTION INTRAMUSCULAR; INTRAVENOUS
Status: COMPLETED | OUTPATIENT
Start: 2023-08-30 | End: 2023-08-30

## 2023-08-30 RX ORDER — TIZANIDINE 4 MG/1
4 TABLET ORAL EVERY 8 HOURS PRN
Qty: 15 TABLET | Refills: 0 | Status: SHIPPED | OUTPATIENT
Start: 2023-08-30 | End: 2023-09-26 | Stop reason: SDUPTHER

## 2023-08-30 RX ORDER — DEXAMETHASONE SODIUM PHOSPHATE 4 MG/ML
8 INJECTION, SOLUTION INTRA-ARTICULAR; INTRALESIONAL; INTRAMUSCULAR; INTRAVENOUS; SOFT TISSUE
Status: COMPLETED | OUTPATIENT
Start: 2023-08-30 | End: 2023-08-30

## 2023-08-30 RX ADMIN — KETOROLAC TROMETHAMINE 60 MG: 30 INJECTION, SOLUTION INTRAMUSCULAR; INTRAVENOUS at 04:08

## 2023-08-30 RX ADMIN — DEXAMETHASONE SODIUM PHOSPHATE 8 MG: 4 INJECTION, SOLUTION INTRA-ARTICULAR; INTRALESIONAL; INTRAMUSCULAR; INTRAVENOUS; SOFT TISSUE at 04:08

## 2023-08-30 NOTE — PROGRESS NOTES
"Subjective:      Patient ID: Gerardo Ramirez Jr. is a 38 y.o. male.    Vitals:  height is 5' 9" (1.753 m) and weight is 105.7 kg (233 lb). His oral temperature is 97.2 °F (36.2 °C). His blood pressure is 148/102 (abnormal) and his pulse is 85. His respiration is 16 and oxygen saturation is 97%.     Chief Complaint: Neck Pain and Headache    Patient is a 38-year-old male with a past medical history of anxiety, hypertension, hyperlipidemia, sleep apnea, and atrial fibrillation who presents to clinic for evaluation of headache and neck pain.  Patient reports symptoms x4 days.  Patient denies any history of migraines.  Patient denies any trauma or injury.  Patient states symptoms began this weekend after he had a party at his house.  Patient states a little too much drink and fun on the water slide.  Patient states that he has experienced generalized headaches and pain to the right side of his neck.  Patient states pain goes down into his trap.  Patient states that he probably just pulled a muscle or maybe got a little dehydrated.  Patient states that he has taken over-the-counter ibuprofen which has helped symptoms.  Patient states he has also used the past which has helped symptoms.  Patient states that there are no aggravating symptoms to pain.  Patient states pain at current is maybe rated a 5 on 10 scale in as worst a 6 or 7 on a 10 scale.  Patient states that he has not experienced any photophobia or phonophobia.  Patient denies any dizziness, saddle anesthesia, bowel or urinary dysfunction, visual disturbances, fever or chills, chest pain or shortness of breath, abdominal pain, nausea or vomiting.  Patient states has been able to work without difficulty.    Neck Pain   This is a new problem. The current episode started in the past 7 days. The problem has been unchanged. The pain is associated with an unknown factor. The pain is present in the right side. The quality of the pain is described as aching. The " pain is at a severity of 5/10. Associated symptoms include headaches. Pertinent negatives include no chest pain, fever, numbness, photophobia or tingling. He has tried acetaminophen and NSAIDs for the symptoms.   Headache   This is a new problem. The current episode started in the past 7 days. The problem has been unchanged. The quality of the pain is described as aching, sharp and throbbing. The pain is at a severity of 5/10. Associated symptoms include neck pain. Pertinent negatives include no abdominal pain, blurred vision, coughing, dizziness, fever, nausea, numbness, photophobia, tingling or vomiting. He has tried acetaminophen and NSAIDs for the symptoms. There is no history of migraine headaches (Denies).       Constitution: Negative. Negative for chills, sweating, fatigue and fever.   HENT: Negative.     Neck: Positive for neck pain. Negative for neck swelling.   Cardiovascular: Negative.  Negative for chest pain and palpitations.   Eyes: Negative.  Negative for photophobia, vision loss, double vision and blurred vision.   Respiratory: Negative.  Negative for chest tightness, cough and shortness of breath.    Gastrointestinal: Negative.  Negative for abdominal pain, nausea, vomiting, diarrhea and bowel incontinence.   Endocrine: negative.   Genitourinary: Negative.  Negative for bladder incontinence.   Musculoskeletal:  Negative for joint swelling and abnormal ROM of joint.   Skin: Negative.  Negative for color change, pale, rash, wound and erythema.   Allergic/Immunologic: Negative.    Neurological:  Positive for headaches. Negative for dizziness, light-headedness, passing out, history of migraines (Denies), disorientation, altered mental status, numbness and tingling.   Hematologic/Lymphatic: Negative.    Psychiatric/Behavioral: Negative.  Negative for altered mental status, disorientation and confusion.       Objective:     Physical Exam   Constitutional: He is oriented to person, place, and time. He  appears well-developed. He is cooperative.  Non-toxic appearance. He does not appear ill. No distress.   HENT:   Head: Normocephalic and atraumatic.   Ears:   Right Ear: Hearing, tympanic membrane, external ear and ear canal normal.   Left Ear: Hearing, tympanic membrane, external ear and ear canal normal.   Nose: Nose normal. No mucosal edema, rhinorrhea, nasal deformity or congestion. No epistaxis. Right sinus exhibits no maxillary sinus tenderness and no frontal sinus tenderness. Left sinus exhibits no maxillary sinus tenderness and no frontal sinus tenderness.   Mouth/Throat: Uvula is midline, oropharynx is clear and moist and mucous membranes are normal. Mucous membranes are moist. No trismus in the jaw. Normal dentition. No uvula swelling. No oropharyngeal exudate or posterior oropharyngeal erythema. Oropharynx is clear.   Eyes: Conjunctivae and lids are normal. Pupils are equal, round, and reactive to light. Right eye exhibits no discharge. Left eye exhibits no discharge. No scleral icterus.   Neck: Trachea normal and phonation normal. Neck supple. No crepitus.      Comments: Right lateral cervical into trapezius muscular tenderness with palpation and range of motion. No edema present. No erythema present. No decreased range of motion present. pain with movement present. No spinous process tenderness present. muscular tenderness present.   Cardiovascular: Normal rate, regular rhythm and normal pulses.   Pulmonary/Chest: Effort normal and breath sounds normal. No respiratory distress. He has no wheezes. He has no rhonchi. He has no rales.   Abdominal: Normal appearance and bowel sounds are normal. He exhibits no distension. Soft. There is no abdominal tenderness.   Musculoskeletal: Normal range of motion.         General: Normal range of motion.   Neurological: He is alert and oriented to person, place, and time. He exhibits normal muscle tone.   Skin: Skin is warm, dry, intact, not diaphoretic, not pale and  no rash. Capillary refill takes less than 2 seconds. No erythema   Psychiatric: His speech is normal and behavior is normal. Judgment and thought content normal.   Nursing note and vitals reviewed.      Assessment:     1. Generalized headache    2. Cervical strain, acute, initial encounter        Plan:       Generalized headache    Cervical strain, acute, initial encounter    Other orders  -     ketorolac injection 60 mg  -     dexAMETHasone injection 8 mg  -     tiZANidine (ZANAFLEX) 4 MG tablet; Take 1 tablet (4 mg total) by mouth every 8 (eight) hours as needed (Pain, muscle spasm).  Dispense: 15 tablet; Refill: 0  -     naproxen (NAPROSYN) 500 MG tablet; Take 1 tablet (500 mg total) by mouth 2 (two) times daily with meals. for 10 days  Dispense: 20 tablet; Refill: 0                Imaging not available in clinic at current.  Discussed imaging with patient.  Discussed x-ray cervical spine versus CT head and CT C-spine with patient.  Patient requesting outpatient treatment if symptoms continued or worsened he would present to emergency department return for imaging at that point.  Toradol 60 mg IM and Decadron 8 mg IM in clinic.  Patient tolerated well.  No complications noted.    Take medications as prescribed.    Use of no other NSAIDs while on naproxen; may rotate Tylenol.    Use of no muscle relaxers while working, driving or operating heavy machinery.    Recommend continued use of he along with rotating ice as directed.    Follow-up with PCP in 1-2 days.    Return to clinic as needed.    To ED for any new acutely worsening symptoms.    Patient in agreement with plan of care.    DISCLAIMER: Please note that my documentation in this Electronic Healthcare Record was produced using speech recognition software and therefore may contain errors related to that software system.These could include grammar, punctuation and spelling errors or the inclusion/exclusion of phrases that were not intended. Garbled syntax,  mangled pronouns, and other bizarre constructions may be attributed to that software system.

## 2023-09-26 ENCOUNTER — OFFICE VISIT (OUTPATIENT)
Dept: FAMILY MEDICINE | Facility: CLINIC | Age: 38
End: 2023-09-26
Payer: COMMERCIAL

## 2023-09-26 VITALS
SYSTOLIC BLOOD PRESSURE: 126 MMHG | WEIGHT: 258 LBS | DIASTOLIC BLOOD PRESSURE: 78 MMHG | HEART RATE: 87 BPM | TEMPERATURE: 99 F | HEIGHT: 69 IN | BODY MASS INDEX: 38.21 KG/M2 | OXYGEN SATURATION: 97 %

## 2023-09-26 DIAGNOSIS — I10 ESSENTIAL HYPERTENSION: Primary | ICD-10-CM

## 2023-09-26 DIAGNOSIS — Z11.4 ENCOUNTER FOR SCREENING FOR HIV: ICD-10-CM

## 2023-09-26 DIAGNOSIS — Z11.59 NEED FOR HEPATITIS C SCREENING TEST: ICD-10-CM

## 2023-09-26 DIAGNOSIS — E78.2 MIXED HYPERLIPIDEMIA: ICD-10-CM

## 2023-09-26 DIAGNOSIS — M54.2 CERVICALGIA: ICD-10-CM

## 2023-09-26 PROCEDURE — 99214 PR OFFICE/OUTPT VISIT, EST, LEVL IV, 30-39 MIN: ICD-10-PCS | Mod: S$GLB,,, | Performed by: INTERNAL MEDICINE

## 2023-09-26 PROCEDURE — 99214 OFFICE O/P EST MOD 30 MIN: CPT | Mod: S$GLB,,, | Performed by: INTERNAL MEDICINE

## 2023-09-26 RX ORDER — OLMESARTAN MEDOXOMIL 40 MG/1
40 TABLET ORAL DAILY
Qty: 90 TABLET | Refills: 1 | Status: SHIPPED | OUTPATIENT
Start: 2023-09-26 | End: 2024-02-15 | Stop reason: SDUPTHER

## 2023-09-26 RX ORDER — BUTALBITAL, ACETAMINOPHEN AND CAFFEINE 300; 40; 50 MG/1; MG/1; MG/1
1-2 CAPSULE ORAL 3 TIMES DAILY
Qty: 30 CAPSULE | Refills: 1 | Status: SHIPPED | OUTPATIENT
Start: 2023-09-26 | End: 2024-02-15 | Stop reason: SDUPTHER

## 2023-09-26 RX ORDER — ATORVASTATIN CALCIUM 20 MG/1
20 TABLET, FILM COATED ORAL NIGHTLY
Qty: 90 TABLET | Refills: 1 | Status: SHIPPED | OUTPATIENT
Start: 2023-09-26 | End: 2024-02-15 | Stop reason: SINTOL

## 2023-09-26 RX ORDER — TIZANIDINE 4 MG/1
4 TABLET ORAL EVERY 8 HOURS PRN
Qty: 30 TABLET | Refills: 1 | Status: SHIPPED | OUTPATIENT
Start: 2023-09-26 | End: 2024-02-15 | Stop reason: SDUPTHER

## 2023-09-26 NOTE — PROGRESS NOTES
Subjective:       Patient ID: Gerardo Ramirez Jr. is a 38 y.o. male.    Chief Complaint: Hypertension (5 months follow up), Hyperlipidemia, Headache, Neck Pain (Neck stiffness and fatigue), and Fatigue    Here for routine follow up; last visit note, most recent available labs, and health maintenance topics reviewed.   He was clowning on a water slide about a month ago and kiesha his neck; has been bothering him since.  He went to urgent care and got steroids, NSAIDs, zanaflex.  It did help some but still bothering him.      Hypertension  This is a chronic problem. The problem is controlled. Associated symptoms include neck pain, palpitations and shortness of breath. Pertinent negatives include no anxiety, chest pain, headaches, malaise/fatigue or peripheral edema. Risk factors for coronary artery disease include obesity, male gender, dyslipidemia and smoking/tobacco exposure. Past treatments include angiotensin blockers and beta blockers. There are no compliance problems.    Hyperlipidemia  This is a chronic problem. Recent lipid tests were reviewed and are variable (triglycerides high, cholesterol fine). Exacerbating diseases include obesity. Associated symptoms include myalgias and shortness of breath. Pertinent negatives include no chest pain. Current antihyperlipidemic treatment includes diet change.     Review of Systems   Constitutional:  Positive for fatigue. Negative for activity change, appetite change, chills, diaphoresis, fever, malaise/fatigue and unexpected weight change.   HENT:  Negative for congestion, ear discharge, ear pain, hearing loss, nosebleeds, postnasal drip, rhinorrhea, sinus pressure, sinus pain, sneezing, sore throat, tinnitus, trouble swallowing and voice change.    Eyes:  Negative for photophobia, pain, discharge, redness, itching and visual disturbance.   Respiratory:  Positive for apnea, chest tightness and shortness of breath. Negative for cough, choking and wheezing.     Cardiovascular:  Positive for palpitations. Negative for chest pain and leg swelling.   Gastrointestinal:  Positive for constipation and diarrhea. Negative for abdominal distention, abdominal pain, blood in stool, nausea and vomiting.   Endocrine: Negative for cold intolerance, heat intolerance, polydipsia and polyuria.   Genitourinary:  Negative for decreased urine volume, difficulty urinating, dysuria, enuresis, flank pain, frequency, genital sores, hematuria, penile discharge, penile pain, scrotal swelling, testicular pain and urgency.   Musculoskeletal:  Positive for arthralgias, back pain, myalgias, neck pain and neck stiffness. Negative for gait problem and joint swelling.   Skin:  Negative for rash and wound.   Allergic/Immunologic: Negative for environmental allergies, food allergies and immunocompromised state.   Neurological:  Positive for dizziness and light-headedness. Negative for tremors, seizures, syncope, facial asymmetry, speech difficulty, weakness, numbness and headaches.   Hematological:  Negative for adenopathy. Does not bruise/bleed easily.   Psychiatric/Behavioral:  Negative for confusion, decreased concentration, hallucinations, self-injury, sleep disturbance and suicidal ideas. The patient is nervous/anxious.        Past Medical History:   Diagnosis Date    Anxiety     Atrial fibrillation 03/2020    new onset- meds changed    Hyperlipidemia     Hypertension 2013    Inguinal hernia bilateral, non-recurrent 02/2020    right side more pronounced- surg scheduled    Pneumonia     age 11    Sleep apnea     Sleep apnea-like behavior     needs sleep study- wife says he stops breathing while sleeping      Past Surgical History:   Procedure Laterality Date    CHOLECYSTECTOMY  04/08/2019    w/ Small Umbilical hernia rep-     HERNIA REPAIR Bilateral 03/10/2020    Dr. Lanier - [facility]    UMBILICAL HERNIA REPAIR  04/08/2019        VASECTOMY Bilateral 11/30/2020    Procedure: VASECTOMY;   Surgeon: German Tobias MD;  Location: Pending sale to [facility];  Service: Urology;  Laterality: Bilateral;       Family History   Problem Relation Age of Onset    Diabetes Mother     Hypertension Mother     Hypertension Sister     Hyperlipidemia Sister     Anxiety disorder Sister     Coronary artery disease Maternal Grandfather     No Known Problems Father        Social History     Socioeconomic History    Marital status:    Occupational History    Occupation: [employer]   Tobacco Use    Smoking status: Former     Current packs/day: 0.00     Types: Cigarettes, Vaping with nicotine     Quit date: 2022     Years since quittin.8    Smokeless tobacco: Never   Substance and Sexual Activity    Alcohol use: Yes     Comment: occ    Drug use: No    Sexual activity: Yes     Partners: Female   Social History Narrative    LIVE WITH MICHA     Social Determinants of Health     Stress: Stress Concern Present (10/16/2020)    [employer] - Occupational Stress Questionnaire     Feeling of Stress : To some extent       Current Outpatient Medications   Medication Sig Dispense Refill    coenzyme Q10 200 mg capsule Take 200 mg by mouth once daily.      metoprolol succinate (TOPROL-XL) 50 MG 24 hr tablet Take 1 tablet (50 mg total) by mouth once daily. 90 tablet 1    atorvastatin (LIPITOR) 20 MG tablet Take 1 tablet (20 mg total) by mouth every evening. 90 tablet 1    butalbital-acetaminophen-caff -40 mg Cap Take 1-2 capsules by mouth 3 (three) times daily. 30 capsule 1    olmesartan (BENICAR) 40 MG tablet Take 1 tablet (40 mg total) by mouth once daily. 90 tablet 1    tiZANidine (ZANAFLEX) 4 MG tablet Take 1 tablet (4 mg total) by mouth every 8 (eight) hours as needed (Pain, muscle spasm). 30 tablet 1     No current facility-administered medications for this visit.       Review of patient's allergies indicates:   Allergen Reactions    Amoxicillin Hives     Objective:      Blood pressure 126/78, pulse  "87, temperature 99 °F (37.2 °C), temperature source Temporal, height 5' 9" (1.753 m), weight 117 kg (258 lb), SpO2 97 %. Body mass index is 38.1 kg/m².   Physical Exam  Vitals and nursing note reviewed.   Constitutional:       General: He is not in acute distress.     Appearance: He is well-developed. He is obese. He is not ill-appearing, toxic-appearing or diaphoretic.   HENT:      Head: Normocephalic and atraumatic.   Eyes:      General: No scleral icterus.        Right eye: No discharge.         Left eye: No discharge.      Conjunctiva/sclera: Conjunctivae normal.   Neck:      Vascular: No carotid bruit.   Cardiovascular:      Rate and Rhythm: Normal rate and regular rhythm.      Heart sounds: Normal heart sounds. No murmur heard.  Pulmonary:      Effort: Pulmonary effort is normal. No respiratory distress.      Breath sounds: Normal breath sounds. No decreased breath sounds, wheezing, rhonchi or rales.   Abdominal:      General: There is no distension.      Palpations: Abdomen is soft.      Tenderness: There is no abdominal tenderness. There is no guarding or rebound.   Musculoskeletal:      Cervical back: No rigidity. Pain with movement and muscular tenderness present. No spinous process tenderness. Normal range of motion.      Right lower leg: No edema.      Left lower leg: No edema.   Skin:     General: Skin is warm and dry.   Neurological:      Mental Status: He is alert.      Motor: No tremor.   Psychiatric:         Mood and Affect: Mood normal.         Speech: Speech normal.         Behavior: Behavior normal.             Assessment:       1. Essential hypertension    2. Mixed hyperlipidemia    3. Cervicalgia    4. Need for hepatitis C screening test    5. Encounter for screening for HIV        Plan:       Gerardo was seen today for hypertension, hyperlipidemia, headache, neck pain and fatigue.    Diagnoses and all orders for this visit:    Essential hypertension  -     olmesartan (BENICAR) 40 MG tablet; " Take 1 tablet (40 mg total) by mouth once daily.  -     Comprehensive Metabolic Panel; Future  -     Lipid Panel; Future  -     Urinalysis w/reflex to Microscopic; Future  -     Comprehensive Metabolic Panel  -     Lipid Panel  -     Urinalysis w/reflex to Microscopic    Mixed hyperlipidemia  -     atorvastatin (LIPITOR) 20 MG tablet; Take 1 tablet (20 mg total) by mouth every evening.  -     Comprehensive Metabolic Panel; Future  -     Lipid Panel; Future  -     Comprehensive Metabolic Panel  -     Lipid Panel    Cervicalgia  -     Ambulatory referral/consult to Physical/Occupational Therapy; Future  -     tiZANidine (ZANAFLEX) 4 MG tablet; Take 1 tablet (4 mg total) by mouth every 8 (eight) hours as needed (Pain, muscle spasm).    Need for hepatitis C screening test  -     Hepatitis C Antibody; Future  -     Hepatitis C Antibody    Encounter for screening for HIV  -     HIV 1/2 Ag/Ab (4th Gen); Future  -     HIV 1/2 Ag/Ab (4th Gen)    Other orders  -     butalbital-acetaminophen-caff -40 mg Cap; Take 1-2 capsules by mouth 3 (three) times daily.

## 2024-02-08 ENCOUNTER — PATIENT MESSAGE (OUTPATIENT)
Dept: FAMILY MEDICINE | Facility: CLINIC | Age: 39
End: 2024-02-08
Payer: COMMERCIAL

## 2024-02-08 DIAGNOSIS — E66.9 OBESITY, UNSPECIFIED CLASSIFICATION, UNSPECIFIED OBESITY TYPE, UNSPECIFIED WHETHER SERIOUS COMORBIDITY PRESENT: ICD-10-CM

## 2024-02-08 DIAGNOSIS — R53.83 FATIGUE, UNSPECIFIED TYPE: ICD-10-CM

## 2024-02-08 DIAGNOSIS — E78.2 MIXED HYPERLIPIDEMIA: ICD-10-CM

## 2024-02-08 DIAGNOSIS — I10 ESSENTIAL HYPERTENSION: ICD-10-CM

## 2024-02-08 DIAGNOSIS — Z11.59 NEED FOR HEPATITIS C SCREENING TEST: Primary | ICD-10-CM

## 2024-02-08 DIAGNOSIS — R73.01 IMPAIRED FASTING GLUCOSE: ICD-10-CM

## 2024-02-08 DIAGNOSIS — Z00.01 ENCOUNTER FOR PREVENTATIVE ADULT HEALTH CARE EXAM WITH ABNORMAL FINDINGS: ICD-10-CM

## 2024-02-08 DIAGNOSIS — G47.33 OSA ON CPAP: ICD-10-CM

## 2024-02-08 DIAGNOSIS — Z11.4 ENCOUNTER FOR SCREENING FOR HIV: ICD-10-CM

## 2024-02-15 ENCOUNTER — OFFICE VISIT (OUTPATIENT)
Dept: FAMILY MEDICINE | Facility: CLINIC | Age: 39
End: 2024-02-15
Payer: COMMERCIAL

## 2024-02-15 ENCOUNTER — TELEPHONE (OUTPATIENT)
Dept: FAMILY MEDICINE | Facility: CLINIC | Age: 39
End: 2024-02-15

## 2024-02-15 VITALS
TEMPERATURE: 97 F | WEIGHT: 270.75 LBS | OXYGEN SATURATION: 98 % | DIASTOLIC BLOOD PRESSURE: 94 MMHG | BODY MASS INDEX: 40.1 KG/M2 | HEIGHT: 69 IN | HEART RATE: 88 BPM | SYSTOLIC BLOOD PRESSURE: 134 MMHG

## 2024-02-15 DIAGNOSIS — R51.9 NONINTRACTABLE EPISODIC HEADACHE, UNSPECIFIED HEADACHE TYPE: ICD-10-CM

## 2024-02-15 DIAGNOSIS — E78.2 MIXED HYPERLIPIDEMIA: ICD-10-CM

## 2024-02-15 DIAGNOSIS — T46.6X5A MYALGIA DUE TO STATIN: ICD-10-CM

## 2024-02-15 DIAGNOSIS — I10 ESSENTIAL HYPERTENSION: Primary | ICD-10-CM

## 2024-02-15 DIAGNOSIS — M54.2 CERVICALGIA: ICD-10-CM

## 2024-02-15 DIAGNOSIS — I49.3 PVC'S (PREMATURE VENTRICULAR CONTRACTIONS): ICD-10-CM

## 2024-02-15 DIAGNOSIS — M79.10 MYALGIA DUE TO STATIN: ICD-10-CM

## 2024-02-15 PROCEDURE — 99214 OFFICE O/P EST MOD 30 MIN: CPT | Mod: S$GLB,,, | Performed by: INTERNAL MEDICINE

## 2024-02-15 PROCEDURE — 99999 PR PBB SHADOW E&M-EST. PATIENT-LVL III: CPT | Mod: PBBFAC,,, | Performed by: INTERNAL MEDICINE

## 2024-02-15 RX ORDER — OLMESARTAN MEDOXOMIL 40 MG/1
40 TABLET ORAL DAILY
Qty: 90 TABLET | Refills: 1 | Status: SHIPPED | OUTPATIENT
Start: 2024-02-15 | End: 2024-05-22 | Stop reason: SDUPTHER

## 2024-02-15 RX ORDER — PITAVASTATIN CALCIUM 2.09 MG/1
2 TABLET, FILM COATED ORAL NIGHTLY
Qty: 30 TABLET | Refills: 3 | Status: SHIPPED | OUTPATIENT
Start: 2024-02-15 | End: 2024-03-22 | Stop reason: SDUPTHER

## 2024-02-15 RX ORDER — AMLODIPINE BESYLATE 5 MG/1
5 TABLET ORAL DAILY
Qty: 90 TABLET | Refills: 1 | Status: SHIPPED | OUTPATIENT
Start: 2024-02-15 | End: 2024-04-08

## 2024-02-15 RX ORDER — SUMATRIPTAN 50 MG/1
50 TABLET, FILM COATED ORAL
Qty: 9 TABLET | Refills: 3 | Status: SHIPPED | OUTPATIENT
Start: 2024-02-15 | End: 2024-04-08

## 2024-02-15 RX ORDER — BUTALBITAL, ACETAMINOPHEN AND CAFFEINE 300; 40; 50 MG/1; MG/1; MG/1
1-2 CAPSULE ORAL 3 TIMES DAILY
Qty: 30 CAPSULE | Refills: 1 | Status: ON HOLD | OUTPATIENT
Start: 2024-02-15

## 2024-02-15 RX ORDER — TIZANIDINE 4 MG/1
4 TABLET ORAL EVERY 8 HOURS PRN
Qty: 30 TABLET | Refills: 1 | Status: SHIPPED | OUTPATIENT
Start: 2024-02-15 | End: 2024-05-22

## 2024-02-15 RX ORDER — METOPROLOL SUCCINATE 50 MG/1
50 TABLET, EXTENDED RELEASE ORAL DAILY
Qty: 90 TABLET | Refills: 1 | Status: SHIPPED | OUTPATIENT
Start: 2024-02-15 | End: 2024-05-22 | Stop reason: SDUPTHER

## 2024-02-15 NOTE — PROGRESS NOTES
Subjective:       Patient ID: Gerardo Ramirez Jr. is a 39 y.o. male.    Chief Complaint: Headache (X 2 weeks) and Neck Pain (X 2 weeks)    Here for routine follow up; last visit note, most recent available labs, and health maintenance topics reviewed.    Neck improved after a couple of rounds of PT.    Headache   This is a new problem. The current episode started 1 to 4 weeks ago (about 2 weeks). The problem occurs daily. The problem has been waxing and waning. The pain is located in the Right unilateral region. The pain radiates to the right neck. The pain quality is similar to prior headaches (had similar in the past after drinking). The quality of the pain is described as dull (pressure behind eyes). The pain is moderate. Associated symptoms include abdominal pain (RUQ; off and on for over a year), dizziness, hearing loss (hearing seems to come and go with heartbeat), a loss of balance and neck pain. Pertinent negatives include no back pain, blurred vision, coughing, ear pain, eye pain, eye redness, fever, nausea, numbness, photophobia, rhinorrhea, seizures, sinus pressure, sore throat, tinnitus, visual change, vomiting or weakness. Associated symptoms comments: Dizzy/lightheaded. Treatments tried: tizanidine (but too sedating), fioricet. The treatment provided moderate relief. His past medical history is significant for obesity.   Hypertension  This is a chronic problem. The problem is uncontrolled. Associated symptoms include headaches, neck pain and shortness of breath. Pertinent negatives include no anxiety, blurred vision, chest pain, malaise/fatigue, palpitations or peripheral edema. Risk factors for coronary artery disease include obesity, male gender, dyslipidemia and smoking/tobacco exposure. Past treatments include angiotensin blockers and beta blockers. There are no compliance problems.    Hyperlipidemia  This is a chronic problem. Recent lipid tests were reviewed and are variable (triglycerides  high, cholesterol fine). Exacerbating diseases include obesity. Associated symptoms include shortness of breath. Pertinent negatives include no chest pain or myalgias. Current antihyperlipidemic treatment includes diet change. Compliance problems include medication side effects (muscle aches with lipitor, even with CoQ10).      Review of Systems   Constitutional:  Positive for fatigue. Negative for activity change, appetite change, chills, diaphoresis, fever, malaise/fatigue and unexpected weight change.   HENT:  Positive for hearing loss (hearing seems to come and go with heartbeat). Negative for congestion, ear discharge, ear pain, nosebleeds, postnasal drip, rhinorrhea, sinus pressure, sinus pain, sneezing, sore throat, tinnitus, trouble swallowing and voice change.    Eyes:  Negative for blurred vision, photophobia, pain, discharge, redness, itching and visual disturbance.   Respiratory:  Positive for apnea, chest tightness and shortness of breath. Negative for cough, choking and wheezing.    Cardiovascular:  Negative for chest pain, palpitations and leg swelling.   Gastrointestinal:  Positive for abdominal pain (RUQ; off and on for over a year). Negative for abdominal distention, blood in stool, constipation, diarrhea, nausea and vomiting.   Endocrine: Negative for cold intolerance, heat intolerance, polydipsia and polyuria.   Genitourinary:  Negative for decreased urine volume, difficulty urinating, dysuria, enuresis, flank pain, frequency, genital sores, hematuria, penile discharge, penile pain, scrotal swelling, testicular pain and urgency.   Musculoskeletal:  Positive for neck pain. Negative for arthralgias, back pain, gait problem, joint swelling, myalgias and neck stiffness.   Skin:  Negative for rash and wound.   Allergic/Immunologic: Negative for environmental allergies, food allergies and immunocompromised state.   Neurological:  Positive for dizziness, light-headedness, headaches and loss of balance.  Negative for tremors, seizures, syncope, facial asymmetry, speech difficulty, weakness and numbness.   Hematological:  Negative for adenopathy. Does not bruise/bleed easily.   Psychiatric/Behavioral:  Negative for confusion, decreased concentration, hallucinations, self-injury, sleep disturbance and suicidal ideas. The patient is nervous/anxious.        Past Medical History:   Diagnosis Date    Anxiety     Atrial fibrillation 2020    new onset- meds changed    Hyperlipidemia     Hypertension 2013    Inguinal hernia bilateral, non-recurrent 2020    right side more pronounced- surg scheduled    Pneumonia     age 11    Sleep apnea     Sleep apnea-like behavior     needs sleep study- wife says he stops breathing while sleeping      Past Surgical History:   Procedure Laterality Date    CHOLECYSTECTOMY  2019    w/ Small Umbilical hernia rep-     HERNIA REPAIR Bilateral 03/10/2020    Dr. Lanier - [facility]    UMBILICAL HERNIA REPAIR  2019        VASECTOMY Bilateral 2020    Procedure: VASECTOMY;  Surgeon: German Tobias MD;  Location: [facility];  Service: Urology;  Laterality: Bilateral;       Family History   Problem Relation Age of Onset    Diabetes Mother     Hypertension Mother     Hypertension Sister     Hyperlipidemia Sister     Anxiety disorder Sister     Coronary artery disease Maternal Grandfather     No Known Problems Father        Social History     Socioeconomic History    Marital status:    Occupational History    Occupation: [employer]   Tobacco Use    Smoking status: Former     Current packs/day: 0.00     Types: Cigarettes, Vaping with nicotine     Quit date: 2022     Years since quittin.2    Smokeless tobacco: Never   Substance and Sexual Activity    Alcohol use: Yes     Comment: occ    Drug use: No    Sexual activity: Yes     Partners: Female   Social History Narrative    LIVE WITH FIANCE     Social Determinants of Health     Stress: Stress Concern  "Present (10/16/2020)    [facility] of Occupational Health - Occupational Stress Questionnaire     Feeling of Stress : To some extent       Current Outpatient Medications   Medication Sig Dispense Refill    amLODIPine (NORVASC) 5 MG tablet Take 1 tablet (5 mg total) by mouth once daily. 90 tablet 1    butalbital-acetaminophen-caff -40 mg Cap Take 1-2 capsules by mouth 3 (three) times daily. 30 capsule 1    coenzyme Q10 200 mg capsule Take 200 mg by mouth once daily.      metoprolol succinate (TOPROL-XL) 50 MG 24 hr tablet Take 1 tablet (50 mg total) by mouth once daily. 90 tablet 1    olmesartan (BENICAR) 40 MG tablet Take 1 tablet (40 mg total) by mouth once daily. 90 tablet 1    pitavastatin calcium (LIVALO) 2 mg Tab tablet Take 1 tablet (2 mg total) by mouth every evening. 30 tablet 3    sumatriptan (IMITREX) 50 MG tablet Take 1 tablet (50 mg total) by mouth every 2 (two) hours as needed for Migraine. Max of 2 doses in 24 hours 9 tablet 3    tiZANidine (ZANAFLEX) 4 MG tablet Take 1 tablet (4 mg total) by mouth every 8 (eight) hours as needed (Pain, muscle spasm). 30 tablet 1     No current facility-administered medications for this visit.       Review of patient's allergies indicates:   Allergen Reactions    Amoxicillin Hives     Objective:    HPI     Headache     Additional comments: X 2 weeks           Neck Pain     Additional comments: X 2 weeks          Last edited by Ramila Hernandez MA on 2/15/2024  2:21 PM.      Blood pressure (!) 134/94, pulse 88, temperature 96.7 °F (35.9 °C), temperature source Temporal, height 5' 9" (1.753 m), weight 122.8 kg (270 lb 11.6 oz), SpO2 98 %. Body mass index is 39.98 kg/m².   Physical Exam  Vitals and nursing note reviewed.   Constitutional:       General: He is not in acute distress.     Appearance: He is well-developed. He is obese. He is not ill-appearing, toxic-appearing or diaphoretic.   HENT:      Head: Normocephalic and atraumatic.   Eyes:      General: " No scleral icterus.        Right eye: No discharge.         Left eye: No discharge.      Conjunctiva/sclera: Conjunctivae normal.   Neck:      Vascular: No carotid bruit.   Cardiovascular:      Rate and Rhythm: Normal rate and regular rhythm.      Heart sounds: Normal heart sounds. No murmur heard.  Pulmonary:      Effort: Pulmonary effort is normal. No respiratory distress.      Breath sounds: Normal breath sounds. No decreased breath sounds, wheezing, rhonchi or rales.   Abdominal:      General: There is no distension.      Palpations: Abdomen is soft.      Tenderness: There is no abdominal tenderness. There is no guarding or rebound.   Musculoskeletal:      Right lower leg: No edema.      Left lower leg: No edema.   Skin:     General: Skin is warm and dry.   Neurological:      Mental Status: He is alert.      Motor: No tremor.   Psychiatric:         Mood and Affect: Mood normal.         Speech: Speech normal.         Behavior: Behavior normal.             Assessment:       1. Essential hypertension    2. Cervicalgia    3. PVC's (premature ventricular contractions)    4. Mixed hyperlipidemia    5. Nonintractable episodic headache, unspecified headache type    6. Myalgia due to statin        Plan:       Gerardo was seen today for headache and neck pain.    Diagnoses and all orders for this visit:    Essential hypertension  -     olmesartan (BENICAR) 40 MG tablet; Take 1 tablet (40 mg total) by mouth once daily.  -     metoprolol succinate (TOPROL-XL) 50 MG 24 hr tablet; Take 1 tablet (50 mg total) by mouth once daily.  -     amLODIPine (NORVASC) 5 MG tablet; Take 1 tablet (5 mg total) by mouth once daily.    Cervicalgia  -     tiZANidine (ZANAFLEX) 4 MG tablet; Take 1 tablet (4 mg total) by mouth every 8 (eight) hours as needed (Pain, muscle spasm).    PVC's (premature ventricular contractions)  -     metoprolol succinate (TOPROL-XL) 50 MG 24 hr tablet; Take 1 tablet (50 mg total) by mouth once daily.    Mixed  hyperlipidemia  -     pitavastatin calcium (LIVALO) 2 mg Tab tablet; Take 1 tablet (2 mg total) by mouth every evening.    Nonintractable episodic headache, unspecified headache type  Comments:  Cervicogenic vs migraine.  Trial imitrex  Orders:  -     butalbital-acetaminophen-caff -40 mg Cap; Take 1-2 capsules by mouth 3 (three) times daily.  -     sumatriptan (IMITREX) 50 MG tablet; Take 1 tablet (50 mg total) by mouth every 2 (two) hours as needed for Migraine. Max of 2 doses in 24 hours    Myalgia due to statin  Comments:  Trial pitavastatin         Encouraged labs

## 2024-02-15 NOTE — TELEPHONE ENCOUNTER
----- Message from Velma Brenner sent at 2/15/2024  2:06 PM CST -----  Pt is on his way. He went to the wrong building. He will be there shortly.

## 2024-02-18 LAB
25(OH)D3+25(OH)D2 SERPL-MCNC: 11.7 NG/ML (ref 30–100)
ALBUMIN SERPL-MCNC: 4.4 G/DL (ref 4.1–5.1)
ALBUMIN/GLOB SERPL: 1.5 {RATIO} (ref 1.2–2.2)
ALP SERPL-CCNC: 82 IU/L (ref 44–121)
ALT SERPL-CCNC: 40 IU/L (ref 0–44)
APPEARANCE UR: CLEAR
AST SERPL-CCNC: 23 IU/L (ref 0–40)
BACTERIA #/AREA URNS HPF: NORMAL /[HPF]
BASOPHILS # BLD AUTO: 0 X10E3/UL (ref 0–0.2)
BASOPHILS NFR BLD AUTO: 0 %
BILIRUB SERPL-MCNC: 0.4 MG/DL (ref 0–1.2)
BILIRUB UR QL STRIP: NEGATIVE
BUN SERPL-MCNC: 16 MG/DL (ref 6–24)
BUN/CREAT SERPL: 19 (ref 9–20)
CALCIUM SERPL-MCNC: 9.4 MG/DL (ref 8.7–10.2)
CASTS URNS QL MICRO: NORMAL /LPF
CHLORIDE SERPL-SCNC: 101 MMOL/L (ref 96–106)
CHOLEST SERPL-MCNC: 276 MG/DL (ref 100–199)
CO2 SERPL-SCNC: 20 MMOL/L (ref 20–29)
COLOR UR: YELLOW
CREAT SERPL-MCNC: 0.86 MG/DL (ref 0.76–1.27)
EOSINOPHIL # BLD AUTO: 0.1 X10E3/UL (ref 0–0.4)
EOSINOPHIL NFR BLD AUTO: 4 %
EPI CELLS #/AREA URNS HPF: NORMAL /HPF (ref 0–10)
ERYTHROCYTE [DISTWIDTH] IN BLOOD BY AUTOMATED COUNT: 14.9 % (ref 11.6–15.4)
EST. GFR  (NO RACE VARIABLE): 112 ML/MIN/1.73
GLOBULIN SER CALC-MCNC: 2.9 G/DL (ref 1.5–4.5)
GLUCOSE SERPL-MCNC: 102 MG/DL (ref 70–99)
GLUCOSE UR QL STRIP: NEGATIVE
HBA1C MFR BLD: 6 % (ref 4.8–5.6)
HCT VFR BLD AUTO: 41.1 % (ref 37.5–51)
HCV IGG SERPL QL IA: NON REACTIVE
HDLC SERPL-MCNC: 33 MG/DL
HGB BLD-MCNC: 13.4 G/DL (ref 13–17.7)
HGB UR QL STRIP: NEGATIVE
HIV 1+2 AB+HIV1 P24 AG SERPL QL IA: NON REACTIVE
IMM GRANULOCYTES # BLD AUTO: 0 X10E3/UL (ref 0–0.1)
IMM GRANULOCYTES NFR BLD AUTO: 0 %
KETONES UR QL STRIP: NEGATIVE
LDLC SERPL CALC-MCNC: 152 MG/DL (ref 0–99)
LEUKOCYTE ESTERASE UR QL STRIP: NEGATIVE
LYMPHOCYTES # BLD AUTO: 1.5 X10E3/UL (ref 0.7–3.1)
LYMPHOCYTES NFR BLD AUTO: 54 %
MCH RBC QN AUTO: 28.9 PG (ref 26.6–33)
MCHC RBC AUTO-ENTMCNC: 32.6 G/DL (ref 31.5–35.7)
MCV RBC AUTO: 89 FL (ref 79–97)
MICRO URNS: NORMAL
MICRO URNS: NORMAL
MONOCYTES # BLD AUTO: 0.3 X10E3/UL (ref 0.1–0.9)
MONOCYTES NFR BLD AUTO: 9 %
MORPHOLOGY BLD-IMP: ABNORMAL
NEUTROPHILS # BLD AUTO: 0.9 X10E3/UL (ref 1.4–7)
NEUTROPHILS NFR BLD AUTO: 33 %
NITRITE UR QL STRIP: NEGATIVE
PH UR STRIP: 6 [PH] (ref 5–7.5)
PLATELET # BLD AUTO: 99 X10E3/UL (ref 150–450)
POTASSIUM SERPL-SCNC: 4.3 MMOL/L (ref 3.5–5.2)
PROT SERPL-MCNC: 7.3 G/DL (ref 6–8.5)
PROT UR QL STRIP: NEGATIVE
RBC # BLD AUTO: 4.63 X10E6/UL (ref 4.14–5.8)
RBC #/AREA URNS HPF: NORMAL /HPF (ref 0–2)
SODIUM SERPL-SCNC: 136 MMOL/L (ref 134–144)
SP GR UR STRIP: 1.01 (ref 1–1.03)
TESTOST SERPL-MCNC: 302 NG/DL (ref 264–916)
TRIGL SERPL-MCNC: 478 MG/DL (ref 0–149)
TSH SERPL DL<=0.005 MIU/L-ACNC: 1.86 UIU/ML (ref 0.45–4.5)
URINALYSIS REFLEX: NORMAL
UROBILINOGEN UR STRIP-MCNC: 0.2 MG/DL (ref 0.2–1)
VLDLC SERPL CALC-MCNC: 91 MG/DL (ref 5–40)
WBC # BLD AUTO: 2.7 X10E3/UL (ref 3.4–10.8)
WBC #/AREA URNS HPF: NORMAL /HPF (ref 0–5)

## 2024-02-19 ENCOUNTER — PATIENT MESSAGE (OUTPATIENT)
Dept: FAMILY MEDICINE | Facility: CLINIC | Age: 39
End: 2024-02-19
Payer: COMMERCIAL

## 2024-02-19 DIAGNOSIS — E55.9 VITAMIN D DEFICIENCY: Primary | ICD-10-CM

## 2024-02-19 DIAGNOSIS — D69.6 THROMBOCYTOPENIA: ICD-10-CM

## 2024-02-19 RX ORDER — ERGOCALCIFEROL 1.25 MG/1
50000 CAPSULE ORAL
Qty: 12 CAPSULE | Refills: 1 | Status: SHIPPED | OUTPATIENT
Start: 2024-02-19 | End: 2024-05-22 | Stop reason: SDUPTHER

## 2024-02-28 ENCOUNTER — PATIENT MESSAGE (OUTPATIENT)
Dept: FAMILY MEDICINE | Facility: CLINIC | Age: 39
End: 2024-02-28
Payer: COMMERCIAL

## 2024-03-13 ENCOUNTER — TELEPHONE (OUTPATIENT)
Dept: FAMILY MEDICINE | Facility: CLINIC | Age: 39
End: 2024-03-13
Payer: COMMERCIAL

## 2024-03-13 NOTE — TELEPHONE ENCOUNTER
----- Message from Anabel Ramirez sent at 3/13/2024  2:33 PM CDT -----  Pt is due for repeat labs. Needs order for [facility]   [phone]

## 2024-03-19 ENCOUNTER — PATIENT MESSAGE (OUTPATIENT)
Dept: FAMILY MEDICINE | Facility: CLINIC | Age: 39
End: 2024-03-19
Payer: COMMERCIAL

## 2024-03-19 DIAGNOSIS — D69.6 THROMBOCYTOPENIA: Primary | ICD-10-CM

## 2024-03-19 DIAGNOSIS — E78.2 MIXED HYPERLIPIDEMIA: ICD-10-CM

## 2024-03-19 LAB
ALBUMIN SERPL-MCNC: 4.3 G/DL (ref 4.1–5.1)
ALBUMIN/GLOB SERPL: 1.7 {RATIO} (ref 1.2–2.2)
ALP SERPL-CCNC: 70 IU/L (ref 44–121)
ALT SERPL-CCNC: 58 IU/L (ref 0–44)
AST SERPL-CCNC: 31 IU/L (ref 0–40)
BILIRUB SERPL-MCNC: 0.6 MG/DL (ref 0–1.2)
BUN SERPL-MCNC: 12 MG/DL (ref 6–24)
BUN/CREAT SERPL: 13 (ref 9–20)
CALCIUM SERPL-MCNC: 9.2 MG/DL (ref 8.7–10.2)
CHLORIDE SERPL-SCNC: 106 MMOL/L (ref 96–106)
CHOLEST SERPL-MCNC: 104 MG/DL (ref 100–199)
CO2 SERPL-SCNC: 18 MMOL/L (ref 20–29)
CREAT SERPL-MCNC: 0.89 MG/DL (ref 0.76–1.27)
EST. GFR  (NO RACE VARIABLE): 110 ML/MIN/1.73
GLOBULIN SER CALC-MCNC: 2.5 G/DL (ref 1.5–4.5)
GLUCOSE SERPL-MCNC: 102 MG/DL (ref 70–99)
HCV IGG SERPL QL IA: NON REACTIVE
HDLC SERPL-MCNC: 28 MG/DL
HIV 1+2 AB+HIV1 P24 AG SERPL QL IA: NON REACTIVE
LDLC SERPL CALC-MCNC: 49 MG/DL (ref 0–99)
POTASSIUM SERPL-SCNC: 4.1 MMOL/L (ref 3.5–5.2)
PROT SERPL-MCNC: 6.8 G/DL (ref 6–8.5)
SODIUM SERPL-SCNC: 138 MMOL/L (ref 134–144)
TRIGL SERPL-MCNC: 155 MG/DL (ref 0–149)
VLDLC SERPL CALC-MCNC: 27 MG/DL (ref 5–40)

## 2024-03-20 NOTE — TELEPHONE ENCOUNTER
Spoke with pt he states that the most recent CBC order needs to be switched to [facility] instead of [facility].

## 2024-03-22 LAB
BASOPHILS # BLD AUTO: 0 X10E3/UL (ref 0–0.2)
BASOPHILS NFR BLD AUTO: 0 %
EOSINOPHIL # BLD AUTO: 0.1 X10E3/UL (ref 0–0.4)
EOSINOPHIL NFR BLD AUTO: 3 %
ERYTHROCYTE [DISTWIDTH] IN BLOOD BY AUTOMATED COUNT: 14.8 % (ref 11.6–15.4)
HCT VFR BLD AUTO: 36.5 % (ref 37.5–51)
HGB BLD-MCNC: 12.3 G/DL (ref 13–17.7)
IMM GRANULOCYTES # BLD AUTO: 0 X10E3/UL (ref 0–0.1)
IMM GRANULOCYTES NFR BLD AUTO: 0 %
LYMPHOCYTES # BLD AUTO: 1.4 X10E3/UL (ref 0.7–3.1)
LYMPHOCYTES NFR BLD AUTO: 53 %
MCH RBC QN AUTO: 30 PG (ref 26.6–33)
MCHC RBC AUTO-ENTMCNC: 33.7 G/DL (ref 31.5–35.7)
MCV RBC AUTO: 89 FL (ref 79–97)
MONOCYTES # BLD AUTO: 0.3 X10E3/UL (ref 0.1–0.9)
MONOCYTES NFR BLD AUTO: 13 %
MORPHOLOGY BLD-IMP: ABNORMAL
NEUTROPHILS # BLD AUTO: 0.8 X10E3/UL (ref 1.4–7)
NEUTROPHILS NFR BLD AUTO: 31 %
NRBC BLD AUTO-RTO: 1 % (ref 0–0)
PLATELET # BLD AUTO: 118 X10E3/UL (ref 150–450)
RBC # BLD AUTO: 4.1 X10E6/UL (ref 4.14–5.8)
WBC # BLD AUTO: 2.7 X10E3/UL (ref 3.4–10.8)

## 2024-03-22 RX ORDER — PITAVASTATIN CALCIUM 2.09 MG/1
2 TABLET, FILM COATED ORAL NIGHTLY
Qty: 90 TABLET | Refills: 1 | Status: SHIPPED | OUTPATIENT
Start: 2024-03-22 | End: 2024-05-22 | Stop reason: SDUPTHER

## 2024-03-25 ENCOUNTER — PATIENT MESSAGE (OUTPATIENT)
Dept: FAMILY MEDICINE | Facility: CLINIC | Age: 39
End: 2024-03-25
Payer: COMMERCIAL

## 2024-03-25 DIAGNOSIS — D61.818 PANCYTOPENIA: Primary | ICD-10-CM

## 2024-04-05 ENCOUNTER — OFFICE VISIT (OUTPATIENT)
Dept: HEMATOLOGY/ONCOLOGY | Facility: CLINIC | Age: 39
End: 2024-04-05
Payer: COMMERCIAL

## 2024-04-05 VITALS
HEIGHT: 69 IN | DIASTOLIC BLOOD PRESSURE: 82 MMHG | RESPIRATION RATE: 16 BRPM | SYSTOLIC BLOOD PRESSURE: 145 MMHG | BODY MASS INDEX: 43.79 KG/M2 | WEIGHT: 295.63 LBS | HEART RATE: 69 BPM | TEMPERATURE: 98 F

## 2024-04-05 DIAGNOSIS — D64.9 ANEMIA, UNSPECIFIED TYPE: ICD-10-CM

## 2024-04-05 DIAGNOSIS — D61.818 PANCYTOPENIA: Primary | ICD-10-CM

## 2024-04-05 DIAGNOSIS — D64.9 ANEMIA, NORMOCYTIC NORMOCHROMIC: ICD-10-CM

## 2024-04-05 DIAGNOSIS — D53.9 NUTRITIONAL ANEMIA, UNSPECIFIED: ICD-10-CM

## 2024-04-05 DIAGNOSIS — D72.819 LEUKOPENIA, UNSPECIFIED TYPE: ICD-10-CM

## 2024-04-05 DIAGNOSIS — D69.6 THROMBOCYTOPENIA: ICD-10-CM

## 2024-04-05 PROCEDURE — 99203 OFFICE O/P NEW LOW 30 MIN: CPT | Mod: S$GLB,,, | Performed by: INTERNAL MEDICINE

## 2024-04-05 NOTE — PROGRESS NOTES
"[facility]  History & Physical    Subjective:      Patient ID:   NAME: Gerardo Ramirez Jr. : 1985     39 y.o. male    Referring Doc: Galindo Berrios Jr.,*  Other Physicians: Micheline Lanier/Brant Yeung        Chief Complaint: pancytopenia      HPI:  39 y.o. male with diagnosis of pancytopenia who has been referred by Galindo Berrios Jr.,* for evaluation by medical hematology/oncology. He is here with his wife.    He was found to have some recent decline in his blood counts.    He works for [employer]. No history of blood disorders in family.    He reports that he feels "good"; has been back at the gym. He has been less tired.    Breathing. Former smoker and quit a year and a half ago.    No unexplained weight loss, no fevers of night sweats    He drinks only 1-2x/week    Maternal grandmother had pancreatic cancer    Maternal uncle with colon cancer    Paternal aunt with brain cancer              ROS:   GEN: normal without any fever, night sweats or weight loss  HEENT: normal with no HA's, sore throat, stiff neck, changes in vision  CV: normal with no CP, SOB, PND, TRUONG or orthopnea  PULM: normal with no SOB, cough, hemoptysis, sputum or pleuritic pain  GI: normal with no abdominal pain, nausea, vomiting, constipation, diarrhea, melanotic stools, BRBPR, or hematemesis  : normal with no hematuria, dysuria  BREAST: normal with no mass, discharge, pain  SKIN: normal with no rash, erythema, bruising, or swelling       Past Medical/Surgical History:  Past Medical History:   Diagnosis Date    Anemia, normocytic normochromic 2024    Anemia, unspecified 2024    Anxiety     Atrial fibrillation 2020    new onset- meds changed    Hyperlipidemia     Hypertension 2013    Inguinal hernia bilateral, non-recurrent 2020    right side more pronounced- surg scheduled    Leucopenia 2024    Pancytopenia 2024    Pneumonia     age 11    Sleep " apnea     Sleep apnea-like behavior     needs sleep study- wife says he stops breathing while sleeping    Thrombocytopenia 2024     Past Surgical History:   Procedure Laterality Date    CHOLECYSTECTOMY  2019    w/ Small Umbilical hernia rep-     HERNIA REPAIR Bilateral 03/10/2020    Dr. Lanier - [facility]    UMBILICAL HERNIA REPAIR  2019        VASECTOMY Bilateral 2020    Procedure: VASECTOMY;  Surgeon: German Tobias MD;  Location: [facility];  Service: Urology;  Laterality: Bilateral;         Allergies:  Review of patient's allergies indicates:   Allergen Reactions    Amoxicillin Hives       Social/Family History:  Social History     Socioeconomic History    Marital status:    Occupational History    Occupation: [employer]   Tobacco Use    Smoking status: Former     Current packs/day: 0.00     Types: Cigarettes, Vaping with nicotine     Quit date: 2022     Years since quittin.3    Smokeless tobacco: Never   Substance and Sexual Activity    Alcohol use: Yes     Comment: occ    Drug use: No    Sexual activity: Yes     Partners: Female   Social History Narrative    LIVE WITH FIANCE     Social Determinants of Health     Stress: Stress Concern Present (10/16/2020)    [facility] of Occupational Health - Occupational Stress Questionnaire     Feeling of Stress : To some extent     Family History   Problem Relation Age of Onset    Diabetes Mother     Hypertension Mother     Hypertension Sister     Hyperlipidemia Sister     Anxiety disorder Sister     Coronary artery disease Maternal Grandfather     No Known Problems Father          Medications:    Current Outpatient Medications:     butalbital-acetaminophen-caff -40 mg Cap, Take 1-2 capsules by mouth 3 (three) times daily., Disp: 30 capsule, Rfl: 1    ergocalciferol (VITAMIN D2) 50,000 unit Cap, Take 1 capsule (50,000 Units total) by mouth every 7 days., Disp: 12 capsule, Rfl: 1    metoprolol succinate  "(TOPROL-XL) 50 MG 24 hr tablet, Take 1 tablet (50 mg total) by mouth once daily., Disp: 90 tablet, Rfl: 1    olmesartan (BENICAR) 40 MG tablet, Take 1 tablet (40 mg total) by mouth once daily., Disp: 90 tablet, Rfl: 1    pitavastatin calcium (LIVALO) 2 mg Tab tablet, Take 1 tablet (2 mg total) by mouth every evening., Disp: 90 tablet, Rfl: 1    tiZANidine (ZANAFLEX) 4 MG tablet, Take 1 tablet (4 mg total) by mouth every 8 (eight) hours as needed (Pain, muscle spasm)., Disp: 30 tablet, Rfl: 1    amLODIPine (NORVASC) 5 MG tablet, Take 1 tablet (5 mg total) by mouth once daily. (Patient not taking: Reported on 4/5/2024), Disp: 90 tablet, Rfl: 1    coenzyme Q10 200 mg capsule, Take 200 mg by mouth once daily., Disp: , Rfl:     sumatriptan (IMITREX) 50 MG tablet, Take 1 tablet (50 mg total) by mouth every 2 (two) hours as needed for Migraine. Max of 2 doses in 24 hours, Disp: 9 tablet, Rfl: 3      Pathology:   Cancer Staging   No matching staging information was found for the patient.      Objective:   Vitals:  Blood pressure (!) 145/82, pulse 69, temperature 97.8 °F (36.6 °C), resp. rate 16, height 5' 9" (1.753 m), weight 134.1 kg (295 lb 9.6 oz).    Physical Examination:   GEN: no apparent distress, comfortable; AAOx3  HEAD: atraumatic and normocephalic  EYES: no pallor, no icterus, PERRLA  ENT: OMM, no pharyngeal erythema, external ears WNL; no nasal discharge; no thrush  NECK: no masses, thyroid normal, trachea midline, no LAD/LN's, supple  CV: RRR with no murmur; normal pulse; normal S1 and S2; no pedal edema  CHEST: Normal respiratory effort; CTAB; normal breath sounds; no wheeze or crackles  ABDOM: nontender and nondistended; soft; normal bowel sounds; no rebound/guarding  MUSC/Skeletal: ROM normal; no crepitus; joints normal; no deformities or arthropathy  EXTREM: no clubbing, cyanosis, inflammation or swelling  SKIN: no rashes, lesions, ulcers, petechiae or subcutaneous nodules  : no bah  NEURO: grossly " intact; motor/sensory WNL; AAOx3; no tremors  PSYCH: normal mood, affect and behavior  LYMPH: normal cervical, supraclavicular, axillary and groin LN's      Labs:   Lab Results   Component Value Date    WBC 2.7 (L) 03/21/2024    HGB 12.3 (L) 03/21/2024    HCT 36.5 (L) 03/21/2024    MCV 89 03/21/2024     (L) 03/21/2024    CMP  Sodium   Date Value Ref Range Status   03/18/2024 138 134 - 144 mmol/L Final     Potassium   Date Value Ref Range Status   03/18/2024 4.1 3.5 - 5.2 mmol/L Final     Chloride   Date Value Ref Range Status   03/18/2024 106 96 - 106 mmol/L Final     CO2   Date Value Ref Range Status   03/18/2024 18 (L) 20 - 29 mmol/L Final     Glucose   Date Value Ref Range Status   03/18/2024 102 (H) 70 - 99 mg/dL Final     BUN   Date Value Ref Range Status   03/18/2024 12 6 - 24 mg/dL Final     Creatinine   Date Value Ref Range Status   03/18/2024 0.89 0.76 - 1.27 mg/dL Final     Calcium   Date Value Ref Range Status   03/18/2024 9.2 8.7 - 10.2 mg/dL Final     Total Protein   Date Value Ref Range Status   11/24/2020 7.1 6.0 - 8.4 g/dL Final     Albumin   Date Value Ref Range Status   03/18/2024 4.3 4.1 - 5.1 g/dL Final   11/24/2020 4.2 3.5 - 5.2 g/dL Final     Total Bilirubin   Date Value Ref Range Status   03/18/2024 0.6 0.0 - 1.2 mg/dL Final     Alkaline Phosphatase   Date Value Ref Range Status   11/24/2020 83 55 - 135 U/L Final     AST   Date Value Ref Range Status   03/18/2024 31 0 - 40 IU/L Final     ALT   Date Value Ref Range Status   03/18/2024 58 (H) 0 - 44 IU/L Final     Anion Gap   Date Value Ref Range Status   11/24/2020 12 8 - 16 mmol/L Final     eGFR if    Date Value Ref Range Status   11/24/2020 >60 >60 mL/min/1.73 m^2 Final     eGFR if non    Date Value Ref Range Status   04/27/2021 105 >59 mL/min/1.73 Final         Radiology/Diagnostic Studies:          All lab results and imaging results have been reviewed and discussed with the patient    Assessment:    (1) 39 y.o. male with diagnosis of pancytopenia who has been referred by Galindo Berrios Jr.,* for evaluation by medical hematology/oncology.     - leucopenia with WBC at 2.7 with relatively adequate differential breakdown  - anemia with hgb at 12.3 - NCNC parameters  - mild thrombocytopenia with plats at 118,000    - his lab work was normal about 3 yrs ago; recent Hep C ab was nonreactive and HIV was negative    - he only took one dose of the imitrex and has not been on it with any consistence  - he has been on Livalo since Feb 2024    (2) HTN and hypercholesterolemia    (3) Atrial fibrillation    (4) Hepatic steatosis; elevated LFts    (5) Anxiety    (6) Migraine HA's - was on Imitrex but only took one dose    (7) EASTON    (8) Former smoker - quit Nov 2022 (vapes)     (9) Occasional alcohol intake    (10) Hx/of hernia repair, cholecystectomy and vasectomy        VISIT DIAGNOSES:     Pancytopenia  -     Ambulatory referral/consult to Hematology / Oncology    Leukopenia, unspecified type    Anemia, normocytic normochromic    Anemia, unspecified type    Thrombocytopenia          Plan:     PLAN:  Set up bone marrow biopsy  Check DAYLIN, B12/folate, iron panel; viral panel, DAYLIN  Consider GI evaluation at some point   Check labs monthly  for now  Check US of liver and spleen  F/u with PCP    RTC in  3 weeks   Fax note to Galindo Berrios Jr.,* Brant Lanier [facility] [place], [place]    COVID-19 Discussion:    Discussion on COVID-19 coronavirus epidemic and the recommended precautions with regard to cancer and/or hematology patients. Re-iterated the CDC recommendations for adequate hand washing, use of hand -like products, and coughing into elbow, etc. In addition, especially for our patients who are on chemotherapy and/or our otherwise immunocompromised patients, I have recommended avoidance of crowds, including movie theaters, restaurants, churches, etc. I have recommended avoidance of any sick or symptomatic  family members and/or friends. I have also recommended avoidance of any raw and unwashed food products, and general avoidance of food items that have not been prepared by themselves. The patient has been asked to call us immediately with any symptom developments, issues, questions or other general concerns. They are to proceed to Urgent Care for testing if they suspect they are afflicted and they are to call their PCP for further therapy/care.       I have explained and the patient understands all of  the current recommendation(s). I have answered all of their questions to the best of my ability and to their complete satisfaction.           Thank you for allowing me to participate in this patient's care. Please call with any questions or concerns.    Electronically signed Sung Cummings MD

## 2024-04-08 ENCOUNTER — TELEPHONE (OUTPATIENT)
Dept: RADIOLOGY | Facility: HOSPITAL | Age: 39
End: 2024-04-08

## 2024-04-08 NOTE — NURSING
Bone marrow bx scheduled @ [facility] on 4/25 @ 10am with arrival @ 8am.  Pre-procedure instructions given and understanding verbalized.

## 2024-04-09 ENCOUNTER — TELEPHONE (OUTPATIENT)
Dept: HEMATOLOGY/ONCOLOGY | Facility: CLINIC | Age: 39
End: 2024-04-09

## 2024-04-09 DIAGNOSIS — D72.819 LEUKOPENIA, UNSPECIFIED TYPE: ICD-10-CM

## 2024-04-09 DIAGNOSIS — D61.818 PANCYTOPENIA: Primary | ICD-10-CM

## 2024-04-09 NOTE — TELEPHONE ENCOUNTER
----- Message from Gwen Haskins MA sent at 4/8/2024  2:10 PM CDT -----  Regarding: Imaging Tests  Patient advises Dr. ARCOS was supposed to order an Ultrasound of his liver and spleen but there are no orders in for that. Wanted to see if the US needs to happen or not.     Callback [phone]

## 2024-04-09 NOTE — TELEPHONE ENCOUNTER
Message reviewed with Dr. Cummings, per his verbal orders I placed orders for abd US liver and spleen. Patient made aware of orders placed and that once auth obtained they will call with appt, instructed to call me if he does not hear from scheduling by the end of this week. Verbalized understanding.

## 2024-04-24 ENCOUNTER — TELEPHONE (OUTPATIENT)
Dept: HEMATOLOGY/ONCOLOGY | Facility: CLINIC | Age: 39
End: 2024-04-24

## 2024-04-25 ENCOUNTER — HOSPITAL ENCOUNTER (OUTPATIENT)
Dept: RADIOLOGY | Facility: HOSPITAL | Age: 39
Discharge: HOME | End: 2024-04-25
Attending: INTERNAL MEDICINE
Payer: COMMERCIAL

## 2024-04-25 VITALS
SYSTOLIC BLOOD PRESSURE: 105 MMHG | TEMPERATURE: 98 F | RESPIRATION RATE: 16 BRPM | OXYGEN SATURATION: 97 % | DIASTOLIC BLOOD PRESSURE: 53 MMHG | HEART RATE: 70 BPM

## 2024-04-25 DIAGNOSIS — D72.819 LEUKOPENIA, UNSPECIFIED TYPE: ICD-10-CM

## 2024-04-25 DIAGNOSIS — D61.818 PANCYTOPENIA: ICD-10-CM

## 2024-04-25 LAB
APTT PPP: 30.3 SEC (ref 21–32)
BASOPHILS NFR BLD: 0 % (ref 0–1.9)
DIFFERENTIAL METHOD BLD: ABNORMAL
EOSINOPHIL NFR BLD: 4 % (ref 0–8)
ERYTHROCYTE [DISTWIDTH] IN BLOOD BY AUTOMATED COUNT: 14.2 % (ref 11.5–14.5)
HCT VFR BLD AUTO: 37.2 % (ref 40–54)
HGB BLD-MCNC: 12.4 G/DL (ref 14–18)
IMM GRANULOCYTES # BLD AUTO: ABNORMAL K/UL (ref 0–0.04)
IMM GRANULOCYTES NFR BLD AUTO: ABNORMAL % (ref 0–0.5)
INR PPP: 1 (ref 0.8–1.2)
LYMPHOCYTES NFR BLD: 55 % (ref 18–48)
MCH RBC QN AUTO: 29.7 PG (ref 27–31)
MCHC RBC AUTO-ENTMCNC: 33.3 G/DL (ref 32–36)
MCV RBC AUTO: 89 FL (ref 82–98)
MONOCYTES NFR BLD: 12 % (ref 4–15)
NEUTROPHILS NFR BLD: 24 % (ref 38–73)
NEUTS BAND NFR BLD MANUAL: 5 %
NRBC BLD-RTO: 0 /100 WBC
OVALOCYTES BLD QL SMEAR: ABNORMAL
PLATELET # BLD AUTO: 108 K/UL (ref 150–450)
PLATELET BLD QL SMEAR: ABNORMAL
PMV BLD AUTO: 9.4 FL (ref 9.2–12.9)
POIKILOCYTOSIS BLD QL SMEAR: SLIGHT
PROTHROMBIN TIME: 11.5 SEC (ref 9–12.5)
RBC # BLD AUTO: 4.18 M/UL (ref 4.6–6.2)
WBC # BLD AUTO: 2.46 K/UL (ref 3.9–12.7)

## 2024-04-25 PROCEDURE — 76700 US EXAM ABDOM COMPLETE: CPT | Mod: TC

## 2024-04-25 PROCEDURE — 99152 MOD SED SAME PHYS/QHP 5/>YRS: CPT

## 2024-04-25 PROCEDURE — 85610 PROTHROMBIN TIME: CPT | Performed by: RADIOLOGY

## 2024-04-25 PROCEDURE — 85007 BL SMEAR W/DIFF WBC COUNT: CPT | Performed by: RADIOLOGY

## 2024-04-25 PROCEDURE — 38222 DX BONE MARROW BX & ASPIR: CPT | Mod: TC,RT

## 2024-04-25 PROCEDURE — 38222 DX BONE MARROW BX & ASPIR: CPT | Mod: 26,RT,, | Performed by: RADIOLOGY

## 2024-04-25 PROCEDURE — 63600175 PHARM REV CODE 636 W HCPCS: Performed by: RADIOLOGY

## 2024-04-25 PROCEDURE — 25000003 PHARM REV CODE 250: Performed by: RADIOLOGY

## 2024-04-25 PROCEDURE — 85027 COMPLETE CBC AUTOMATED: CPT | Performed by: RADIOLOGY

## 2024-04-25 PROCEDURE — 77012 CT SCAN FOR NEEDLE BIOPSY: CPT

## 2024-04-25 PROCEDURE — 99153 MOD SED SAME PHYS/QHP EA: CPT

## 2024-04-25 PROCEDURE — 77012 CT SCAN FOR NEEDLE BIOPSY: CPT | Mod: 26,,, | Performed by: RADIOLOGY

## 2024-04-25 PROCEDURE — 85730 THROMBOPLASTIN TIME PARTIAL: CPT | Performed by: RADIOLOGY

## 2024-04-25 RX ORDER — LIDOCAINE HYDROCHLORIDE 10 MG/ML
INJECTION, SOLUTION EPIDURAL; INFILTRATION; INTRACAUDAL; PERINEURAL
Status: COMPLETED | OUTPATIENT
Start: 2024-04-25 | End: 2024-04-25

## 2024-04-25 RX ORDER — FENTANYL CITRATE 50 UG/ML
INJECTION, SOLUTION INTRAMUSCULAR; INTRAVENOUS
Status: COMPLETED | OUTPATIENT
Start: 2024-04-25 | End: 2024-04-25

## 2024-04-25 RX ORDER — MIDAZOLAM HYDROCHLORIDE 1 MG/ML
INJECTION, SOLUTION INTRAMUSCULAR; INTRAVENOUS
Status: COMPLETED | OUTPATIENT
Start: 2024-04-25 | End: 2024-04-25

## 2024-04-25 RX ADMIN — FENTANYL CITRATE 50 MCG: 50 INJECTION INTRAMUSCULAR; INTRAVENOUS at 10:04

## 2024-04-25 RX ADMIN — FENTANYL CITRATE 25 MCG: 50 INJECTION INTRAMUSCULAR; INTRAVENOUS at 10:04

## 2024-04-25 RX ADMIN — MIDAZOLAM 1 MG: 1 INJECTION INTRAMUSCULAR; INTRAVENOUS at 10:04

## 2024-04-25 RX ADMIN — LIDOCAINE HYDROCHLORIDE 15 ML: 10 INJECTION, SOLUTION EPIDURAL; INFILTRATION; INTRACAUDAL at 10:04

## 2024-04-25 RX ADMIN — MIDAZOLAM 2 MG: 1 INJECTION INTRAMUSCULAR; INTRAVENOUS at 10:04

## 2024-04-25 NOTE — SEDATION DOCUMENTATION
Pt to ct via stretcher.  Pt awake, alert, cooperative.  ID x 2 identifiers.  Procedure explained, informed consent obtained.  Resp even and unlabored, skin w/d/p.  Pt assisted to ct table and pre procedure imaging initiated.

## 2024-04-25 NOTE — PLAN OF CARE
Bone marrow bx completed.  Pt luis well.  Skin w/d/p, resp even and unlabored.  Pt assisted to stretcher and transported to [facility] with RN

## 2024-04-29 LAB
ALBUMIN SERPL-MCNC: 4.3 G/DL (ref 4.1–5.1)
ALBUMIN/GLOB SERPL: 1.6 {RATIO} (ref 1.2–2.2)
ALP SERPL-CCNC: 72 IU/L (ref 44–121)
ALT SERPL-CCNC: 34 IU/L (ref 0–44)
AST SERPL-CCNC: 19 IU/L (ref 0–40)
BASOPHILS # BLD AUTO: 0 X10E3/UL (ref 0–0.2)
BASOPHILS NFR BLD AUTO: 0 %
BILIRUB SERPL-MCNC: 0.5 MG/DL (ref 0–1.2)
BUN SERPL-MCNC: 17 MG/DL (ref 6–24)
BUN/CREAT SERPL: 19 (ref 9–20)
CALCIUM SERPL-MCNC: 9.2 MG/DL (ref 8.7–10.2)
CENTROMERE B AB SER-ACNC: <0.2 AI (ref 0–0.9)
CHLORIDE SERPL-SCNC: 103 MMOL/L (ref 96–106)
CHROMATIN AB SERPL-ACNC: <0.2 AI (ref 0–0.9)
CO2 SERPL-SCNC: 19 MMOL/L (ref 20–29)
CREAT SERPL-MCNC: 0.89 MG/DL (ref 0.76–1.27)
DSDNA AB SER-ACNC: 21 IU/ML (ref 0–9)
EBV NA IGG SER IA-ACNC: 260 U/ML (ref 0–17.9)
EBV VCA IGG SER IA-ACNC: 89.6 U/ML (ref 0–17.9)
EBV VCA IGM SER IA-ACNC: <36 U/ML (ref 0–35.9)
ENA JO1 AB SER-ACNC: <0.2 AI (ref 0–0.9)
ENA RNP AB SER-ACNC: <0.2 AI (ref 0–0.9)
ENA SCL70 AB SER-ACNC: <0.2 AI (ref 0–0.9)
ENA SM AB SER-ACNC: <0.2 AI (ref 0–0.9)
ENA SS-A AB SER-ACNC: <0.2 AI (ref 0–0.9)
ENA SS-B AB SER-ACNC: <0.2 AI (ref 0–0.9)
EOSINOPHIL # BLD AUTO: 0.1 X10E3/UL (ref 0–0.4)
EOSINOPHIL NFR BLD AUTO: 3 %
ERYTHROCYTE [DISTWIDTH] IN BLOOD BY AUTOMATED COUNT: 14.9 % (ref 11.6–15.4)
EST. GFR  (NO RACE VARIABLE): 110 ML/MIN/1.73
FERRITIN SERPL-MCNC: 274 NG/ML (ref 30–400)
FOLATE SERPL-MCNC: >20 NG/ML
GLOBULIN SER CALC-MCNC: 2.7 G/DL (ref 1.5–4.5)
GLUCOSE SERPL-MCNC: 106 MG/DL (ref 70–99)
HAV IGM SERPL QL IA: NEGATIVE
HBV CORE IGM SERPL QL IA: NEGATIVE
HBV SURFACE AG SERPL QL IA: NEGATIVE
HCT VFR BLD AUTO: 38.3 % (ref 37.5–51)
HCV AB SERPL QL IA: NORMAL
HCV IGG SERPL QL IA: NON REACTIVE
HGB BLD-MCNC: 12.7 G/DL (ref 13–17.7)
HIV 1+2 AB+HIV1 P24 AG SERPL QL IA: NON REACTIVE
IMM GRANULOCYTES # BLD AUTO: 0 X10E3/UL (ref 0–0.1)
IMM GRANULOCYTES NFR BLD AUTO: 0 %
IRON SATN MFR SERPL: 28 % (ref 15–55)
IRON SERPL-MCNC: 84 UG/DL (ref 38–169)
LYMPHOCYTES # BLD AUTO: 1.5 X10E3/UL (ref 0.7–3.1)
LYMPHOCYTES NFR BLD AUTO: 56 %
Lab: ABNORMAL
MCH RBC QN AUTO: 30.4 PG (ref 26.6–33)
MCHC RBC AUTO-ENTMCNC: 33.2 G/DL (ref 31.5–35.7)
MCV RBC AUTO: 92 FL (ref 79–97)
MONOCYTES # BLD AUTO: 0.2 X10E3/UL (ref 0.1–0.9)
MONOCYTES NFR BLD AUTO: 8 %
MORPHOLOGY BLD-IMP: ABNORMAL
NEUTROPHILS # BLD AUTO: 0.9 X10E3/UL (ref 1.4–7)
NEUTROPHILS NFR BLD AUTO: 33 %
PLATELET # BLD AUTO: 102 X10E3/UL (ref 150–450)
POTASSIUM SERPL-SCNC: 4.3 MMOL/L (ref 3.5–5.2)
PROT SERPL-MCNC: 7 G/DL (ref 6–8.5)
RBC # BLD AUTO: 4.18 X10E6/UL (ref 4.14–5.8)
SERVICE CMNT-IMP: ABNORMAL
SODIUM SERPL-SCNC: 135 MMOL/L (ref 134–144)
TIBC SERPL-MCNC: 299 UG/DL (ref 250–450)
UIBC SERPL-MCNC: 215 UG/DL (ref 111–343)
VIT B12 SERPL-MCNC: 399 PG/ML (ref 232–1245)
WBC # BLD AUTO: 2.6 X10E3/UL (ref 3.4–10.8)

## 2024-04-30 NOTE — PROGRESS NOTES
[facility]ology/Oncology  PROGRESS NOTE - 2nd Follow-up Visit      Subjective:       Patient ID:   NAME: Gerardo Ramirez Jr. : 1985     39 y.o. male    Referring Doc: Galindo Berrios Jr.,*  Other Physicians: Micheline Lanier/Brant Yeung           Chief Complaint: pancytopenia f/u    History of Present Illness:     Patient returns today for a 2nd regularly scheduled follow-up visit.  The patient is here today to go over the results of the recently ordered labs, tests and studies. He is here with his wife.    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             ROS:   GEN: normal without any fever, night sweats or weight loss  HEENT: normal with no HA's, sore throat, stiff neck, changes in vision  CV: normal with no CP, SOB, PND, TRUONG or orthopnea  PULM: normal with no SOB, cough, hemoptysis, sputum or pleuritic pain  GI: normal with no abdominal pain, nausea, vomiting, constipation, diarrhea, melanotic stools, BRBPR, or hematemesis  : normal with no hematuria, dysuria  BREAST: normal with no mass, discharge, pain  SKIN: normal with no rash, erythema, bruising, or swelling    Pain Scale:  0    Allergies:  Review of patient's allergies indicates:   Allergen Reactions    Amoxicillin Hives       Medications:    Current Outpatient Medications:     butalbital-acetaminophen-caff -40 mg Cap, Take 1-2 capsules by mouth 3 (three) times daily., Disp: 30 capsule, Rfl: 1    ergocalciferol (VITAMIN D2) 50,000 unit Cap, Take 1 capsule (50,000 Units total) by mouth every 7 days., Disp: 12 capsule, Rfl: 1    metoprolol succinate (TOPROL-XL) 50 MG 24 hr tablet, Take 1 tablet (50 mg total) by mouth once daily., Disp: 90 tablet, Rfl: 1    olmesartan (BENICAR) 40 MG tablet, Take 1 tablet (40 mg total) by mouth once  daily., Disp: 90 tablet, Rfl: 1    pitavastatin calcium (LIVALO) 2 mg Tab tablet, Take 1 tablet (2 mg total) by mouth every evening., Disp: 90 tablet, Rfl: 1    tiZANidine (ZANAFLEX) 4 MG tablet, Take 1 tablet (4 mg total) by mouth every 8 (eight) hours as needed (Pain, muscle spasm)., Disp: 30 tablet, Rfl: 1    PMHx/PSHx Updates:  See patient's last visit with me on 4/5/2024.  See H&P on 4/5/2024        Pathology:   Cancer Staging   No matching staging information was found for the patient.            Objective:     Vitals:  Blood pressure 132/75, pulse 88, temperature 97.4 °F (36.3 °C), weight 117 kg (258 lb).    Physical Examination:   GEN: no apparent distress, comfortable; AAOx3  HEAD: atraumatic and normocephalic  EYES: no pallor, no icterus, PERRLA  ENT: OMM, no pharyngeal erythema, external ears WNL; no nasal discharge; no thrush  NECK: no masses, thyroid normal, trachea midline, no LAD/LN's, supple  CV: RRR with no murmur; normal pulse; normal S1 and S2; no pedal edema  CHEST: Normal respiratory effort; CTAB; normal breath sounds; no wheeze or crackles  ABDOM: nontender and nondistended; soft; normal bowel sounds; no rebound/guarding  MUSC/Skeletal: ROM normal; no crepitus; joints normal; no deformities or arthropathy  EXTREM: no clubbing, cyanosis, inflammation or swelling  SKIN: no rashes, lesions, ulcers, petechiae or subcutaneous nodules  : no bah  NEURO: grossly intact; motor/sensory WNL; AAOx3; no tremors  PSYCH: normal mood, affect and behavior  LYMPH: normal cervical, supraclavicular, axillary and groin LN's            Labs:     Lab Results   Component Value Date    WBC 2.6 (L) 04/26/2024    HGB 12.7 (L) 04/26/2024    HCT 38.3 04/26/2024    MCV 92 04/26/2024     (L) 04/26/2024       CMP  Sodium   Date Value Ref Range Status   04/26/2024 135 134 - 144 mmol/L Final     Potassium   Date Value Ref Range Status   04/26/2024 4.3 3.5 - 5.2 mmol/L Final     Chloride   Date Value Ref Range Status    04/26/2024 103 96 - 106 mmol/L Final     CO2   Date Value Ref Range Status   04/26/2024 19 (L) 20 - 29 mmol/L Final     Glucose   Date Value Ref Range Status   04/26/2024 106 (H) 70 - 99 mg/dL Final     BUN   Date Value Ref Range Status   04/26/2024 17 6 - 24 mg/dL Final     Creatinine   Date Value Ref Range Status   04/26/2024 0.89 0.76 - 1.27 mg/dL Final     Calcium   Date Value Ref Range Status   04/26/2024 9.2 8.7 - 10.2 mg/dL Final     Total Protein   Date Value Ref Range Status   11/24/2020 7.1 6.0 - 8.4 g/dL Final     Albumin   Date Value Ref Range Status   04/26/2024 4.3 4.1 - 5.1 g/dL Final   11/24/2020 4.2 3.5 - 5.2 g/dL Final     Total Bilirubin   Date Value Ref Range Status   04/26/2024 0.5 0.0 - 1.2 mg/dL Final     Alkaline Phosphatase   Date Value Ref Range Status   11/24/2020 83 55 - 135 U/L Final     AST   Date Value Ref Range Status   04/26/2024 19 0 - 40 IU/L Final     ALT   Date Value Ref Range Status   04/26/2024 34 0 - 44 IU/L Final     Anion Gap   Date Value Ref Range Status   11/24/2020 12 8 - 16 mmol/L Final     eGFR   Date Value Ref Range Status   04/26/2024 110 >59 mL/min/1.73 Final           Radiology/Diagnostic Studies:    CT Dx Bone Marrow Biopsy(ies) w/ Aspiration; Right(XPD)    Result Date: 4/25/2024  EXAMINATION: CT DIAGNOSTIC BONE MARROW BIOPSY(IES) WITH ASPIRATION RIGHT(XPD) CLINICAL HISTORY: Other pancytopenia COMPARISON: None. FINDINGS: After obtaining written informed consent, which included a discussion of the risks and benefits of the procedure to include infection and bleeding, and allowing the patient the opportunity to ask questions, the patient agreed to the procedure. The patient was placed prone on the CT fluoroscopy table. A suitable skin entrance site overlying the right posterior iliac spine was localized with CT guidance. The skin was marked, and then prepped and draped in sterile fashion, with several milliliters of lidocaine 1% injected subcutaneously and along  the periosteum to achieve adequate local anesthesia. Following, a punch skin incision was made, and an 11-gauge Jamshidi bone biopsy needle was advanced into the posterior iliac spine under intermittent CT fluoroscopic guidance.  Several aspirate attempts were made, with very little aspirate obtained.  A core biopsy sample was obtained.  Attention was then turned to the left posterior iliac spine, with the skin prepped and draped in sterile fashion, and lidocaine 1% injected to achieve adequate local anesthesia.  A punch skin incision was made and an 11-gauge Jamshidi bone biopsy needle was advanced into the left posterior iliac spine with intermittent CT fluoroscopic guidance.  Several aspirate attempts were made, with very little aspirate obtained.  The needle was removed and a bandage was applied to the skin. The patient tolerated the procedure well, with no immediate postprocedural complications. There was no significant blood loss. Total moderate conscious sedation time with supervision by the interventional radiologist and monitoring by the special procedures registered nurse was 25 minutes. The patient received Versed 4 mg and Fentanyl 200 mcg IV during the procedure.     CT guided bone marrow core biopsy and aspiration as described. Electronically signed by: Miguel A Goode Date:    04/25/2024 Time:    11:34    US Abdomen Complete    Result Date: 4/25/2024  EXAMINATION: US ABDOMEN COMPLETE CLINICAL HISTORY: Pancytopenia COMPARISON: June 2021 FINDINGS: Sonographic assessment of the abdomen was performed.  The pancreas is partially obscured by bowel gas.  Visualized portions are normal.  The aorta is normal in caliber in the upper abdomen.  The inferior vena cava is unremarkable. The liver has a normal sonographic appearance, with no mass or contour abnormality.  Maximal longitudinal dimension is 16.9 cm.  Hepato pedal flow is noted within the portal vein. The gallbladder is absent.  Common bile duct is normal in  caliber at 5 mm. The bilateral kidneys have a normal appearance.  The spleen is enlarged measuring 15.0 cm in greatest longitudinal dimension.  There is no free fluid.     1. Mild splenomegaly. 2. Surgical absence of the gallbladder. Electronically signed by: Shaquille Mart Date:    04/25/2024 Time:    10:43      I have reviewed all available lab results and radiology reports.    Assessment/Plan:   (1) 39 y.o. male with diagnosis of pancytopenia who has been referred by Galindo Berrios Jr.,* for evaluation by medical hematology/oncology.      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        (2) HTN and hypercholesterolemia     (3) Atrial fibrillation     (4) Hepatic steatosis; elevated LFts     (5) Anxiety     (6) Migraine HA's - was on Imitrex but only took one dose     (7) EASTON     (8) Former smoker - quit Nov 2022 (vapes)      (9) Occasional alcohol intake     (10) Hx/of hernia repair, cholecystectomy and vasectomy            VISIT DIAGNOSES:      Pancytopenia    Thrombocytopenia    Leukopenia, unspecified type    Anemia, normocytic normochromic    Anemia, unspecified type          PLAN:  Proceed with evaluation and therapy under direction of Dr Hensley  Will follow alongside peripherally  Consider GI evaluation at some point   Check labs monthly for now  F/u with PCP     RTC in  3 weeks   Fax note to Galindo Berrios Jr.,*  "Brant Lanier C [place], O'Quin Kittrell, Jimmy L. Jr., MD,     Discussion:     Pathology Discussion:    I reviewed and discussed the pathology report(s) and radiograph reports (if available) in as simple to understand and/or laymen's terms to the best of my ability. I had an indepth conversation with the patient and went over the patient's individual diagnosis based on the information that was currently available. I discussed the TNM staging process with regard to the patient's particular cancer type, and the calculated stage based on the currently available TNM data and literature. I discussed the available prognostic data with regard to the current staging information and how it relates to the prognosis of their particular neoplastic process.      NCCN Guidelines:    I discussed the available treatment option(s) in accordance with the latest literature from the NCCN Clinical Practice Guidelines for the patient's particular type of cancer disorder. The NCCN Guidelines provide a "document evidence-based (and) consensus-driven management" of the care of oncology patients. The treatment recommendations were made not only in accordance to the NCCN guidelines, but also factored in to account the patient's overall age, condition, performance status and their medical co-morbidities. I went over the risks and benefits of the the treatment options (if any could be made) with regard to their particular cancer type, their cancer stage, their age, and their co-morbidities.     I spent over 25 mins of time with the patient. Reviewed results of the recently ordered labs, tests and studies; made directives with regards to the results. Over half of this time was spent couseling and coordinating care.    I have explained all of the above in detail and the patient understands all of the current recommendation(s). I have answered all of their questions to the best of my ability and to their complete satisfaction.   The patient " is to continue with the current management plan.            Electronically signed by Sung Cummings MD

## 2024-05-01 ENCOUNTER — OFFICE VISIT (OUTPATIENT)
Dept: HEMATOLOGY/ONCOLOGY | Facility: CLINIC | Age: 39
End: 2024-05-01
Payer: COMMERCIAL

## 2024-05-01 ENCOUNTER — TELEPHONE (OUTPATIENT)
Dept: HEMATOLOGY/ONCOLOGY | Facility: CLINIC | Age: 39
End: 2024-05-01

## 2024-05-01 ENCOUNTER — TELEPHONE (OUTPATIENT)
Dept: HEMATOLOGY/ONCOLOGY | Facility: CLINIC | Age: 39
End: 2024-05-01
Payer: COMMERCIAL

## 2024-05-01 VITALS
BODY MASS INDEX: 38.1 KG/M2 | SYSTOLIC BLOOD PRESSURE: 132 MMHG | WEIGHT: 258 LBS | HEART RATE: 88 BPM | DIASTOLIC BLOOD PRESSURE: 75 MMHG | TEMPERATURE: 97 F

## 2024-05-01 DIAGNOSIS — D69.6 THROMBOCYTOPENIA: ICD-10-CM

## 2024-05-01 DIAGNOSIS — D64.9 ANEMIA, NORMOCYTIC NORMOCHROMIC: ICD-10-CM

## 2024-05-01 DIAGNOSIS — D72.819 LEUKOPENIA, UNSPECIFIED TYPE: ICD-10-CM

## 2024-05-01 DIAGNOSIS — D72.819 LEUKOPENIA, UNSPECIFIED TYPE: Primary | ICD-10-CM

## 2024-05-01 DIAGNOSIS — D61.818 PANCYTOPENIA: Primary | ICD-10-CM

## 2024-05-01 DIAGNOSIS — R89.7 ABNORMAL BIOPSY RESULT: ICD-10-CM

## 2024-05-01 DIAGNOSIS — D64.9 ANEMIA, UNSPECIFIED TYPE: ICD-10-CM

## 2024-05-01 PROCEDURE — 99215 OFFICE O/P EST HI 40 MIN: CPT | Mod: S$GLB,,, | Performed by: INTERNAL MEDICINE

## 2024-05-01 NOTE — NURSING
Oncology Navigation   Intake  Cancer Type: Leukemia  Type of Referral: External  Date of Referral: 04/30/24  Initial Nurse Navigator Contact: 05/01/24  Referral to Initial Contact Timeline (days): 1  Date Worked: 05/01/24  First Appointment Available: 05/20/24  Appointment Date: 05/20/24  First Available Date vs. Scheduled Date (days): 0     Treatment                              Acuity      Follow Up  No follow-ups on file.

## 2024-05-01 NOTE — TELEPHONE ENCOUNTER
Referral placed to hem/onc, Dr. Hensley per Dr. Cummings's verbal orders to do so. Dr Cummings spoke to Dr Hensley, his office is aware to call patient this afternoon to schedule after patient seen in this office to review results.

## 2024-05-01 NOTE — TELEPHONE ENCOUNTER
Called and spoke to pt.  Appt scheduled on 5.20 with Dr Paredes.  All questions and concerns addressed.

## 2024-05-15 ENCOUNTER — TELEPHONE (OUTPATIENT)
Facility: CLINIC | Age: 39
End: 2024-05-15
Payer: COMMERCIAL

## 2024-05-15 ENCOUNTER — LAB VISIT (OUTPATIENT)
Dept: LAB | Facility: HOSPITAL | Age: 39
End: 2024-05-15
Attending: INTERNAL MEDICINE
Payer: COMMERCIAL

## 2024-05-15 DIAGNOSIS — D72.819 LEUKOPENIA, UNSPECIFIED TYPE: ICD-10-CM

## 2024-05-15 PROCEDURE — 88342 IMHCHEM/IMCYTCHM 1ST ANTB: CPT | Performed by: PATHOLOGY

## 2024-05-15 PROCEDURE — 88341 IMHCHEM/IMCYTCHM EA ADD ANTB: CPT | Performed by: PATHOLOGY

## 2024-05-15 PROCEDURE — 88341 IMHCHEM/IMCYTCHM EA ADD ANTB: CPT | Mod: 26,59,, | Performed by: PATHOLOGY

## 2024-05-15 PROCEDURE — 88342 IMHCHEM/IMCYTCHM 1ST ANTB: CPT | Mod: 26,59,, | Performed by: PATHOLOGY

## 2024-05-15 PROCEDURE — 88321 CONSLTJ&REPRT SLD PREP ELSWR: CPT | Mod: ,,, | Performed by: PATHOLOGY

## 2024-05-15 PROCEDURE — 88325 CONSLTJ COMPRE RVW REC REPRT: CPT | Performed by: PATHOLOGY

## 2024-05-20 ENCOUNTER — LAB VISIT (OUTPATIENT)
Dept: LAB | Facility: HOSPITAL | Age: 39
End: 2024-05-20
Attending: INTERNAL MEDICINE
Payer: COMMERCIAL

## 2024-05-20 ENCOUNTER — OFFICE VISIT (OUTPATIENT)
Dept: HEMATOLOGY/ONCOLOGY | Facility: CLINIC | Age: 39
End: 2024-05-20
Payer: COMMERCIAL

## 2024-05-20 VITALS
WEIGHT: 265.31 LBS | SYSTOLIC BLOOD PRESSURE: 140 MMHG | DIASTOLIC BLOOD PRESSURE: 84 MMHG | TEMPERATURE: 99 F | OXYGEN SATURATION: 98 % | BODY MASS INDEX: 39.29 KG/M2 | HEART RATE: 67 BPM | HEIGHT: 69 IN

## 2024-05-20 DIAGNOSIS — D84.822 IMMUNODEFICIENCY DUE TO EXTERNAL CAUSE: ICD-10-CM

## 2024-05-20 DIAGNOSIS — D61.818 PANCYTOPENIA: ICD-10-CM

## 2024-05-20 DIAGNOSIS — R89.7 ABNORMAL BIOPSY RESULT: ICD-10-CM

## 2024-05-20 DIAGNOSIS — C91.40 HAIRY CELL LEUKEMIA NOT HAVING ACHIEVED REMISSION: ICD-10-CM

## 2024-05-20 DIAGNOSIS — C91.40 HAIRY CELL LEUKEMIA NOT HAVING ACHIEVED REMISSION: Primary | ICD-10-CM

## 2024-05-20 LAB
ALBUMIN SERPL BCP-MCNC: 3.9 G/DL (ref 3.5–5.2)
ALP SERPL-CCNC: 74 U/L (ref 55–135)
ALT SERPL W/O P-5'-P-CCNC: 47 U/L (ref 10–44)
ANION GAP SERPL CALC-SCNC: 9 MMOL/L (ref 8–16)
AST SERPL-CCNC: 22 U/L (ref 10–40)
BASOPHILS # BLD AUTO: 0.01 K/UL (ref 0–0.2)
BASOPHILS NFR BLD: 0.3 % (ref 0–1.9)
BILIRUB SERPL-MCNC: 0.6 MG/DL (ref 0.1–1)
BUN SERPL-MCNC: 16 MG/DL (ref 6–20)
CALCIUM SERPL-MCNC: 9.2 MG/DL (ref 8.7–10.5)
CHLORIDE SERPL-SCNC: 106 MMOL/L (ref 95–110)
CO2 SERPL-SCNC: 21 MMOL/L (ref 23–29)
CREAT SERPL-MCNC: 0.9 MG/DL (ref 0.5–1.4)
DIFFERENTIAL METHOD BLD: ABNORMAL
EOSINOPHIL # BLD AUTO: 0.1 K/UL (ref 0–0.5)
EOSINOPHIL NFR BLD: 3.4 % (ref 0–8)
ERYTHROCYTE [DISTWIDTH] IN BLOOD BY AUTOMATED COUNT: 14 % (ref 11.5–14.5)
EST. GFR  (NO RACE VARIABLE): >60 ML/MIN/1.73 M^2
GLUCOSE SERPL-MCNC: 129 MG/DL (ref 70–110)
HBV CORE AB SERPL QL IA: NORMAL
HBV SURFACE AG SERPL QL IA: NORMAL
HCT VFR BLD AUTO: 35.2 % (ref 40–54)
HGB BLD-MCNC: 12.3 G/DL (ref 14–18)
IMM GRANULOCYTES # BLD AUTO: 0.01 K/UL (ref 0–0.04)
IMM GRANULOCYTES NFR BLD AUTO: 0.3 % (ref 0–0.5)
LDH SERPL L TO P-CCNC: 157 U/L (ref 110–260)
LYMPHOCYTES # BLD AUTO: 1.5 K/UL (ref 1–4.8)
LYMPHOCYTES NFR BLD: 50.5 % (ref 18–48)
MCH RBC QN AUTO: 31.1 PG (ref 27–31)
MCHC RBC AUTO-ENTMCNC: 34.9 G/DL (ref 32–36)
MCV RBC AUTO: 89 FL (ref 82–98)
MONOCYTES # BLD AUTO: 0.3 K/UL (ref 0.3–1)
MONOCYTES NFR BLD: 10 % (ref 4–15)
NEUTROPHILS # BLD AUTO: 1 K/UL (ref 1.8–7.7)
NEUTROPHILS NFR BLD: 35.5 % (ref 38–73)
NRBC BLD-RTO: 1 /100 WBC
PLATELET # BLD AUTO: 106 K/UL (ref 150–450)
PMV BLD AUTO: 9.5 FL (ref 9.2–12.9)
POTASSIUM SERPL-SCNC: 3.8 MMOL/L (ref 3.5–5.1)
PROT SERPL-MCNC: 7.3 G/DL (ref 6–8.4)
RBC # BLD AUTO: 3.95 M/UL (ref 4.6–6.2)
SODIUM SERPL-SCNC: 136 MMOL/L (ref 136–145)
WBC # BLD AUTO: 2.91 K/UL (ref 3.9–12.7)

## 2024-05-20 PROCEDURE — 87340 HEPATITIS B SURFACE AG IA: CPT | Performed by: INTERNAL MEDICINE

## 2024-05-20 PROCEDURE — 86704 HEP B CORE ANTIBODY TOTAL: CPT | Performed by: INTERNAL MEDICINE

## 2024-05-20 PROCEDURE — 83615 LACTATE (LD) (LDH) ENZYME: CPT | Performed by: INTERNAL MEDICINE

## 2024-05-20 PROCEDURE — 85025 COMPLETE CBC W/AUTO DIFF WBC: CPT | Performed by: INTERNAL MEDICINE

## 2024-05-20 PROCEDURE — 36415 COLL VENOUS BLD VENIPUNCTURE: CPT | Performed by: INTERNAL MEDICINE

## 2024-05-20 PROCEDURE — 99999 PR PBB SHADOW E&M-EST. PATIENT-LVL IV: CPT | Mod: PBBFAC,,, | Performed by: INTERNAL MEDICINE

## 2024-05-20 PROCEDURE — 99205 OFFICE O/P NEW HI 60 MIN: CPT | Mod: S$GLB,,, | Performed by: INTERNAL MEDICINE

## 2024-05-20 PROCEDURE — 80053 COMPREHEN METABOLIC PANEL: CPT | Performed by: INTERNAL MEDICINE

## 2024-05-20 RX ORDER — ACYCLOVIR 400 MG/1
400 TABLET ORAL 2 TIMES DAILY
Qty: 60 TABLET | Refills: 11 | Status: ON HOLD | OUTPATIENT
Start: 2024-05-20 | End: 2025-05-20

## 2024-05-20 RX ORDER — SULFAMETHOXAZOLE AND TRIMETHOPRIM 800; 160 MG/1; MG/1
1 TABLET ORAL
Qty: 10 TABLET | Refills: 10 | Status: SHIPPED | OUTPATIENT
Start: 2024-05-20 | End: 2024-05-28 | Stop reason: SDUPTHER

## 2024-05-20 NOTE — PROGRESS NOTES
HEMATOLOGIC MALIGNANCIES CONSULT NOTE    IDENTIFYING STATEMENT   Gerardo Ramirez Jr. (Gerardo) is a 39 y.o. male with a  of 1985 from [place], [place], referred by Dr. 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     HISTORY OF PRESENT ILLNESS:      Mr. 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    He had evaluation with Dr. Cummings on 2024 who performed broad workup for pancytopenia. No cause was identified on laboratory testing, so he had bone marrow biopsy on , which was consistent with hairy cell leukemia involving 95% of the marrow. BRAF V600E mutation was detected.     Abdominal ultrasound also shows splenomegaly measuring 15 cm.     He feels well and reports no complaints.     Past Medical History:   Diagnosis Date    Anemia, normocytic normochromic 2024    Anemia, unspecified 2024    Anxiety     Hyperlipidemia     Hypertension 2013    Inguinal hernia bilateral, non-recurrent 2020    right side more pronounced- surg scheduled    Leucopenia 2024    Pancytopenia 2024    Pneumonia     age 11    Sleep apnea     Sleep apnea-like behavior     needs sleep study- wife says he stops breathing while sleeping    Thrombocytopenia 2024       Family History   Problem Relation Name Age of Onset    Diabetes Mother      Hypertension Mother      Hypertension Sister      Hyperlipidemia Sister      Anxiety disorder Sister      Coronary artery disease Maternal Grandfather      No Known Problems Father         Social History     Socioeconomic History    Marital status:    Occupational History    Occupation: [employer]   Tobacco Use    Smoking status: Former     Current packs/day: 0.00     Types: Cigarettes, Vaping with nicotine     Quit date: 2022     Years since quittin.4    Smokeless tobacco: Never   Substance and Sexual Activity    Alcohol use: Yes     Comment: occ    Drug use:  "No    Sexual activity: Yes     Partners: Female   Social History Narrative    LIVE WITH MICHA     Social Determinants of Health     Stress: Stress Concern Present (10/16/2020)    [employer] - Occupational Stress Questionnaire     Feeling of Stress : To some extent         MEDICATIONS:     Current Outpatient Medications on File Prior to Visit   Medication Sig Dispense Refill    butalbital-acetaminophen-caff -40 mg Cap Take 1-2 capsules by mouth 3 (three) times daily. 30 capsule 1     No current facility-administered medications on file prior to visit.       ALLERGIES:   Review of patient's allergies indicates:   Allergen Reactions    Amoxicillin Hives        ROS:       Review of Systems   Constitutional:  Negative for diaphoresis, fatigue, fever and unexpected weight change.   HENT:   Negative for lump/mass and sore throat.    Eyes:  Negative for icterus.   Respiratory:  Negative for cough and shortness of breath.    Cardiovascular:  Negative for chest pain and palpitations.   Gastrointestinal:  Negative for abdominal distention, constipation, diarrhea, nausea and vomiting.   Genitourinary:  Negative for dysuria and frequency.    Musculoskeletal:  Negative for arthralgias, gait problem and myalgias.   Skin:  Negative for rash.   Neurological:  Negative for dizziness, gait problem and headaches.   Hematological:  Negative for adenopathy. Does not bruise/bleed easily.   Psychiatric/Behavioral:  The patient is not nervous/anxious.        PHYSICAL EXAM:  Vitals:    05/20/24 1352   BP: (!) 140/84   Pulse: 67   Temp: 99 °F (37.2 °C)   TempSrc: Oral   SpO2: 98%   Weight: 120.4 kg (265 lb 5.2 oz)   Height: 5' 9" (1.753 m)   PainSc: 0-No pain       Physical Exam  Constitutional:       General: He is not in acute distress.     Appearance: He is well-developed.   HENT:      Head: Normocephalic and atraumatic.      Mouth/Throat:      Mouth: No oral lesions.   Eyes:      Conjunctiva/sclera: " Conjunctivae normal.   Neck:      Thyroid: No thyromegaly.   Cardiovascular:      Rate and Rhythm: Normal rate and regular rhythm.      Heart sounds: Normal heart sounds. No murmur heard.  Pulmonary:      Breath sounds: Normal breath sounds. No wheezing or rales.   Abdominal:      General: There is no distension.      Palpations: Abdomen is soft. There is splenomegaly (spleen tip palpated but not extending below costal margin). There is no hepatomegaly or mass.      Tenderness: There is no abdominal tenderness.   Lymphadenopathy:      Cervical: No cervical adenopathy.      Right cervical: No deep cervical adenopathy.     Left cervical: No deep cervical adenopathy.   Skin:     Findings: No rash.   Neurological:      Mental Status: He is alert and oriented to person, place, and time.      Cranial Nerves: No cranial nerve deficit.      Coordination: Coordination normal.      Deep Tendon Reflexes: Reflexes are normal and symmetric.         LAB:   Results for orders placed or performed in visit on 05/03/24   CBC Auto Differential   Result Value Ref Range    WBC 2.7 (L) 3.4 - 10.8 x10E3/uL    RBC 4.28 4.14 - 5.80 x10E6/uL    Hemoglobin 12.7 (L) 13.0 - 17.7 g/dL    Hematocrit 38.7 37.5 - 51.0 %    MCV 90 79 - 97 fL    MCH 29.7 26.6 - 33.0 pg    MCHC 32.8 31.5 - 35.7 g/dL    RDW 14.7 11.6 - 15.4 %    Platelets 111 (L) 150 - 450 x10E3/uL    Neutrophils 30 Not Estab. %    Lymphs 56 Not Estab. %    Monocytes 10 Not Estab. %    Eos 4 Not Estab. %    Basos 0 Not Estab. %    Neutrophils (Absolute) 0.8 (L) 1.4 - 7.0 x10E3/uL    Lymphs (Absolute) 1.5 0.7 - 3.1 x10E3/uL    Monocytes(Absolute) 0.3 0.1 - 0.9 x10E3/uL    Eos (Absolute) 0.1 0.0 - 0.4 x10E3/uL    Baso (Absolute) 0.0 0.0 - 0.2 x10E3/uL    Immature Granulocytes 0 Not Estab. %    Immature Grans (Abs) 0.0 0.0 - 0.1 x10E3/uL    Hematology Comments: Note:    Comprehensive Metabolic Panel   Result Value Ref Range    Glucose 169 (H) 70 - 99 mg/dL    BUN 18 6 - 24 mg/dL     Creatinine 0.92 0.76 - 1.27 mg/dL    eGFR 107 >59 mL/min/1.73    BUN/Creatinine Ratio 20 9 - 20    Sodium 138 134 - 144 mmol/L    Potassium 4.1 3.5 - 5.2 mmol/L    Chloride 104 96 - 106 mmol/L    CO2 22 20 - 29 mmol/L    Calcium 9.1 8.7 - 10.2 mg/dL    Protein, Total 7.0 6.0 - 8.5 g/dL    Albumin 4.3 4.1 - 5.1 g/dL    Globulin, Total 2.7 1.5 - 4.5 g/dL    Albumin/Globulin Ratio 1.6 1.2 - 2.2    Total Bilirubin 0.6 0.0 - 1.2 mg/dL    Alkaline Phosphatase 73 44 - 121 IU/L    AST 19 0 - 40 IU/L    ALT 43 0 - 44 IU/L       PROBLEMS ASSESSED THIS VISIT:    1. Hairy cell leukemia not having achieved remission    2. Abnormal biopsy result    3. 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 Pancytopenia      IMPRESSION:    1. Hairy cell leukemia   A. 4/25/2024: Bone marrow biopsy - hairy cell leukemia with 95% marrow involvement; positive for BRAF V600E mutation    2. Anxiety  3. Hypertension  4. Hyperlipidemia  5. Obesity - Body mass index is 39.18 kg/m².  6. Hepatic steatosis    PLAN:       Hairy cell leukemia  Mr. Ramirez has a recent diagnosis of hairy cell leukemia. Bone marrow pathology review at [facility] is pending, but immunophenotypic findings and presence of BRAF V600E mutation supports diagnosis of hairy cell leukemia (formerly classic hairy cell leukemia).     We reviewed that this is an indolent lymphoid malignancy with generally favorable long-term prognosis. It is incurable but most individuals have a life expectancy that parallels that of people who are not afflicted with hairy cell leukemia.    He has moderate neutropenia with ANC consistently less than 1000, which serves as an indication for therapy. I recommend concurrent rituximab-cladribine. He will prefer to receive this closer to home, and Dr. Cummings is comfortable administering this.     Immunodeficiency  Due to hairy cell leukemia and expected to worsen with anticipated purine analogue therapy. I recommend antimicrobial prophylaxis as follows:  -  HSV/VZV: acyclovir 400 mg PO BID  - PJP: TMP/SMX DS PO qMWF    Pancytopenia  Due to hairy cell leukemia. Will require weekly monitoring throughout therapy and recovery.     Follow-up  - will receive rituximab-cladribine with Dr. Cummings  - Return to [facility] at end of therapy (will plan for 3 month follow-up)  - delay bone marrow biopsy until at least 4-6 months after the end of therapy    Route Chart for Scheduling    BMT Chart Routing      Follow up with physician 3 months.   Follow up with YECENIA    Provider visit type Malignant hem   Infusion scheduling note    Injection scheduling note    Labs CBC, CMP, LDH and type and screen   Scheduling:  Preferred lab:  Lab interval:     Imaging    Pharmacy appointment    Other referrals                  Chan Paredes MD  Hematologic Malignancies, Stem Cell Transplantation, and Cellular Therapy

## 2024-05-21 NOTE — PROGRESS NOTES
[facility]  PROGRESS NOTE -  Follow-up Visit      Subjective:       Patient ID:   NAME: Gerardo Ramirez Jr. : 1985     39 y.o. male    Referring Doc: Galindo Berrios Jr.,*  Other Physicians: Micheline Lanier/Brant Yeung           Chief Complaint: pancytopenia f/u    History of Present Illness:     Patient returns today for a regularly scheduled follow-up visit.  The patient is here today to go over the results of the recently ordered labs, tests and studies. He is here with his wife.    He had bone marrow biopsy on 2024 with pathology showing 95% of the marrow with hairy cell Leukemia (HCL)    He saw Dr Chan Paredes on 2024 and they are planning to proceed with a regimen of therapy with 2CDA , he also ordered CT scans of his chest/abdom/pelvis    He wants to start therapy sometime after 2024          ROS:   GEN: normal without any fever, night sweats or weight loss  HEENT: normal with no HA's, sore throat, stiff neck, changes in vision  CV: normal with no CP, SOB, PND, TRUONG or orthopnea  PULM: normal with no SOB, cough, hemoptysis, sputum or pleuritic pain  GI: normal with no abdominal pain, nausea, vomiting, constipation, diarrhea, melanotic stools, BRBPR, or hematemesis  : normal with no hematuria, dysuria  BREAST: normal with no mass, discharge, pain  SKIN: normal with no rash, erythema, bruising, or swelling    Pain Scale:  0    Allergies:  Review of patient's allergies indicates:   Allergen Reactions    Amoxicillin Hives       Medications:    Current Outpatient Medications:     acyclovir (ZOVIRAX) 400 MG tablet, Take 1 tablet (400 mg total) by mouth 2 (two) times daily., Disp: 60 tablet, Rfl: 11    butalbital-acetaminophen-caff -40 mg Cap, Take 1-2 capsules by mouth 3 (three) times daily., Disp: 30 capsule, Rfl: 1    ergocalciferol (VITAMIN D2) 50,000 unit Cap, Take 1 capsule (50,000 Units total) by mouth every 7 days., Disp: 12  "capsule, Rfl: 1    metoprolol succinate (TOPROL-XL) 50 MG 24 hr tablet, Take 1 tablet (50 mg total) by mouth once daily., Disp: 90 tablet, Rfl: 1    olmesartan (BENICAR) 40 MG tablet, Take 1 tablet (40 mg total) by mouth once daily., Disp: 90 tablet, Rfl: 1    pitavastatin calcium (LIVALO) 2 mg Tab tablet, Take 1 tablet (2 mg total) by mouth every evening., Disp: 90 tablet, Rfl: 1    sulfamethoxazole-trimethoprim 800-160mg (BACTRIM DS) 800-160 mg Tab, Take 1 tablet by mouth every Mon, Wed, Fri., Disp: 10 tablet, Rfl: 10    tiZANidine (ZANAFLEX) 4 MG tablet, Take 1 tablet (4 mg total) by mouth every 8 (eight) hours as needed (Pain, muscle spasm)., Disp: 30 tablet, Rfl: 1    PMHx/PSHx Updates:  See patient's last visit with me on 5/1/2024.  See H&P on 4/5/2024        Pathology:   Cancer Staging   No matching staging information was found for the patient.            Objective:     Vitals:  Blood pressure 131/85, pulse 79, temperature 97.2 °F (36.2 °C), resp. rate 18, height 5' 9" (1.753 m), weight 119.9 kg (264 lb 6.4 oz).    Physical Examination:   GEN: no apparent distress, comfortable; AAOx3  HEAD: atraumatic and normocephalic  EYES: no pallor, no icterus, PERRLA  ENT: OMM, no pharyngeal erythema, external ears WNL; no nasal discharge; no thrush  NECK: no masses, thyroid normal, trachea midline, no LAD/LN's, supple  CV: RRR with no murmur; normal pulse; normal S1 and S2; no pedal edema  CHEST: Normal respiratory effort; CTAB; normal breath sounds; no wheeze or crackles  ABDOM: nontender and nondistended; soft; normal bowel sounds; no rebound/guarding  MUSC/Skeletal: ROM normal; no crepitus; joints normal; no deformities or arthropathy  EXTREM: no clubbing, cyanosis, inflammation or swelling  SKIN: no rashes, lesions, ulcers, petechiae or subcutaneous nodules  : no bah  NEURO: grossly intact; motor/sensory WNL; AAOx3; no tremors  PSYCH: normal mood, affect and behavior  LYMPH: normal cervical, supraclavicular, " axillary and groin LN's            Labs:     Lab Results   Component Value Date    WBC 2.91 (L) 05/20/2024    HGB 12.3 (L) 05/20/2024    HCT 35.2 (L) 05/20/2024    MCV 89 05/20/2024     (L) 05/20/2024       CMP  Sodium   Date Value Ref Range Status   05/20/2024 136 136 - 145 mmol/L Final     Potassium   Date Value Ref Range Status   05/20/2024 3.8 3.5 - 5.1 mmol/L Final     Chloride   Date Value Ref Range Status   05/20/2024 106 95 - 110 mmol/L Final     CO2   Date Value Ref Range Status   05/20/2024 21 (L) 23 - 29 mmol/L Final     Glucose   Date Value Ref Range Status   05/20/2024 129 (H) 70 - 110 mg/dL Final     BUN   Date Value Ref Range Status   05/20/2024 16 6 - 20 mg/dL Final     Creatinine   Date Value Ref Range Status   05/20/2024 0.9 0.5 - 1.4 mg/dL Final     Calcium   Date Value Ref Range Status   05/20/2024 9.2 8.7 - 10.5 mg/dL Final     Total Protein   Date Value Ref Range Status   05/20/2024 7.3 6.0 - 8.4 g/dL Final     Albumin   Date Value Ref Range Status   05/20/2024 3.9 3.5 - 5.2 g/dL Final     Total Bilirubin   Date Value Ref Range Status   05/20/2024 0.6 0.1 - 1.0 mg/dL Final     Comment:     For infants and newborns, interpretation of results should be based  on gestational age, weight and in agreement with clinical  observations.    Premature Infant recommended reference ranges:  Up to 24 hours.............<8.0 mg/dL  Up to 48 hours............<12.0 mg/dL  3-5 days..................<15.0 mg/dL  6-29 days.................<15.0 mg/dL       Alkaline Phosphatase   Date Value Ref Range Status   05/20/2024 74 55 - 135 U/L Final     AST   Date Value Ref Range Status   05/20/2024 22 10 - 40 U/L Final     ALT   Date Value Ref Range Status   05/20/2024 47 (H) 10 - 44 U/L Final     Anion Gap   Date Value Ref Range Status   05/20/2024 9 8 - 16 mmol/L Final     eGFR   Date Value Ref Range Status   05/20/2024 >60.0 >60 mL/min/1.73 m^2 Final           Radiology/Diagnostic Studies:    CT Dx Bone Marrow  Biopsy(ies) w/ Aspiration; Right(XPD)    Result Date: 4/25/2024  EXAMINATION: CT DIAGNOSTIC BONE MARROW BIOPSY(IES) WITH ASPIRATION RIGHT(XPD) CLINICAL HISTORY: Other pancytopenia COMPARISON: None. FINDINGS: After obtaining written informed consent, which included a discussion of the risks and benefits of the procedure to include infection and bleeding, and allowing the patient the opportunity to ask questions, the patient agreed to the procedure. The patient was placed prone on the CT fluoroscopy table. A suitable skin entrance site overlying the right posterior iliac spine was localized with CT guidance. The skin was marked, and then prepped and draped in sterile fashion, with several milliliters of lidocaine 1% injected subcutaneously and along the periosteum to achieve adequate local anesthesia. Following, a punch skin incision was made, and an 11-gauge Jamshidi bone biopsy needle was advanced into the posterior iliac spine under intermittent CT fluoroscopic guidance.  Several aspirate attempts were made, with very little aspirate obtained.  A core biopsy sample was obtained.  Attention was then turned to the left posterior iliac spine, with the skin prepped and draped in sterile fashion, and lidocaine 1% injected to achieve adequate local anesthesia.  A punch skin incision was made and an 11-gauge Jamshidi bone biopsy needle was advanced into the left posterior iliac spine with intermittent CT fluoroscopic guidance.  Several aspirate attempts were made, with very little aspirate obtained.  The needle was removed and a bandage was applied to the skin. The patient tolerated the procedure well, with no immediate postprocedural complications. There was no significant blood loss. Total moderate conscious sedation time with supervision by the interventional radiologist and monitoring by the special procedures registered nurse was 25 minutes. The patient received Versed 4 mg and Fentanyl 200 mcg IV during the  procedure.     CT guided bone marrow core biopsy and aspiration as described. Electronically signed by: Miguel A Goode Date:    04/25/2024 Time:    11:34    US Abdomen Complete    Result Date: 4/25/2024  EXAMINATION: US ABDOMEN COMPLETE CLINICAL HISTORY: Pancytopenia COMPARISON: June 2021 FINDINGS: Sonographic assessment of the abdomen was performed.  The pancreas is partially obscured by bowel gas.  Visualized portions are normal.  The aorta is normal in caliber in the upper abdomen.  The inferior vena cava is unremarkable. The liver has a normal sonographic appearance, with no mass or contour abnormality.  Maximal longitudinal dimension is 16.9 cm.  Hepato pedal flow is noted within the portal vein. The gallbladder is absent.  Common bile duct is normal in caliber at 5 mm. The bilateral kidneys have a normal appearance.  The spleen is enlarged measuring 15.0 cm in greatest longitudinal dimension.  There is no free fluid.     1. Mild splenomegaly. 2. Surgical absence of the gallbladder. Electronically signed by: Shaquille Mart Date:    04/25/2024 Time:    10:43      I have reviewed all available lab results and radiology reports.    Assessment/Plan:   (1) 39 y.o. male with diagnosis of pancytopenia who has been referred by Galindo Berrios Jr.,* for evaluation by medical hematology/oncology.      - leucopenia with WBC at 2.7 with relatively adequate differential breakdown  - anemia with hgb at 12.3 - NCNC parameters  - mild thrombocytopenia with plats at 118,000     - his lab work was normal about 3 yrs ago; recent Hep C ab was nonreactive and HIV was negative     - he only took one dose of the imitrex and has not been on it with any consistence  - he has been on Livalo since Feb 2024 5/1/2024:  - He had bone marrow biopsy on 4/25/2024 with pathology showing 95% of the marrow with hairy cell Leukemia (HCL)  - US with only mild splenomegaly  - Already discussed and made referral to Dr Sea Hensley with [facility] [place].      5/22/2024:  - He saw Dr Chan Paredes on 5/20/2024 and they are planning to proceed with a regimen of therapy with 2CDA , he also ordered CT scans of his chest/abdom/pelvis  - He wants to start therapy sometime after June 4th 2024  - await the recs and note from Dr Paredes  - set up chemotherapy school in meantime       (2) HTN and hypercholesterolemia     (3) Atrial fibrillation     (4) Hepatic steatosis; elevated LFts     (5) Anxiety     (6) Migraine HA's - was on Imitrex but only took one dose     (7) EASTON     (8) Former smoker - quit Nov 2022 (vapes)      (9) Occasional alcohol intake     (10) Hx/of hernia repair, cholecystectomy and vasectomy            VISIT DIAGNOSES:      Anemia, unspecified type    Anemia, normocytic normochromic    Leukopenia, unspecified type    Pancytopenia    Thrombocytopenia          PLAN:  Await final note and recs from Dr Paredes - chemotherapy orders are not yet in chart from [facility]; patient would like to get chemotherapy here on [facility] either at [facility] or [facility]  Patient wants to start 2nd week of June 2024  Proceed with ordered CT scans  Set up with chemotherapy school in the meantime  Will follow alongside peripherally  Check labs weekly while on therapy  F/u with PCP     RTC in 2 weeks   Fax note to Galindo Berrios Jr.,* Brant Lanier C Bethala O'Galindo Brenner Jr., MD,     Discussion:     Pathology Discussion:    I reviewed and discussed the pathology report(s) and radiograph reports (if available) in as simple to understand and/or laymen's terms to the best of my ability. I had an indepth conversation with the patient and went over the patient's individual diagnosis based on the information that was currently available. I discussed the TNM staging process with regard to the patient's particular cancer type, and the calculated stage based on the currently available TNM data and literature. I discussed the available prognostic data with regard to the current  "staging information and how it relates to the prognosis of their particular neoplastic process.      NCCN Guidelines:    I discussed the available treatment option(s) in accordance with the latest literature from the NCCN Clinical Practice Guidelines for the patient's particular type of cancer disorder. The NCCN Guidelines provide a "document evidence-based (and) consensus-driven management" of the care of oncology patients. The treatment recommendations were made not only in accordance to the NCCN guidelines, but also factored in to account the patient's overall age, condition, performance status and their medical co-morbidities. I went over the risks and benefits of the the treatment options (if any could be made) with regard to their particular cancer type, their cancer stage, their age, and their co-morbidities.     I spent over 25 mins of time with the patient. Reviewed results of the recently ordered labs, tests and studies; made directives with regards to the results. Over half of this time was spent couseling and coordinating care.    I have explained all of the above in detail and the patient understands all of the current recommendation(s). I have answered all of their questions to the best of my ability and to their complete satisfaction.   The patient is to continue with the current management plan.            Electronically signed by Sung Cummings MD            "

## 2024-05-22 ENCOUNTER — OFFICE VISIT (OUTPATIENT)
Facility: CLINIC | Age: 39
End: 2024-05-22
Payer: COMMERCIAL

## 2024-05-22 ENCOUNTER — OFFICE VISIT (OUTPATIENT)
Dept: FAMILY MEDICINE | Facility: CLINIC | Age: 39
End: 2024-05-22
Payer: COMMERCIAL

## 2024-05-22 VITALS
TEMPERATURE: 97 F | HEART RATE: 83 BPM | OXYGEN SATURATION: 98 % | DIASTOLIC BLOOD PRESSURE: 74 MMHG | WEIGHT: 265.13 LBS | BODY MASS INDEX: 39.27 KG/M2 | HEIGHT: 69 IN | SYSTOLIC BLOOD PRESSURE: 112 MMHG

## 2024-05-22 VITALS
BODY MASS INDEX: 39.16 KG/M2 | SYSTOLIC BLOOD PRESSURE: 131 MMHG | WEIGHT: 264.38 LBS | DIASTOLIC BLOOD PRESSURE: 85 MMHG | RESPIRATION RATE: 18 BRPM | HEIGHT: 69 IN | HEART RATE: 79 BPM | TEMPERATURE: 97 F

## 2024-05-22 DIAGNOSIS — D69.6 THROMBOCYTOPENIA: ICD-10-CM

## 2024-05-22 DIAGNOSIS — D61.818 PANCYTOPENIA: ICD-10-CM

## 2024-05-22 DIAGNOSIS — D64.9 ANEMIA, NORMOCYTIC NORMOCHROMIC: ICD-10-CM

## 2024-05-22 DIAGNOSIS — D64.9 ANEMIA, UNSPECIFIED TYPE: Primary | ICD-10-CM

## 2024-05-22 DIAGNOSIS — E78.2 MIXED HYPERLIPIDEMIA: ICD-10-CM

## 2024-05-22 DIAGNOSIS — I49.3 PVC'S (PREMATURE VENTRICULAR CONTRACTIONS): ICD-10-CM

## 2024-05-22 DIAGNOSIS — D72.819 LEUKOPENIA, UNSPECIFIED TYPE: ICD-10-CM

## 2024-05-22 DIAGNOSIS — C91.40 HAIRY CELL LEUKEMIA NOT HAVING ACHIEVED REMISSION: ICD-10-CM

## 2024-05-22 DIAGNOSIS — I10 ESSENTIAL HYPERTENSION: ICD-10-CM

## 2024-05-22 DIAGNOSIS — E55.9 VITAMIN D DEFICIENCY: ICD-10-CM

## 2024-05-22 PROCEDURE — 99999 PR PBB SHADOW E&M-EST. PATIENT-LVL III: CPT | Mod: PBBFAC,,, | Performed by: INTERNAL MEDICINE

## 2024-05-22 PROCEDURE — 99214 OFFICE O/P EST MOD 30 MIN: CPT | Mod: S$GLB,,, | Performed by: INTERNAL MEDICINE

## 2024-05-22 PROCEDURE — 99999 PR PBB SHADOW E&M-EST. PATIENT-LVL IV: CPT | Mod: PBBFAC,,, | Performed by: INTERNAL MEDICINE

## 2024-05-22 PROCEDURE — 99215 OFFICE O/P EST HI 40 MIN: CPT | Mod: S$GLB,,, | Performed by: INTERNAL MEDICINE

## 2024-05-22 RX ORDER — OLMESARTAN MEDOXOMIL 40 MG/1
40 TABLET ORAL DAILY
Qty: 90 TABLET | Refills: 1 | Status: ON HOLD | OUTPATIENT
Start: 2024-05-22

## 2024-05-22 RX ORDER — PITAVASTATIN CALCIUM 2.09 MG/1
2 TABLET, FILM COATED ORAL NIGHTLY
Qty: 90 TABLET | Refills: 1 | Status: ON HOLD | OUTPATIENT
Start: 2024-05-22 | End: 2025-05-22

## 2024-05-22 RX ORDER — METOPROLOL SUCCINATE 50 MG/1
50 TABLET, EXTENDED RELEASE ORAL DAILY
Qty: 90 TABLET | Refills: 1 | Status: ON HOLD | OUTPATIENT
Start: 2024-05-22

## 2024-05-22 RX ORDER — ERGOCALCIFEROL 1.25 MG/1
50000 CAPSULE ORAL
Qty: 12 CAPSULE | Refills: 1 | Status: ON HOLD | OUTPATIENT
Start: 2024-05-22

## 2024-05-22 NOTE — PROGRESS NOTES
Subjective:       Patient ID: Gerardo Ramirez Jr. is a 39 y.o. male.    Chief Complaint: Hypertension (3 months follow up)    Here for routine follow up; last visit note, most recent available labs, and health maintenance topics reviewed.    He was recently diagnosed with hairy cell leukemia after I noted some CBC abnormalities.   Planning to start chemo in a few weeks.      Hypertension  This is a chronic problem. The problem is controlled. Pertinent negatives include no anxiety, blurred vision, chest pain, headaches, malaise/fatigue, neck pain, palpitations, peripheral edema or shortness of breath. Risk factors for coronary artery disease include obesity, male gender, dyslipidemia and smoking/tobacco exposure. Past treatments include angiotensin blockers and beta blockers. There are no compliance problems.    Hyperlipidemia  This is a chronic problem. Recent lipid tests were reviewed and are variable (triglycerides high, cholesterol fine). Exacerbating diseases include obesity. Pertinent negatives include no chest pain, myalgias or shortness of breath. Current antihyperlipidemic treatment includes diet change. Compliance problems include medication side effects (muscle aches with lipitor, even with CoQ10).      Review of Systems   Constitutional:  Negative for activity change, appetite change, chills, diaphoresis, fatigue, fever, malaise/fatigue and unexpected weight change.   HENT:  Negative for congestion, ear discharge, ear pain, hearing loss, nosebleeds, postnasal drip, rhinorrhea, sinus pressure, sinus pain, sneezing, sore throat, tinnitus, trouble swallowing and voice change.    Eyes:  Negative for blurred vision, photophobia, pain, discharge, redness, itching and visual disturbance.   Respiratory:  Positive for apnea. Negative for cough, choking, chest tightness, shortness of breath and wheezing.    Cardiovascular:  Negative for chest pain, palpitations and leg swelling.   Gastrointestinal:  Positive  for diarrhea. Negative for abdominal distention, abdominal pain, blood in stool, constipation, nausea and vomiting.   Endocrine: Negative for cold intolerance, heat intolerance, polydipsia and polyuria.   Genitourinary:  Negative for decreased urine volume, difficulty urinating, dysuria, enuresis, flank pain, frequency, genital sores, hematuria, penile discharge, penile pain, scrotal swelling, testicular pain and urgency.   Musculoskeletal:  Negative for arthralgias, back pain, gait problem, joint swelling, myalgias, neck pain and neck stiffness.   Skin:  Negative for rash and wound.   Allergic/Immunologic: Negative for environmental allergies, food allergies and immunocompromised state.   Neurological:  Positive for light-headedness. Negative for dizziness, tremors, seizures, syncope, facial asymmetry, speech difficulty, weakness, numbness and headaches.   Hematological:  Negative for adenopathy. Does not bruise/bleed easily.   Psychiatric/Behavioral:  Negative for confusion, decreased concentration, hallucinations, self-injury, sleep disturbance and suicidal ideas. The patient is not nervous/anxious.        Past Medical History:   Diagnosis Date    Anemia, normocytic normochromic 04/05/2024    Anemia, unspecified 04/05/2024    Anxiety     Hyperlipidemia     Hypertension 2013    Inguinal hernia bilateral, non-recurrent 02/2020    right side more pronounced- surg scheduled    Leucopenia 04/05/2024    Pancytopenia 04/05/2024    Pneumonia     age 11    Sleep apnea     Sleep apnea-like behavior     needs sleep study- wife says he stops breathing while sleeping    Thrombocytopenia 04/05/2024      Past Surgical History:   Procedure Laterality Date    CHOLECYSTECTOMY  04/08/2019    w/ Small Umbilical hernia rep-     HERNIA REPAIR Bilateral 03/10/2020    Dr. Lanier - [facility]    UMBILICAL HERNIA REPAIR  04/08/2019        VASECTOMY Bilateral 11/30/2020    Procedure: VASECTOMY;  Surgeon: German Tobias MD;   Location: [facility];  Service: Urology;  Laterality: Bilateral;       Family History   Problem Relation Name Age of Onset    Diabetes Mother      Hypertension Mother      Hypertension Sister      Hyperlipidemia Sister      Anxiety disorder Sister      Coronary artery disease Maternal Grandfather      No Known Problems Father         Social History     Socioeconomic History    Marital status:    Occupational History    Occupation: [employer]   Tobacco Use    Smoking status: Former     Current packs/day: 0.00     Types: Cigarettes, Vaping with nicotine     Quit date: 2022     Years since quittin.4    Smokeless tobacco: Never   Substance and Sexual Activity    Alcohol use: Yes     Comment: occ    Drug use: No    Sexual activity: Yes     Partners: Female   Social History Narrative    LIVE WITH MICHA     Social Determinants of Health     Stress: Stress Concern Present (10/16/2020)    Citizen of the [place] [employer] - Occupational Stress Questionnaire     Feeling of Stress : To some extent       Current Outpatient Medications   Medication Sig Dispense Refill    acyclovir (ZOVIRAX) 400 MG tablet Take 1 tablet (400 mg total) by mouth 2 (two) times daily. 60 tablet 11    butalbital-acetaminophen-caff -40 mg Cap Take 1-2 capsules by mouth 3 (three) times daily. 30 capsule 1    sulfamethoxazole-trimethoprim 800-160mg (BACTRIM DS) 800-160 mg Tab Take 1 tablet by mouth every Mon, Wed, Fri. 10 tablet 10    ergocalciferol (VITAMIN D2) 50,000 unit Cap Take 1 capsule (50,000 Units total) by mouth every 7 days. 12 capsule 1    metoprolol succinate (TOPROL-XL) 50 MG 24 hr tablet Take 1 tablet (50 mg total) by mouth once daily. 90 tablet 1    olmesartan (BENICAR) 40 MG tablet Take 1 tablet (40 mg total) by mouth once daily. 90 tablet 1    pitavastatin calcium (LIVALO) 2 mg Tab tablet Take 1 tablet (2 mg total) by mouth every evening. 90 tablet 1     No current facility-administered medications for this  "visit.       Review of patient's allergies indicates:   Allergen Reactions    Amoxicillin Hives     Objective:    HPI     Hypertension     Additional comments: 3 months follow up          Last edited by Ramila Hernandez MA on 5/22/2024  3:46 PM.      Blood pressure 112/74, pulse 83, temperature 96.7 °F (35.9 °C), temperature source Temporal, height 5' 9" (1.753 m), weight 120.2 kg (265 lb 1.7 oz), SpO2 98%. Body mass index is 39.15 kg/m².   Physical Exam  Vitals and nursing note reviewed.   Constitutional:       General: He is not in acute distress.     Appearance: He is well-developed. He is obese. He is not ill-appearing, toxic-appearing or diaphoretic.   HENT:      Head: Normocephalic and atraumatic.   Eyes:      General: No scleral icterus.        Right eye: No discharge.         Left eye: No discharge.      Conjunctiva/sclera: Conjunctivae normal.   Neck:      Vascular: No carotid bruit.   Cardiovascular:      Rate and Rhythm: Normal rate and regular rhythm.      Heart sounds: Normal heart sounds. No murmur heard.  Pulmonary:      Effort: Pulmonary effort is normal. No respiratory distress.      Breath sounds: Normal breath sounds. No decreased breath sounds, wheezing, rhonchi or rales.   Abdominal:      General: There is no distension.      Palpations: Abdomen is soft.      Tenderness: There is no abdominal tenderness. There is no guarding or rebound.   Musculoskeletal:      Right lower leg: No edema.      Left lower leg: No edema.   Skin:     General: Skin is warm and dry.   Neurological:      Mental Status: He is alert.      Motor: No tremor.   Psychiatric:         Mood and Affect: Mood normal.         Speech: Speech normal.         Behavior: Behavior normal.           Lab Visit on 05/20/2024   Component Date Value Ref Range Status    WBC 05/20/2024 2.91 (L)  3.90 - 12.70 K/uL Final    RBC 05/20/2024 3.95 (L)  4.60 - 6.20 M/uL Final    Hemoglobin 05/20/2024 12.3 (L)  14.0 - 18.0 g/dL Final    Hematocrit " 05/20/2024 35.2 (L)  40.0 - 54.0 % Final    MCV 05/20/2024 89  82 - 98 fL Final    MCH 05/20/2024 31.1 (H)  27.0 - 31.0 pg Final    MCHC 05/20/2024 34.9  32.0 - 36.0 g/dL Final    RDW 05/20/2024 14.0  11.5 - 14.5 % Final    Platelets 05/20/2024 106 (L)  150 - 450 K/uL Final    MPV 05/20/2024 9.5  9.2 - 12.9 fL Final    Immature Granulocytes 05/20/2024 0.3  0.0 - 0.5 % Final    Gran # (ANC) 05/20/2024 1.0 (L)  1.8 - 7.7 K/uL Final    Immature Grans (Abs) 05/20/2024 0.01  0.00 - 0.04 K/uL Final    Comment: Mild elevation in immature granulocytes is non specific and   can be seen in a variety of conditions including stress response,   acute inflammation, trauma and pregnancy. Correlation with other   laboratory and clinical findings is essential.      Lymph # 05/20/2024 1.5  1.0 - 4.8 K/uL Final    Mono # 05/20/2024 0.3  0.3 - 1.0 K/uL Final    Eos # 05/20/2024 0.1  0.0 - 0.5 K/uL Final    Baso # 05/20/2024 0.01  0.00 - 0.20 K/uL Final    nRBC 05/20/2024 1 (A)  0 /100 WBC Final    Gran % 05/20/2024 35.5 (L)  38.0 - 73.0 % Final    Lymph % 05/20/2024 50.5 (H)  18.0 - 48.0 % Final    Mono % 05/20/2024 10.0  4.0 - 15.0 % Final    Eosinophil % 05/20/2024 3.4  0.0 - 8.0 % Final    Basophil % 05/20/2024 0.3  0.0 - 1.9 % Final    Differential Method 05/20/2024 Automated   Final    Sodium 05/20/2024 136  136 - 145 mmol/L Final    Potassium 05/20/2024 3.8  3.5 - 5.1 mmol/L Final    Chloride 05/20/2024 106  95 - 110 mmol/L Final    CO2 05/20/2024 21 (L)  23 - 29 mmol/L Final    Glucose 05/20/2024 129 (H)  70 - 110 mg/dL Final    BUN 05/20/2024 16  6 - 20 mg/dL Final    Creatinine 05/20/2024 0.9  0.5 - 1.4 mg/dL Final    Calcium 05/20/2024 9.2  8.7 - 10.5 mg/dL Final    Total Protein 05/20/2024 7.3  6.0 - 8.4 g/dL Final    Albumin 05/20/2024 3.9  3.5 - 5.2 g/dL Final    Total Bilirubin 05/20/2024 0.6  0.1 - 1.0 mg/dL Final    Comment: For infants and newborns, interpretation of results should be based  on gestational age, weight  and in agreement with clinical  observations.    Premature Infant recommended reference ranges:  Up to 24 hours.............<8.0 mg/dL  Up to 48 hours............<12.0 mg/dL  3-5 days..................<15.0 mg/dL  6-29 days.................<15.0 mg/dL      Alkaline Phosphatase 05/20/2024 74  55 - 135 U/L Final    AST 05/20/2024 22  10 - 40 U/L Final    ALT 05/20/2024 47 (H)  10 - 44 U/L Final    eGFR 05/20/2024 >60.0  >60 mL/min/1.73 m^2 Final    Anion Gap 05/20/2024 9  8 - 16 mmol/L Final    LD 05/20/2024 157  110 - 260 U/L Final    Results are increased in hemolyzed samples.    Hep B Core Total Ab 05/20/2024 Non-reactive  Non-reactive Final    Hepatitis B Surface Ag 05/20/2024 Non-reactive  Non-reactive Final   ]  Assessment:       1. Essential hypertension    2. PVC's (premature ventricular contractions)    3. Vitamin D deficiency    4. Mixed hyperlipidemia        Plan:       Gerardo was seen today for hypertension.    Diagnoses and all orders for this visit:    Essential hypertension  -     olmesartan (BENICAR) 40 MG tablet; Take 1 tablet (40 mg total) by mouth once daily.  -     metoprolol succinate (TOPROL-XL) 50 MG 24 hr tablet; Take 1 tablet (50 mg total) by mouth once daily.    PVC's (premature ventricular contractions)  -     metoprolol succinate (TOPROL-XL) 50 MG 24 hr tablet; Take 1 tablet (50 mg total) by mouth once daily.    Vitamin D deficiency  -     ergocalciferol (VITAMIN D2) 50,000 unit Cap; Take 1 capsule (50,000 Units total) by mouth every 7 days.    Mixed hyperlipidemia  -     pitavastatin calcium (LIVALO) 2 mg Tab tablet; Take 1 tablet (2 mg total) by mouth every evening.

## 2024-05-24 ENCOUNTER — PATIENT MESSAGE (OUTPATIENT)
Facility: CLINIC | Age: 39
End: 2024-05-24
Payer: COMMERCIAL

## 2024-05-24 DIAGNOSIS — C91.40 HAIRY CELL LEUKEMIA NOT HAVING ACHIEVED REMISSION: Primary | ICD-10-CM

## 2024-05-27 ENCOUNTER — HOSPITAL ENCOUNTER (OUTPATIENT)
Dept: RADIOLOGY | Facility: HOSPITAL | Age: 39
Discharge: HOME | End: 2024-05-27
Attending: INTERNAL MEDICINE
Payer: COMMERCIAL

## 2024-05-27 DIAGNOSIS — C91.40 HAIRY CELL LEUKEMIA NOT HAVING ACHIEVED REMISSION: ICD-10-CM

## 2024-05-27 PROCEDURE — 71260 CT THORAX DX C+: CPT | Mod: 26,,, | Performed by: RADIOLOGY

## 2024-05-27 PROCEDURE — 25500020 PHARM REV CODE 255: Mod: PO | Performed by: INTERNAL MEDICINE

## 2024-05-27 PROCEDURE — 74177 CT ABD & PELVIS W/CONTRAST: CPT | Mod: TC,PO

## 2024-05-27 PROCEDURE — 74177 CT ABD & PELVIS W/CONTRAST: CPT | Mod: 26,,, | Performed by: RADIOLOGY

## 2024-05-27 RX ADMIN — IOHEXOL 100 ML: 350 INJECTION, SOLUTION INTRAVENOUS at 03:05

## 2024-05-28 ENCOUNTER — OFFICE VISIT (OUTPATIENT)
Facility: CLINIC | Age: 39
End: 2024-05-28
Payer: COMMERCIAL

## 2024-05-28 VITALS
SYSTOLIC BLOOD PRESSURE: 112 MMHG | RESPIRATION RATE: 16 BRPM | HEIGHT: 69 IN | DIASTOLIC BLOOD PRESSURE: 73 MMHG | TEMPERATURE: 98 F | BODY MASS INDEX: 39.32 KG/M2 | HEART RATE: 70 BPM | WEIGHT: 265.5 LBS

## 2024-05-28 DIAGNOSIS — C91.40 HAIRY CELL LEUKEMIA NOT HAVING ACHIEVED REMISSION: Primary | ICD-10-CM

## 2024-05-28 DIAGNOSIS — D84.822 IMMUNODEFICIENCY DUE TO EXTERNAL CAUSE: ICD-10-CM

## 2024-05-28 PROCEDURE — 99999 PR PBB SHADOW E&M-EST. PATIENT-LVL III: CPT | Mod: PBBFAC,,, | Performed by: NURSE PRACTITIONER

## 2024-05-28 PROCEDURE — 99215 OFFICE O/P EST HI 40 MIN: CPT | Mod: S$GLB,,, | Performed by: NURSE PRACTITIONER

## 2024-05-28 PROCEDURE — G2211 COMPLEX E/M VISIT ADD ON: HCPCS | Mod: S$GLB,,, | Performed by: NURSE PRACTITIONER

## 2024-05-28 RX ORDER — PROMETHAZINE HYDROCHLORIDE 25 MG/1
25 TABLET ORAL
Qty: 30 TABLET | Refills: 2 | Status: ON HOLD | OUTPATIENT
Start: 2024-05-28

## 2024-05-28 RX ORDER — ONDANSETRON HYDROCHLORIDE 8 MG/1
8 TABLET, FILM COATED ORAL EVERY 8 HOURS PRN
Qty: 30 TABLET | Refills: 2 | Status: ON HOLD | OUTPATIENT
Start: 2024-05-28 | End: 2025-05-28

## 2024-05-28 RX ORDER — SULFAMETHOXAZOLE AND TRIMETHOPRIM 800; 160 MG/1; MG/1
1 TABLET ORAL
Qty: 24 TABLET | Refills: 3 | Status: ON HOLD | OUTPATIENT
Start: 2024-05-29

## 2024-05-28 NOTE — PROGRESS NOTES
[facility] HEMATOLGY ONCOLOGY     Subjective:       Patient ID: Gerardo Ramirez Jr. is a 39 y.o. male presents today for chemotherapy education.      Chief Complaint:  Hairy Cell Leukemia     HPI  He is here today with his wife today to get chemotherapy education on Cladribine and Rituxan.     [place] [facility] [place] due to start on 6/10/2024  Oncology History   Hairy cell leukemia   5/24/2024 Initial Diagnosis    Hairy cell leukemia     6/3/2024 -  Chemotherapy    Treatment Summary   Plan Name: OP LEUK: cladribine (D1-5) + riTUXimab (weekly x8)  Treatment Goal: Curative  Status: Active  Start Date: 6/3/2024 (Planned)  End Date: 7/22/2024 (Planned)  Provider: Chan Paredes MD  Chemotherapy: cladribine (LEUSTATIN) 18 mg in sodium chloride 0.9% 518 mL chemo infusion, 0.15 mg/kg = 18 mg (original dose ), Intravenous, Clinic/HOD 1 time, 0 of 1 cycle  Dose modification: 0.15 mg/kg (Cycle 1, Reason: Other (see comments), Comment: per reference)  riTUXimab-pvvr (RUXIENCE) 375 mg/m2 = 908 mg in sodium chloride 0.9% 908 mL infusion (conc: 1 mg/mL), 375 mg/m2 = 908 mg, Intravenous, Clinic/HOD 1 time, 0 of 1 cycle         Past Medical History:   Diagnosis Date    Anemia, normocytic normochromic 04/05/2024    Anemia, unspecified 04/05/2024    Anxiety     Hyperlipidemia     Hypertension 2013    Inguinal hernia bilateral, non-recurrent 02/2020    right side more pronounced- surg scheduled    Leucopenia 04/05/2024    Pancytopenia 04/05/2024    Pneumonia     age 11    Sleep apnea     Sleep apnea-like behavior     needs sleep study- wife says he stops breathing while sleeping    Thrombocytopenia 04/05/2024       Past Surgical History:   Procedure Laterality Date    CHOLECYSTECTOMY  04/08/2019    w/ Small Umbilical hernia rep-     HERNIA REPAIR Bilateral 03/10/2020    Dr. Lanier - [facility]    UMBILICAL HERNIA REPAIR  04/08/2019        VASECTOMY Bilateral 11/30/2020    Procedure:  VASECTOMY;  Surgeon: German Tobias MD;  [facility];  Service: Urology;  Laterality: Bilateral;       Social History     Socioeconomic History    Marital status:    Occupational History    Occupation: [employer]   Tobacco Use    Smoking status: Former     Current packs/day: 0.00     Types: Cigarettes, Vaping with nicotine     Quit date: 2022     Years since quittin.4    Smokeless tobacco: Never   Substance and Sexual Activity    Alcohol use: Yes     Comment: occ    Drug use: No    Sexual activity: Yes     Partners: Female   Social History Narrative    LIVE WITH MICHA     Social Determinants of Health     Stress: Stress Concern Present (10/16/2020)    [facility] of Occupational Health - Occupational Stress Questionnaire     Feeling of Stress : To some extent       Family History   Problem Relation Name Age of Onset    Diabetes Mother      Hypertension Mother      Hypertension Sister      Hyperlipidemia Sister      Anxiety disorder Sister      Coronary artery disease Maternal Grandfather      No Known Problems Father         Review of patient's allergies indicates:   Allergen Reactions    Amoxicillin Hives         Current Outpatient Medications:     acyclovir (ZOVIRAX) 400 MG tablet, Take 1 tablet (400 mg total) by mouth 2 (two) times daily., Disp: 60 tablet, Rfl: 11    butalbital-acetaminophen-caff -40 mg Cap, Take 1-2 capsules by mouth 3 (three) times daily., Disp: 30 capsule, Rfl: 1    ergocalciferol (VITAMIN D2) 50,000 unit Cap, Take 1 capsule (50,000 Units total) by mouth every 7 days., Disp: 12 capsule, Rfl: 1    metoprolol succinate (TOPROL-XL) 50 MG 24 hr tablet, Take 1 tablet (50 mg total) by mouth once daily., Disp: 90 tablet, Rfl: 1    olmesartan (BENICAR) 40 MG tablet, Take 1 tablet (40 mg total) by mouth once daily., Disp: 90 tablet, Rfl: 1    pitavastatin calcium (LIVALO) 2 mg Tab tablet, Take 1 tablet (2 mg total) by mouth every evening., Disp: 90 tablet, Rfl: 1     "ondansetron (ZOFRAN) 8 MG tablet, Take 1 tablet (8 mg total) by mouth every 8 (eight) hours as needed for Nausea., Disp: 30 tablet, Rfl: 2    promethazine (PHENERGAN) 25 MG tablet, Take 1 tablet (25 mg total) by mouth every 4 to 6 hours as needed for Nausea., Disp: 30 tablet, Rfl: 2    [START ON 5/29/2024] sulfamethoxazole-trimethoprim 800-160mg (BACTRIM DS) 800-160 mg Tab, Take 1 tablet by mouth every Mon, Wed, Fri., Disp: 24 tablet, Rfl: 3    All medications and past history have been reviewed.    Review of Systems   Constitutional:  Negative for activity change, appetite change, fatigue and fever.   HENT:  Negative for congestion, mouth sores, postnasal drip, rhinorrhea, sinus pressure, sore throat and trouble swallowing.    Eyes:  Negative for photophobia and visual disturbance.   Respiratory:  Negative for cough, chest tightness, shortness of breath and wheezing.    Cardiovascular:  Negative for chest pain and leg swelling.   Gastrointestinal:  Negative for abdominal distention, abdominal pain, constipation, diarrhea, nausea and vomiting.   Endocrine: Negative for cold intolerance.   Genitourinary:  Negative for decreased urine volume, dysuria and frequency.   Musculoskeletal:  Negative for arthralgias and myalgias.   Skin:  Negative for pallor and rash.   Allergic/Immunologic: Negative for immunocompromised state.   Neurological:  Negative for dizziness, syncope, weakness, light-headedness, numbness and headaches.   Hematological:  Negative for adenopathy. Does not bruise/bleed easily.   Psychiatric/Behavioral:  Negative for sleep disturbance. The patient is not nervous/anxious.        Objective:        Physcial Examination  VITAL SIGNS:    Body surface area is 2.42 meters squared.   Pain Assessment  Vitals:    05/28/24 1440   BP: 112/73   Pulse: 70   Resp: 16   Temp: 97.7 °F (36.5 °C)   Weight: 120.4 kg (265 lb 8 oz)   Height: 5' 9" (1.753 m)   PainSc: 0-No pain       Wt Readings from Last 5 Encounters: "   05/28/24 120.4 kg (265 lb 8 oz)   05/22/24 120.2 kg (265 lb 1.7 oz)   05/22/24 119.9 kg (264 lb 6.4 oz)   05/20/24 120.4 kg (265 lb 5.2 oz)   05/01/24 117 kg (258 lb)       Physical Exam  Constitutional:       Appearance: Normal appearance.   HENT:      Head: Normocephalic and atraumatic.      Mouth/Throat:      Mouth: Mucous membranes are moist.   Cardiovascular:      Rate and Rhythm: Normal rate and regular rhythm.      Pulses: Normal pulses.      Heart sounds: Normal heart sounds.   Pulmonary:      Effort: Pulmonary effort is normal. No respiratory distress.      Breath sounds: Normal breath sounds. No wheezing.   Abdominal:      General: There is no distension.      Palpations: Abdomen is soft. There is no mass.      Tenderness: There is no abdominal tenderness.   Musculoskeletal:         General: No swelling. Normal range of motion.      Right lower leg: No edema.      Left lower leg: No edema.   Skin:     General: Skin is warm and dry.      Capillary Refill: Capillary refill takes 2 to 3 seconds.      Findings: No bruising or rash.   Neurological:      Mental Status: He is alert and oriented to person, place, and time. Mental status is at baseline.      Motor: No weakness.   Psychiatric:         Mood and Affect: Mood normal.         Behavior: Behavior normal.              Laboratory and Radiology   Lab Results   Component Value Date    WBC 2.91 (L) 05/20/2024    RBC 3.95 (L) 05/20/2024    HGB 12.3 (L) 05/20/2024    HCT 35.2 (L) 05/20/2024    MCV 89 05/20/2024    MCH 31.1 (H) 05/20/2024    MCHC 34.9 05/20/2024    RDW 14.0 05/20/2024     (L) 05/20/2024    MPV 9.5 05/20/2024    GRAN 1.0 (L) 05/20/2024    GRAN 35.5 (L) 05/20/2024    LYMPH 1.5 05/20/2024    LYMPH 50.5 (H) 05/20/2024    MONO 0.3 05/20/2024    MONO 10.0 05/20/2024    EOS 0.1 05/20/2024    BASO 0.01 05/20/2024    EOSINOPHIL 3.4 05/20/2024    BASOPHIL 0.3 05/20/2024     BMP  Lab Results   Component Value Date     05/20/2024    K 3.8  05/20/2024     05/20/2024    CO2 21 (L) 05/20/2024    BUN 16 05/20/2024    CREATININE 0.9 05/20/2024    CALCIUM 9.2 05/20/2024    ANIONGAP 9 05/20/2024    ESTGFRAFRICA >60 11/24/2020    EGFRNONAA 105 04/27/2021     Lab Results   Component Value Date    ALT 47 (H) 05/20/2024    AST 22 05/20/2024    ALKPHOS 74 05/20/2024    BILITOT 0.6 05/20/2024     Results for orders placed or performed during the hospital encounter of 05/27/24 (from the past 2160 hour(s))   CT Chest Abdomen Pelvis With IV Contrast (XPD) Routine Oral Contrast    Narrative    EXAMINATION:  CT CHEST ABDOMEN PELVIS WITH IV CONTRAST (XPD)    CLINICAL HISTORY:  Hairy cell leukemia not having achieved remission Hematologic malignancy, staging;    TECHNIQUE:  CMS Mandated Quality Data-CT Radiation Dose-436    All CT scans at this facility dose modulation, iterative reconstruction, and or weight-based dosing when appropriate to reduce radiation dose to as low as reasonably achievable.    COMPARISON:  Abdominal ultrasound 04/25/2024    Chest radiograph 08/03/2021.    CT abdomen and pelvis 01/30/2020    FINDINGS:  No suspicious pulmonary nodule or mass.  No airspace disease.  No pleural effusion or pneumothorax.    Central airways are patent.  Esophagus is unremarkable.  Normal size mediastinal lymph nodes.  Atherosclerotic calcification coronary arteries.    Bone window images show no acute or aggressive osseous abnormality.    No focal hepatic lesion.  Gallbladder and biliary tree are unremarkable.  Spleen is mildly enlarged.  No focal splenic lesion.  Normal pancreas.    No adrenal lesion.  Small cyst at the upper pole of the right kidney.  No left renal lesion.  No hydronephrosis.  Ureters are normal in caliber.  Urinary bladder is unremarkable.    Stomach is within normal limits.  No evidence of small-bowel pathology.  Normal appendix.  No pathologically enlarged lymph nodes in the abdomen or pelvis.  Aortic atherosclerotic calcification.       Impression    Splenomegaly.    No lymphadenopathy in the chest, abdomen, or pelvis.      Electronically signed by: Abdoul Edwards  Date:    05/27/2024  Time:    16:00   Results for orders placed or performed during the hospital encounter of 04/25/24 (from the past 2160 hour(s))   CT Dx Bone Marrow Biopsy(ies) w/ Aspiration; Right(XPD)    Narrative    EXAMINATION:  CT DIAGNOSTIC BONE MARROW BIOPSY(IES) WITH ASPIRATION RIGHT(XPD)    CLINICAL HISTORY:  Other pancytopenia    COMPARISON:  None.    FINDINGS:  After obtaining written informed consent, which included a discussion of the risks and benefits of the procedure to include infection and bleeding, and allowing the patient the opportunity to ask questions, the patient agreed to the procedure.    The patient was placed prone on the CT fluoroscopy table. A suitable skin entrance site overlying the right posterior iliac spine was localized with CT guidance. The skin was marked, and then prepped and draped in sterile fashion, with several milliliters of lidocaine 1% injected subcutaneously and along the periosteum to achieve adequate local anesthesia.    Following, a punch skin incision was made, and an 11-gauge Jamshidi bone biopsy needle was advanced into the posterior iliac spine under intermittent CT fluoroscopic guidance.  Several aspirate attempts were made, with very little aspirate obtained.  A core biopsy sample was obtained.  Attention was then turned to the left posterior iliac spine, with the skin prepped and draped in sterile fashion, and lidocaine 1% injected to achieve adequate local anesthesia.  A punch skin incision was made and an 11-gauge Jamshidi bone biopsy needle was advanced into the left posterior iliac spine with intermittent CT fluoroscopic guidance.  Several aspirate attempts were made, with very little aspirate obtained.  The needle was removed and a bandage was applied to the skin.    The patient tolerated the procedure well, with no  immediate postprocedural complications. There was no significant blood loss. Total moderate conscious sedation time with supervision by the interventional radiologist and monitoring by the special procedures registered nurse was 25 minutes. The patient received Versed 4 mg and Fentanyl 200 mcg IV during the procedure.      Impression    CT guided bone marrow core biopsy and aspiration as described.      Electronically signed by: Miguel A Goode  Date:    04/25/2024  Time:    11:34     No results found for this or any previous visit (from the past 2160 hour(s)).  No results found for this or any previous visit (from the past 2160 hour(s)).    Pathology  Pathology Results  (Last 10 years)                 11/30/20 1132  Specimen to Pathology, Surgery Urology Final result    Narrative:  Pre-op Diagnosis: Encounter for sterilization [Z30.2]   Procedure(s):   VASECTOMY   Number of specimens: 2   Name of specimens: 1) right vas, 2) left vas   Specimen total (fresh, frozen, permanent):->2               All lab results and imaging results have been reviewed and discussed with the patient.        TITLE: PLAN OF CARE FOR THE CHEMOTHERAPY PATIENT / TEACHING PROTOCOL    PURPOSE: To involve the patient / significant other in the plan of care and to provide teaching to the significant other & patient receiving chemotherapy.    LEVEL: Independent.    CONTENT: The Plan of Care for the chemotherapy patient is individualized and appropriate to the patients needs, strengths, limitations, & goals.  Education includes information regarding chemotherapy side effects, the treatment itself, and self-care  Activities.    GOAL / OUTCOME STANDARDS    PHYSIOLOGIC: The client will remain free or experience minimal side effects or toxicities throughout the chemotherapy treatment period.     PSYCHOLOGIC: The client/significant others will demonstrate positive coping mechanisms in relation to chemotherapy and its side effects.      COGINITIVE: The  client/significant others will verbalize understanding of self-care measure to avoid/minimize side effects of the chemotherapy regimen.    EVALUATION / COMMENT KEY:    V = Audiovisual/Video  S = Successfully meets outcome  N = Needs further instruction  NA = Not applicable to the patient  P = Previous knowledge  U = Unable to comprehend  * = See progress notes      PLAN OF CARE  INFORMATION TO BE DELIVERED / NURSING INTERVENTIONS DATE EVALUATION   Assessment of client/caregiver,         knowledge of cancer diagnosis,         and chemotherapy as a treatment. 1a. Evaluate patient/caregiver learning ability    b. Plan teaching sessions with patient/caregiver according to needs and present anxiety level/ability to learn.    c. Provide Chemotherapy Education Packet,        Mouth Care Protocol,         Specific Patient Education Sheets. 05/28/2024 S   Individual chemotherapy treatment         plan. 2a. Review of Chemotherapy Education handout from [employer]            05/28/2024   S   Knowledge Deficit & Self-Management of general side effects common to all chemotherapy:  Nausea/Vomiting  b.   Diarrhea  Mouth Care  Dental care  Constipation  Hair Loss  Potential for infection  Fatigue   3a. Reinforce that the majority of side effects from chemotherapy are reversible and are  controlled both in the hospital and at home        (blood counts recover, hair grows back).   b.  Refer to the following for reinforcement of         information post-treatment:  Mouth Care Protocol.  Bowel Protocol for constipation or diarrhea.  3.  Drug Specific Chemotherapy Information Sheets for each medication patient receiving.    05/28/2024     S     PLAN OF CARE  INFORMATION TO BE DELIVERED / NURSING INTERVENTIONS DATE EVALUATION   h. Potential for bleeding         i. Potential anemia/fatigue         j. Potential sunburn         k. Birth control measures  l. Safety measures post treatment 4.  Chemotherapy Home Care Instruction  and Safety  Information Sheet.  A. patient/caregivers to thoroughly cook shellfish (shrimp, crab, etc) to decrease the chance of infection.    B.  Use sunscreen and protective clothing while in the sun.   05/28/2024      Knowledge deficit & Self Management of Drug Specific  Side Effects.    BLADDER EFFECTS        (Hemorrhagic Cystitis)                  Preventable with adequate hydration; occurs 2-3 days or more post treatment.   1.  Instruct patient to:  a.   Void at least every 2 hours; increase intake.  b.   DO NOT hold urine; go when urge is felt.  c.    Empty bladder at bedtime and on         awakening.  d.   Observe for color changes (red to tea           colored), amount and frequency changes.  e.   Notify oncologist of any abnormalities           in urine or voiding or if you cannot               drink adequate fluids.   05/28/2024   S   b.   CHANGES IN URINE   COLOR:      1.   Instruct patient:  a.   Most evident in first 2-3 voidings after           administration.  Lasts less than 24 hours.  If urine is discolored 2 or more days post- treatment, notify oncologist.      05/28/2024 S   c.    KIDNEY EFFECTS           (Nephrotoxicity)   1.  Instruct patient to:  a.   Drink 8-16 glasses of fluid/day the day   pre-treatment and 3-4 days post-treatment to maintain hydration; the best way to minimize kidney problems.  b.   Notify oncologist immediately if unable to drink fluids or if changes are noted in urinary elimination.     05/28/2024   S   PULMONARY TOXICITY    Instruct patient to report symptoms such as shortness of breath, chest pain, shallow breathing, or chest wall discomfort to physician.  Reinforce preventative measures used by the health care team.  Baseline and periodic PFT and chest x-ray.   05/28/2024   S     PLAN OF CARE INFORMATION TO BE DELIVERED / NURSING INTERVENTIONS DATE EVALUATION   NERVE & MUSCLE EFFECTS (neurotoxocity; neuropathy, possible visual/hearing changes)        Instruct patient  to:    Report numbness or tingling of the hands/feet, loss of fine motor movement (buttoning shirt, tying shoelaces), or gait changes to your oncologist.  If numbness/tingling are present:  protect feet with shoes at all times.  Use gloves for washing dishes/gardening & potholders in kitchen.       05/28/2024   S   CARDIOTOXICITY  Decreased effectiveness of             cardiac function. Effective are                  cumulative and irreversible.                                    CARDIAC ARRYTHMIAS              4   Instruct:  Heart function may be tested before treatment and perdiocally during treatment.  Notify oncologist of irregular pulse, palpitations, shortness of breath, or swelling in lower extremities/feet.          Chemotherapy can cause arrhythmias on infusion that resolve once infusion discontinued. Instruct nurse if any irregularity felt.    05/28/2024   S   EXTRAVASTION  Occurs when vesicants leak outside of vein and cause damage to the skin and underlying tissues.   Reinforce preventive measures used to avoid complications.  Fresh IV site or central line monitored continuously with vesicant IVP.  Continuous infusion via central line site and blood return monitored periodically around the clock.  Instruct to:  Notify nurse of any discomfort, burning, stinging, etc. at IV site during chemotherapy administration.  Notify oncologist of any redness, pain, or swelling at IV site after discharge from hospital.   05/28/2024   S   HYPERSENSITIVITY can happen with any medication.   Instruct patient:  Nurse is with them during the initial part of treatment and will be close by to monitor.  Pre-medication ordered by the oncologist must be taken on time. If doses are missed, treatment will need to be re-scheduled.  Skin redness, itching, or hives appearing after discharge should be reported to oncologist. 05/28/2024   S       PLAN OF CARE INFORMATION TO BE DELIVERED / NURSING INTERVENTIONS DATE EVALUATION    FLU-LIKE SYNDROME      Instruct patient symptoms are hard to prevent and may include fever, shaking chills, muscle and body aches.  Taking prescribed medications from physician if needed.  Adequate fluids are important.    Reinforce the need to call if temperature is         elevated to 100.4 or more  05/28/2024   S   HAND-FOOT SYNDROME  causes painful, symmetric swelling and redness of palms and soles                  Instruct patient to report any numbness or tingling in the hands or feet.  Explain prevention techniques, such as     Use heavy moisturizers to lessen skin dryness and itching, but to avoid if skin is cracked or broken  Bathe in tepid water, use non-perfumed soap, and wash gently. Baths with oatmeal or diluted baking soda may be soothing.  Avoid tight fitting shoes and repetitive actions, such as rubbing hands or applying pressure to hands/feet.  Review measures to take should syndrome occur:  Cold compresses and elevation for          edema  Pain medications and other measures as ordered by oncologist.   4.   Syndrome resolves few weeks after therapy. 05/28/2024   S   5. DISCHARGE PLANNING /        EDUCATION 1.    Explain importance of compliance with follow- up  tests (CBC, CMP).  2.    Verify patient/caregiver know:  a.    Oncologists office phone number.  b.    Dates of follow-up appointments.  c.    Prescriptions given for nausea  3.   Review side effects to monitor and notify          oncologist about.  4.   Reinforce the need for patient and caregivers to:  a.    Review information given.  b.    Call oncologists office with questions  or symptoms  5.   Provide [facility] [place] Brochure make referrals if needed for financial or .   05/28/2024   S     PROGRESS NOTES: I met with the patient Mr. Ramirez today for chemotherapy education. he will be starting treatment with Cladribine and Rituximab . We discussed the mechanism of action, potential side effects of this  treatment as well as ways he can manage them at home. Some of these side effects include but or not limited to fever, nausea, vomiting, decreased appetite, fatigue, weakness, cytopenias, myalgia/arthralgia, constipation, diarrhea, bleeding, headache, shortness of breath, nail changes, taste change, hair thinning/loss, mood disturbances, or edema. We also discussed dietary modifications he should make although this will be discussed in more detail with the dietician. he was provided with anti-emetic medication, a copy of all of the information we discussed today as well as our contact information. he will be provided a schedule on his first day of treatment. We will obtain labs on a weekly basis and the patient will follow-up with the physician for toxicity monitoring throughout treatment. All questions were answered and an informed consent was obtained. he was reminded to certainly contact us sooner if needed.  Attached to the patients folder and discussed with the patient the 24 hour/ 7 days a week after hours telephone number for the physician.  Patient notified to call anytime 24/7 because their is a physician on call for any problems that may arise.  Patient also notified to report to [facility] if they can not get in touch with a physician after hours.  Discussed the five wishes booklet with the patient and their family.           Assessment/Plan:       1. Hairy cell leukemia not having achieved remission    2. 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  Dispense: 30 tablet; Refill: 2  -     promethazine (PHENERGAN) 25 MG tablet; Take 1 tablet (25 mg total) by mouth every 4 to 6 hours as needed for Nausea.  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  Dispense: 24 tablet; Refill: 3        I have explained and the patient understands all of  the current recommendation(s). I have answered all of their questions to the best of my ability and to their complete satisfaction.   The patient is to continue with the current management plan.          Medications Ordered:  Zofran 8mg 1 tab PO Q8h prn nausea  Phenergan 25mg PO Q4-6h prn nausea    Standing Labs Ordered:  CBC weekly  CMP weekly      Electronically signed by: Electronically signed by: Kendra Puga, MSN, APRN, AGNP-C

## 2024-05-30 ENCOUNTER — PATIENT MESSAGE (OUTPATIENT)
Dept: HEMATOLOGY/ONCOLOGY | Facility: CLINIC | Age: 39
End: 2024-05-30
Payer: COMMERCIAL

## 2024-05-30 ENCOUNTER — PATIENT MESSAGE (OUTPATIENT)
Facility: CLINIC | Age: 39
End: 2024-05-30
Payer: COMMERCIAL

## 2024-05-30 LAB
FINAL PATHOLOGIC DIAGNOSIS: NORMAL
GROSS: NORMAL
Lab: NORMAL
MICROSCOPIC EXAM: NORMAL

## 2024-06-06 ENCOUNTER — TELEPHONE (OUTPATIENT)
Dept: HEMATOLOGY/ONCOLOGY | Facility: CLINIC | Age: 39
End: 2024-06-06
Payer: COMMERCIAL

## 2024-06-06 ENCOUNTER — LAB VISIT (OUTPATIENT)
Dept: LAB | Facility: HOSPITAL | Age: 39
End: 2024-06-06
Attending: INTERNAL MEDICINE
Payer: COMMERCIAL

## 2024-06-06 DIAGNOSIS — C91.40 HAIRY CELL LEUKEMIA NOT HAVING ACHIEVED REMISSION: ICD-10-CM

## 2024-06-06 LAB
ALBUMIN SERPL BCP-MCNC: 4.3 G/DL (ref 3.5–5.2)
ALP SERPL-CCNC: 63 U/L (ref 55–135)
ALT SERPL W/O P-5'-P-CCNC: 33 U/L (ref 10–44)
ANION GAP SERPL CALC-SCNC: 7 MMOL/L (ref 8–16)
AST SERPL-CCNC: 18 U/L (ref 10–40)
BASOPHILS # BLD AUTO: 0.01 K/UL (ref 0–0.2)
BASOPHILS NFR BLD: 0.3 % (ref 0–1.9)
BILIRUB SERPL-MCNC: 0.6 MG/DL (ref 0.1–1)
BUN SERPL-MCNC: 17 MG/DL (ref 6–20)
CALCIUM SERPL-MCNC: 9.1 MG/DL (ref 8.7–10.5)
CHLORIDE SERPL-SCNC: 105 MMOL/L (ref 95–110)
CO2 SERPL-SCNC: 25 MMOL/L (ref 23–29)
CREAT SERPL-MCNC: 1 MG/DL (ref 0.5–1.4)
DIFFERENTIAL METHOD BLD: ABNORMAL
EOSINOPHIL # BLD AUTO: 0.1 K/UL (ref 0–0.5)
EOSINOPHIL NFR BLD: 3.6 % (ref 0–8)
ERYTHROCYTE [DISTWIDTH] IN BLOOD BY AUTOMATED COUNT: 13.7 % (ref 11.5–14.5)
EST. GFR  (NO RACE VARIABLE): >60 ML/MIN/1.73 M^2
GLUCOSE SERPL-MCNC: 118 MG/DL (ref 70–110)
HCT VFR BLD AUTO: 35.4 % (ref 40–54)
HGB BLD-MCNC: 12.3 G/DL (ref 14–18)
IMM GRANULOCYTES # BLD AUTO: 0.01 K/UL (ref 0–0.04)
IMM GRANULOCYTES NFR BLD AUTO: 0.3 % (ref 0–0.5)
LYMPHOCYTES # BLD AUTO: 1.6 K/UL (ref 1–4.8)
LYMPHOCYTES NFR BLD: 52.8 % (ref 18–48)
MCH RBC QN AUTO: 30.4 PG (ref 27–31)
MCHC RBC AUTO-ENTMCNC: 34.7 G/DL (ref 32–36)
MCV RBC AUTO: 88 FL (ref 82–98)
MONOCYTES # BLD AUTO: 0.5 K/UL (ref 0.3–1)
MONOCYTES NFR BLD: 15.8 % (ref 4–15)
NEUTROPHILS # BLD AUTO: 0.8 K/UL (ref 1.8–7.7)
NEUTROPHILS NFR BLD: 27.2 % (ref 38–73)
NRBC BLD-RTO: 0 /100 WBC
PLATELET # BLD AUTO: 98 K/UL (ref 150–450)
PLATELET BLD QL SMEAR: ABNORMAL
PMV BLD AUTO: 9.2 FL (ref 9.2–12.9)
POTASSIUM SERPL-SCNC: 4 MMOL/L (ref 3.5–5.1)
PROT SERPL-MCNC: 7.2 G/DL (ref 6–8.4)
RBC # BLD AUTO: 4.04 M/UL (ref 4.6–6.2)
SODIUM SERPL-SCNC: 137 MMOL/L (ref 136–145)
WBC # BLD AUTO: 3.03 K/UL (ref 3.9–12.7)

## 2024-06-06 PROCEDURE — 36415 COLL VENOUS BLD VENIPUNCTURE: CPT | Performed by: NURSE PRACTITIONER

## 2024-06-06 PROCEDURE — 80053 COMPREHEN METABOLIC PANEL: CPT | Performed by: NURSE PRACTITIONER

## 2024-06-06 PROCEDURE — 85025 COMPLETE CBC W/AUTO DIFF WBC: CPT | Performed by: NURSE PRACTITIONER

## 2024-06-06 NOTE — TELEPHONE ENCOUNTER
----- Message from Dennise Aragon sent at 6/6/2024  2:33 PM CDT -----  Regarding: Appeal needed  Contact: Pt @[phone]  Pt is calling asking to speak with someone in the office to discuss pending authorization for their upcoming appt. Pt has advised that he wanted to make sure that their appt was authorized by their insurance before coming in for appt. Pt is asking for a call back today. Thanks.

## 2024-06-06 NOTE — TELEPHONE ENCOUNTER
Reached out to pt and advised that we are changing ruxience to truxima per insurance guidelines. Explained that approval was placed in error, but was updated to show denial. Pt voiced understanding, was appreciative of call.

## 2024-06-07 ENCOUNTER — TELEPHONE (OUTPATIENT)
Facility: CLINIC | Age: 39
End: 2024-06-07
Payer: COMMERCIAL

## 2024-06-07 RX ORDER — DIPHENHYDRAMINE HYDROCHLORIDE 50 MG/ML
50 INJECTION INTRAMUSCULAR; INTRAVENOUS ONCE AS NEEDED
Status: CANCELLED | OUTPATIENT
Start: 2024-06-10

## 2024-06-07 RX ORDER — SODIUM CHLORIDE 0.9 % (FLUSH) 0.9 %
10 SYRINGE (ML) INJECTION
Status: CANCELLED | OUTPATIENT
Start: 2024-06-10

## 2024-06-07 RX ORDER — ACETAMINOPHEN 325 MG/1
650 TABLET ORAL
Status: CANCELLED | OUTPATIENT
Start: 2024-06-10

## 2024-06-07 RX ORDER — HEPARIN 100 UNIT/ML
500 SYRINGE INTRAVENOUS
Status: CANCELLED | OUTPATIENT
Start: 2024-06-10

## 2024-06-07 RX ORDER — FAMOTIDINE 10 MG/ML
20 INJECTION INTRAVENOUS
Status: CANCELLED | OUTPATIENT
Start: 2024-06-10

## 2024-06-07 RX ORDER — EPINEPHRINE 0.3 MG/.3ML
0.3 INJECTION SUBCUTANEOUS ONCE AS NEEDED
Status: CANCELLED | OUTPATIENT
Start: 2024-06-10

## 2024-06-07 RX ORDER — MEPERIDINE HYDROCHLORIDE 50 MG/ML
25 INJECTION INTRAMUSCULAR; INTRAVENOUS; SUBCUTANEOUS
Status: CANCELLED | OUTPATIENT
Start: 2024-06-10

## 2024-06-07 NOTE — TELEPHONE ENCOUNTER
Patient made aware that he is scheduled for chemo Monday morning and that he should be here for 7am. Patient verbalized understanding.

## 2024-06-10 ENCOUNTER — DOCUMENTATION ONLY (OUTPATIENT)
Facility: CLINIC | Age: 39
End: 2024-06-10
Payer: COMMERCIAL

## 2024-06-10 ENCOUNTER — INFUSION (OUTPATIENT)
Dept: INFUSION THERAPY | Facility: HOSPITAL | Age: 39
End: 2024-06-10
Attending: INTERNAL MEDICINE
Payer: COMMERCIAL

## 2024-06-10 ENCOUNTER — SOCIAL WORK (OUTPATIENT)
Dept: HEMATOLOGY/ONCOLOGY | Facility: CLINIC | Age: 39
End: 2024-06-10
Payer: COMMERCIAL

## 2024-06-10 VITALS
TEMPERATURE: 99 F | HEART RATE: 85 BPM | DIASTOLIC BLOOD PRESSURE: 70 MMHG | SYSTOLIC BLOOD PRESSURE: 129 MMHG | OXYGEN SATURATION: 98 % | WEIGHT: 264.31 LBS | BODY MASS INDEX: 39.15 KG/M2 | HEIGHT: 69 IN | RESPIRATION RATE: 18 BRPM

## 2024-06-10 DIAGNOSIS — C91.40 HAIRY CELL LEUKEMIA NOT HAVING ACHIEVED REMISSION: Primary | ICD-10-CM

## 2024-06-10 PROCEDURE — 96417 CHEMO IV INFUS EACH ADDL SEQ: CPT

## 2024-06-10 PROCEDURE — 96415 CHEMO IV INFUSION ADDL HR: CPT

## 2024-06-10 PROCEDURE — 63600175 PHARM REV CODE 636 W HCPCS: Mod: JW,JG | Performed by: NURSE PRACTITIONER

## 2024-06-10 PROCEDURE — 25000003 PHARM REV CODE 250: Performed by: NURSE PRACTITIONER

## 2024-06-10 PROCEDURE — 63600175 PHARM REV CODE 636 W HCPCS: Performed by: NURSE PRACTITIONER

## 2024-06-10 PROCEDURE — 96367 TX/PROPH/DG ADDL SEQ IV INF: CPT

## 2024-06-10 PROCEDURE — 96375 TX/PRO/DX INJ NEW DRUG ADDON: CPT

## 2024-06-10 PROCEDURE — 96413 CHEMO IV INFUSION 1 HR: CPT

## 2024-06-10 RX ORDER — ACETAMINOPHEN 325 MG/1
650 TABLET ORAL
Status: DISCONTINUED | OUTPATIENT
Start: 2024-06-10 | End: 2024-06-10 | Stop reason: SDUPTHER

## 2024-06-10 RX ORDER — ACETAMINOPHEN 325 MG/1
650 TABLET ORAL
Status: CANCELLED
Start: 2024-06-10

## 2024-06-10 RX ORDER — MEPERIDINE HYDROCHLORIDE 25 MG/ML
25 INJECTION INTRAMUSCULAR; INTRAVENOUS; SUBCUTANEOUS
Status: DISCONTINUED | OUTPATIENT
Start: 2024-06-10 | End: 2024-06-10 | Stop reason: HOSPADM

## 2024-06-10 RX ORDER — ACETAMINOPHEN 325 MG/1
650 TABLET ORAL
Status: COMPLETED | OUTPATIENT
Start: 2024-06-10 | End: 2024-06-10

## 2024-06-10 RX ORDER — EPINEPHRINE 0.3 MG/.3ML
0.3 INJECTION SUBCUTANEOUS ONCE AS NEEDED
Status: DISCONTINUED | OUTPATIENT
Start: 2024-06-10 | End: 2024-06-10 | Stop reason: HOSPADM

## 2024-06-10 RX ORDER — SODIUM CHLORIDE 0.9 % (FLUSH) 0.9 %
10 SYRINGE (ML) INJECTION
Status: CANCELLED | OUTPATIENT
Start: 2024-06-14

## 2024-06-10 RX ORDER — HEPARIN 100 UNIT/ML
500 SYRINGE INTRAVENOUS
Status: CANCELLED | OUTPATIENT
Start: 2024-06-12

## 2024-06-10 RX ORDER — SODIUM CHLORIDE 0.9 % (FLUSH) 0.9 %
10 SYRINGE (ML) INJECTION
Status: CANCELLED | OUTPATIENT
Start: 2024-06-13

## 2024-06-10 RX ORDER — HEPARIN 100 UNIT/ML
500 SYRINGE INTRAVENOUS
Status: CANCELLED | OUTPATIENT
Start: 2024-06-13

## 2024-06-10 RX ORDER — SODIUM CHLORIDE 0.9 % (FLUSH) 0.9 %
10 SYRINGE (ML) INJECTION
Status: CANCELLED | OUTPATIENT
Start: 2024-06-11

## 2024-06-10 RX ORDER — FAMOTIDINE 10 MG/ML
20 INJECTION INTRAVENOUS
Status: COMPLETED | OUTPATIENT
Start: 2024-06-10 | End: 2024-06-10

## 2024-06-10 RX ORDER — HEPARIN 100 UNIT/ML
500 SYRINGE INTRAVENOUS
Status: CANCELLED | OUTPATIENT
Start: 2024-06-14

## 2024-06-10 RX ORDER — HEPARIN 100 UNIT/ML
500 SYRINGE INTRAVENOUS
Status: CANCELLED | OUTPATIENT
Start: 2024-06-11

## 2024-06-10 RX ORDER — SODIUM CHLORIDE 0.9 % (FLUSH) 0.9 %
10 SYRINGE (ML) INJECTION
Status: CANCELLED | OUTPATIENT
Start: 2024-06-12

## 2024-06-10 RX ORDER — DIPHENHYDRAMINE HYDROCHLORIDE 50 MG/ML
50 INJECTION INTRAMUSCULAR; INTRAVENOUS ONCE AS NEEDED
Status: DISCONTINUED | OUTPATIENT
Start: 2024-06-10 | End: 2024-06-10 | Stop reason: HOSPADM

## 2024-06-10 RX ADMIN — RITUXIMAB-ABBS 900 MG: 10 INJECTION, SOLUTION INTRAVENOUS at 10:06

## 2024-06-10 RX ADMIN — DIPHENHYDRAMINE HYDROCHLORIDE 50 MG: 50 INJECTION, SOLUTION INTRAMUSCULAR; INTRAVENOUS at 10:06

## 2024-06-10 RX ADMIN — ACETAMINOPHEN 650 MG: 325 TABLET ORAL at 04:06

## 2024-06-10 RX ADMIN — CLADRIBINE 18 MG: 1 INJECTION, SOLUTION INTRAVENOUS at 08:06

## 2024-06-10 RX ADMIN — MEPERIDINE HYDROCHLORIDE 25 MG: 25 INJECTION INTRAMUSCULAR; INTRAVENOUS; SUBCUTANEOUS at 12:06

## 2024-06-10 RX ADMIN — ACETAMINOPHEN 650 MG: 325 TABLET ORAL at 10:06

## 2024-06-10 RX ADMIN — PROMETHAZINE HYDROCHLORIDE 12.5 MG: 25 INJECTION INTRAMUSCULAR; INTRAVENOUS at 12:06

## 2024-06-10 RX ADMIN — HYDROCORTISONE SODIUM SUCCINATE 100 MG: 100 INJECTION, POWDER, FOR SOLUTION INTRAMUSCULAR; INTRAVENOUS at 04:06

## 2024-06-10 RX ADMIN — FAMOTIDINE 20 MG: 10 INJECTION INTRAVENOUS at 10:06

## 2024-06-10 NOTE — PROGRESS NOTES
CHEMO SCHOOL- NUTRITION    Gerardo Ramirez Jr. 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 Pt will be receiving cladribine and rituximab. I met with Mr. Ramirez for chemo school today. Pt reports excellent appetite and intake. He denies any recent unintentional weight changes. He is usually very good about staying hydrated during the day but will be continuing to increase his hydration. He denies having any nutrition related questions or concerns at the current time.     CW: 264#.     Food Insecurity:  Patient is worried whether their food would run out before they have more money to buy more: never  The food they bought just didn't last and they didn't have money to buy more: Never      Plan:   Reviewed chemo school packet & provided copy to pt.   Discussed importance of maintaining wt & staying hydrated.   Reviewed food safety guidelines recommended during treatment.   Reviewed alternate sources of hydration  Provided RD contact info & encouraged pt to call with any questions/concerns.   Will f/u as needed.       Electronically signed by: Ana Jimenez MBA, ERICN, LDN

## 2024-06-10 NOTE — NURSING
1220- Truxima stopped Patient having rigors, nausea, and headache. Demerol 25mg given SIVP.  1240- resting and rigors have stopped. 1315- Truxima resumed at 75ml/hr.1635- Truxima stopped again d/t rigors with headache. Solucortef 100mg SIVP given. 165- Tylenol 650mg po given. 1510- Truxima resumed at 300ml/hr. 1735- Medication completed and patient without any further issues at present. Vitals stable.

## 2024-06-11 ENCOUNTER — PATIENT MESSAGE (OUTPATIENT)
Facility: CLINIC | Age: 39
End: 2024-06-11
Payer: COMMERCIAL

## 2024-06-11 ENCOUNTER — INFUSION (OUTPATIENT)
Dept: INFUSION THERAPY | Facility: HOSPITAL | Age: 39
End: 2024-06-11
Attending: INTERNAL MEDICINE
Payer: COMMERCIAL

## 2024-06-11 VITALS
WEIGHT: 268.13 LBS | OXYGEN SATURATION: 99 % | BODY MASS INDEX: 39.71 KG/M2 | RESPIRATION RATE: 15 BRPM | SYSTOLIC BLOOD PRESSURE: 132 MMHG | TEMPERATURE: 98 F | DIASTOLIC BLOOD PRESSURE: 70 MMHG | HEART RATE: 59 BPM | HEIGHT: 69 IN

## 2024-06-11 DIAGNOSIS — C91.40 HAIRY CELL LEUKEMIA NOT HAVING ACHIEVED REMISSION: Primary | ICD-10-CM

## 2024-06-11 PROCEDURE — 96415 CHEMO IV INFUSION ADDL HR: CPT

## 2024-06-11 PROCEDURE — 25000003 PHARM REV CODE 250: Performed by: INTERNAL MEDICINE

## 2024-06-11 PROCEDURE — 96367 TX/PROPH/DG ADDL SEQ IV INF: CPT

## 2024-06-11 PROCEDURE — 25000003 PHARM REV CODE 250: Performed by: NURSE PRACTITIONER

## 2024-06-11 PROCEDURE — 96413 CHEMO IV INFUSION 1 HR: CPT

## 2024-06-11 PROCEDURE — 63600175 PHARM REV CODE 636 W HCPCS: Mod: JW,JG | Performed by: NURSE PRACTITIONER

## 2024-06-11 PROCEDURE — A4216 STERILE WATER/SALINE, 10 ML: HCPCS | Performed by: NURSE PRACTITIONER

## 2024-06-11 RX ORDER — ACETAMINOPHEN 325 MG/1
650 TABLET ORAL
Status: CANCELLED
Start: 2024-06-13

## 2024-06-11 RX ORDER — ACETAMINOPHEN 325 MG/1
650 TABLET ORAL
Status: COMPLETED | OUTPATIENT
Start: 2024-06-11 | End: 2024-06-11

## 2024-06-11 RX ORDER — SODIUM CHLORIDE 0.9 % (FLUSH) 0.9 %
10 SYRINGE (ML) INJECTION
Status: DISCONTINUED | OUTPATIENT
Start: 2024-06-11 | End: 2024-06-11 | Stop reason: HOSPADM

## 2024-06-11 RX ORDER — ACETAMINOPHEN 325 MG/1
650 TABLET ORAL
Status: CANCELLED
Start: 2024-06-14

## 2024-06-11 RX ORDER — ACETAMINOPHEN 325 MG/1
650 TABLET ORAL
Status: CANCELLED
Start: 2024-06-12

## 2024-06-11 RX ADMIN — PROMETHAZINE HYDROCHLORIDE 12.5 MG: 25 INJECTION INTRAMUSCULAR; INTRAVENOUS at 09:06

## 2024-06-11 RX ADMIN — ACETAMINOPHEN 650 MG: 325 TABLET ORAL at 10:06

## 2024-06-11 RX ADMIN — SODIUM CHLORIDE, PRESERVATIVE FREE 10 ML: 5 INJECTION INTRAVENOUS at 11:06

## 2024-06-11 RX ADMIN — CLADRIBINE 18 MG: 1 INJECTION, SOLUTION INTRAVENOUS at 09:06

## 2024-06-11 NOTE — PROGRESS NOTES
Gerardo Ramirez Jr is a 39 year old diagnosed with hairy cell leukemia.  I met with patient and his wife during his chemo infusion to complete new patient orientation and to complete the NCCN Distress Screening; patient indicated a rating of 0.  Patient denied needing psychosocial support at this time.  I provided patient with the [facility] community events flyer and my contact information in the event supportive services arise in the future.

## 2024-06-11 NOTE — PROGRESS NOTES
[facility]/On[facility]  PROGRESS NOTE -  Follow-up Visit      Subjective:       Patient ID:   NAME: Gerardo Ramirez Jr. : 1985     39 y.o. male    Referring Doc: Galindo Berrios Jr.,*  Other Physicians: Micheline Lanier/Brant Yeung           Chief Complaint: pancytopenia f/u    History of Present Illness:     Patient returns today for a regularly scheduled follow-up visit.  The patient is here today to go over the results of the recently ordered labs, tests and studies. He is here with his wife.    He had bone marrow biopsy on 2024 with pathology showing 95% of the marrow with hairy cell Leukemia (HCL)    He saw Dr Chan Paredes on 2024 and has proceeded  with a regimen of therapy this past Monday; he sees Dr paredes after he completes the 8 weeks (Aug 19th tentatively)    He seems to be tolerating it well so far             ROS:   GEN: normal without any fever, night sweats or weight loss  HEENT: normal with no HA's, sore throat, stiff neck, changes in vision  CV: normal with no CP, SOB, PND, TRUONG or orthopnea  PULM: normal with no SOB, cough, hemoptysis, sputum or pleuritic pain  GI: normal with no abdominal pain, nausea, vomiting, constipation, diarrhea, melanotic stools, BRBPR, or hematemesis  : normal with no hematuria, dysuria  BREAST: normal with no mass, discharge, pain  SKIN: normal with no rash, erythema, bruising, or swelling    Pain Scale:  0    Allergies:  Review of patient's allergies indicates:   Allergen Reactions    Amoxicillin Hives       Medications:    Current Outpatient Medications:     acyclovir (ZOVIRAX) 400 MG tablet, Take 1 tablet (400 mg total) by mouth 2 (two) times daily., Disp: 60 tablet, Rfl: 11    butalbital-acetaminophen-caff -40 mg Cap, Take 1-2 capsules by mouth 3 (three) times daily., Disp: 30 capsule, Rfl: 1    ergocalciferol (VITAMIN D2) 50,000 unit Cap, Take 1 capsule (50,000 Units total) by mouth every 7 days., Disp: 12  "capsule, Rfl: 1    metoprolol succinate (TOPROL-XL) 50 MG 24 hr tablet, Take 1 tablet (50 mg total) by mouth once daily., Disp: 90 tablet, Rfl: 1    olmesartan (BENICAR) 40 MG tablet, Take 1 tablet (40 mg total) by mouth once daily., Disp: 90 tablet, Rfl: 1    ondansetron (ZOFRAN) 8 MG tablet, Take 1 tablet (8 mg total) by mouth every 8 (eight) hours as needed for Nausea., Disp: 30 tablet, Rfl: 2    pitavastatin calcium (LIVALO) 2 mg Tab tablet, Take 1 tablet (2 mg total) by mouth every evening., Disp: 90 tablet, Rfl: 1    promethazine (PHENERGAN) 25 MG tablet, Take 1 tablet (25 mg total) by mouth every 4 to 6 hours as needed for Nausea., Disp: 30 tablet, Rfl: 2    sulfamethoxazole-trimethoprim 800-160mg (BACTRIM DS) 800-160 mg Tab, Take 1 tablet by mouth every Mon, Wed, Fri., Disp: 24 tablet, Rfl: 3  No current facility-administered medications for this visit.    PMHx/PSHx Updates:  See patient's last visit with me on 5/22/2024.  See H&P on 4/5/2024        Pathology:   Cancer Staging   No matching staging information was found for the patient.            Objective:     Vitals:  Blood pressure 123/79, pulse 79, temperature 97.3 °F (36.3 °C), resp. rate 18, height 5' 9" (1.753 m), weight 121.1 kg (266 lb 14.4 oz), SpO2 97%.    Physical Examination:   GEN: no apparent distress, comfortable; AAOx3  HEAD: atraumatic and normocephalic  EYES: no pallor, no icterus, PERRLA  ENT: OMM, no pharyngeal erythema, external ears WNL; no nasal discharge; no thrush  NECK: no masses, thyroid normal, trachea midline, no LAD/LN's, supple  CV: RRR with no murmur; normal pulse; normal S1 and S2; no pedal edema  CHEST: Normal respiratory effort; CTAB; normal breath sounds; no wheeze or crackles  ABDOM: nontender and nondistended; soft; normal bowel sounds; no rebound/guarding  MUSC/Skeletal: ROM normal; no crepitus; joints normal; no deformities or arthropathy  EXTREM: no clubbing, cyanosis, inflammation or swelling  SKIN: no rashes, " lesions, ulcers, petechiae or subcutaneous nodules  : no bah  NEURO: grossly intact; motor/sensory WNL; AAOx3; no tremors  PSYCH: normal mood, affect and behavior  LYMPH: normal cervical, supraclavicular, axillary and groin LN's            Labs:     Lab Results   Component Value Date    WBC 3.03 (L) 06/06/2024    HGB 12.3 (L) 06/06/2024    HCT 35.4 (L) 06/06/2024    MCV 88 06/06/2024    PLT 98 (L) 06/06/2024       CMP  Sodium   Date Value Ref Range Status   06/06/2024 137 136 - 145 mmol/L Final     Potassium   Date Value Ref Range Status   06/06/2024 4.0 3.5 - 5.1 mmol/L Final     Chloride   Date Value Ref Range Status   06/06/2024 105 95 - 110 mmol/L Final     CO2   Date Value Ref Range Status   06/06/2024 25 23 - 29 mmol/L Final     Glucose   Date Value Ref Range Status   06/06/2024 118 (H) 70 - 110 mg/dL Final     BUN   Date Value Ref Range Status   06/06/2024 17 6 - 20 mg/dL Final     Creatinine   Date Value Ref Range Status   06/06/2024 1.0 0.5 - 1.4 mg/dL Final     Calcium   Date Value Ref Range Status   06/06/2024 9.1 8.7 - 10.5 mg/dL Final     Total Protein   Date Value Ref Range Status   06/06/2024 7.2 6.0 - 8.4 g/dL Final     Albumin   Date Value Ref Range Status   06/06/2024 4.3 3.5 - 5.2 g/dL Final     Total Bilirubin   Date Value Ref Range Status   06/06/2024 0.6 0.1 - 1.0 mg/dL Final     Comment:     For infants and newborns, interpretation of results should be based  on gestational age, weight and in agreement with clinical  observations.    Premature Infant recommended reference ranges:  Up to 24 hours.............<8.0 mg/dL  Up to 48 hours............<12.0 mg/dL  3-5 days..................<15.0 mg/dL  6-29 days.................<15.0 mg/dL       Alkaline Phosphatase   Date Value Ref Range Status   06/06/2024 63 55 - 135 U/L Final     AST   Date Value Ref Range Status   06/06/2024 18 10 - 40 U/L Final     ALT   Date Value Ref Range Status   06/06/2024 33 10 - 44 U/L Final     Anion Gap   Date  Value Ref Range Status   06/06/2024 7 (L) 8 - 16 mmol/L Final     eGFR   Date Value Ref Range Status   06/06/2024 >60.0 >60 mL/min/1.73 m^2 Final           Radiology/Diagnostic Studies:    CT Dx Bone Marrow Biopsy(ies) w/ Aspiration; Right(XPD)    Result Date: 4/25/2024  EXAMINATION: CT DIAGNOSTIC BONE MARROW BIOPSY(IES) WITH ASPIRATION RIGHT(XPD) CLINICAL HISTORY: Other pancytopenia COMPARISON: None. FINDINGS: After obtaining written informed consent, which included a discussion of the risks and benefits of the procedure to include infection and bleeding, and allowing the patient the opportunity to ask questions, the patient agreed to the procedure. The patient was placed prone on the CT fluoroscopy table. A suitable skin entrance site overlying the right posterior iliac spine was localized with CT guidance. The skin was marked, and then prepped and draped in sterile fashion, with several milliliters of lidocaine 1% injected subcutaneously and along the periosteum to achieve adequate local anesthesia. Following, a punch skin incision was made, and an 11-gauge Jamshidi bone biopsy needle was advanced into the posterior iliac spine under intermittent CT fluoroscopic guidance.  Several aspirate attempts were made, with very little aspirate obtained.  A core biopsy sample was obtained.  Attention was then turned to the left posterior iliac spine, with the skin prepped and draped in sterile fashion, and lidocaine 1% injected to achieve adequate local anesthesia.  A punch skin incision was made and an 11-gauge Jamshidi bone biopsy needle was advanced into the left posterior iliac spine with intermittent CT fluoroscopic guidance.  Several aspirate attempts were made, with very little aspirate obtained.  The needle was removed and a bandage was applied to the skin. The patient tolerated the procedure well, with no immediate postprocedural complications. There was no significant blood loss. Total moderate conscious sedation  time with supervision by the interventional radiologist and monitoring by the special procedures registered nurse was 25 minutes. The patient received Versed 4 mg and Fentanyl 200 mcg IV during the procedure.     CT guided bone marrow core biopsy and aspiration as described. Electronically signed by: Miguel A Goode Date:    04/25/2024 Time:    11:34    US Abdomen Complete    Result Date: 4/25/2024  EXAMINATION: US ABDOMEN COMPLETE CLINICAL HISTORY: Pancytopenia COMPARISON: June 2021 FINDINGS: Sonographic assessment of the abdomen was performed.  The pancreas is partially obscured by bowel gas.  Visualized portions are normal.  The aorta is normal in caliber in the upper abdomen.  The inferior vena cava is unremarkable. The liver has a normal sonographic appearance, with no mass or contour abnormality.  Maximal longitudinal dimension is 16.9 cm.  Hepato pedal flow is noted within the portal vein. The gallbladder is absent.  Common bile duct is normal in caliber at 5 mm. The bilateral kidneys have a normal appearance.  The spleen is enlarged measuring 15.0 cm in greatest longitudinal dimension.  There is no free fluid.     1. Mild splenomegaly. 2. Surgical absence of the gallbladder. Electronically signed by: Shaquille Mart Date:    04/25/2024 Time:    10:43      I have reviewed all available lab results and radiology reports.    Assessment/Plan:   (1) 39 y.o. male with diagnosis of pancytopenia who has been referred by Galindo Berrios Jr.,* for evaluation by medical hematology/oncology.      - leucopenia with WBC at 2.7 with relatively adequate differential breakdown  - anemia with hgb at 12.3 - NCNC parameters  - mild thrombocytopenia with plats at 118,000     - his lab work was normal about 3 yrs ago; recent Hep C ab was nonreactive and HIV was negative     - he only took one dose of the imitrex and has not been on it with any consistence  - he has been on Livalo since Feb 2024 5/1/2024:  - He had bone marrow  biopsy on 4/25/2024 with pathology showing 95% of the marrow with hairy cell Leukemia (HCL)  - US with only mild splenomegaly  - Already discussed and made referral to Dr Sea Hensley with [facility] [place]     5/22/2024:  - He saw Dr Chan Paredes on 5/20/2024 and they are planning to proceed with a regimen of therapy with 2CDA , he also ordered CT scans of his chest/abdom/pelvis  - He wants to start therapy sometime after June 4th 2024  - await the recs and note from Dr Paredes  - set up chemotherapy school in meantime    6/13/2024:  - He saw Dr Chan Paredes on 5/20/2024 and has proceeded  with a regimen of therapy this past Monday;   - seems to be tolerating things well so far  - he sees Dr Paredes after he completes the 8 weeks (Aug 19th tentatively)       (2) HTN and hypercholesterolemia     (3) Atrial fibrillation     (4) Hepatic steatosis; elevated LFts     (5) Anxiety     (6) Migraine HA's - was on Imitrex but only took one dose     (7) EASTON     (8) Former smoker - quit Nov 2022 (vapes)      (9) Occasional alcohol intake     (10) Hx/of hernia repair, cholecystectomy and vasectomy            VISIT DIAGNOSES:      Hairy cell leukemia not having achieved remission    Anemia, unspecified type    Anemia, normocytic normochromic    Leukopenia, unspecified type    Thrombocytopenia    Pancytopenia          PLAN:  Proceeded with therapy as per Dr Paredes directives - s/p chemotherapy school with Do - discussed the side-effect profile of the drugs, provided literature on the drugs and obtained consents  F/u with Dr Paredes planned for Aug 19th 2024  Check labs weekly while on therapy  F/u with PCP     RTC in 1 week with Do and 2 weeks with Myself  Fax note to Galindo Berrios Jr.,* Brant Lanier C Bethala, O'Quin Kittrell, Jimmy L. Jr., MD, Chan Paredes    Discussion:     Pathology Discussion:    I reviewed and discussed the pathology report(s) and radiograph reports (if available) in as simple to understand and/or laymen's  "terms to the best of my ability. I had an indepth conversation with the patient and went over the patient's individual diagnosis based on the information that was currently available. I discussed the TNM staging process with regard to the patient's particular cancer type, and the calculated stage based on the currently available TNM data and literature. I discussed the available prognostic data with regard to the current staging information and how it relates to the prognosis of their particular neoplastic process.      NCCN Guidelines:    I discussed the available treatment option(s) in accordance with the latest literature from the NCCN Clinical Practice Guidelines for the patient's particular type of cancer disorder. The NCCN Guidelines provide a "document evidence-based (and) consensus-driven management" of the care of oncology patients. The treatment recommendations were made not only in accordance to the NCCN guidelines, but also factored in to account the patient's overall age, condition, performance status and their medical co-morbidities. I went over the risks and benefits of the the treatment options (if any could be made) with regard to their particular cancer type, their cancer stage, their age, and their co-morbidities.     I spent over 25 mins of time with the patient. Reviewed results of the recently ordered labs, tests and studies; made directives with regards to the results. Over half of this time was spent couseling and coordinating care.    I have explained all of the above in detail and the patient understands all of the current recommendation(s). I have answered all of their questions to the best of my ability and to their complete satisfaction.   The patient is to continue with the current management plan.            Electronically signed by Sung Cummings MD              "

## 2024-06-11 NOTE — PLAN OF CARE
Problem: Fatigue  Goal: Improved Activity Tolerance  Outcome: Progressing  Intervention: Promote Improved Energy  Flowsheets (Taken 6/11/2024 0912)  Fatigue Management:   fatigue-related activity identified   frequent rest breaks encouraged   paced activity encouraged  Sleep/Rest Enhancement:   regular sleep/rest pattern promoted   relaxation techniques promoted  Activity Management: Ambulated -L4  Environmental Support: rest periods encouraged

## 2024-06-12 ENCOUNTER — INFUSION (OUTPATIENT)
Dept: INFUSION THERAPY | Facility: HOSPITAL | Age: 39
End: 2024-06-12
Attending: INTERNAL MEDICINE
Payer: COMMERCIAL

## 2024-06-12 VITALS
TEMPERATURE: 98 F | DIASTOLIC BLOOD PRESSURE: 85 MMHG | HEART RATE: 83 BPM | RESPIRATION RATE: 18 BRPM | HEIGHT: 69 IN | OXYGEN SATURATION: 98 % | SYSTOLIC BLOOD PRESSURE: 138 MMHG | BODY MASS INDEX: 39.34 KG/M2 | WEIGHT: 265.63 LBS

## 2024-06-12 DIAGNOSIS — C91.40 HAIRY CELL LEUKEMIA NOT HAVING ACHIEVED REMISSION: Primary | ICD-10-CM

## 2024-06-12 PROCEDURE — 96415 CHEMO IV INFUSION ADDL HR: CPT

## 2024-06-12 PROCEDURE — A4216 STERILE WATER/SALINE, 10 ML: HCPCS | Performed by: NURSE PRACTITIONER

## 2024-06-12 PROCEDURE — 96413 CHEMO IV INFUSION 1 HR: CPT

## 2024-06-12 PROCEDURE — 25000003 PHARM REV CODE 250: Performed by: INTERNAL MEDICINE

## 2024-06-12 PROCEDURE — 25000003 PHARM REV CODE 250: Performed by: NURSE PRACTITIONER

## 2024-06-12 PROCEDURE — 63600175 PHARM REV CODE 636 W HCPCS: Mod: JG | Performed by: NURSE PRACTITIONER

## 2024-06-12 RX ORDER — ACETAMINOPHEN 325 MG/1
650 TABLET ORAL
Status: COMPLETED | OUTPATIENT
Start: 2024-06-12 | End: 2024-06-12

## 2024-06-12 RX ORDER — SODIUM CHLORIDE 0.9 % (FLUSH) 0.9 %
10 SYRINGE (ML) INJECTION
Status: DISCONTINUED | OUTPATIENT
Start: 2024-06-12 | End: 2024-06-12 | Stop reason: HOSPADM

## 2024-06-12 RX ADMIN — SODIUM CHLORIDE 18 MG: 9 INJECTION, SOLUTION INTRAVENOUS at 11:06

## 2024-06-12 RX ADMIN — SODIUM CHLORIDE, PRESERVATIVE FREE 10 ML: 5 INJECTION INTRAVENOUS at 01:06

## 2024-06-12 RX ADMIN — ACETAMINOPHEN 650 MG: 325 TABLET ORAL at 11:06

## 2024-06-13 ENCOUNTER — OFFICE VISIT (OUTPATIENT)
Facility: CLINIC | Age: 39
End: 2024-06-13
Payer: COMMERCIAL

## 2024-06-13 ENCOUNTER — PATIENT MESSAGE (OUTPATIENT)
Facility: CLINIC | Age: 39
End: 2024-06-13

## 2024-06-13 ENCOUNTER — INFUSION (OUTPATIENT)
Dept: INFUSION THERAPY | Facility: HOSPITAL | Age: 39
End: 2024-06-13
Attending: INTERNAL MEDICINE
Payer: COMMERCIAL

## 2024-06-13 ENCOUNTER — TELEPHONE (OUTPATIENT)
Facility: CLINIC | Age: 39
End: 2024-06-13

## 2024-06-13 VITALS
HEART RATE: 79 BPM | HEART RATE: 75 BPM | DIASTOLIC BLOOD PRESSURE: 73 MMHG | RESPIRATION RATE: 18 BRPM | HEIGHT: 69 IN | SYSTOLIC BLOOD PRESSURE: 123 MMHG | BODY MASS INDEX: 39.53 KG/M2 | WEIGHT: 266.88 LBS | RESPIRATION RATE: 17 BRPM | WEIGHT: 266.88 LBS | TEMPERATURE: 97 F | HEIGHT: 69 IN | DIASTOLIC BLOOD PRESSURE: 79 MMHG | OXYGEN SATURATION: 98 % | SYSTOLIC BLOOD PRESSURE: 122 MMHG | OXYGEN SATURATION: 97 % | TEMPERATURE: 98 F | BODY MASS INDEX: 39.53 KG/M2

## 2024-06-13 DIAGNOSIS — D61.818 PANCYTOPENIA: ICD-10-CM

## 2024-06-13 DIAGNOSIS — D72.819 LEUKOPENIA, UNSPECIFIED TYPE: ICD-10-CM

## 2024-06-13 DIAGNOSIS — D64.9 ANEMIA, NORMOCYTIC NORMOCHROMIC: ICD-10-CM

## 2024-06-13 DIAGNOSIS — D69.6 THROMBOCYTOPENIA: ICD-10-CM

## 2024-06-13 DIAGNOSIS — D64.9 ANEMIA, UNSPECIFIED TYPE: ICD-10-CM

## 2024-06-13 DIAGNOSIS — C91.40 HAIRY CELL LEUKEMIA NOT HAVING ACHIEVED REMISSION: Primary | ICD-10-CM

## 2024-06-13 PROCEDURE — 63600175 PHARM REV CODE 636 W HCPCS: Mod: JW,JG | Performed by: NURSE PRACTITIONER

## 2024-06-13 PROCEDURE — 99999 PR PBB SHADOW E&M-EST. PATIENT-LVL IV: CPT | Mod: PBBFAC,,, | Performed by: INTERNAL MEDICINE

## 2024-06-13 PROCEDURE — G2211 COMPLEX E/M VISIT ADD ON: HCPCS | Mod: S$GLB,,, | Performed by: INTERNAL MEDICINE

## 2024-06-13 PROCEDURE — 96415 CHEMO IV INFUSION ADDL HR: CPT

## 2024-06-13 PROCEDURE — 25000003 PHARM REV CODE 250: Performed by: INTERNAL MEDICINE

## 2024-06-13 PROCEDURE — 25000003 PHARM REV CODE 250: Performed by: NURSE PRACTITIONER

## 2024-06-13 PROCEDURE — 99215 OFFICE O/P EST HI 40 MIN: CPT | Mod: S$GLB,,, | Performed by: INTERNAL MEDICINE

## 2024-06-13 PROCEDURE — 96413 CHEMO IV INFUSION 1 HR: CPT

## 2024-06-13 RX ORDER — ACETAMINOPHEN 325 MG/1
650 TABLET ORAL
Status: COMPLETED | OUTPATIENT
Start: 2024-06-13 | End: 2024-06-13

## 2024-06-13 RX ADMIN — SODIUM CHLORIDE 18 MG: 9 INJECTION, SOLUTION INTRAVENOUS at 08:06

## 2024-06-13 RX ADMIN — ACETAMINOPHEN 650 MG: 325 TABLET ORAL at 08:06

## 2024-06-13 NOTE — TELEPHONE ENCOUNTER
Called pt regarding return to work form. Notified pt that paperwork is completed and will be at clinic desk for pt p/u.     ----- Message from Debbie Alfaro sent at 6/12/2024 11:13 AM CDT -----  Allyn ARANGO/ [employer]. called needing confirmation that pt is out of work due to the diagnosis of Lenkemia. Ref Claim# [id number]    # [phone]

## 2024-06-14 ENCOUNTER — INFUSION (OUTPATIENT)
Dept: INFUSION THERAPY | Facility: HOSPITAL | Age: 39
End: 2024-06-14
Attending: INTERNAL MEDICINE
Payer: COMMERCIAL

## 2024-06-14 VITALS
HEIGHT: 69 IN | SYSTOLIC BLOOD PRESSURE: 112 MMHG | HEART RATE: 76 BPM | WEIGHT: 264.88 LBS | RESPIRATION RATE: 18 BRPM | TEMPERATURE: 98 F | DIASTOLIC BLOOD PRESSURE: 72 MMHG | BODY MASS INDEX: 39.23 KG/M2 | OXYGEN SATURATION: 96 %

## 2024-06-14 DIAGNOSIS — C91.40 HAIRY CELL LEUKEMIA NOT HAVING ACHIEVED REMISSION: Primary | ICD-10-CM

## 2024-06-14 PROCEDURE — 96415 CHEMO IV INFUSION ADDL HR: CPT

## 2024-06-14 PROCEDURE — 96413 CHEMO IV INFUSION 1 HR: CPT

## 2024-06-14 PROCEDURE — 63600175 PHARM REV CODE 636 W HCPCS: Mod: JZ,JG | Performed by: NURSE PRACTITIONER

## 2024-06-14 PROCEDURE — A4216 STERILE WATER/SALINE, 10 ML: HCPCS | Performed by: NURSE PRACTITIONER

## 2024-06-14 PROCEDURE — 25000003 PHARM REV CODE 250: Performed by: INTERNAL MEDICINE

## 2024-06-14 PROCEDURE — 25000003 PHARM REV CODE 250: Performed by: NURSE PRACTITIONER

## 2024-06-14 RX ORDER — SODIUM CHLORIDE 0.9 % (FLUSH) 0.9 %
10 SYRINGE (ML) INJECTION
Status: CANCELLED | OUTPATIENT
Start: 2024-06-17

## 2024-06-14 RX ORDER — HEPARIN 100 UNIT/ML
500 SYRINGE INTRAVENOUS
Status: CANCELLED | OUTPATIENT
Start: 2024-06-17

## 2024-06-14 RX ORDER — FAMOTIDINE 10 MG/ML
20 INJECTION INTRAVENOUS
Status: CANCELLED | OUTPATIENT
Start: 2024-06-17

## 2024-06-14 RX ORDER — ACETAMINOPHEN 325 MG/1
650 TABLET ORAL
Status: CANCELLED | OUTPATIENT
Start: 2024-06-17

## 2024-06-14 RX ORDER — MEPERIDINE HYDROCHLORIDE 50 MG/ML
25 INJECTION INTRAMUSCULAR; INTRAVENOUS; SUBCUTANEOUS
Status: CANCELLED | OUTPATIENT
Start: 2024-06-17

## 2024-06-14 RX ORDER — DIPHENHYDRAMINE HYDROCHLORIDE 50 MG/ML
50 INJECTION INTRAMUSCULAR; INTRAVENOUS ONCE AS NEEDED
Status: CANCELLED | OUTPATIENT
Start: 2024-06-17

## 2024-06-14 RX ORDER — EPINEPHRINE 0.3 MG/.3ML
0.3 INJECTION SUBCUTANEOUS ONCE AS NEEDED
Status: CANCELLED | OUTPATIENT
Start: 2024-06-17

## 2024-06-14 RX ORDER — ACETAMINOPHEN 325 MG/1
650 TABLET ORAL
Status: CANCELLED
Start: 2024-06-17

## 2024-06-14 RX ORDER — SODIUM CHLORIDE 0.9 % (FLUSH) 0.9 %
10 SYRINGE (ML) INJECTION
Status: DISCONTINUED | OUTPATIENT
Start: 2024-06-14 | End: 2024-06-14 | Stop reason: HOSPADM

## 2024-06-14 RX ORDER — ACETAMINOPHEN 325 MG/1
650 TABLET ORAL
Status: COMPLETED | OUTPATIENT
Start: 2024-06-14 | End: 2024-06-14

## 2024-06-14 RX ADMIN — SODIUM CHLORIDE, PRESERVATIVE FREE 10 ML: 5 INJECTION INTRAVENOUS at 09:06

## 2024-06-14 RX ADMIN — SODIUM CHLORIDE 18 MG: 9 INJECTION, SOLUTION INTRAVENOUS at 07:06

## 2024-06-14 RX ADMIN — ACETAMINOPHEN 650 MG: 325 TABLET ORAL at 07:06

## 2024-06-15 ENCOUNTER — LAB VISIT (OUTPATIENT)
Dept: LAB | Facility: HOSPITAL | Age: 39
End: 2024-06-15
Attending: NURSE PRACTITIONER
Payer: COMMERCIAL

## 2024-06-15 DIAGNOSIS — C91.40 HAIRY CELL LEUKEMIA NOT HAVING ACHIEVED REMISSION: ICD-10-CM

## 2024-06-15 LAB
ALBUMIN SERPL BCP-MCNC: 4.2 G/DL (ref 3.5–5.2)
ALP SERPL-CCNC: 55 U/L (ref 55–135)
ALT SERPL W/O P-5'-P-CCNC: 65 U/L (ref 10–44)
ANION GAP SERPL CALC-SCNC: 11 MMOL/L (ref 8–16)
AST SERPL-CCNC: 32 U/L (ref 10–40)
BASOPHILS NFR BLD: 0 % (ref 0–1.9)
BILIRUB SERPL-MCNC: 1 MG/DL (ref 0.1–1)
BUN SERPL-MCNC: 17 MG/DL (ref 6–20)
CALCIUM SERPL-MCNC: 9.2 MG/DL (ref 8.7–10.5)
CHLORIDE SERPL-SCNC: 102 MMOL/L (ref 95–110)
CO2 SERPL-SCNC: 19 MMOL/L (ref 23–29)
CREAT SERPL-MCNC: 1 MG/DL (ref 0.5–1.4)
DACRYOCYTES BLD QL SMEAR: ABNORMAL
DIFFERENTIAL METHOD BLD: ABNORMAL
EOSINOPHIL NFR BLD: 4 % (ref 0–8)
ERYTHROCYTE [DISTWIDTH] IN BLOOD BY AUTOMATED COUNT: 13.6 % (ref 11.5–14.5)
EST. GFR  (NO RACE VARIABLE): >60 ML/MIN/1.73 M^2
GLUCOSE SERPL-MCNC: 205 MG/DL (ref 70–110)
HCT VFR BLD AUTO: 38.9 % (ref 40–54)
HGB BLD-MCNC: 13.3 G/DL (ref 14–18)
IMM GRANULOCYTES # BLD AUTO: ABNORMAL K/UL (ref 0–0.04)
IMM GRANULOCYTES NFR BLD AUTO: ABNORMAL % (ref 0–0.5)
LYMPHOCYTES NFR BLD: 40 % (ref 18–48)
MCH RBC QN AUTO: 29.8 PG (ref 27–31)
MCHC RBC AUTO-ENTMCNC: 34.2 G/DL (ref 32–36)
MCV RBC AUTO: 87 FL (ref 82–98)
MONOCYTES NFR BLD: 0 % (ref 4–15)
NEUTROPHILS NFR BLD: 56 % (ref 38–73)
NRBC BLD-RTO: 0 /100 WBC
PLATELET # BLD AUTO: 56 K/UL (ref 150–450)
PLATELET BLD QL SMEAR: ABNORMAL
PMV BLD AUTO: 11.1 FL (ref 9.2–12.9)
POIKILOCYTOSIS BLD QL SMEAR: SLIGHT
POTASSIUM SERPL-SCNC: 4.2 MMOL/L (ref 3.5–5.1)
PROT SERPL-MCNC: 7.5 G/DL (ref 6–8.4)
RBC # BLD AUTO: 4.47 M/UL (ref 4.6–6.2)
SODIUM SERPL-SCNC: 132 MMOL/L (ref 136–145)
WBC # BLD AUTO: 0.86 K/UL (ref 3.9–12.7)

## 2024-06-15 PROCEDURE — 85007 BL SMEAR W/DIFF WBC COUNT: CPT | Performed by: NURSE PRACTITIONER

## 2024-06-15 PROCEDURE — 80053 COMPREHEN METABOLIC PANEL: CPT | Performed by: NURSE PRACTITIONER

## 2024-06-15 PROCEDURE — 85027 COMPLETE CBC AUTOMATED: CPT | Performed by: NURSE PRACTITIONER

## 2024-06-15 PROCEDURE — 36415 COLL VENOUS BLD VENIPUNCTURE: CPT | Performed by: NURSE PRACTITIONER

## 2024-06-17 ENCOUNTER — LAB VISIT (OUTPATIENT)
Dept: LAB | Facility: HOSPITAL | Age: 39
End: 2024-06-17
Attending: NURSE PRACTITIONER
Payer: COMMERCIAL

## 2024-06-17 ENCOUNTER — INFUSION (OUTPATIENT)
Dept: INFUSION THERAPY | Facility: HOSPITAL | Age: 39
End: 2024-06-17
Attending: INTERNAL MEDICINE
Payer: COMMERCIAL

## 2024-06-17 VITALS
SYSTOLIC BLOOD PRESSURE: 110 MMHG | TEMPERATURE: 97 F | OXYGEN SATURATION: 98 % | HEART RATE: 99 BPM | BODY MASS INDEX: 38.54 KG/M2 | DIASTOLIC BLOOD PRESSURE: 62 MMHG | HEIGHT: 69 IN | RESPIRATION RATE: 17 BRPM | WEIGHT: 260.19 LBS

## 2024-06-17 DIAGNOSIS — C91.40 HAIRY CELL LEUKEMIA: Primary | ICD-10-CM

## 2024-06-17 DIAGNOSIS — C91.40 HAIRY CELL LEUKEMIA: ICD-10-CM

## 2024-06-17 DIAGNOSIS — C91.40 HAIRY CELL LEUKEMIA NOT HAVING ACHIEVED REMISSION: ICD-10-CM

## 2024-06-17 LAB
BASOPHILS NFR BLD: 0 % (ref 0–1.9)
DIFFERENTIAL METHOD BLD: ABNORMAL
EOSINOPHIL NFR BLD: 16 % (ref 0–8)
ERYTHROCYTE [DISTWIDTH] IN BLOOD BY AUTOMATED COUNT: 13.2 % (ref 11.5–14.5)
HCT VFR BLD AUTO: 38.5 % (ref 40–54)
HGB BLD-MCNC: 12.9 G/DL (ref 14–18)
IMM GRANULOCYTES # BLD AUTO: ABNORMAL K/UL (ref 0–0.04)
IMM GRANULOCYTES NFR BLD AUTO: ABNORMAL % (ref 0–0.5)
LYMPHOCYTES NFR BLD: 38 % (ref 18–48)
MCH RBC QN AUTO: 29.2 PG (ref 27–31)
MCHC RBC AUTO-ENTMCNC: 33.5 G/DL (ref 32–36)
MCV RBC AUTO: 87 FL (ref 82–98)
MONOCYTES NFR BLD: 0 % (ref 4–15)
NEUTROPHILS NFR BLD: 44 % (ref 38–73)
NEUTS BAND NFR BLD MANUAL: 2 %
NRBC BLD-RTO: 0 /100 WBC
OVALOCYTES BLD QL SMEAR: ABNORMAL
PLATELET # BLD AUTO: 92 K/UL (ref 150–450)
PLATELET BLD QL SMEAR: ABNORMAL
PMV BLD AUTO: 9.8 FL (ref 9.2–12.9)
POIKILOCYTOSIS BLD QL SMEAR: SLIGHT
RBC # BLD AUTO: 4.42 M/UL (ref 4.6–6.2)
WBC # BLD AUTO: 0.5 K/UL (ref 3.9–12.7)

## 2024-06-17 PROCEDURE — 85027 COMPLETE CBC AUTOMATED: CPT | Performed by: NURSE PRACTITIONER

## 2024-06-17 PROCEDURE — 96415 CHEMO IV INFUSION ADDL HR: CPT

## 2024-06-17 PROCEDURE — 96413 CHEMO IV INFUSION 1 HR: CPT

## 2024-06-17 PROCEDURE — 36415 COLL VENOUS BLD VENIPUNCTURE: CPT | Performed by: NURSE PRACTITIONER

## 2024-06-17 PROCEDURE — 25000003 PHARM REV CODE 250: Performed by: INTERNAL MEDICINE

## 2024-06-17 PROCEDURE — 85007 BL SMEAR W/DIFF WBC COUNT: CPT | Performed by: NURSE PRACTITIONER

## 2024-06-17 PROCEDURE — 96375 TX/PRO/DX INJ NEW DRUG ADDON: CPT

## 2024-06-17 PROCEDURE — 96367 TX/PROPH/DG ADDL SEQ IV INF: CPT

## 2024-06-17 PROCEDURE — 63600175 PHARM REV CODE 636 W HCPCS: Performed by: INTERNAL MEDICINE

## 2024-06-17 RX ORDER — ACETAMINOPHEN 325 MG/1
650 TABLET ORAL
Status: COMPLETED | OUTPATIENT
Start: 2024-06-17 | End: 2024-06-17

## 2024-06-17 RX ORDER — FAMOTIDINE 10 MG/ML
20 INJECTION INTRAVENOUS
Status: COMPLETED | OUTPATIENT
Start: 2024-06-17 | End: 2024-06-17

## 2024-06-17 RX ORDER — MEPERIDINE HYDROCHLORIDE 25 MG/ML
25 INJECTION INTRAMUSCULAR; INTRAVENOUS; SUBCUTANEOUS
Status: DISCONTINUED | OUTPATIENT
Start: 2024-06-17 | End: 2024-06-17 | Stop reason: HOSPADM

## 2024-06-17 RX ORDER — SODIUM CHLORIDE 0.9 % (FLUSH) 0.9 %
10 SYRINGE (ML) INJECTION
Status: DISCONTINUED | OUTPATIENT
Start: 2024-06-17 | End: 2024-06-17 | Stop reason: HOSPADM

## 2024-06-17 RX ORDER — DIPHENHYDRAMINE HYDROCHLORIDE 50 MG/ML
50 INJECTION INTRAMUSCULAR; INTRAVENOUS ONCE AS NEEDED
Status: DISCONTINUED | OUTPATIENT
Start: 2024-06-17 | End: 2024-06-17 | Stop reason: HOSPADM

## 2024-06-17 RX ORDER — EPINEPHRINE 0.3 MG/.3ML
0.3 INJECTION SUBCUTANEOUS ONCE AS NEEDED
Status: DISCONTINUED | OUTPATIENT
Start: 2024-06-17 | End: 2024-06-17 | Stop reason: HOSPADM

## 2024-06-17 RX ADMIN — RITUXIMAB-ABBS 900 MG: 10 INJECTION, SOLUTION INTRAVENOUS at 09:06

## 2024-06-17 RX ADMIN — FAMOTIDINE 20 MG: 10 INJECTION INTRAVENOUS at 08:06

## 2024-06-17 RX ADMIN — DIPHENHYDRAMINE HYDROCHLORIDE 50 MG: 50 INJECTION, SOLUTION INTRAMUSCULAR; INTRAVENOUS at 08:06

## 2024-06-17 RX ADMIN — ACETAMINOPHEN 650 MG: 325 TABLET ORAL at 08:06

## 2024-06-17 NOTE — PLAN OF CARE
Problem: Fatigue  Goal: Improved Activity Tolerance  Intervention: Promote Improved Energy  Flowsheets (Taken 6/17/2024 9330)  Fatigue Management: fatigue-related activity identified  Activity Management: Ambulated -L4

## 2024-06-18 ENCOUNTER — HOSPITAL ENCOUNTER (INPATIENT)
Facility: HOSPITAL | Age: 39
LOS: 1 days | Discharge: HOME | DRG: 809 | End: 2024-06-20
Attending: EMERGENCY MEDICINE | Admitting: FAMILY MEDICINE
Payer: COMMERCIAL

## 2024-06-18 ENCOUNTER — TELEPHONE (OUTPATIENT)
Facility: CLINIC | Age: 39
End: 2024-06-18
Payer: COMMERCIAL

## 2024-06-18 DIAGNOSIS — D70.9 NEUTROPENIC FEVER: ICD-10-CM

## 2024-06-18 DIAGNOSIS — R50.9 FEVER: ICD-10-CM

## 2024-06-18 DIAGNOSIS — R50.81 NEUTROPENIC FEVER: ICD-10-CM

## 2024-06-18 DIAGNOSIS — R07.9 CHEST PAIN: ICD-10-CM

## 2024-06-18 DIAGNOSIS — C91.40 HAIRY CELL LEUKEMIA NOT HAVING ACHIEVED REMISSION: Primary | ICD-10-CM

## 2024-06-18 LAB
ALBUMIN SERPL BCP-MCNC: 4.1 G/DL (ref 3.5–5.2)
ALP SERPL-CCNC: 52 U/L (ref 55–135)
ALT SERPL W/O P-5'-P-CCNC: 39 U/L (ref 10–44)
ANION GAP SERPL CALC-SCNC: 8 MMOL/L (ref 8–16)
AST SERPL-CCNC: 13 U/L (ref 10–40)
BILIRUB SERPL-MCNC: 1 MG/DL (ref 0.1–1)
BILIRUB UR QL STRIP: NEGATIVE
BUN SERPL-MCNC: 17 MG/DL (ref 6–20)
CALCIUM SERPL-MCNC: 9.4 MG/DL (ref 8.7–10.5)
CHLORIDE SERPL-SCNC: 101 MMOL/L (ref 95–110)
CLARITY UR: CLEAR
CO2 SERPL-SCNC: 22 MMOL/L (ref 23–29)
COLOR UR: COLORLESS
CREAT SERPL-MCNC: 0.9 MG/DL (ref 0.5–1.4)
CREAT SERPL-MCNC: 1 MG/DL (ref 0.5–1.4)
EST. GFR  (NO RACE VARIABLE): >60 ML/MIN/1.73 M^2
GLUCOSE SERPL-MCNC: 150 MG/DL (ref 70–110)
GLUCOSE UR QL STRIP: NEGATIVE
HGB UR QL STRIP: NEGATIVE
KETONES UR QL STRIP: NEGATIVE
LACTATE SERPL-SCNC: 1.7 MMOL/L (ref 0.5–1.9)
LDH SERPL L TO P-CCNC: 1.58 MMOL/L (ref 0.5–2.2)
LEUKOCYTE ESTERASE UR QL STRIP: NEGATIVE
NITRITE UR QL STRIP: NEGATIVE
PH UR STRIP: 6 [PH] (ref 5–8)
POTASSIUM SERPL-SCNC: 3.9 MMOL/L (ref 3.5–5.1)
PROT SERPL-MCNC: 7.2 G/DL (ref 6–8.4)
PROT UR QL STRIP: NEGATIVE
SAMPLE: NORMAL
SAMPLE: NORMAL
SODIUM SERPL-SCNC: 131 MMOL/L (ref 136–145)
SP GR UR STRIP: <1.005 (ref 1–1.03)
URN SPEC COLLECT METH UR: ABNORMAL
UROBILINOGEN UR STRIP-ACNC: NEGATIVE EU/DL

## 2024-06-18 PROCEDURE — G0378 HOSPITAL OBSERVATION PER HR: HCPCS

## 2024-06-18 PROCEDURE — 85025 COMPLETE CBC W/AUTO DIFF WBC: CPT | Performed by: PHYSICIAN ASSISTANT

## 2024-06-18 PROCEDURE — 99285 EMERGENCY DEPT VISIT HI MDM: CPT | Mod: 25

## 2024-06-18 PROCEDURE — 93005 ELECTROCARDIOGRAM TRACING: CPT | Performed by: INTERNAL MEDICINE

## 2024-06-18 PROCEDURE — 80053 COMPREHEN METABOLIC PANEL: CPT | Performed by: EMERGENCY MEDICINE

## 2024-06-18 PROCEDURE — 96365 THER/PROPH/DIAG IV INF INIT: CPT

## 2024-06-18 PROCEDURE — 36415 COLL VENOUS BLD VENIPUNCTURE: CPT | Performed by: EMERGENCY MEDICINE

## 2024-06-18 PROCEDURE — 63600175 PHARM REV CODE 636 W HCPCS: Performed by: EMERGENCY MEDICINE

## 2024-06-18 PROCEDURE — 82565 ASSAY OF CREATININE: CPT

## 2024-06-18 PROCEDURE — 25000003 PHARM REV CODE 250: Performed by: EMERGENCY MEDICINE

## 2024-06-18 PROCEDURE — 81003 URINALYSIS AUTO W/O SCOPE: CPT | Performed by: PHYSICIAN ASSISTANT

## 2024-06-18 PROCEDURE — 87040 BLOOD CULTURE FOR BACTERIA: CPT | Performed by: EMERGENCY MEDICINE

## 2024-06-18 PROCEDURE — 96367 TX/PROPH/DG ADDL SEQ IV INF: CPT

## 2024-06-18 PROCEDURE — 25000003 PHARM REV CODE 250: Performed by: HOSPITALIST

## 2024-06-18 PROCEDURE — 93010 ELECTROCARDIOGRAM REPORT: CPT | Mod: ,,, | Performed by: INTERNAL MEDICINE

## 2024-06-18 PROCEDURE — 83605 ASSAY OF LACTIC ACID: CPT | Performed by: EMERGENCY MEDICINE

## 2024-06-18 RX ORDER — IBUPROFEN 200 MG
24 TABLET ORAL
Status: DISCONTINUED | OUTPATIENT
Start: 2024-06-18 | End: 2024-06-20 | Stop reason: HOSPADM

## 2024-06-18 RX ORDER — SODIUM CHLORIDE 0.9 % (FLUSH) 0.9 %
10 SYRINGE (ML) INJECTION EVERY 12 HOURS PRN
Status: DISCONTINUED | OUTPATIENT
Start: 2024-06-18 | End: 2024-06-20 | Stop reason: HOSPADM

## 2024-06-18 RX ORDER — ACETAMINOPHEN 325 MG/1
650 TABLET ORAL EVERY 4 HOURS PRN
Status: DISCONTINUED | OUTPATIENT
Start: 2024-06-18 | End: 2024-06-20 | Stop reason: HOSPADM

## 2024-06-18 RX ORDER — HYDROCODONE BITARTRATE AND ACETAMINOPHEN 5; 325 MG/1; MG/1
1 TABLET ORAL EVERY 6 HOURS PRN
Status: DISCONTINUED | OUTPATIENT
Start: 2024-06-18 | End: 2024-06-20 | Stop reason: HOSPADM

## 2024-06-18 RX ORDER — ACYCLOVIR 400 MG/1
400 TABLET ORAL 2 TIMES DAILY
Status: CANCELLED | OUTPATIENT
Start: 2024-06-18

## 2024-06-18 RX ORDER — SODIUM,POTASSIUM PHOSPHATES 280-250MG
2 POWDER IN PACKET (EA) ORAL
Status: DISCONTINUED | OUTPATIENT
Start: 2024-06-18 | End: 2024-06-20 | Stop reason: HOSPADM

## 2024-06-18 RX ORDER — NALOXONE HCL 0.4 MG/ML
0.02 VIAL (ML) INJECTION
Status: DISCONTINUED | OUTPATIENT
Start: 2024-06-18 | End: 2024-06-20 | Stop reason: HOSPADM

## 2024-06-18 RX ORDER — VANCOMYCIN HCL IN 5 % DEXTROSE 1G/250ML
2000 PLASTIC BAG, INJECTION (ML) INTRAVENOUS
Status: COMPLETED | OUTPATIENT
Start: 2024-06-18 | End: 2024-06-19

## 2024-06-18 RX ORDER — LOSARTAN POTASSIUM 25 MG/1
25 TABLET ORAL DAILY
Status: CANCELLED | OUTPATIENT
Start: 2024-06-19

## 2024-06-18 RX ORDER — ALUMINUM HYDROXIDE, MAGNESIUM HYDROXIDE, AND SIMETHICONE 1200; 120; 1200 MG/30ML; MG/30ML; MG/30ML
30 SUSPENSION ORAL 4 TIMES DAILY PRN
Status: DISCONTINUED | OUTPATIENT
Start: 2024-06-18 | End: 2024-06-20 | Stop reason: HOSPADM

## 2024-06-18 RX ORDER — SULFAMETHOXAZOLE AND TRIMETHOPRIM 800; 160 MG/1; MG/1
1 TABLET ORAL
Status: CANCELLED | OUTPATIENT
Start: 2024-06-19

## 2024-06-18 RX ORDER — ATORVASTATIN CALCIUM 20 MG/1
20 TABLET, FILM COATED ORAL DAILY
Status: CANCELLED | OUTPATIENT
Start: 2024-06-19

## 2024-06-18 RX ORDER — ACETAMINOPHEN 325 MG/1
650 TABLET ORAL EVERY 8 HOURS PRN
Status: DISCONTINUED | OUTPATIENT
Start: 2024-06-18 | End: 2024-06-20 | Stop reason: HOSPADM

## 2024-06-18 RX ORDER — SODIUM CHLORIDE 9 MG/ML
INJECTION, SOLUTION INTRAVENOUS CONTINUOUS
Status: DISCONTINUED | OUTPATIENT
Start: 2024-06-18 | End: 2024-06-20 | Stop reason: HOSPADM

## 2024-06-18 RX ORDER — TALC
6 POWDER (GRAM) TOPICAL NIGHTLY PRN
Status: DISCONTINUED | OUTPATIENT
Start: 2024-06-18 | End: 2024-06-20 | Stop reason: HOSPADM

## 2024-06-18 RX ORDER — GLUCAGON 1 MG
1 KIT INJECTION
Status: DISCONTINUED | OUTPATIENT
Start: 2024-06-18 | End: 2024-06-20 | Stop reason: HOSPADM

## 2024-06-18 RX ORDER — IBUPROFEN 200 MG
16 TABLET ORAL
Status: DISCONTINUED | OUTPATIENT
Start: 2024-06-18 | End: 2024-06-20 | Stop reason: HOSPADM

## 2024-06-18 RX ORDER — METOPROLOL SUCCINATE 50 MG/1
50 TABLET, EXTENDED RELEASE ORAL DAILY
Status: CANCELLED | OUTPATIENT
Start: 2024-06-19

## 2024-06-18 RX ORDER — LANOLIN ALCOHOL/MO/W.PET/CERES
800 CREAM (GRAM) TOPICAL
Status: DISCONTINUED | OUTPATIENT
Start: 2024-06-18 | End: 2024-06-20 | Stop reason: HOSPADM

## 2024-06-18 RX ORDER — ACETAMINOPHEN 500 MG
1000 TABLET ORAL ONCE AS NEEDED
COMMUNITY

## 2024-06-18 RX ORDER — ONDANSETRON HYDROCHLORIDE 2 MG/ML
4 INJECTION, SOLUTION INTRAVENOUS EVERY 6 HOURS PRN
Status: DISCONTINUED | OUTPATIENT
Start: 2024-06-18 | End: 2024-06-20 | Stop reason: HOSPADM

## 2024-06-18 RX ORDER — HEPARIN SODIUM 5000 [USP'U]/ML
5000 INJECTION, SOLUTION INTRAVENOUS; SUBCUTANEOUS EVERY 8 HOURS
Status: DISCONTINUED | OUTPATIENT
Start: 2024-06-19 | End: 2024-06-20 | Stop reason: HOSPADM

## 2024-06-18 RX ADMIN — CEFEPIME 2 G: 2 INJECTION, POWDER, FOR SOLUTION INTRAVENOUS at 09:06

## 2024-06-18 RX ADMIN — ACETAMINOPHEN 650 MG: 325 TABLET ORAL at 10:06

## 2024-06-18 RX ADMIN — SODIUM CHLORIDE: 9 INJECTION, SOLUTION INTRAVENOUS at 10:06

## 2024-06-18 RX ADMIN — VANCOMYCIN HYDROCHLORIDE 2000 MG: 1 INJECTION, POWDER, LYOPHILIZED, FOR SOLUTION INTRAVENOUS at 10:06

## 2024-06-18 NOTE — TELEPHONE ENCOUNTER
----- Message from Debbie Alfaro sent at 6/14/2024  1:12 PM CDT -----  Ms. Allyn HEART with [employer]. Needs to verify if Dr. Cummings is keeping the pt out of work form June 12th to August 5th.  ----- Message -----  From: Debbie Alfaro  Sent: 6/12/2024  11:15 AM CDT  To: Zoila Almaraz Staff    Allyn HEART W/ [employer] called needing confirmation that pt is out of work due to the diagnosis of Lenkemia. Ref Claim# [id number]    # [phone]

## 2024-06-18 NOTE — TELEPHONE ENCOUNTER
Spoke to patient concerning this to see if he was ok with us giving [employer] medical info, he states he is and that he was called and made aware that he needs to come in and sign paperwork for [employer] that we will than fax back but he has not had a chance to come by to do so. States he will come by tomorrow to sign paperwork so that we can than fax it. Keya HOLM provided me with a copy of completed paperwork which I left at  to be signed.

## 2024-06-19 ENCOUNTER — PATIENT MESSAGE (OUTPATIENT)
Facility: CLINIC | Age: 39
End: 2024-06-19
Payer: COMMERCIAL

## 2024-06-19 LAB
ALBUMIN SERPL BCP-MCNC: 3.8 G/DL (ref 3.5–5.2)
ALP SERPL-CCNC: 45 U/L (ref 55–135)
ALT SERPL W/O P-5'-P-CCNC: 32 U/L (ref 10–44)
ANION GAP SERPL CALC-SCNC: 7 MMOL/L (ref 8–16)
AST SERPL-CCNC: 10 U/L (ref 10–40)
BASOPHILS # BLD AUTO: 0 K/UL (ref 0–0.2)
BASOPHILS # BLD AUTO: 0 K/UL (ref 0–0.2)
BASOPHILS NFR BLD: 0 % (ref 0–1.9)
BASOPHILS NFR BLD: 0 % (ref 0–1.9)
BILIRUB SERPL-MCNC: 1.1 MG/DL (ref 0.1–1)
BUN SERPL-MCNC: 13 MG/DL (ref 6–20)
CALCIUM SERPL-MCNC: 8.5 MG/DL (ref 8.7–10.5)
CHLORIDE SERPL-SCNC: 104 MMOL/L (ref 95–110)
CO2 SERPL-SCNC: 23 MMOL/L (ref 23–29)
CREAT SERPL-MCNC: 0.9 MG/DL (ref 0.5–1.4)
DACRYOCYTES BLD QL SMEAR: ABNORMAL
DIFFERENTIAL METHOD BLD: ABNORMAL
DIFFERENTIAL METHOD BLD: ABNORMAL
EOSINOPHIL # BLD AUTO: 0 K/UL (ref 0–0.5)
EOSINOPHIL # BLD AUTO: 0 K/UL (ref 0–0.5)
EOSINOPHIL NFR BLD: 2.4 % (ref 0–8)
EOSINOPHIL NFR BLD: 2.6 % (ref 0–8)
ERYTHROCYTE [DISTWIDTH] IN BLOOD BY AUTOMATED COUNT: 13.2 % (ref 11.5–14.5)
ERYTHROCYTE [DISTWIDTH] IN BLOOD BY AUTOMATED COUNT: 13.4 % (ref 11.5–14.5)
EST. GFR  (NO RACE VARIABLE): >60 ML/MIN/1.73 M^2
GLUCOSE SERPL-MCNC: 144 MG/DL (ref 70–110)
HCT VFR BLD AUTO: 31.3 % (ref 40–54)
HCT VFR BLD AUTO: 32.9 % (ref 40–54)
HGB BLD-MCNC: 10.7 G/DL (ref 14–18)
HGB BLD-MCNC: 11.3 G/DL (ref 14–18)
IMM GRANULOCYTES # BLD AUTO: 0 K/UL (ref 0–0.04)
IMM GRANULOCYTES # BLD AUTO: 0.01 K/UL (ref 0–0.04)
IMM GRANULOCYTES NFR BLD AUTO: 0 % (ref 0–0.5)
IMM GRANULOCYTES NFR BLD AUTO: 2.6 % (ref 0–0.5)
LYMPHOCYTES # BLD AUTO: 0.1 K/UL (ref 1–4.8)
LYMPHOCYTES # BLD AUTO: 0.1 K/UL (ref 1–4.8)
LYMPHOCYTES NFR BLD: 34.1 % (ref 18–48)
LYMPHOCYTES NFR BLD: 36.8 % (ref 18–48)
MAGNESIUM SERPL-MCNC: 1.5 MG/DL (ref 1.6–2.6)
MCH RBC QN AUTO: 29.7 PG (ref 27–31)
MCH RBC QN AUTO: 29.7 PG (ref 27–31)
MCHC RBC AUTO-ENTMCNC: 34.2 G/DL (ref 32–36)
MCHC RBC AUTO-ENTMCNC: 34.3 G/DL (ref 32–36)
MCV RBC AUTO: 86 FL (ref 82–98)
MCV RBC AUTO: 87 FL (ref 82–98)
MONOCYTES # BLD AUTO: 0 K/UL (ref 0.3–1)
MONOCYTES # BLD AUTO: 0 K/UL (ref 0.3–1)
MONOCYTES NFR BLD: 0 % (ref 4–15)
MONOCYTES NFR BLD: 2.4 % (ref 4–15)
NEUTROPHILS # BLD AUTO: 0.2 K/UL (ref 1.8–7.7)
NEUTROPHILS # BLD AUTO: 0.3 K/UL (ref 1.8–7.7)
NEUTROPHILS NFR BLD: 58 % (ref 38–73)
NEUTROPHILS NFR BLD: 61.1 % (ref 38–73)
NRBC BLD-RTO: 0 /100 WBC
NRBC BLD-RTO: 0 /100 WBC
OVALOCYTES BLD QL SMEAR: ABNORMAL
PLATELET # BLD AUTO: 102 K/UL (ref 150–450)
PLATELET # BLD AUTO: 104 K/UL (ref 150–450)
PLATELET BLD QL SMEAR: ABNORMAL
PMV BLD AUTO: 8.7 FL (ref 9.2–12.9)
PMV BLD AUTO: 9.2 FL (ref 9.2–12.9)
POIKILOCYTOSIS BLD QL SMEAR: SLIGHT
POTASSIUM SERPL-SCNC: 4.4 MMOL/L (ref 3.5–5.1)
PROT SERPL-MCNC: 6.5 G/DL (ref 6–8.4)
RBC # BLD AUTO: 3.6 M/UL (ref 4.6–6.2)
RBC # BLD AUTO: 3.81 M/UL (ref 4.6–6.2)
SODIUM SERPL-SCNC: 134 MMOL/L (ref 136–145)
WBC # BLD AUTO: 0.38 K/UL (ref 3.9–12.7)
WBC # BLD AUTO: 0.41 K/UL (ref 3.9–12.7)

## 2024-06-19 PROCEDURE — 87040 BLOOD CULTURE FOR BACTERIA: CPT | Performed by: EMERGENCY MEDICINE

## 2024-06-19 PROCEDURE — 36415 COLL VENOUS BLD VENIPUNCTURE: CPT | Performed by: EMERGENCY MEDICINE

## 2024-06-19 PROCEDURE — 96366 THER/PROPH/DIAG IV INF ADDON: CPT

## 2024-06-19 PROCEDURE — 63600175 PHARM REV CODE 636 W HCPCS: Mod: JZ,JB,JG | Performed by: FAMILY MEDICINE

## 2024-06-19 PROCEDURE — 80053 COMPREHEN METABOLIC PANEL: CPT | Performed by: HOSPITALIST

## 2024-06-19 PROCEDURE — 85025 COMPLETE CBC W/AUTO DIFF WBC: CPT | Performed by: HOSPITALIST

## 2024-06-19 PROCEDURE — 83735 ASSAY OF MAGNESIUM: CPT | Performed by: HOSPITALIST

## 2024-06-19 PROCEDURE — 25000003 PHARM REV CODE 250: Performed by: HOSPITALIST

## 2024-06-19 PROCEDURE — 12000002 HC ACUTE/MED SURGE SEMI-PRIVATE ROOM

## 2024-06-19 PROCEDURE — 36415 COLL VENOUS BLD VENIPUNCTURE: CPT | Performed by: HOSPITALIST

## 2024-06-19 PROCEDURE — 63600175 PHARM REV CODE 636 W HCPCS: Performed by: HOSPITALIST

## 2024-06-19 PROCEDURE — 25000003 PHARM REV CODE 250: Performed by: FAMILY MEDICINE

## 2024-06-19 RX ADMIN — VANCOMYCIN HYDROCHLORIDE 2000 MG: 1 INJECTION, POWDER, LYOPHILIZED, FOR SOLUTION INTRAVENOUS at 10:06

## 2024-06-19 RX ADMIN — FILGRASTIM-SNDZ 480 MCG: 480 INJECTION, SOLUTION INTRAVENOUS; SUBCUTANEOUS at 02:06

## 2024-06-19 RX ADMIN — CEFEPIME 2 G: 2 INJECTION, POWDER, FOR SOLUTION INTRAVENOUS at 12:06

## 2024-06-19 RX ADMIN — Medication 800 MG: at 10:06

## 2024-06-19 RX ADMIN — ACETAMINOPHEN 650 MG: 325 TABLET ORAL at 09:06

## 2024-06-19 RX ADMIN — ACETAMINOPHEN 650 MG: 325 TABLET ORAL at 04:06

## 2024-06-19 RX ADMIN — CEFEPIME 2 G: 2 INJECTION, POWDER, FOR SOLUTION INTRAVENOUS at 09:06

## 2024-06-19 RX ADMIN — CEFEPIME 2 G: 2 INJECTION, POWDER, FOR SOLUTION INTRAVENOUS at 05:06

## 2024-06-19 RX ADMIN — Medication 800 MG: at 02:06

## 2024-06-19 NOTE — SUBJECTIVE & OBJECTIVE
Interval History:  Patient seen and examined, no acute complaints    Review of Systems   Constitutional:  Positive for fever. Negative for chills.   Respiratory:  Negative for chest tightness, shortness of breath and wheezing.    Cardiovascular:  Negative for chest pain and palpitations.   Gastrointestinal:  Negative for constipation, diarrhea, nausea and vomiting.   Genitourinary:  Negative for dysuria and hematuria.   Musculoskeletal:  Negative for gait problem.   Neurological:  Negative for dizziness, speech difficulty and numbness.     Objective:     Vital Signs (Most Recent):  Temp: (!) 101.1 °F (38.4 °C) (06/19/24 1638)  Pulse: 108 (06/19/24 1638)  Resp: 20 (06/19/24 1638)  BP: 119/63 (06/19/24 1638)  SpO2: 96 % (06/19/24 1638) Vital Signs (24h Range):  Temp:  [97.3 °F (36.3 °C)-101.1 °F (38.4 °C)] 101.1 °F (38.4 °C)  Pulse:  [] 108  Resp:  [18-20] 20  SpO2:  [96 %-100 %] 96 %  BP: (106-147)/(51-82) 119/63     Weight: 118 kg (260 lb 2.3 oz)  Body mass index is 38.42 kg/m².    Intake/Output Summary (Last 24 hours) at 6/19/2024 1653  Last data filed at 6/19/2024 1301  Gross per 24 hour   Intake 1488.33 ml   Output 2300 ml   Net -811.67 ml         Physical Exam  Vitals and nursing note reviewed.   Constitutional:       General: He is not in acute distress.     Appearance: Normal appearance.   Cardiovascular:      Rate and Rhythm: Normal rate and regular rhythm.      Heart sounds: Normal heart sounds.   Pulmonary:      Breath sounds: No wheezing, rhonchi or rales.   Abdominal:      General: There is no distension.      Palpations: Abdomen is soft.      Tenderness: There is no abdominal tenderness.   Skin:     General: Skin is warm and dry.   Neurological:      General: No focal deficit present.      Mental Status: He is alert and oriented to person, place, and time.   Psychiatric:         Mood and Affect: Mood normal.         Behavior: Behavior normal.             Significant Labs: All pertinent labs within  the past 24 hours have been reviewed.    Significant Imaging: I have reviewed all pertinent imaging results/findings within the past 24 hours.

## 2024-06-19 NOTE — ASSESSMENT & PLAN NOTE
Continue filgrastim for 5 days  Oncology is aware that the patient is in the hospital and should follow  Prophylaxis acyclovir and Bactrim

## 2024-06-19 NOTE — HOSPITAL COURSE
Patient with known leukemia presented with neutropenia and fever.  Vanc and cefepime as running.  Blood cultures are Ngx 48 hours, CXR neg, UA bland so no obvious source.  Patient's oncologist has been consulted for further recommendations and are agrteeable to DC with 72 hours course of po cefdinir and cipro, can resume bactrim after that. give GCSF Fri, Sat and Sun and recheck labs on Monday.

## 2024-06-19 NOTE — PROGRESS NOTES
"Pharmacist Renal Dose Adjustment Note    Gerardo Ramirez Jr. is a 39 y.o. male being treated with the medication Cefepime    Patient Data:    Vital Signs (Most Recent):  Temp: 99.1 °F (37.3 °C) (06/18/24 1921)  Pulse: 95 (06/18/24 2100)  Resp: 18 (06/18/24 2100)  BP: (!) 106/58 (06/18/24 2100)  SpO2: 98 % (06/18/24 2100) Vital Signs (72h Range):  Temp:  [97 °F (36.1 °C)-99.1 °F (37.3 °C)]   Pulse:  []   Resp:  [15-20]   BP: ()/(44-82)   SpO2:  [97 %-100 %]        Ht: 5' 9" (1.753 m)  Wt: 117.9 kg (260 lb)  Estimated Creatinine Clearance: 136.9 mL/min (based on SCr of 0.9 mg/dL).  Body mass index is 38.4 kg/m².    Per [facility] renal dosing protocol:     Previous Order: Cefepime 2 g Q24H    Will be changed to:     New Order: Cefepime 2 g Q8H,    Due to: CrCl > 60 mL/min    Renal dose adjustments performed as noted above.    We will continue monitoring and adjusting as necessary.    Pharmacist: Juan M Becerril, PharmD  Ext: [phone]      "

## 2024-06-19 NOTE — PROGRESS NOTES
VANCOMYCIN PHARMACOKINETIC NOTE:  Vancomycin Day # 1    Objective/Assessment:    Diagnosis/Indication for Vancomycin:Neutropenic Fever      39 y.o., male; Actual Body Weight = 117.9 kg (260 lb).    The patient has the following labs:  6/18/2024 Estimated Creatinine Clearance: 136.9 mL/min (based on SCr of 0.9 mg/dL). Lab Results   Component Value Date    BUN 17 06/18/2024     Lab Results   Component Value Date    WBC 0.38 (LL) 06/18/2024          Plan:  Adjust vancomycin dose and/or frequency based on the patient's actual weight and renal function:  Initiate Vancomycin 2000 mg IV every 12 hours.  Orders have been entered into patient's chart.        Vancomycin trough level has been ordered for 0900 on 06/20.    Pharmacy will manage vancomycin therapy, monitor serum vancomycin levels, monitor renal function and adjust regimen as necessary.    Thank you for allowing us to participate in this patient's care.     Sadie Moyer 6/18/2024   Department of Pharmacy  Ext [phone]

## 2024-06-19 NOTE — FIRST PROVIDER EVALUATION
Emergency Department TeleTriage Encounter Note      CHIEF COMPLAINT    Chief Complaint   Patient presents with    Fever    Diarrhea    Fatigue     Pt receiving chemo treatments and was feeling fatigued and feverish.  Pt says he took some tylenol and fever was 100.4 with Tylenol and was advised by oncology nurse to be seen at ED.  Pt wbc 0.5 and platelets 92       VITAL SIGNS   Initial Vitals [06/18/24 1921]   BP Pulse Resp Temp SpO2   (!) 147/82 100 20 99.1 °F (37.3 °C) 97 %      MAP       --            ALLERGIES    Review of patient's allergies indicates:   Allergen Reactions    Amoxicillin Hives       PROVIDER TRIAGE NOTE  Patient presents with complaint of fever and fatigue.  Currently on chemotherapy for leukemia.      Phy:   Constitutional: well nourished, well developed, appearing stated age, NAD        Initial orders will be placed and care will be transferred to an alternate provider when patient is roomed for a full evaluation. Any additional orders and the final disposition will be determined by that provider.        ORDERS  Labs Reviewed - No data to display    ED Orders (720h ago, onward)      None              Virtual Visit Note: The provider triage portion of this emergency department evaluation and documentation was performed via [employer], a HIPAA-compliant telemedicine application, in concert with a tele-presenter in the room. A face to face patient evaluation with one of my colleagues will occur once the patient is placed in an emergency department room.      DISCLAIMER: This note was prepared with [employer] voice recognition transcription software. Garbled syntax, mangled pronouns, and other bizarre constructions may be attributed to that software system.

## 2024-06-19 NOTE — PLAN OF CARE
Problem: Adult Inpatient Plan of Care  Goal: Plan of Care Review  Outcome: Progressing  Goal: Readiness for Transition of Care  Outcome: Progressing

## 2024-06-19 NOTE — NURSING
Problem: Hemodynamic Instability (Hemodialysis)  Goal: Effective Tissue Perfusion  Outcome: Ongoing, Progressing      Nurses Note -- 4 Eyes      6/19/2024   1:34 AM      Skin assessed during: Admit      [x] No Altered Skin Integrity Present    [x]Prevention Measures Documented      [] Yes- Altered Skin Integrity Present or Discovered   [] LDA Added if Not in Epic (Describe Wound)   [] New Altered Skin Integrity was Present on Admit and Documented in LDA   [] Wound Image Taken    Wound Care Consulted? No    Attending Nurse:  Kian James RN/Staff Member:  Kaylan HOLM

## 2024-06-19 NOTE — H&P
[facility] - Emergency Dept  Hospital Medicine  History & Physical    Patient Name: Gerardo Ramirez Jr.  MRN: [id number]  Patient Class: OP- Observation  Admission Date: 6/18/2024  Attending Physician: Dian Norris MD  Primary Care Provider: Galindo Berrios Jr., MD         Patient information was obtained from patient and ER records of as it can history of question IM like this I.     Subjective:     Principal Problem:Neutropenic fever    Chief Complaint:   Chief Complaint   Patient presents with    Fever    Diarrhea    Fatigue     Pt receiving chemo treatments and was feeling fatigued and feverish.  Pt says he took some tylenol and fever was 100.4 with Tylenol and was advised by oncology nurse to be seen at ED.  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  Patient was recently diagnosed and has completed 1 week's course of chemotherapy and a few days' course of immunotherapy.  It is confirmed in the ER the patient has had a fever.  Oncology/hematology was notified of patient's presentation and suggested that he be started on vancomycin and Zosyn.  Patient has also been started we will filgrastim for 5 days per oncology recommendation.  Patient otherwise doing well.  Complained of mild fever but no significant pain.  Question and concerns addressed.  Hospitalist will admit this patient    Past Medical History:   Diagnosis Date    Anemia, normocytic normochromic 04/05/2024    Anemia, unspecified 04/05/2024    Anxiety     Hyperlipidemia     Hypertension 2013    Inguinal hernia bilateral, non-recurrent 02/2020    right side more pronounced- surg scheduled    Leucopenia 04/05/2024    Pancytopenia 04/05/2024    Pneumonia     age 11    Sleep apnea     Sleep apnea-like behavior     needs sleep study- wife says he stops breathing while sleeping    Thrombocytopenia 04/05/2024       Past Surgical  History:   Procedure Laterality Date    CHOLECYSTECTOMY  04/08/2019    w/ Small Umbilical hernia rep-     HERNIA REPAIR Bilateral 03/10/2020    Dr. Lanier - [facility]    UMBILICAL HERNIA REPAIR  04/08/2019        VASECTOMY Bilateral 11/30/2020    Procedure: VASECTOMY;  Surgeon: German Tobias MD;  [facility];  Service: Urology;  Laterality: Bilateral;       Review of patient's allergies indicates:   Allergen Reactions    Amoxicillin Hives       No current facility-administered medications on file prior to encounter.     Current Outpatient Medications on File Prior to Encounter   Medication Sig    acetaminophen (TYLENOL EXTRA STRENGTH) 500 MG tablet Take 1,000 mg by mouth once as needed for Pain.    acyclovir (ZOVIRAX) 400 MG tablet Take 1 tablet (400 mg total) by mouth 2 (two) times daily.    butalbital-acetaminophen-caff -40 mg Cap Take 1-2 capsules by mouth 3 (three) times daily.    metoprolol succinate (TOPROL-XL) 50 MG 24 hr tablet Take 1 tablet (50 mg total) by mouth once daily.    olmesartan (BENICAR) 40 MG tablet Take 1 tablet (40 mg total) by mouth once daily.    ondansetron (ZOFRAN) 8 MG tablet Take 1 tablet (8 mg total) by mouth every 8 (eight) hours as needed for Nausea.    pitavastatin calcium (LIVALO) 2 mg Tab tablet Take 1 tablet (2 mg total) by mouth every evening.    promethazine (PHENERGAN) 25 MG tablet Take 1 tablet (25 mg total) by mouth every 4 to 6 hours as needed for Nausea.    sulfamethoxazole-trimethoprim 800-160mg (BACTRIM DS) 800-160 mg Tab Take 1 tablet by mouth every Mon, Wed, Fri.    ergocalciferol (VITAMIN D2) 50,000 unit Cap Take 1 capsule (50,000 Units total) by mouth every 7 days.     Family History       Problem Relation (Age of Onset)    Anxiety disorder Sister    Coronary artery disease Maternal Grandfather    Diabetes Mother    Hyperlipidemia Sister    Hypertension Mother, Sister    No Known Problems Father          Tobacco Use    Smoking status:  Former     Current packs/day: 0.00     Types: Cigarettes, Vaping with nicotine     Quit date: 2022     Years since quittin.5    Smokeless tobacco: Never   Substance and Sexual Activity    Alcohol use: Yes     Comment: occ    Drug use: No    Sexual activity: Yes     Partners: Female     Review of Systems   Constitutional:  Positive for fever.   HENT: Negative.     Eyes: Negative.    Respiratory: Negative.     Cardiovascular: Negative.    Gastrointestinal: Negative.    Musculoskeletal: Negative.    Skin: Negative.    Neurological: Negative.      Objective:     Vital Signs (Most Recent):  Temp: 99.1 °F (37.3 °C) (24)  Pulse: 95 (24)  Resp: 18 (24)  BP: (!) 106/58 (24)  SpO2: 98 % (24) Vital Signs (24h Range):  Temp:  [99.1 °F (37.3 °C)] 99.1 °F (37.3 °C)  Pulse:  [] 95  Resp:  [18-20] 18  SpO2:  [97 %-100 %] 98 %  BP: (106-147)/(58-82) 106/58     Weight: 117.9 kg (260 lb)  Body mass index is 38.4 kg/m².     Physical Exam  Constitutional:       Appearance: Normal appearance.   HENT:      Head: Normocephalic and atraumatic.      Nose: Nose normal.   Eyes:      Pupils: Pupils are equal, round, and reactive to light.   Cardiovascular:      Rate and Rhythm: Normal rate and regular rhythm.   Pulmonary:      Effort: Pulmonary effort is normal.   Abdominal:      Palpations: Abdomen is soft.   Musculoskeletal:      Cervical back: Neck supple.   Skin:     General: Skin is warm.   Neurological:      General: No focal deficit present.      Mental Status: He is alert.   Psychiatric:         Behavior: Behavior normal.              CRANIAL NERVES     CN III, IV, VI   Pupils are equal, round, and reactive to light.       Significant Labs: All pertinent labs within the past 24 hours have been reviewed.  Recent Lab Results         24        Albumin   4.1           ALP   52           ALT   39            Anion Gap   8           Appearance, UA Clear             AST   13           Baso #   0.00           Basophil %   0.0           Bilirubin (UA) Negative             BILIRUBIN TOTAL   1.0  Comment: For infants and newborns, interpretation of results should be based  on gestational age, weight and in agreement with clinical  observations.    Premature Infant recommended reference ranges:  Up to 24 hours.............<8.0 mg/dL  Up to 48 hours............<12.0 mg/dL  3-5 days..................<15.0 mg/dL  6-29 days.................<15.0 mg/dL             BUN   17           Calcium   9.4           Chloride   101           CO2   22           Color, UA Colorless             Creatinine   0.9           Differential Method   Automated           eGFR   >60.0           Eos #   0.0           Eos %   2.6           Glucose   150           Glucose, UA Negative             Gran # (ANC)   0.2           Gran %   58.0           Hematocrit   32.9           Hemoglobin   11.3           Immature Grans (Abs)   0.01  Comment: Mild elevation in immature granulocytes is non specific and   can be seen in a variety of conditions including stress response,   acute inflammation, trauma and pregnancy. Correlation with other   laboratory and clinical findings is essential.             Immature Granulocytes   2.6           Ketones, UA Negative             Lactic Acid Level   1.7  Comment: Falsely low lactic acid results can be found in samples   containing >=13.0 mg/dL total bilirubin and/or >=3.5 mg/dL   direct bilirubin.             Leukocyte Esterase, UA Negative             Lymph #   0.1           Lymph %   36.8           MCH   29.7           MCHC   34.3           MCV   86           Mono #   0.0           Mono %   0.0           MPV   8.7           NITRITE UA Negative             nRBC   0           Blood, UA Negative             pH, UA 6.0             Platelet Count   104           POC Creatinine     1.0         POC Lactate       1.58        Potassium   3.9           PROTEIN TOTAL   7.2           Protein, UA Negative  Comment: Recommend a 24 hour urine protein or a urine   protein/creatinine ratio if globulin induced proteinuria is  clinically suspected.               RBC   3.81           RDW   13.2           Sample     VENOUS   VENOUS       Sodium   131           Spec Grav UA <1.005             Specimen UA Urine, Clean Catch             UROBILINOGEN UA Negative             WBC   0.38  Comment: WBC   critical result(s) called and verbal readback obtained from   CRISTY RUANO RN ER. by TC1 06/18/2024 21:10                     Significant Imaging: I have reviewed all pertinent imaging results/findings within the past 24 hours.  Assessment/Plan:     * Neutropenic fever  Continue with vancomycin and Zosyn per recommendation   Follow cultures   Follow labs         Hairy cell leukemia  Continue filgrastim for 5 days  Oncology is aware that the patient is in the hospital and should follow  Prophylaxis acyclovir and Bactrim      Pancytopenia  Closely follow labs  CBC in the morning    Essential hypertension  Continue metoprolol  Losartan 25 mg daily      VTE Risk Mitigation (From admission, onward)           Ordered     heparin (porcine) injection 5,000 Units  Every 8 hours         06/18/24 2210     IP VTE HIGH RISK PATIENT  Once         06/18/24 2210     Place sequential compression device  Until discontinued         06/18/24 2210                       On 06/18/2024, patient should be placed in hospital observation services under my care.        Pharmacist Renal Dose Adjustment Note    Gerardo Ramirez Jr. is a 39 y.o. male being treated with the medication Cefepime    Patient Data:    Vital Signs (Most Recent):  Temp: 99.1 °F (37.3 °C) (06/18/24 1921)  Pulse: 95 (06/18/24 2100)  Resp: 18 (06/18/24 2100)  BP: (!) 106/58 (06/18/24 2100)  SpO2: 98 % (06/18/24 2100) Vital Signs (72h Range):  Temp:  [97 °F (36.1 °C)-99.1 °F (37.3 °C)]   Pulse:  []  "  Resp:  [15-20]   BP: ()/(44-82)   SpO2:  [97 %-100 %]        Ht: 5' 9" (1.753 m)  Wt: 117.9 kg (260 lb)  Estimated Creatinine Clearance: 136.9 mL/min (based on SCr of 0.9 mg/dL).  Body mass index is 38.4 kg/m².    Per [facility] renal dosing protocol:     Previous Order: Cefepime 2 g Q24H    Will be changed to:     New Order: Cefepime 2 g Q8H,    Due to: CrCl > 60 mL/min    Renal dose adjustments performed as noted above.    We will continue monitoring and adjusting as necessary.    Pharmacist: Juan M Becerril, PharmD  Ext: [phone]        Dian Norris MD  Department of Hospital Medicine  [facility] - Emergency Dept          "

## 2024-06-19 NOTE — PROGRESS NOTES
[facility] Medicine  Progress Note    Patient Name: Gerardo Ramirez Jr.  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  Patient was recently diagnosed and has completed 1 week's course of chemotherapy and a few days' course of immunotherapy.  It is confirmed in the ER the patient has had a fever.  Oncology/hematology was notified of patient's presentation and suggested that he be started on vancomycin and Zosyn.  Patient has also been started we will filgrastim for 5 days per oncology recommendation.  Patient otherwise doing well.  Complained of mild fever but no significant pain.  Question and concerns addressed.  Hospitalist will admit this patient    Overview/Hospital Course:  Patient with known leukemia presented with neutropenia and fever.  Vanc and cefepime as running.  Blood cultures are pending.  Patient's oncologist has been consulted for further recommendations.    Interval History:  Patient seen and examined, no acute complaints    Review of Systems   Constitutional:  Positive for fever. Negative for chills.   Respiratory:  Negative for chest tightness, shortness of breath and wheezing.    Cardiovascular:  Negative for chest pain and palpitations.   Gastrointestinal:  Negative for constipation, diarrhea, nausea and vomiting.   Genitourinary:  Negative for dysuria and hematuria.   Musculoskeletal:  Negative for gait problem.   Neurological:  Negative for dizziness, speech difficulty and numbness.     Objective:     Vital Signs (Most Recent):  Temp: (!) 101.1 °F (38.4 °C) (06/19/24 1638)  Pulse: 108 (06/19/24 1638)  Resp: 20 (06/19/24 1638)  BP: 119/63  (06/19/24 1638)  SpO2: 96 % (06/19/24 1638) Vital Signs (24h Range):  Temp:  [97.3 °F (36.3 °C)-101.1 °F (38.4 °C)] 101.1 °F (38.4 °C)  Pulse:  [] 108  Resp:  [18-20] 20  SpO2:  [96 %-100 %] 96 %  BP: (106-147)/(51-82) 119/63     Weight: 118 kg (260 lb 2.3 oz)  Body mass index is 38.42 kg/m².    Intake/Output Summary (Last 24 hours) at 6/19/2024 1653  Last data filed at 6/19/2024 1301  Gross per 24 hour   Intake 1488.33 ml   Output 2300 ml   Net -811.67 ml         Physical Exam  Vitals and nursing note reviewed.   Constitutional:       General: He is not in acute distress.     Appearance: Normal appearance.   Cardiovascular:      Rate and Rhythm: Normal rate and regular rhythm.      Heart sounds: Normal heart sounds.   Pulmonary:      Breath sounds: No wheezing, rhonchi or rales.   Abdominal:      General: There is no distension.      Palpations: Abdomen is soft.      Tenderness: There is no abdominal tenderness.   Skin:     General: Skin is warm and dry.   Neurological:      General: No focal deficit present.      Mental Status: He is alert and oriented to person, place, and time.   Psychiatric:         Mood and Affect: Mood normal.         Behavior: Behavior normal.             Significant Labs: All pertinent labs within the past 24 hours have been reviewed.    Significant Imaging: I have reviewed all pertinent imaging results/findings within the past 24 hours.    Assessment/Plan:      * Neutropenic fever  Continue with vancomycin and cefepime  Follow cultures   Follow labs   Oncology has been consulted for help with the recommendations.      Hairy cell leukemia  Continue filgrastim for 5 days  Oncology is aware that the patient is in the hospital and should follow  Prophylaxis acyclovir and Bactrim      Pancytopenia  Closely follow labs  CBC in the morning    Essential hypertension  Continue metoprolol  Losartan 25 mg daily      VTE Risk Mitigation (From admission, onward)           Ordered     heparin  (porcine) injection 5,000 Units  Every 8 hours         06/18/24 2210     IP VTE HIGH RISK PATIENT  Once         06/18/24 2210     Place sequential compression device  Until discontinued         06/18/24 2210                    Discharge Planning   MARGAUX: 6/21/2024     Code Status: Full Code   Is the patient medically ready for discharge?:     Reason for patient still in hospital (select all that apply): Treatment, Consult recommendations, and Pending disposition  Discharge Plan A: Home with family                  Jamison Oliveros DO  Department of Hospital Medicine   [facility]

## 2024-06-19 NOTE — ASSESSMENT & PLAN NOTE
Continue with vancomycin and cefepime  Follow cultures   Follow labs   Oncology has been consulted for help with the recommendations.

## 2024-06-19 NOTE — PLAN OF CARE
[facility]  Initial Discharge Assessment       Primary Care Provider: Galindo Berrios Jr., MD    Admission Diagnosis: Fever [R50.9]    Admission Date: 6/18/2024  Expected Discharge Date: 6/21/2024    Discharge assessment completed with patient at bedside. Information verified as correct on facesheet. Patient independent in ADLs. No HH/HD/coumadin. DME at home is a cpap, but patient does not use at this time. Discharge plan is home with spouse - spouse to provide transport on discharge. No discharge needs anticipated at this time - CM following    Transition of Care Barriers: None    Payor: [employer] / Plan: [employer] CHOICE POS / Product Type: Commercial /     Extended Emergency Contact Information  Primary Emergency Contact: Rishabh Ford  Address: [address]           [place] [address] [address] [facility]  Mobile Phone: [phone]  Relation: Spouse  Preferred language: English   needed? No    Discharge Plan A: Home with family  Discharge Plan B: Home      [id number] - [place], [place] - [address] AT [facility]  [address]  [place] [address]  Phone: [phone] Fax: [phone]      Initial Assessment (most recent)       Adult Discharge Assessment - 06/19/24 1501          Discharge Assessment    Assessment Type Discharge Planning Assessment     Confirmed/corrected address, phone number and insurance Yes     Confirmed Demographics Correct on Facesheet     Source of Information patient     Communicated MARGAUX with patient/caregiver Yes     Reason For Admission neutropenic fever     People in Home spouse;child(yesenia), dependent     Facility Arrived From: home     Do you expect to return to your current living situation? Yes     Do you have help at home or someone to help you manage your care at home? Yes     Who are your caregiver(s) and their phone number(s)? independent in care     Prior to hospitilization cognitive status: Alert/Oriented     Current  cognitive status: Alert/Oriented     Walking or Climbing Stairs Difficulty no     Dressing/Bathing Difficulty no     Equipment Currently Used at Home none     Readmission within 30 days? No     Patient currently being followed by outpatient case management? No     Do you currently have service(s) that help you manage your care at home? No     Do you take prescription medications? Yes     Do you have prescription coverage? Yes     Coverage [employer]     Do you have any problems affording any of your prescribed medications? No     Is the patient taking medications as prescribed? yes     Who is going to help you get home at discharge? spouse, Rishabh     How do you get to doctors appointments? car, drives self     Are you on dialysis? No     Do you take coumadin? No     Discharge Plan A Home with family     Discharge Plan B Home     DME Needed Upon Discharge  CPAP     Discharge Plan discussed with: Patient     Transition of Care Barriers None

## 2024-06-19 NOTE — CONSULTS
[facility]   Hematology/Oncology  Inpatient Consult Note          Patient Name: Gerardo Ramirez Jr.  MRN: [id number]  Admission Date: 6/18/2024  Hospital Length of Stay: 0 days  Code Status: Full Code   Attending Provider: Jamison Oliveros DO  Referring Provider: Self, Aaareferral  Consulting Provider: Sung Cummings MD  Primary Care Physician: Galindo Berrios Jr., MD  Principal Problem:Neutropenic fever    Consults  Subjective:     Chief Complaint: Fever, Diarrhea, and Fatigue (Pt receiving chemo treatments and was feeling fatigued and feverish.  Pt says he took some tylenol and fever was 100.4 with Tylenol and was advised by oncology nurse to be seen at ED.  Pt wbc 0.5 and platelets 92)          History Present Illness:  39 y.o. male known to our oncology service with diagnosis of Hairy Cell leukemia (HCL) who recently started regimen of chemotherapy under the guidance of Dr Chan Paredes with [facility]. He presented to the ED at [facility] with febrile temp and has been admitted to the hospitalist service. He is on IV antibiotics. Patient laying in bed, he is awake and alert. He reports that he is feeling fine and has not had anymore febril episodes. No CP, SOB, HA's or N/V. Wife is at bedside. I communicated via Epic with Dr Swift earlier today about use of GCSF.         Past Medical/Surgical History:  Past Medical History:   Diagnosis Date    Anemia, normocytic normochromic 04/05/2024    Anemia, unspecified 04/05/2024    Anxiety     Hyperlipidemia     Hypertension 2013    Inguinal hernia bilateral, non-recurrent 02/2020    right side more pronounced- surg scheduled    Leucopenia 04/05/2024    Pancytopenia 04/05/2024    Pneumonia     age 11    Sleep apnea     Sleep apnea-like behavior     needs sleep study- wife says he stops breathing while sleeping    Thrombocytopenia 04/05/2024     Past Surgical History:   Procedure Laterality Date    CHOLECYSTECTOMY  04/08/2019    w/ Small Umbilical  hernia rep-     HERNIA REPAIR Bilateral 03/10/2020    Dr. Lanier - [facility]    UMBILICAL HERNIA REPAIR  2019        VASECTOMY Bilateral 2020    Procedure: VASECTOMY;  Surgeon: German Tobias MD;  Location: [facility];  Service: Urology;  Laterality: Bilateral;         Allergies:  Review of patient's allergies indicates:   Allergen Reactions    Amoxicillin Hives       Social/Family History:  Social History     Socioeconomic History    Marital status:    Occupational History    Occupation: [employer]   Tobacco Use    Smoking status: Former     Current packs/day: 0.00     Types: Cigarettes, Vaping with nicotine     Quit date: 2022     Years since quittin.5    Smokeless tobacco: Never   Substance and Sexual Activity    Alcohol use: Yes     Comment: occ    Drug use: No    Sexual activity: Yes     Partners: Female   Social History Narrative    LIVE WITH [employer]     Social Determinants of Health     Stress: Stress Concern Present (10/16/2020)    [employer] - Occupational Stress Questionnaire     Feeling of Stress : To some extent     Family History   Problem Relation Name Age of Onset    Diabetes Mother      Hypertension Mother      Hypertension Sister      Hyperlipidemia Sister      Anxiety disorder Sister      Coronary artery disease Maternal Grandfather      No Known Problems Father           ROS:    GEN:  febrile temp, general malaise  HEENT: normal with no HA's, sore throat, stiff neck, changes in vision  CV: normal with no CP, SOB, PND, TRUONG or orthopnea  PULM: normal with no SOB, cough, hemoptysis, sputum or pleuritic pain  GI: normal with no abdominal pain, nausea, vomiting, constipation, diarrhea, melanotic stools, BRBPR, or hematemesis  : normal with no hematuria, dysuria  BREAST: normal with no mass, discharge, pain  SKIN: normal with no rash, erythema, bruising, or swelling        Medications:  Continuous Infusions:   sodium chloride 0.9%    "Intravenous Continuous 125 mL/hr at 06/18/24 2230 New Bag at 06/18/24 2230     Scheduled Meds:   ceFEPime IV (PEDS and ADULTS)  2 g Intravenous Q8H    filgrastim-sndz  480 mcg Subcutaneous Daily    heparin (porcine)  5,000 Units Subcutaneous Q8H    vancomycin (VANCOCIN) IV (PEDS and ADULTS)  2,000 mg Intravenous Q12H     PRN Meds:  Current Facility-Administered Medications:     acetaminophen, 650 mg, Oral, Q8H PRN    acetaminophen, 650 mg, Oral, Q4H PRN    aluminum-magnesium hydroxide-simethicone, 30 mL, Oral, QID PRN    dextrose 50%, 12.5 g, Intravenous, PRN    dextrose 50%, 25 g, Intravenous, PRN    glucagon (human recombinant), 1 mg, Intramuscular, PRN    glucose, 16 g, Oral, PRN    glucose, 24 g, Oral, PRN    HYDROcodone-acetaminophen, 1 tablet, Oral, Q6H PRN    magnesium oxide, 800 mg, Oral, PRN    magnesium oxide, 800 mg, Oral, PRN    melatonin, 6 mg, Oral, Nightly PRN    naloxone, 0.02 mg, Intravenous, PRN    ondansetron, 4 mg, Intravenous, Q6H PRN    potassium bicarbonate, 35 mEq, Oral, PRN    potassium bicarbonate, 50 mEq, Oral, PRN    potassium bicarbonate, 60 mEq, Oral, PRN    potassium, sodium phosphates, 2 packet, Oral, PRN    potassium, sodium phosphates, 2 packet, Oral, PRN    potassium, sodium phosphates, 2 packet, Oral, PRN    sodium chloride 0.9%, 10 mL, Intravenous, Q12H PRN    Pharmacy to dose Vancomycin consult, , , Once **AND** vancomycin - pharmacy to dose, , Intravenous, pharmacy to manage frequency         Objective:       Physical Exam:    Vitals:  Blood pressure 135/70, pulse 91, temperature 98.4 °F (36.9 °C), temperature source Oral, resp. rate 18, height 5' 9" (1.753 m), weight 118 kg (260 lb 2.3 oz), SpO2 97%.    GEN: no apparent distress, comfortable; AAOx3  HEAD: atraumatic and normocephalic  EYES: no pallor, no icterus, PERRLA  ENT: OMM, no pharyngeal erythema, external ears WNL; no nasal discharge; no thrush  NECK: no masses, thyroid normal, trachea midline, no LAD/LN's, supple  CV: " RRR with no murmur; normal pulse; normal S1 and S2; no pedal edema  CHEST: Normal respiratory effort; CTAB; normal breath sounds; no wheeze or crackles  ABDOM: nontender and nondistended; soft; normal bowel sounds; no rebound/guarding  MUSC/Skeletal: ROM normal; no crepitus; joints normal; no deformities or arthropathy  EXTREM: no clubbing, cyanosis, inflammation or swelling; IV  SKIN: no rashes, lesions, ulcers, petechiae or subcutaneous nodules  : no bah  NEURO: grossly intact; motor/sensory WNL; AAOx3; no tremors  PSYCH: normal mood, affect and behavior  LYMPH: normal cervical, supraclavicular, axillary and groin LN's      Lab Review:   Lab Results   Component Value Date    WBC 0.41 (LL) 06/19/2024    HGB 10.7 (L) 06/19/2024    HCT 31.3 (L) 06/19/2024    MCV 87 06/19/2024     (L) 06/19/2024       CMP  Sodium   Date Value Ref Range Status   06/19/2024 134 (L) 136 - 145 mmol/L Final     Potassium   Date Value Ref Range Status   06/19/2024 4.4 3.5 - 5.1 mmol/L Final     Chloride   Date Value Ref Range Status   06/19/2024 104 95 - 110 mmol/L Final     CO2   Date Value Ref Range Status   06/19/2024 23 23 - 29 mmol/L Final     Glucose   Date Value Ref Range Status   06/19/2024 144 (H) 70 - 110 mg/dL Final     BUN   Date Value Ref Range Status   06/19/2024 13 6 - 20 mg/dL Final     Creatinine   Date Value Ref Range Status   06/19/2024 0.9 0.5 - 1.4 mg/dL Final     Calcium   Date Value Ref Range Status   06/19/2024 8.5 (L) 8.7 - 10.5 mg/dL Final     Total Protein   Date Value Ref Range Status   06/19/2024 6.5 6.0 - 8.4 g/dL Final     Albumin   Date Value Ref Range Status   06/19/2024 3.8 3.5 - 5.2 g/dL Final     Total Bilirubin   Date Value Ref Range Status   06/19/2024 1.1 (H) 0.1 - 1.0 mg/dL Final     Comment:     For infants and newborns, interpretation of results should be based  on gestational age, weight and in agreement with clinical  observations.    Premature Infant recommended reference ranges:  Up  to 24 hours.............<8.0 mg/dL  Up to 48 hours............<12.0 mg/dL  3-5 days..................<15.0 mg/dL  6-29 days.................<15.0 mg/dL       Alkaline Phosphatase   Date Value Ref Range Status   06/19/2024 45 (L) 55 - 135 U/L Final     AST   Date Value Ref Range Status   06/19/2024 10 10 - 40 U/L Final     ALT   Date Value Ref Range Status   06/19/2024 32 10 - 44 U/L Final     Anion Gap   Date Value Ref Range Status   06/19/2024 7 (L) 8 - 16 mmol/L Final     eGFR   Date Value Ref Range Status   06/19/2024 >60.0 >60 mL/min/1.73 m^2 Final         Diagnostic Results:  X-Ray Chest AP Portable [[id number]] Resulted: 06/18/24 2053   Order Status: Completed Updated: 06/18/24 2055   Narrative:     EXAMINATION:  XR CHEST AP PORTABLE    CLINICAL HISTORY:  sepsis;    TECHNIQUE:  Single frontal view of the chest was performed.    COMPARISON:  08/03/2021    FINDINGS:  Lungs are clear. No focal consolidation. No pleural effusion. No pneumothorax. Normal heart size.   Impression:       No acute findings.       Assessment/Plan:     IMPRESSION:  (1) 39 y.o. male known to our oncology service with diagnosis of Hairy Cell leukemia (HCL) who recently started regimen of chemotherapy under the guidance of Dr Chan Paredes with [facility]. He presented to the ED at [facility] with febrile temp and has been admitted to the hospitalist service. He is on IV antibiotics.     6/19/2024:  - normally we try to avoid giving GCSF in leukemia patients, but since he is febrile neutropenic there is indication to do so  - start GCSF daily  - monitor labs daily    (2) Mild anemia due to chemotherapy     (3) Mild thrombocytopenia due to chemotherapy    (4) HTN and hypercholesterolemia     (5) Atrial fibrillation     (6) Hepatic steatosis; elevated LFts     (7) Anxiety     (8) Migraine HA's - was on Imitrex but only took one dose     (9) EASTON     (10) Former smoker - quit Nov 2022 (vapes)      (11) Occasional alcohol intake     (12) Hx/of hernia repair,  cholecystectomy and vasectomy          Active Diagnoses:    Diagnosis Date Noted POA    PRINCIPAL PROBLEM:  Neutropenic fever [D70.9, R50.81] 06/18/2024 Unknown    Hairy cell leukemia [C91.40] 05/24/2024 Yes    Pancytopenia [D61.818] 04/05/2024 Yes    Essential hypertension [I10] 05/31/2016 Yes      Problems Resolved During this Admission:           PLAN:   Monitor labs daily  Start GCSF daily - discontinue once ANC >500-1000  Antibiotics as per primary team  IVF's, electrolyte management pain management as per primary team  Will follow with you - hopefully can go home in next day or so            Thank you for your consult.      Sung Cummings MD  Hematology/Oncology  [facility]

## 2024-06-19 NOTE — HPI
This is a 39-year-old male who has a history of hairy-cell leukemia presents to the hospital with a fever.  Patient was recently diagnosed and has completed 1 week's course of chemotherapy and a few days' course of immunotherapy.  It is confirmed in the ER the patient has had a fever.  Oncology/hematology was notified of patient's presentation and suggested that he be started on vancomycin and Zosyn.  Patient has also been started we will filgrastim for 5 days per oncology recommendation.  Patient otherwise doing well.  Complained of mild fever but no significant pain.  Question and concerns addressed.  Hospitalist will admit this patient

## 2024-06-19 NOTE — ED PROVIDER NOTES
Encounter Date: 6/18/2024       History     Chief Complaint   Patient presents with    Fever    Diarrhea    Fatigue     Pt receiving chemo treatments and was feeling fatigued and feverish.  Pt says he took some tylenol and fever was 100.4 with Tylenol and was advised by oncology nurse to be seen at ED.  Pt wbc 0.5 and platelets 92     39-year-old male past medical history of hairy cell leukemia currently on chemotherapy presents today with fevers.  Reports last round of chemotherapy was Friday.  Reports febrile to 100.4.  Took some Tylenol.  He also reports some diarrhea.  Nonbloody diarrhea.  He states he was dehydrated.  Denies any chest pain shortness of breath.  Denies cough runny ear pain raised skin wounds.  Denies any blood in his stool.  Denies any hematemesis.  Reports occasional nausea but denies any vomiting.  Spoke to his oncologist who recommended come to the ER. Denies any other symptoms.    The history is provided by the patient. No  was used.     Review of patient's allergies indicates:   Allergen Reactions    Amoxicillin Hives     Past Medical History:   Diagnosis Date    Anemia, normocytic normochromic 04/05/2024    Anemia, unspecified 04/05/2024    Anxiety     Hyperlipidemia     Hypertension 2013    Inguinal hernia bilateral, non-recurrent 02/2020    right side more pronounced- surg scheduled    Leucopenia 04/05/2024    Pancytopenia 04/05/2024    Pneumonia     age 11    Sleep apnea     Sleep apnea-like behavior     needs sleep study- wife says he stops breathing while sleeping    Thrombocytopenia 04/05/2024     Past Surgical History:   Procedure Laterality Date    CHOLECYSTECTOMY  04/08/2019    w/ Small Umbilical hernia rep-     HERNIA REPAIR Bilateral 03/10/2020    Dr. Lanier [facility]    UMBILICAL HERNIA REPAIR  04/08/2019        VASECTOMY Bilateral 11/30/2020    Procedure: VASECTOMY;  Surgeon: German Tobias MD;  Location: [facility];  Service: Urology;   Laterality: Bilateral;     Family History   Problem Relation Name Age of Onset    Diabetes Mother      Hypertension Mother      Hypertension Sister      Hyperlipidemia Sister      Anxiety disorder Sister      Coronary artery disease Maternal Grandfather      No Known Problems Father       Social History     Tobacco Use    Smoking status: Former     Current packs/day: 0.00     Types: Cigarettes, Vaping with nicotine     Quit date: 2022     Years since quittin.5    Smokeless tobacco: Never   Substance Use Topics    Alcohol use: Yes     Comment: occ    Drug use: No     Review of Systems    Physical Exam     Initial Vitals [24]   BP Pulse Resp Temp SpO2   (!) 147/82 100 20 99.1 °F (37.3 °C) 97 %      MAP       --         Physical Exam    Nursing note and vitals reviewed.  Constitutional: He appears well-developed. No distress.   HENT:   Head: Normocephalic and atraumatic.   Nose: Nose normal.   Eyes: EOM are normal.   Neck: Neck supple. No tracheal deviation present. No JVD present.   Cardiovascular:  Normal rate, regular rhythm, normal heart sounds and intact distal pulses.     Exam reveals no gallop and no friction rub.       No murmur heard.  Pulmonary/Chest: Breath sounds normal. No respiratory distress. He has no wheezes. He has no rhonchi. He has no rales.   Abdominal: Abdomen is soft. Bowel sounds are normal. He exhibits no distension. There is no abdominal tenderness. There is no rebound and no guarding.   Musculoskeletal:         General: Normal range of motion.      Cervical back: Neck supple.     Neurological: He is alert and oriented to person, place, and time. He has normal strength. No cranial nerve deficit or sensory deficit.   Skin: Skin is warm and dry. Capillary refill takes less than 2 seconds. No rash noted.   Psychiatric: He has a normal mood and affect.         ED Course   Critical Care    Date/Time: 2024 9:47 AM    Performed by: Jax Shoemaker MD  Authorized by:  Jamison Oliveros DO  Direct patient critical care time: 15 minutes  Additional history critical care time: 10 minutes  Ordering / reviewing critical care time: 10 minutes  Documentation critical care time: 10 minutes  Consulting other physicians critical care time: 3 minutes  Total critical care time (exclusive of procedural time) : 48 minutes  Critical care time was exclusive of separately billable procedures and treating other patients and teaching time.  Critical care was necessary to treat or prevent imminent or life-threatening deterioration of the following conditions: neutropenic fever.  Critical care was time spent personally by me on the following activities: blood draw for specimens, development of treatment plan with patient or surrogate, examination of patient, obtaining history from patient or surrogate, ordering and performing treatments and interventions, ordering and review of laboratory studies, ordering and review of radiographic studies, pulse oximetry, re-evaluation of patient's condition, review of old charts and discussions with consultants.        Labs Reviewed   URINALYSIS, REFLEX TO URINE CULTURE - Abnormal; Notable for the following components:       Result Value    Color, UA Colorless (*)     Specific Gravity, UA <1.005 (*)     All other components within normal limits    Narrative:     Specimen Source->Urine   COMPREHENSIVE METABOLIC PANEL - Abnormal; Notable for the following components:    Sodium 131 (*)     CO2 22 (*)     Glucose 150 (*)     Alkaline Phosphatase 52 (*)     All other components within normal limits   LACTIC ACID, PLASMA   ISTAT LACTATE   ISTAT CREATININE   POCT LACTATE        ECG Results              EKG 12-lead (In process)        Collection Time Result Time QRS Duration OHS QTC Calculation    06/18/24 19:54:06 06/19/24 05:17:34 80 411                     In process by Interface, Lab In [facility] (06/19/24 05:17:42)                   Narrative:    Test Reason :  R50.9,    Vent. Rate : 098 BPM     Atrial Rate : 098 BPM     P-R Int : 160 ms          QRS Dur : 080 ms      QT Int : 322 ms       P-R-T Axes : 030 007 039 degrees     QTc Int : 411 ms    Normal sinus rhythm  Normal ECG  When compared with ECG of 24-NOV-2020 14:07,  No significant change was found    Referred By: [email or web address]   SELF           Confirmed By:                                   Imaging Results              X-Ray Chest AP Portable (Final result)  Result time 06/18/24 20:53:28      Final result by Roopa Hernandez MD (06/18/24 20:53:28)                   Impression:      No acute findings.      Electronically signed by: Roopa Hernandez  Date:    06/18/2024  Time:    20:53               Narrative:    EXAMINATION:  XR CHEST AP PORTABLE    CLINICAL HISTORY:  sepsis;    TECHNIQUE:  Single frontal view of the chest was performed.    COMPARISON:  08/03/2021    FINDINGS:  Lungs are clear. No focal consolidation. No pleural effusion. No pneumothorax. Normal heart size.                                       Medications   sodium chloride 0.9% flush 10 mL (has no administration in time range)   melatonin tablet 6 mg (has no administration in time range)   ondansetron injection 4 mg (has no administration in time range)   acetaminophen tablet 650 mg (650 mg Oral Given 6/19/24 0918)   aluminum-magnesium hydroxide-simethicone 200-200-20 mg/5 mL suspension 30 mL (has no administration in time range)   acetaminophen tablet 650 mg (650 mg Oral Given 6/18/24 2255)   HYDROcodone-acetaminophen 5-325 mg per tablet 1 tablet (has no administration in time range)   naloxone 0.4 mg/mL injection 0.02 mg (has no administration in time range)   potassium bicarbonate disintegrating tablet 50 mEq (has no administration in time range)   potassium bicarbonate disintegrating tablet 35 mEq (has no administration in time range)   potassium bicarbonate disintegrating tablet 60 mEq (has no administration in time range)   magnesium oxide  tablet 800 mg (has no administration in time range)   magnesium oxide tablet 800 mg (has no administration in time range)   potassium, sodium phosphates 280-160-250 mg packet 2 packet (has no administration in time range)   potassium, sodium phosphates 280-160-250 mg packet 2 packet (has no administration in time range)   potassium, sodium phosphates 280-160-250 mg packet 2 packet (has no administration in time range)   glucose chewable tablet 16 g (has no administration in time range)   glucose chewable tablet 24 g (has no administration in time range)   dextrose 50% injection 12.5 g (has no administration in time range)   dextrose 50% injection 25 g (has no administration in time range)   glucagon (human recombinant) injection 1 mg (has no administration in time range)   0.9%  NaCl infusion ( Intravenous New Bag 6/18/24 2230)   heparin (porcine) injection 5,000 Units (5,000 Units Subcutaneous Not Given 6/19/24 0600)   vancomycin - pharmacy to dose (has no administration in time range)   cefepime 2 g in dextrose 5% 100 mL IVPB (ready to mix) (0 g Intravenous Stopped 6/19/24 0610)   filgrastim-sndz (ZARXIO) injection 480 mcg/0.8 mL (Preferred Regimen) (480 mcg Subcutaneous Not Given 6/19/24 0945)   vancomycin (VANCOCIN) 2,000 mg in dextrose 5 % (D5W) 500 mL IVPB (has no administration in time range)   vancomycin in dextrose 5 % 1 gram/250 mL IVPB 2,000 mg (0 mg Intravenous Stopped 6/19/24 0000)   cefepime 2 g in dextrose 5% 100 mL IVPB (ready to mix) (0 g Intravenous Stopped 6/18/24 2228)     Medical Decision Making  39 M with history of leukemia presents with fever for 2 days.    Given the patient's underlying oncological process and recent chemotherapy, the patient may be neutropenic, and may have evolving septicemia/sepsis/septic shock. The plan is as follows:    - STAT CBC and blood culture  - Empiric Vanc/cefipime IV  - If not neutropenic, will consider ceftriaxone and discharge home  - Will consult Oncology  for further instructions  - Consider admission if the patient is neutropenic    Pt found to be severely neutropenic. Discussed with oncology who agrees with plan for antibiotics and admission. Pt accepted by hospital medicine for further workup and management.        Amount and/or Complexity of Data Reviewed  Labs: ordered.    Risk  Prescription drug management.               ED Course as of 06/19/24 0950   Tue Jun 18, 2024 2054 39-year-old male past medical history of hairy cell leukemia currently on chemotherapy presents today with fevers.   [BD]   2131 Labs show severe neutropenia.  UA negative for infection.  Electrolytes some mild hyponatremia otherwise reassuring.  Lactic reassuring.  Chest x-ray negative for pneumonia.  Unclear etiology of patient's fevers but given he is neutropenic given broad-spectrum antibiotics and will discuss with Oncology and likely admit for further management. [BD]   2150 Discussed case with Oncology on-call, Dr. Berkowitz, who agrees with plan for admission and recommends giving Zarxio 480 mcg once daily x5 days. [BD]      ED Course User Index  [BD] Jax Shoemaker MD                             Clinical Impression:  Final diagnoses:  [R50.9] Fever  [D70.9, R50.81] Neutropenic fever          ED Disposition Condition    Observation Stable                Jax Shoemaker MD  06/19/24 0934

## 2024-06-19 NOTE — SUBJECTIVE & OBJECTIVE
Past Medical History:   Diagnosis Date    Anemia, normocytic normochromic 04/05/2024    Anemia, unspecified 04/05/2024    Anxiety     Hyperlipidemia     Hypertension 2013    Inguinal hernia bilateral, non-recurrent 02/2020    right side more pronounced- surg scheduled    Leucopenia 04/05/2024    Pancytopenia 04/05/2024    Pneumonia     age 11    Sleep apnea     Sleep apnea-like behavior     needs sleep study- wife says he stops breathing while sleeping    Thrombocytopenia 04/05/2024       Past Surgical History:   Procedure Laterality Date    CHOLECYSTECTOMY  04/08/2019    w/ Small Umbilical hernia rep-     HERNIA REPAIR Bilateral 03/10/2020    Dr. Lanier - [facility]    UMBILICAL HERNIA REPAIR  04/08/2019        VASECTOMY Bilateral 11/30/2020    Procedure: VASECTOMY;  Surgeon: German Tobias MD;  Location: [facility];  Service: Urology;  Laterality: Bilateral;       Review of patient's allergies indicates:   Allergen Reactions    Amoxicillin Hives       No current facility-administered medications on file prior to encounter.     Current Outpatient Medications on File Prior to Encounter   Medication Sig    acetaminophen (TYLENOL EXTRA STRENGTH) 500 MG tablet Take 1,000 mg by mouth once as needed for Pain.    acyclovir (ZOVIRAX) 400 MG tablet Take 1 tablet (400 mg total) by mouth 2 (two) times daily.    butalbital-acetaminophen-caff -40 mg Cap Take 1-2 capsules by mouth 3 (three) times daily.    metoprolol succinate (TOPROL-XL) 50 MG 24 hr tablet Take 1 tablet (50 mg total) by mouth once daily.    olmesartan (BENICAR) 40 MG tablet Take 1 tablet (40 mg total) by mouth once daily.    ondansetron (ZOFRAN) 8 MG tablet Take 1 tablet (8 mg total) by mouth every 8 (eight) hours as needed for Nausea.    pitavastatin calcium (LIVALO) 2 mg Tab tablet Take 1 tablet (2 mg total) by mouth every evening.    promethazine (PHENERGAN) 25 MG tablet Take 1 tablet (25 mg total) by mouth every 4 to 6 hours as needed for  Nausea.    sulfamethoxazole-trimethoprim 800-160mg (BACTRIM DS) 800-160 mg Tab Take 1 tablet by mouth every Mon, Wed, Fri.    ergocalciferol (VITAMIN D2) 50,000 unit Cap Take 1 capsule (50,000 Units total) by mouth every 7 days.     Family History       Problem Relation (Age of Onset)    Anxiety disorder Sister    Coronary artery disease Maternal Grandfather    Diabetes Mother    Hyperlipidemia Sister    Hypertension Mother, Sister    No Known Problems Father          Tobacco Use    Smoking status: Former     Current packs/day: 0.00     Types: Cigarettes, Vaping with nicotine     Quit date: 2022     Years since quittin.5    Smokeless tobacco: Never   Substance and Sexual Activity    Alcohol use: Yes     Comment: occ    Drug use: No    Sexual activity: Yes     Partners: Female     Review of Systems   Constitutional:  Positive for fever.   HENT: Negative.     Eyes: Negative.    Respiratory: Negative.     Cardiovascular: Negative.    Gastrointestinal: Negative.    Musculoskeletal: Negative.    Skin: Negative.    Neurological: Negative.      Objective:     Vital Signs (Most Recent):  Temp: 99.1 °F (37.3 °C) (24)  Pulse: 95 (24)  Resp: 18 (24)  BP: (!) 106/58 (24)  SpO2: 98 % (24) Vital Signs (24h Range):  Temp:  [99.1 °F (37.3 °C)] 99.1 °F (37.3 °C)  Pulse:  [] 95  Resp:  [18-20] 18  SpO2:  [97 %-100 %] 98 %  BP: (106-147)/(58-82) 106/58     Weight: 117.9 kg (260 lb)  Body mass index is 38.4 kg/m².     Physical Exam  Constitutional:       Appearance: Normal appearance.   HENT:      Head: Normocephalic and atraumatic.      Nose: Nose normal.   Eyes:      Pupils: Pupils are equal, round, and reactive to light.   Cardiovascular:      Rate and Rhythm: Normal rate and regular rhythm.   Pulmonary:      Effort: Pulmonary effort is normal.   Abdominal:      Palpations: Abdomen is soft.   Musculoskeletal:      Cervical back: Neck supple.   Skin:     General:  Skin is warm.   Neurological:      General: No focal deficit present.      Mental Status: He is alert.   Psychiatric:         Behavior: Behavior normal.              CRANIAL NERVES     CN III, IV, VI   Pupils are equal, round, and reactive to light.       Significant Labs: All pertinent labs within the past 24 hours have been reviewed.  Recent Lab Results         06/18/24 2042 06/18/24 2020 06/18/24 2016 06/18/24 2007        Albumin   4.1           ALP   52           ALT   39           Anion Gap   8           Appearance, UA Clear             AST   13           Baso #   0.00           Basophil %   0.0           Bilirubin (UA) Negative             BILIRUBIN TOTAL   1.0  Comment: For infants and newborns, interpretation of results should be based  on gestational age, weight and in agreement with clinical  observations.    Premature Infant recommended reference ranges:  Up to 24 hours.............<8.0 mg/dL  Up to 48 hours............<12.0 mg/dL  3-5 days..................<15.0 mg/dL  6-29 days.................<15.0 mg/dL             BUN   17           Calcium   9.4           Chloride   101           CO2   22           Color, UA Colorless             Creatinine   0.9           Differential Method   Automated           eGFR   >60.0           Eos #   0.0           Eos %   2.6           Glucose   150           Glucose, UA Negative             Gran # (ANC)   0.2           Gran %   58.0           Hematocrit   32.9           Hemoglobin   11.3           Immature Grans (Abs)   0.01  Comment: Mild elevation in immature granulocytes is non specific and   can be seen in a variety of conditions including stress response,   acute inflammation, trauma and pregnancy. Correlation with other   laboratory and clinical findings is essential.             Immature Granulocytes   2.6           Ketones, UA Negative             Lactic Acid Level   1.7  Comment: Falsely low lactic acid results can be found in samples   containing  >=13.0 mg/dL total bilirubin and/or >=3.5 mg/dL   direct bilirubin.             Leukocyte Esterase, UA Negative             Lymph #   0.1           Lymph %   36.8           MCH   29.7           MCHC   34.3           MCV   86           Mono #   0.0           Mono %   0.0           MPV   8.7           NITRITE UA Negative             nRBC   0           Blood, UA Negative             pH, UA 6.0             Platelet Count   104           POC Creatinine     1.0         POC Lactate       1.58       Potassium   3.9           PROTEIN TOTAL   7.2           Protein, UA Negative  Comment: Recommend a 24 hour urine protein or a urine   protein/creatinine ratio if globulin induced proteinuria is  clinically suspected.               RBC   3.81           RDW   13.2           Sample     VENOUS   VENOUS       Sodium   131           Spec Grav UA <1.005             Specimen UA Urine, Clean Catch             UROBILINOGEN UA Negative             WBC   0.38  Comment: WBC   critical result(s) called and verbal readback obtained from   CRISTY RUANO RN ER. by TC1 06/18/2024 21:10                     Significant Imaging: I have reviewed all pertinent imaging results/findings within the past 24 hours.

## 2024-06-20 VITALS
DIASTOLIC BLOOD PRESSURE: 77 MMHG | HEART RATE: 95 BPM | BODY MASS INDEX: 38.53 KG/M2 | HEIGHT: 69 IN | SYSTOLIC BLOOD PRESSURE: 135 MMHG | OXYGEN SATURATION: 99 % | WEIGHT: 260.13 LBS | RESPIRATION RATE: 18 BRPM | TEMPERATURE: 100 F

## 2024-06-20 PROBLEM — T45.1X5A CHEMOTHERAPY INDUCED NEUTROPENIA: Status: ACTIVE | Noted: 2024-06-20

## 2024-06-20 PROBLEM — D70.1 CHEMOTHERAPY INDUCED NEUTROPENIA: Status: ACTIVE | Noted: 2024-06-20

## 2024-06-20 LAB
ALBUMIN SERPL BCP-MCNC: 3.7 G/DL (ref 3.5–5.2)
ALP SERPL-CCNC: 44 U/L (ref 55–135)
ALT SERPL W/O P-5'-P-CCNC: 31 U/L (ref 10–44)
ANION GAP SERPL CALC-SCNC: 8 MMOL/L (ref 8–16)
AST SERPL-CCNC: 13 U/L (ref 10–40)
BASOPHILS # BLD AUTO: 0 K/UL (ref 0–0.2)
BASOPHILS NFR BLD: 0 % (ref 0–1.9)
BILIRUB SERPL-MCNC: 0.9 MG/DL (ref 0.1–1)
BUN SERPL-MCNC: 10 MG/DL (ref 6–20)
CALCIUM SERPL-MCNC: 8.3 MG/DL (ref 8.7–10.5)
CHLORIDE SERPL-SCNC: 103 MMOL/L (ref 95–110)
CO2 SERPL-SCNC: 21 MMOL/L (ref 23–29)
CREAT SERPL-MCNC: 0.9 MG/DL (ref 0.5–1.4)
DIFFERENTIAL METHOD BLD: ABNORMAL
EOSINOPHIL # BLD AUTO: 0 K/UL (ref 0–0.5)
EOSINOPHIL NFR BLD: 1.9 % (ref 0–8)
ERYTHROCYTE [DISTWIDTH] IN BLOOD BY AUTOMATED COUNT: 13.2 % (ref 11.5–14.5)
EST. GFR  (NO RACE VARIABLE): >60 ML/MIN/1.73 M^2
GLUCOSE SERPL-MCNC: 218 MG/DL (ref 70–110)
HCT VFR BLD AUTO: 31 % (ref 40–54)
HGB BLD-MCNC: 10.7 G/DL (ref 14–18)
IMM GRANULOCYTES # BLD AUTO: 0.01 K/UL (ref 0–0.04)
IMM GRANULOCYTES NFR BLD AUTO: 1.9 % (ref 0–0.5)
LYMPHOCYTES # BLD AUTO: 0.2 K/UL (ref 1–4.8)
LYMPHOCYTES NFR BLD: 31.5 % (ref 18–48)
MAGNESIUM SERPL-MCNC: 1.7 MG/DL (ref 1.6–2.6)
MCH RBC QN AUTO: 29.7 PG (ref 27–31)
MCHC RBC AUTO-ENTMCNC: 34.5 G/DL (ref 32–36)
MCV RBC AUTO: 86 FL (ref 82–98)
MONOCYTES # BLD AUTO: 0 K/UL (ref 0.3–1)
MONOCYTES NFR BLD: 1.9 % (ref 4–15)
NEUTROPHILS # BLD AUTO: 0.3 K/UL (ref 1.8–7.7)
NEUTROPHILS NFR BLD: 62.8 % (ref 38–73)
NRBC BLD-RTO: 0 /100 WBC
PLATELET # BLD AUTO: 110 K/UL (ref 150–450)
PMV BLD AUTO: 9.3 FL (ref 9.2–12.9)
POTASSIUM SERPL-SCNC: 4.1 MMOL/L (ref 3.5–5.1)
PROT SERPL-MCNC: 6.9 G/DL (ref 6–8.4)
RBC # BLD AUTO: 3.6 M/UL (ref 4.6–6.2)
SODIUM SERPL-SCNC: 132 MMOL/L (ref 136–145)
VANCOMYCIN TROUGH SERPL-MCNC: 8.8 UG/ML
WBC # BLD AUTO: 0.54 K/UL (ref 3.9–12.7)

## 2024-06-20 PROCEDURE — 83735 ASSAY OF MAGNESIUM: CPT | Performed by: HOSPITALIST

## 2024-06-20 PROCEDURE — 25000003 PHARM REV CODE 250: Performed by: FAMILY MEDICINE

## 2024-06-20 PROCEDURE — 63600175 PHARM REV CODE 636 W HCPCS: Performed by: HOSPITALIST

## 2024-06-20 PROCEDURE — 80053 COMPREHEN METABOLIC PANEL: CPT | Performed by: HOSPITALIST

## 2024-06-20 PROCEDURE — 80202 ASSAY OF VANCOMYCIN: CPT | Performed by: HOSPITALIST

## 2024-06-20 PROCEDURE — 85025 COMPLETE CBC W/AUTO DIFF WBC: CPT | Performed by: HOSPITALIST

## 2024-06-20 PROCEDURE — 36415 COLL VENOUS BLD VENIPUNCTURE: CPT | Performed by: HOSPITALIST

## 2024-06-20 PROCEDURE — 63600175 PHARM REV CODE 636 W HCPCS: Performed by: FAMILY MEDICINE

## 2024-06-20 PROCEDURE — 25000003 PHARM REV CODE 250: Performed by: HOSPITALIST

## 2024-06-20 RX ORDER — CEFDINIR 300 MG/1
300 CAPSULE ORAL EVERY 12 HOURS
Status: DISCONTINUED | OUTPATIENT
Start: 2024-06-20 | End: 2024-06-20 | Stop reason: HOSPADM

## 2024-06-20 RX ORDER — ACYCLOVIR 200 MG/1
400 CAPSULE ORAL 2 TIMES DAILY
Status: DISCONTINUED | OUTPATIENT
Start: 2024-06-20 | End: 2024-06-20 | Stop reason: HOSPADM

## 2024-06-20 RX ORDER — BUTALBITAL, ACETAMINOPHEN AND CAFFEINE 50; 325; 40 MG/1; MG/1; MG/1
1 TABLET ORAL EVERY 6 HOURS PRN
Status: DISCONTINUED | OUTPATIENT
Start: 2024-06-20 | End: 2024-06-20 | Stop reason: HOSPADM

## 2024-06-20 RX ORDER — ATORVASTATIN CALCIUM 20 MG/1
20 TABLET, FILM COATED ORAL DAILY
Status: DISCONTINUED | OUTPATIENT
Start: 2024-06-20 | End: 2024-06-20 | Stop reason: HOSPADM

## 2024-06-20 RX ORDER — OLMESARTAN MEDOXOMIL 20 MG/1
40 TABLET ORAL DAILY
Status: DISCONTINUED | OUTPATIENT
Start: 2024-06-20 | End: 2024-06-20 | Stop reason: HOSPADM

## 2024-06-20 RX ORDER — CEFDINIR 300 MG/1
300 CAPSULE ORAL EVERY 12 HOURS
Qty: 4 CAPSULE | Refills: 0 | Status: SHIPPED | OUTPATIENT
Start: 2024-06-20 | End: 2024-06-22

## 2024-06-20 RX ORDER — CIPROFLOXACIN 500 MG/1
500 TABLET ORAL EVERY 12 HOURS
Qty: 4 TABLET | Refills: 0 | Status: SHIPPED | OUTPATIENT
Start: 2024-06-20 | End: 2024-06-22

## 2024-06-20 RX ORDER — LOSARTAN POTASSIUM 50 MG/1
100 TABLET ORAL DAILY
Status: DISCONTINUED | OUTPATIENT
Start: 2024-06-20 | End: 2024-06-20

## 2024-06-20 RX ORDER — HYDROCODONE BITARTRATE AND ACETAMINOPHEN 5; 325 MG/1; MG/1
1 TABLET ORAL EVERY 12 HOURS PRN
Qty: 10 TABLET | Refills: 0 | Status: SHIPPED | OUTPATIENT
Start: 2024-06-20 | End: 2024-06-27 | Stop reason: SDUPTHER

## 2024-06-20 RX ORDER — CIPROFLOXACIN 500 MG/1
500 TABLET ORAL EVERY 12 HOURS
Status: DISCONTINUED | OUTPATIENT
Start: 2024-06-20 | End: 2024-06-20 | Stop reason: HOSPADM

## 2024-06-20 RX ADMIN — ACETAMINOPHEN 650 MG: 325 TABLET ORAL at 05:06

## 2024-06-20 RX ADMIN — ACYCLOVIR 400 MG: 200 CAPSULE ORAL at 12:06

## 2024-06-20 RX ADMIN — HYDROCODONE BITARTRATE AND ACETAMINOPHEN 1 TABLET: 5; 325 TABLET ORAL at 09:06

## 2024-06-20 RX ADMIN — VANCOMYCIN HYDROCHLORIDE 2000 MG: 5 INJECTION, POWDER, LYOPHILIZED, FOR SOLUTION INTRAVENOUS at 01:06

## 2024-06-20 RX ADMIN — SODIUM CHLORIDE: 9 INJECTION, SOLUTION INTRAVENOUS at 01:06

## 2024-06-20 RX ADMIN — FILGRASTIM-SNDZ 480 MCG: 480 INJECTION, SOLUTION INTRAVENOUS; SUBCUTANEOUS at 05:06

## 2024-06-20 RX ADMIN — CEFEPIME 2 G: 2 INJECTION, POWDER, FOR SOLUTION INTRAVENOUS at 05:06

## 2024-06-20 NOTE — PLAN OF CARE
Discharge orders and chart reviewed. No other discharge needs noted at this time. Pt is clear for discharge from case management. Pt is discharging to home.    Patient to follow up with Dr. Cummings as advised    Spouse to transport home     06/20/24 7600   Final Note   Assessment Type Final Discharge Note   Anticipated Discharge Disposition Home   What phone number can be called within the next 1-3 days to see how you are doing after discharge? [phone]   Hospital Resources/Appts/Education Provided Appointment suggestion unavailable   Post-Acute Status   Discharge Delays None known at this time

## 2024-06-20 NOTE — NURSING
AMA FILLED OUT AND PLACED IN CHART.    DO TO REFUSAL OF BED ALARM. CHARGE NURSE MADE AWARE. THIS NURSE EDUCATED ON RISK AND PATIENT VERBALIZED UNDERSTANDING WITH NO FURTHER QUESTIONS.

## 2024-06-20 NOTE — PROGRESS NOTES
Pharmacokinetic Assessment Follow Up: IV Vancomycin    Vancomycin serum concentration assessment(s):    The trough level was drawn correctly and can be used to guide therapy at this time. The measurement is below the desired definitive target range of 15 to 20 mcg/mL.    Vancomycin Regimen Plan:    Change regimen to Vancomycin 2000 mg IV every 10 hours with next serum trough concentration measured at 1600 prior to the dose on 6/21/24    Drug levels (last 3 results):  Recent Labs   Lab Result Units 06/20/24  0921   Vancomycin-Trough ug/mL 8.8*       Pharmacy will continue to follow and monitor vancomycin.    Please contact pharmacy at extension [phone] for questions regarding this assessment.    Thank you for the consult,   Zohra Jonas       Patient brief summary:  Gerardo Ramirez Jr. is a 39 y.o. male initiated on antimicrobial therapy with IV Vancomycin for treatment of neutropenic fever    Drug Allergies:   Review of patient's allergies indicates:   Allergen Reactions    Amoxicillin Hives       Actual Body Weight:   118 kg    Renal Function:   Estimated Creatinine Clearance: 136.9 mL/min (based on SCr of 0.9 mg/dL).,     Dialysis Method (if applicable):  N/A    CBC (last 72 hours):  Recent Labs   Lab Result Units 06/18/24 2020 06/19/24 0557 06/20/24  0507   WBC K/uL 0.38* 0.41* 0.54*   Hemoglobin g/dL 11.3* 10.7* 10.7*   Hematocrit % 32.9* 31.3* 31.0*   Platelets K/uL 104* 102* 110*   Gran % % 58.0 61.1 62.8   Lymph % % 36.8 34.1 31.5   Mono % % 0.0* 2.4* 1.9*   Eosinophil % % 2.6 2.4 1.9   Basophil % % 0.0 0.0 0.0   Differential Method  Automated Automated Automated       Metabolic Panel (last 72 hours):  Recent Labs   Lab Result Units 06/18/24 2020 06/18/24 2042 06/19/24  0557 06/20/24  0507   Sodium mmol/L 131*  --  134* 132*   Potassium mmol/L 3.9  --  4.4 4.1   Chloride mmol/L 101  --  104 103   CO2 mmol/L 22*  --  23 21*   Glucose mg/dL 150*  --  144* 218*   Glucose, UA   --  Negative  --   --     BUN mg/dL 17  --  13 10   Creatinine mg/dL 0.9  --  0.9 0.9   Albumin g/dL 4.1  --  3.8 3.7   Total Bilirubin mg/dL 1.0  --  1.1* 0.9   Alkaline Phosphatase U/L 52*  --  45* 44*   AST U/L 13  --  10 13   ALT U/L 39  --  32 31   Magnesium mg/dL  --   --  1.5* 1.7       Vancomycin Administrations:  vancomycin given in the last 96 hours                     vancomycin (VANCOCIN) 2,000 mg in dextrose 5 % (D5W) 500 mL IVPB (mg) 2,000 mg New Bag 06/19/24 2233     2,000 mg New Bag  1002    vancomycin in dextrose 5 % 1 gram/250 mL IVPB 2,000 mg (mg) 2,000 mg New Bag 06/18/24 2230                    Microbiologic Results:  Microbiology Results (last 7 days)       Procedure Component Value Units Date/Time    Blood culture x two cultures. Draw prior to antibiotics. [[id number]] Collected: 06/19/24 0031    Order Status: Completed Specimen: Blood from Peripheral, Antecubital, Left Updated: 06/20/24 0232     Blood Culture, Routine No Growth to date      No Growth to date    Narrative:      Aerobic and anaerobic  Collection has been rescheduled by 2MB at 06/18/2024 20:55 Reason:   Unable to collect  Collection has been rescheduled by 2MB at 06/18/2024 20:55 Reason:   Unable to collect    Blood culture x two cultures. Draw prior to antibiotics. [[id number]] Collected: 06/18/24 2118    Order Status: Completed Specimen: Blood Updated: 06/19/24 2232     Blood Culture, Routine No Growth to date      No Growth to date    Narrative:      Aerobic and anaerobic  Collection has been rescheduled by 2MB at 06/18/2024 20:55 Reason:   Unable to collect  Collection has been rescheduled by 2MB at 06/18/2024 20:55 Reason:   Unable to collect    Blood culture x two cultures. Draw prior to antibiotics. [[id number]]     Order Status: Canceled Specimen: Blood     Blood culture x two cultures. Draw prior to antibiotics. [[id number]]     Order Status: Canceled Specimen: Blood

## 2024-06-20 NOTE — PROGRESS NOTES
"[facility]  Hematology/Oncology  Inpatient Progress Note          Patient Name: Gerardo Ramirez Jr.  MRN: [id number]  Admission Date: 6/18/2024  Hospital Length of Stay: 1 days  Code Status: Full Code   Attending Provider: Jamison Oliveros DO  Consulting Provider: Sung Cummings MD  Primary Care Physician: Galindo Berrios Jr., MD  Principal Problem:Neutropenic fever      Subjective:       Patient ID: Gerardo Ramirez Jr. is a 39 y.o. male.    Chief Complaint: Fever, Diarrhea, and Fatigue (Pt receiving chemo treatments and was feeling fatigued and feverish.  Pt says he took some tylenol and fever was 100.4 with Tylenol and was advised by oncology nurse to be seen at ED.  Pt wbc 0.5 and platelets 92)        History Present Illness:    Patient awake and alert and laying in bed; he reports that he feels good, no CP, SOB or N/V; he had another temp about hour after getting the Neupogen injection yesterday evening; his wife is at bedside; I discussed with floor nursing staff; he desperately wants to go home; I communicated with primary via epic      Review of Systems:  GEN: :  febrile temp prior to admit, general malaise   HEENT: normal with no HA's, sore throat, stiff neck, changes in vision  CV: normal with no CP, SOB, PND, TRUONG or orthopnea  PULM: normal with no SOB, cough, hemoptysis, sputum or pleuritic pain  GI: normal with no abdominal pain, nausea, vomiting, constipation, diarrhea, melanotic stools, BRBPR, or hematemesis  : normal with no hematuria, dysuria  BREAST: normal with no mass, discharge, pain  SKIN: normal with no rash, erythema, bruising, or swelling      Objective:     Vitals:  Blood pressure 120/70, pulse 85, temperature 98.2 °F (36.8 °C), temperature source Oral, resp. rate 20, height 5' 9" (1.753 m), weight 118 kg (260 lb 2.3 oz), SpO2 98%.    Physical Exam:  GEN: no apparent distress, comfortable; AAOx3  HEAD: atraumatic and normocephalic  EYES: no pallor, " no icterus, PERRLA  ENT: OMM, no pharyngeal erythema, external ears WNL; no nasal discharge; no thrush  NECK: no masses, thyroid normal, trachea midline, no LAD/LN's, supple  CV: RRR with no murmur; normal pulse; normal S1 and S2; no pedal edema  CHEST: Normal respiratory effort; CTAB; normal breath sounds; no wheeze or crackles  ABDOM: nontender and nondistended; soft; normal bowel sounds; no rebound/guarding  MUSC/Skeletal: ROM normal; no crepitus; joints normal; no deformities or arthropathy  EXTREM: no clubbing, cyanosis, inflammation or swelling; IV's  SKIN: no rashes, lesions, ulcers, petechiae or subcutaneous nodules  : no bah  NEURO: grossly intact; motor/sensory WNL; AAOx3; no tremors  PSYCH: normal mood, affect and behavior  LYMPH: normal cervical, supraclavicular, axillary and groin LN's            Lab Review:        Lab Results   Component Value Date    WBC 0.54 (LL) 06/20/2024    HGB 10.7 (L) 06/20/2024    HCT 31.0 (L) 06/20/2024    MCV 86 06/20/2024     (L) 06/20/2024       CMP  Sodium   Date Value Ref Range Status   06/20/2024 132 (L) 136 - 145 mmol/L Final     Potassium   Date Value Ref Range Status   06/20/2024 4.1 3.5 - 5.1 mmol/L Final     Chloride   Date Value Ref Range Status   06/20/2024 103 95 - 110 mmol/L Final     CO2   Date Value Ref Range Status   06/20/2024 21 (L) 23 - 29 mmol/L Final     Glucose   Date Value Ref Range Status   06/20/2024 218 (H) 70 - 110 mg/dL Final     BUN   Date Value Ref Range Status   06/20/2024 10 6 - 20 mg/dL Final     Creatinine   Date Value Ref Range Status   06/20/2024 0.9 0.5 - 1.4 mg/dL Final     Calcium   Date Value Ref Range Status   06/20/2024 8.3 (L) 8.7 - 10.5 mg/dL Final     Total Protein   Date Value Ref Range Status   06/20/2024 6.9 6.0 - 8.4 g/dL Final     Albumin   Date Value Ref Range Status   06/20/2024 3.7 3.5 - 5.2 g/dL Final     Total Bilirubin   Date Value Ref Range Status   06/20/2024 0.9 0.1 - 1.0 mg/dL Final     Comment:     For  infants and newborns, interpretation of results should be based  on gestational age, weight and in agreement with clinical  observations.    Premature Infant recommended reference ranges:  Up to 24 hours.............<8.0 mg/dL  Up to 48 hours............<12.0 mg/dL  3-5 days..................<15.0 mg/dL  6-29 days.................<15.0 mg/dL       Alkaline Phosphatase   Date Value Ref Range Status   06/20/2024 44 (L) 55 - 135 U/L Final     AST   Date Value Ref Range Status   06/20/2024 13 10 - 40 U/L Final     ALT   Date Value Ref Range Status   06/20/2024 31 10 - 44 U/L Final     Anion Gap   Date Value Ref Range Status   06/20/2024 8 8 - 16 mmol/L Final     eGFR   Date Value Ref Range Status   06/20/2024 >60.0 >60 mL/min/1.73 m^2 Final           Radiology Diagnostic Studies:       X-Ray Chest AP Portable [[id number]] Resulted: 06/18/24 2053   Order Status: Completed Updated: 06/18/24 2055   Narrative:     EXAMINATION:  XR CHEST AP PORTABLE    CLINICAL HISTORY:  sepsis;    TECHNIQUE:  Single frontal view of the chest was performed.    COMPARISON:  08/03/2021    FINDINGS:  Lungs are clear. No focal consolidation. No pleural effusion. No pneumothorax. Normal heart size.   Impression:       No acute findings.        Assessment:     IMPRESSION:    (1) 39 y.o. male known to our oncology service with diagnosis of Hairy Cell leukemia (HCL) who recently started regimen of chemotherapy under the guidance of Dr Chan Paredes with [facility]. He presented to the ED at [facility] with febrile temp and has been admitted to the hospitalist service. He is on IV antibiotics.      6/19/2024:  - normally we try to avoid giving GCSF in leukemia patients, but since he is febrile neutropenic there is indication to do so  - start GCSF daily  - monitor labs daily    6/20/2024:  - remains on IV antibiotics  - wbc 0.54, hgb 10.7 and plats 110,000  - on GCSF  - had temp last night about hour after getting the neupogen  - he desperately wants to go home -  will discuss with primary team     (2) Mild anemia due to chemotherapy      (3) Mild thrombocytopenia due to chemotherapy     (4) HTN and hypercholesterolemia     (5) Atrial fibrillation     (6) Hepatic steatosis; elevated LFts     (7) Anxiety     (8) Migraine HA's - was on Imitrex but only took one dose     (9) EASTON     (10) Former smoker - quit Nov 2022 (vapes)      (11) Occasional alcohol intake     (12) Hx/of hernia repair, cholecystectomy and vasectomy                   1. Hairy cell leukemia not having achieved remission    2. Fever    3. Neutropenic fever    4. Chest pain           Plan:     PLAN:          Monitor labs daily  Start GCSF daily - discontinue once ANC >500-1000  Antibiotics as per primary team  IVF's, electrolyte management pain management as per primary team  Will follow with you                      Sung Cummings MD  Hematology/Oncology  [facility]

## 2024-06-20 NOTE — PROGRESS NOTES
Pharmacy Consult Discontinuation: Vancomycin    Gerardo Ramirez Jr. [id number] is a 39 y.o. male was consulted for vancomycin pharmacotherapy management by pharmacy.    Pharmacy consult for vancomycin dosing is no longer required.  Vancomycin was discontinued 06/20/2024 @ 1606.    Thank you for allowing us to participate in this patient's care. Should you have any questions or concerns please feel free to contact the pharmacy department at [phone].    Gregory Moyer, PharmD

## 2024-06-20 NOTE — DISCHARGE SUMMARY
Formerly [facility], later [facility] Medicine  Discharge Summary      Patient Name: Gerardo Ramirez Jr.  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  Patient was recently diagnosed and has completed 1 week's course of chemotherapy and a few days' course of immunotherapy.  It is confirmed in the ER the patient has had a fever.  Oncology/hematology was notified of patient's presentation and suggested that he be started on vancomycin and Zosyn.  Patient has also been started we will filgrastim for 5 days per oncology recommendation.  Patient otherwise doing well.  Complained of mild fever but no significant pain.  Question and concerns addressed.  Hospitalist will admit this patient    * No surgery found *      Hospital Course:   Patient with known leukemia presented with neutropenia and fever.  Vanc and cefepime as running.  Blood cultures are Ngx 48 hours, CXR neg, UA bland so no obvious source.  Patient's oncologist has been consulted for further recommendations and are agrteeable to DC with 72 hours course of po cefdinir and cipro, can resume bactrim after that. give GCSF Fri, Sat and Sun and recheck labs on Monday.      Goals of Care Treatment Preferences:  Code Status: Full Code      Consults:   Consults (From admission, onward)          Status Ordering Provider     Inpatient consult to Hematology Oncology  Once        Provider:  Sung Cummings MD    Acknowledged JAMISON ZUNIGA            No new Assessment & Plan notes have been filed under this hospital service since  the last note was generated.  Service: Hospital Medicine    Final Active Diagnoses:    Diagnosis Date Noted POA    PRINCIPAL PROBLEM:  Neutropenic fever [D70.9, R50.81] 06/18/2024 Yes    Chemotherapy induced neutropenia [D70.1, T45.1X5A] 06/20/2024 Yes    Hairy cell leukemia [C91.40] 05/24/2024 Yes    Pancytopenia [D61.818] 04/05/2024 Yes    Essential hypertension [I10] 05/31/2016 Yes      Problems Resolved During this Admission:       Discharged Condition: good    Disposition: Home or Self Care    Follow Up:   Follow-up Information       Galindo Berrios Jr., MD Follow up in 1 week(s).    Specialty: Internal Medicine  Contact information:  [address]  [address]  [place] [address] [phone]               Sung Cummings MD Follow up in 1 week(s).    Specialties: Hematology, Hematology and Oncology, Oncology  Contact information:  [address]  [address]  [place] [address] [phone]                           Patient Instructions:      Notify your health care provider if you experience any of the following:  temperature >100.4     Notify your health care provider if you experience any of the following:  persistent nausea and vomiting or diarrhea     Notify your health care provider if you experience any of the following:  severe uncontrolled pain     Notify your health care provider if you experience any of the following:  persistent dizziness, light-headedness, or visual disturbances     Activity as tolerated       Significant Diagnostic Studies: Labs: BMP:   Recent Labs   Lab 06/18/24 2020 06/19/24  0557 06/20/24  0507   * 144* 218*   * 134* 132*   K 3.9 4.4 4.1    104 103   CO2 22* 23 21*   BUN 17 13 10   CREATININE 0.9 0.9 0.9   CALCIUM 9.4 8.5* 8.3*   MG  --  1.5* 1.7   , CMP   Recent Labs   Lab 06/18/24 2020 06/19/24  0557 06/20/24  0507   * 134* 132*   K 3.9 4.4 4.1    104 103   CO2 22* 23 21*   * 144* 218*   BUN 17 13 10   CREATININE 0.9 0.9 0.9  "  CALCIUM 9.4 8.5* 8.3*   PROT 7.2 6.5 6.9   ALBUMIN 4.1 3.8 3.7   BILITOT 1.0 1.1* 0.9   ALKPHOS 52* 45* 44*   AST 13 10 13   ALT 39 32 31   ANIONGAP 8 7* 8   , and CBC   Recent Labs   Lab 06/18/24 2020 06/19/24  0557 06/20/24  0507   WBC 0.38* 0.41* 0.54*   HGB 11.3* 10.7* 10.7*   HCT 32.9* 31.3* 31.0*   * 102* 110*     Microbiology: Blood Culture   Lab Results   Component Value Date    LABBLOO No Growth to date 06/19/2024    LABBLOO No Growth to date 06/19/2024    and Urine Culture  No results found for: "LABURIN"    Pending Diagnostic Studies:       None           Medications:  Reconciled Home Medications:      Medication List        START taking these medications      cefdinir 300 MG capsule  Commonly known as: OMNICEF  Take 1 capsule (300 mg total) by mouth every 12 (twelve) hours. for 2 days     ciprofloxacin HCl 500 MG tablet  Commonly known as: CIPRO  Take 1 tablet (500 mg total) by mouth every 12 (twelve) hours. for 2 days     HYDROcodone-acetaminophen 5-325 mg per tablet  Commonly known as: NORCO  Take 1 tablet by mouth every 12 (twelve) hours as needed for Pain.            CONTINUE taking these medications      acyclovir 400 MG tablet  Commonly known as: ZOVIRAX  Take 1 tablet (400 mg total) by mouth 2 (two) times daily.     butalbital-acetaminophen-caff -40 mg Cap  Take 1-2 capsules by mouth 3 (three) times daily.     ergocalciferol 50,000 unit Cap  Commonly known as: VITAMIN D2  Take 1 capsule (50,000 Units total) by mouth every 7 days.     metoprolol succinate 50 MG 24 hr tablet  Commonly known as: TOPROL-XL  Take 1 tablet (50 mg total) by mouth once daily.     olmesartan 40 MG tablet  Commonly known as: BENICAR  Take 1 tablet (40 mg total) by mouth once daily.     ondansetron 8 MG tablet  Commonly known as: ZOFRAN  Take 1 tablet (8 mg total) by mouth every 8 (eight) hours as needed for Nausea.     pitavastatin calcium 2 mg Tab tablet  Commonly known as: LIVALO  Take 1 tablet (2 mg " total) by mouth every evening.     promethazine 25 MG tablet  Commonly known as: PHENERGAN  Take 1 tablet (25 mg total) by mouth every 4 to 6 hours as needed for Nausea.     sulfamethoxazole-trimethoprim 800-160mg 800-160 mg Tab  Commonly known as: BACTRIM DS  Take 1 tablet by mouth every Mon, Wed, Fri.     TYLENOL EXTRA STRENGTH 500 MG tablet  Generic drug: acetaminophen  Take 1,000 mg by mouth once as needed for Pain.              Indwelling Lines/Drains at time of discharge:   Lines/Drains/Airways       None                   Time spent on the discharge of patient: 34 minutes         Jamison Oliveros DO  Department of Hospital Medicine  Pending sale to [facility]

## 2024-06-21 ENCOUNTER — INFUSION (OUTPATIENT)
Dept: INFUSION THERAPY | Facility: HOSPITAL | Age: 39
End: 2024-06-21
Attending: NURSE PRACTITIONER
Payer: COMMERCIAL

## 2024-06-21 ENCOUNTER — TELEPHONE (OUTPATIENT)
Facility: CLINIC | Age: 39
End: 2024-06-21
Payer: COMMERCIAL

## 2024-06-21 VITALS
OXYGEN SATURATION: 98 % | DIASTOLIC BLOOD PRESSURE: 72 MMHG | RESPIRATION RATE: 18 BRPM | BODY MASS INDEX: 38.75 KG/M2 | HEART RATE: 79 BPM | TEMPERATURE: 98 F | HEIGHT: 69 IN | SYSTOLIC BLOOD PRESSURE: 110 MMHG | WEIGHT: 261.63 LBS

## 2024-06-21 DIAGNOSIS — R50.81 NEUTROPENIC FEVER: ICD-10-CM

## 2024-06-21 DIAGNOSIS — C91.40 HAIRY CELL LEUKEMIA NOT HAVING ACHIEVED REMISSION: ICD-10-CM

## 2024-06-21 DIAGNOSIS — D70.9 NEUTROPENIC FEVER: ICD-10-CM

## 2024-06-21 DIAGNOSIS — D70.1 CHEMOTHERAPY INDUCED NEUTROPENIA: Primary | ICD-10-CM

## 2024-06-21 DIAGNOSIS — T45.1X5A CHEMOTHERAPY INDUCED NEUTROPENIA: Primary | ICD-10-CM

## 2024-06-21 PROCEDURE — 63600175 PHARM REV CODE 636 W HCPCS: Mod: JZ,JB,JG | Performed by: NURSE PRACTITIONER

## 2024-06-21 PROCEDURE — 96372 THER/PROPH/DIAG INJ SC/IM: CPT

## 2024-06-21 RX ADMIN — FILGRASTIM-SNDZ 480 MCG: 480 INJECTION, SOLUTION INTRAVENOUS; SUBCUTANEOUS at 03:06

## 2024-06-21 NOTE — PLAN OF CARE
Problem: Fatigue  Goal: Improved Activity Tolerance  Outcome: Progressing  Intervention: Promote Improved Energy  Flowsheets (Taken 6/21/2024 0168)  Fatigue Management: frequent rest breaks encouraged  Sleep/Rest Enhancement: regular sleep/rest pattern promoted  Activity Management:   Ambulated -L4   Up in chair - L3  Environmental Support:   calm environment promoted   rest periods encouraged

## 2024-06-21 NOTE — TELEPHONE ENCOUNTER
Spoke to [employer] who called to inquire about disability paperwork, informed that we received the paperwork and are awaiting patient signature to complete the paperwork and will fax back to them once received, which we are hoping will be today. Verbalized understanding.

## 2024-06-22 ENCOUNTER — TELEPHONE (OUTPATIENT)
Dept: INFUSION THERAPY | Facility: HOSPITAL | Age: 39
End: 2024-06-22

## 2024-06-22 LAB
OHS QRS DURATION: 80 MS
OHS QTC CALCULATION: 411 MS

## 2024-06-22 NOTE — TELEPHONE ENCOUNTER
Spoke with Pt. Pt states not able to come to 8am appt for Zarxio Injection. Wants to reschedule for Sunday and Monday. States his car is not working, may be the Battery. Will cancel today's appointment (Saturday).  Gave patient instructions to arrive at [facility] ED Registration on Sunday at 0800 for his 2nd of 3 injections.  Instructed pt that he will need to return on Monday for the 3rd of 3 injections.  Pt verbalized an understanding.  Will send Kenrda Lopez a message regarding the missed 2nd dose of Zarxio. amr

## 2024-06-23 LAB — BACTERIA BLD CULT: NORMAL

## 2024-06-24 LAB — BACTERIA BLD CULT: NORMAL

## 2024-06-25 ENCOUNTER — TELEPHONE (OUTPATIENT)
Facility: CLINIC | Age: 39
End: 2024-06-25
Payer: COMMERCIAL

## 2024-06-25 ENCOUNTER — LAB VISIT (OUTPATIENT)
Dept: LAB | Facility: HOSPITAL | Age: 39
End: 2024-06-25
Attending: NURSE PRACTITIONER
Payer: COMMERCIAL

## 2024-06-25 DIAGNOSIS — C91.40 HAIRY CELL LEUKEMIA NOT HAVING ACHIEVED REMISSION: ICD-10-CM

## 2024-06-25 LAB
ALBUMIN SERPL BCP-MCNC: 4 G/DL (ref 3.5–5.2)
ALP SERPL-CCNC: 55 U/L (ref 55–135)
ALT SERPL W/O P-5'-P-CCNC: 71 U/L (ref 10–44)
ANION GAP SERPL CALC-SCNC: 6 MMOL/L (ref 8–16)
AST SERPL-CCNC: 14 U/L (ref 10–40)
BASOPHILS # BLD AUTO: ABNORMAL K/UL (ref 0–0.2)
BASOPHILS NFR BLD: 1 % (ref 0–1.9)
BILIRUB SERPL-MCNC: 0.7 MG/DL (ref 0.1–1)
BUN SERPL-MCNC: 12 MG/DL (ref 6–20)
CALCIUM SERPL-MCNC: 8.8 MG/DL (ref 8.7–10.5)
CHLORIDE SERPL-SCNC: 104 MMOL/L (ref 95–110)
CO2 SERPL-SCNC: 24 MMOL/L (ref 23–29)
CREAT SERPL-MCNC: 0.9 MG/DL (ref 0.5–1.4)
DACRYOCYTES BLD QL SMEAR: ABNORMAL
DIFFERENTIAL METHOD BLD: ABNORMAL
EOSINOPHIL # BLD AUTO: ABNORMAL K/UL (ref 0–0.5)
EOSINOPHIL NFR BLD: 4 % (ref 0–8)
ERYTHROCYTE [DISTWIDTH] IN BLOOD BY AUTOMATED COUNT: 13.1 % (ref 11.5–14.5)
EST. GFR  (NO RACE VARIABLE): >60 ML/MIN/1.73 M^2
GLUCOSE SERPL-MCNC: 148 MG/DL (ref 70–110)
HCT VFR BLD AUTO: 31.8 % (ref 40–54)
HGB BLD-MCNC: 10.7 G/DL (ref 14–18)
IMM GRANULOCYTES # BLD AUTO: ABNORMAL K/UL (ref 0–0.04)
IMM GRANULOCYTES NFR BLD AUTO: ABNORMAL % (ref 0–0.5)
LYMPHOCYTES # BLD AUTO: ABNORMAL K/UL (ref 1–4.8)
LYMPHOCYTES NFR BLD: 33 % (ref 18–48)
MCH RBC QN AUTO: 29 PG (ref 27–31)
MCHC RBC AUTO-ENTMCNC: 33.6 G/DL (ref 32–36)
MCV RBC AUTO: 86 FL (ref 82–98)
MONOCYTES # BLD AUTO: ABNORMAL K/UL (ref 0.3–1)
MONOCYTES NFR BLD: 2 % (ref 4–15)
NEUTROPHILS NFR BLD: 47 % (ref 38–73)
NEUTS BAND NFR BLD MANUAL: 13 %
NRBC BLD-RTO: 2 /100 WBC
OVALOCYTES BLD QL SMEAR: ABNORMAL
PLATELET # BLD AUTO: 192 K/UL (ref 150–450)
PLATELET BLD QL SMEAR: ABNORMAL
PMV BLD AUTO: 8.7 FL (ref 9.2–12.9)
POIKILOCYTOSIS BLD QL SMEAR: SLIGHT
POLYCHROMASIA BLD QL SMEAR: ABNORMAL
POTASSIUM SERPL-SCNC: 4.4 MMOL/L (ref 3.5–5.1)
PROT SERPL-MCNC: 7.3 G/DL (ref 6–8.4)
RBC # BLD AUTO: 3.69 M/UL (ref 4.6–6.2)
SODIUM SERPL-SCNC: 134 MMOL/L (ref 136–145)
WBC # BLD AUTO: 1.17 K/UL (ref 3.9–12.7)

## 2024-06-25 PROCEDURE — 36415 COLL VENOUS BLD VENIPUNCTURE: CPT | Performed by: NURSE PRACTITIONER

## 2024-06-25 PROCEDURE — 85027 COMPLETE CBC AUTOMATED: CPT | Performed by: NURSE PRACTITIONER

## 2024-06-25 PROCEDURE — 80053 COMPREHEN METABOLIC PANEL: CPT | Performed by: NURSE PRACTITIONER

## 2024-06-25 PROCEDURE — 85007 BL SMEAR W/DIFF WBC COUNT: CPT | Performed by: NURSE PRACTITIONER

## 2024-06-25 NOTE — TELEPHONE ENCOUNTER
Critical WBC 1.17, ANC 0.72 called from Monica at [facility]. EVARISTO Carroll made aware of above, per her verbal order, patient to get 2 more doses of zarxio. Patient made aware of above and verbalized understanding.

## 2024-06-26 ENCOUNTER — INFUSION (OUTPATIENT)
Dept: INFUSION THERAPY | Facility: HOSPITAL | Age: 39
End: 2024-06-26
Attending: INTERNAL MEDICINE
Payer: COMMERCIAL

## 2024-06-26 VITALS
DIASTOLIC BLOOD PRESSURE: 73 MMHG | RESPIRATION RATE: 18 BRPM | BODY MASS INDEX: 38.38 KG/M2 | TEMPERATURE: 99 F | WEIGHT: 259.13 LBS | HEIGHT: 69 IN | OXYGEN SATURATION: 99 % | HEART RATE: 90 BPM | SYSTOLIC BLOOD PRESSURE: 111 MMHG

## 2024-06-26 DIAGNOSIS — T45.1X5A CHEMOTHERAPY INDUCED NEUTROPENIA: Primary | ICD-10-CM

## 2024-06-26 DIAGNOSIS — C91.40 HAIRY CELL LEUKEMIA NOT HAVING ACHIEVED REMISSION: ICD-10-CM

## 2024-06-26 DIAGNOSIS — R50.81 NEUTROPENIC FEVER: ICD-10-CM

## 2024-06-26 DIAGNOSIS — D70.9 NEUTROPENIC FEVER: ICD-10-CM

## 2024-06-26 DIAGNOSIS — D70.1 CHEMOTHERAPY INDUCED NEUTROPENIA: Primary | ICD-10-CM

## 2024-06-26 PROCEDURE — 63600175 PHARM REV CODE 636 W HCPCS: Mod: JZ,JB,JG | Performed by: NURSE PRACTITIONER

## 2024-06-26 PROCEDURE — 96372 THER/PROPH/DIAG INJ SC/IM: CPT

## 2024-06-26 RX ADMIN — FILGRASTIM-SNDZ 480 MCG: 480 INJECTION, SOLUTION INTRAVENOUS; SUBCUTANEOUS at 01:06

## 2024-06-26 NOTE — PROGRESS NOTES
[facility] Hematology/Oncology  PROGRESS NOTE -  Follow-up Visit      Subjective:       Patient ID:   NAME: eGrardo Ramirez Jr. : 1985     39 y.o. male    Referring Doc: Galindo Berrios Jr.,*  Other Physicians: Micheline Lanier/Brant Yeung           Chief Complaint: pancytopenia f/u    History of Present Illness:     Patient returns today for a regularly scheduled follow-up visit.  The patient is here today to go over the results of the recently ordered labs, tests and studies. He is here with his wife.      He was recently hospitalized and discharged last Thursday. He was given couple of days of oral antibiotics to take at home which he has since completed.     No fevers, some residual malaise; having a lot of aches in joints and hips; he has been on neupogen injections and last one was yesterday    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)    He saw Dr Chan Paredes on 2024 and has since proceeded with a regimen of therapy; he sees Dr paredes after he completes the 8 weeks (Aug 19th tentatively)    Therapy on hold this week due to recent low counts and hospitalization                  ROS:   GEN: normal without any fever, night sweats or weight loss; aches/pains, malaise; night sweats every now and then  HEENT: normal with no HA's, sore throat, stiff neck, changes in vision  CV: normal with no CP, SOB, PND, TRUONG or orthopnea  PULM: normal with no SOB, cough, hemoptysis, sputum or pleuritic pain  GI: normal with no abdominal pain, nausea, vomiting, constipation, diarrhea, melanotic stools, BRBPR, or hematemesis  : normal with no hematuria, dysuria  BREAST: normal with no mass, discharge, pain  SKIN: normal with no rash, erythema, bruising, or swelling    Pain Scale:  0    Allergies:  Review of patient's allergies indicates:   Allergen Reactions    Amoxicillin Hives       Medications:    Current  "Outpatient Medications:     acetaminophen (TYLENOL EXTRA STRENGTH) 500 MG tablet, Take 1,000 mg by mouth once as needed for Pain., Disp: , Rfl:     acyclovir (ZOVIRAX) 400 MG tablet, Take 1 tablet (400 mg total) by mouth 2 (two) times daily., Disp: 60 tablet, Rfl: 11    butalbital-acetaminophen-caff -40 mg Cap, Take 1-2 capsules by mouth 3 (three) times daily., Disp: 30 capsule, Rfl: 1    ergocalciferol (VITAMIN D2) 50,000 unit Cap, Take 1 capsule (50,000 Units total) by mouth every 7 days., Disp: 12 capsule, Rfl: 1    metoprolol succinate (TOPROL-XL) 50 MG 24 hr tablet, Take 1 tablet (50 mg total) by mouth once daily., Disp: 90 tablet, Rfl: 1    olmesartan (BENICAR) 40 MG tablet, Take 1 tablet (40 mg total) by mouth once daily., Disp: 90 tablet, Rfl: 1    ondansetron (ZOFRAN) 8 MG tablet, Take 1 tablet (8 mg total) by mouth every 8 (eight) hours as needed for Nausea., Disp: 30 tablet, Rfl: 2    pitavastatin calcium (LIVALO) 2 mg Tab tablet, Take 1 tablet (2 mg total) by mouth every evening., Disp: 90 tablet, Rfl: 1    promethazine (PHENERGAN) 25 MG tablet, Take 1 tablet (25 mg total) by mouth every 4 to 6 hours as needed for Nausea., Disp: 30 tablet, Rfl: 2    sulfamethoxazole-trimethoprim 800-160mg (BACTRIM DS) 800-160 mg Tab, Take 1 tablet by mouth every Mon, Wed, Fri., Disp: 24 tablet, Rfl: 3  No current facility-administered medications for this visit.    PMHx/PSHx Updates:  See patient's last visit with me on 6/20/2024.  See H&P on 4/5/2024        Pathology:   Cancer Staging   No matching staging information was found for the patient.            Objective:     Vitals:  Blood pressure 111/78, pulse 67, temperature 98.6 °F (37 °C), resp. rate 20, height 5' 9" (1.753 m), weight 118.8 kg (262 lb).    Physical Examination:   GEN: no apparent distress, comfortable; AAOx3  HEAD: atraumatic and normocephalic  EYES: no pallor, no icterus, PERRLA  ENT: OMM, no pharyngeal erythema, external ears WNL; no nasal " discharge; no thrush  NECK: no masses, thyroid normal, trachea midline, no LAD/LN's, supple  CV: RRR with no murmur; normal pulse; normal S1 and S2; no pedal edema  CHEST: Normal respiratory effort; CTAB; normal breath sounds; no wheeze or crackles  ABDOM: nontender and nondistended; soft; normal bowel sounds; no rebound/guarding  MUSC/Skeletal: ROM normal; no crepitus; joints normal; no deformities or arthropathy  EXTREM: no clubbing, cyanosis, inflammation or swelling  SKIN: no rashes, lesions, ulcers, petechiae or subcutaneous nodules  : no bah  NEURO: grossly intact; motor/sensory WNL; AAOx3; no tremors  PSYCH: normal mood, affect and behavior  LYMPH: normal cervical, supraclavicular, axillary and groin LN's            Labs:     Lab Results   Component Value Date    WBC 1.17 (LL) 06/25/2024    HGB 10.7 (L) 06/25/2024    HCT 31.8 (L) 06/25/2024    MCV 86 06/25/2024     06/25/2024       CMP  Sodium   Date Value Ref Range Status   06/25/2024 134 (L) 136 - 145 mmol/L Final     Potassium   Date Value Ref Range Status   06/25/2024 4.4 3.5 - 5.1 mmol/L Final     Chloride   Date Value Ref Range Status   06/25/2024 104 95 - 110 mmol/L Final     CO2   Date Value Ref Range Status   06/25/2024 24 23 - 29 mmol/L Final     Glucose   Date Value Ref Range Status   06/25/2024 148 (H) 70 - 110 mg/dL Final     BUN   Date Value Ref Range Status   06/25/2024 12 6 - 20 mg/dL Final     Creatinine   Date Value Ref Range Status   06/25/2024 0.9 0.5 - 1.4 mg/dL Final     Calcium   Date Value Ref Range Status   06/25/2024 8.8 8.7 - 10.5 mg/dL Final     Total Protein   Date Value Ref Range Status   06/25/2024 7.3 6.0 - 8.4 g/dL Final     Albumin   Date Value Ref Range Status   06/25/2024 4.0 3.5 - 5.2 g/dL Final     Total Bilirubin   Date Value Ref Range Status   06/25/2024 0.7 0.1 - 1.0 mg/dL Final     Comment:     For infants and newborns, interpretation of results should be based  on gestational age, weight and in agreement  with clinical  observations.    Premature Infant recommended reference ranges:  Up to 24 hours.............<8.0 mg/dL  Up to 48 hours............<12.0 mg/dL  3-5 days..................<15.0 mg/dL  6-29 days.................<15.0 mg/dL       Alkaline Phosphatase   Date Value Ref Range Status   06/25/2024 55 55 - 135 U/L Final     AST   Date Value Ref Range Status   06/25/2024 14 10 - 40 U/L Final     ALT   Date Value Ref Range Status   06/25/2024 71 (H) 10 - 44 U/L Final     Anion Gap   Date Value Ref Range Status   06/25/2024 6 (L) 8 - 16 mmol/L Final     eGFR   Date Value Ref Range Status   06/25/2024 >60.0 >60 mL/min/1.73 m^2 Final           Radiology/Diagnostic Studies:    CT Dx Bone Marrow Biopsy(ies) w/ Aspiration; Right(XPD)    Result Date: 4/25/2024  EXAMINATION: CT DIAGNOSTIC BONE MARROW BIOPSY(IES) WITH ASPIRATION RIGHT(XPD) CLINICAL HISTORY: Other pancytopenia COMPARISON: None. FINDINGS: After obtaining written informed consent, which included a discussion of the risks and benefits of the procedure to include infection and bleeding, and allowing the patient the opportunity to ask questions, the patient agreed to the procedure. The patient was placed prone on the CT fluoroscopy table. A suitable skin entrance site overlying the right posterior iliac spine was localized with CT guidance. The skin was marked, and then prepped and draped in sterile fashion, with several milliliters of lidocaine 1% injected subcutaneously and along the periosteum to achieve adequate local anesthesia. Following, a punch skin incision was made, and an 11-gauge ChartITright bone biopsy needle was advanced into the posterior iliac spine under intermittent CT fluoroscopic guidance.  Several aspirate attempts were made, with very little aspirate obtained.  A core biopsy sample was obtained.  Attention was then turned to the left posterior iliac spine, with the skin prepped and draped in sterile fashion, and lidocaine 1% injected to  achieve adequate local anesthesia.  A punch skin incision was made and an 11-gauge Jamshidi bone biopsy needle was advanced into the left posterior iliac spine with intermittent CT fluoroscopic guidance.  Several aspirate attempts were made, with very little aspirate obtained.  The needle was removed and a bandage was applied to the skin. The patient tolerated the procedure well, with no immediate postprocedural complications. There was no significant blood loss. Total moderate conscious sedation time with supervision by the interventional radiologist and monitoring by the special procedures registered nurse was 25 minutes. The patient received Versed 4 mg and Fentanyl 200 mcg IV during the procedure.     CT guided bone marrow core biopsy and aspiration as described. Electronically signed by: Miguel A Goode Date:    04/25/2024 Time:    11:34    US Abdomen Complete    Result Date: 4/25/2024  EXAMINATION: US ABDOMEN COMPLETE CLINICAL HISTORY: Pancytopenia COMPARISON: June 2021 FINDINGS: Sonographic assessment of the abdomen was performed.  The pancreas is partially obscured by bowel gas.  Visualized portions are normal.  The aorta is normal in caliber in the upper abdomen.  The inferior vena cava is unremarkable. The liver has a normal sonographic appearance, with no mass or contour abnormality.  Maximal longitudinal dimension is 16.9 cm.  Hepato pedal flow is noted within the portal vein. The gallbladder is absent.  Common bile duct is normal in caliber at 5 mm. The bilateral kidneys have a normal appearance.  The spleen is enlarged measuring 15.0 cm in greatest longitudinal dimension.  There is no free fluid.     1. Mild splenomegaly. 2. Surgical absence of the gallbladder. Electronically signed by: Shaquille Mart Date:    04/25/2024 Time:    10:43      I have reviewed all available lab results and radiology reports.    Assessment/Plan:   (1) 39 y.o. male with diagnosis of pancytopenia who has been referred by  Galindo Berrios Jr.,* for evaluation by medical hematology/oncology.      - leucopenia with WBC at 2.7 with relatively adequate differential breakdown  - anemia with hgb at 12.3 - NCNC parameters  - mild thrombocytopenia with plats at 118,000     - his lab work was normal about 3 yrs ago; recent Hep C ab was nonreactive and HIV was negative     - he only took one dose of the imitrex and has not been on it with any consistence  - he has been on Livalo since Feb 2024 5/1/2024:  - He had bone marrow biopsy on 4/25/2024 with pathology showing 95% of the marrow with hairy cell Leukemia (HCL)  - US with only mild splenomegaly  - Already discussed and made referral to Dr Sea Hensley with [facility].     5/22/2024:  - He saw Dr Chan Paredes on 5/20/2024 and they are planning to proceed with a regimen of therapy with 2CDA , he also ordered CT scans of his chest/abdom/pelvis  - He wants to start therapy sometime after June 4th 2024  - await the recs and note from Dr Paredes  - set up chemotherapy school in meantime    6/13/2024:  - He saw Dr Chan Paredes on 5/20/2024 and has proceeded  with a regimen of therapy this past Monday;   - seems to be tolerating things well so far  - he sees Dr Paredes after he completes the 8 weeks (Aug 19th tentatively)    6/27/2024:  - He was recently hospitalized and discharged last Thursday. He was given couple of days of oral antibiotics to take at home which he has since completed.   - No fevers, some residual malaise; having a lot of aches in joints and hips; he has been on neupogen injections and last one was yesterday  - check labs today and if WBC better, then can hold off on the GCSF today       (2) HTN and hypercholesterolemia     (3) Atrial fibrillation     (4) Hepatic steatosis; elevated LFts     (5) Anxiety     (6) Migraine HA's - was on Imitrex but only took one dose     (7) EASTON     (8) Former smoker - quit Nov 2022 (vapes)      (9) Occasional alcohol intake     (10) Hx/of hernia  "repair, cholecystectomy and vasectomy            VISIT DIAGNOSES:      Hairy cell leukemia not having achieved remission    Anemia, unspecified type    Anemia, normocytic normochromic    Leukopenia, unspecified type    Pancytopenia    Thrombocytopenia    Chemotherapy induced neutropenia          PLAN:  Check labs today and if ANC adequate, then will hold off on the GCSF today  Chemotherapy held this week - will resume next week  Therapy as per Dr Paredes directives - s/p chemotherapy school with Do - discussed the side-effect profile of the drugs, provided literature on the drugs and obtained consents  F/u with Dr Paredes planned for Aug 19th 2024  Check labs weekly while on therapy  Refill hydrocodone  Discussed use of claritin and motrin prn  F/u with PCP as directed     RTC in 1 week with Do and 2 weeks with Myself  Fax note to Galindo Berrios Jr.,* Brant Lanier C Bethala OGalinod Alicea Jr., MD, Chan Paredes    Discussion:     Pathology Discussion:    I reviewed and discussed the pathology report(s) and radiograph reports (if available) in as simple to understand and/or laymen's terms to the best of my ability. I had an indepth conversation with the patient and went over the patient's individual diagnosis based on the information that was currently available. I discussed the TNM staging process with regard to the patient's particular cancer type, and the calculated stage based on the currently available TNM data and literature. I discussed the available prognostic data with regard to the current staging information and how it relates to the prognosis of their particular neoplastic process.      NCCN Guidelines:    I discussed the available treatment option(s) in accordance with the latest literature from the NCCN Clinical Practice Guidelines for the patient's particular type of cancer disorder. The NCCN Guidelines provide a "document evidence-based (and) consensus-driven management" of the " care of oncology patients. The treatment recommendations were made not only in accordance to the NCCN guidelines, but also factored in to account the patient's overall age, condition, performance status and their medical co-morbidities. I went over the risks and benefits of the the treatment options (if any could be made) with regard to their particular cancer type, their cancer stage, their age, and their co-morbidities.     I spent over 25 mins of time with the patient. Reviewed results of the recently ordered labs, tests and studies; made directives with regards to the results. Over half of this time was spent couseling and coordinating care.    I have explained all of the above in detail and the patient understands all of the current recommendation(s). I have answered all of their questions to the best of my ability and to their complete satisfaction.   The patient is to continue with the current management plan.            Electronically signed by Sung Cummings MD

## 2024-06-27 ENCOUNTER — LAB VISIT (OUTPATIENT)
Dept: LAB | Facility: HOSPITAL | Age: 39
End: 2024-06-27
Attending: INTERNAL MEDICINE
Payer: COMMERCIAL

## 2024-06-27 ENCOUNTER — OFFICE VISIT (OUTPATIENT)
Facility: CLINIC | Age: 39
End: 2024-06-27
Payer: COMMERCIAL

## 2024-06-27 ENCOUNTER — INFUSION (OUTPATIENT)
Dept: INFUSION THERAPY | Facility: HOSPITAL | Age: 39
End: 2024-06-27
Attending: INTERNAL MEDICINE
Payer: COMMERCIAL

## 2024-06-27 VITALS
SYSTOLIC BLOOD PRESSURE: 111 MMHG | HEART RATE: 67 BPM | RESPIRATION RATE: 20 BRPM | TEMPERATURE: 99 F | HEIGHT: 69 IN | WEIGHT: 262 LBS | BODY MASS INDEX: 38.8 KG/M2 | DIASTOLIC BLOOD PRESSURE: 78 MMHG

## 2024-06-27 DIAGNOSIS — D72.819 LEUKOPENIA, UNSPECIFIED TYPE: ICD-10-CM

## 2024-06-27 DIAGNOSIS — T45.1X5A CHEMOTHERAPY INDUCED NEUTROPENIA: ICD-10-CM

## 2024-06-27 DIAGNOSIS — D69.6 THROMBOCYTOPENIA: ICD-10-CM

## 2024-06-27 DIAGNOSIS — C91.40 HAIRY CELL LEUKEMIA NOT HAVING ACHIEVED REMISSION: Primary | ICD-10-CM

## 2024-06-27 DIAGNOSIS — D61.818 PANCYTOPENIA: ICD-10-CM

## 2024-06-27 DIAGNOSIS — T45.1X5A CHEMOTHERAPY INDUCED NEUTROPENIA: Primary | ICD-10-CM

## 2024-06-27 DIAGNOSIS — C91.40 HAIRY CELL LEUKEMIA NOT HAVING ACHIEVED REMISSION: ICD-10-CM

## 2024-06-27 DIAGNOSIS — D64.9 ANEMIA, UNSPECIFIED TYPE: ICD-10-CM

## 2024-06-27 DIAGNOSIS — D70.1 CHEMOTHERAPY INDUCED NEUTROPENIA: ICD-10-CM

## 2024-06-27 DIAGNOSIS — D70.1 CHEMOTHERAPY INDUCED NEUTROPENIA: Primary | ICD-10-CM

## 2024-06-27 DIAGNOSIS — D70.9 NEUTROPENIC FEVER: ICD-10-CM

## 2024-06-27 DIAGNOSIS — R50.81 NEUTROPENIC FEVER: ICD-10-CM

## 2024-06-27 DIAGNOSIS — D64.9 ANEMIA, NORMOCYTIC NORMOCHROMIC: ICD-10-CM

## 2024-06-27 LAB
ALBUMIN SERPL BCP-MCNC: 4 G/DL (ref 3.5–5.2)
ALP SERPL-CCNC: 56 U/L (ref 55–135)
ALT SERPL W/O P-5'-P-CCNC: 46 U/L (ref 10–44)
ANION GAP SERPL CALC-SCNC: 9 MMOL/L (ref 8–16)
AST SERPL-CCNC: 12 U/L (ref 10–40)
BASOPHILS # BLD AUTO: 0.02 K/UL (ref 0–0.2)
BASOPHILS NFR BLD: 0.5 % (ref 0–1.9)
BILIRUB SERPL-MCNC: 1.4 MG/DL (ref 0.1–1)
BUN SERPL-MCNC: 18 MG/DL (ref 6–20)
CALCIUM SERPL-MCNC: 9 MG/DL (ref 8.7–10.5)
CHLORIDE SERPL-SCNC: 100 MMOL/L (ref 95–110)
CO2 SERPL-SCNC: 22 MMOL/L (ref 23–29)
CREAT SERPL-MCNC: 1.1 MG/DL (ref 0.5–1.4)
DIFFERENTIAL METHOD BLD: ABNORMAL
EOSINOPHIL # BLD AUTO: 0 K/UL (ref 0–0.5)
EOSINOPHIL NFR BLD: 0.5 % (ref 0–8)
ERYTHROCYTE [DISTWIDTH] IN BLOOD BY AUTOMATED COUNT: 13.2 % (ref 11.5–14.5)
EST. GFR  (NO RACE VARIABLE): >60 ML/MIN/1.73 M^2
GLUCOSE SERPL-MCNC: 142 MG/DL (ref 70–110)
HCT VFR BLD AUTO: 31.7 % (ref 40–54)
HGB BLD-MCNC: 10.9 G/DL (ref 14–18)
IMM GRANULOCYTES # BLD AUTO: 0.06 K/UL (ref 0–0.04)
IMM GRANULOCYTES NFR BLD AUTO: 1.6 % (ref 0–0.5)
LYMPHOCYTES # BLD AUTO: 0.5 K/UL (ref 1–4.8)
LYMPHOCYTES NFR BLD: 13.8 % (ref 18–48)
MCH RBC QN AUTO: 29.3 PG (ref 27–31)
MCHC RBC AUTO-ENTMCNC: 34.4 G/DL (ref 32–36)
MCV RBC AUTO: 85 FL (ref 82–98)
MONOCYTES # BLD AUTO: 0 K/UL (ref 0.3–1)
MONOCYTES NFR BLD: 0.8 % (ref 4–15)
NEUTROPHILS # BLD AUTO: 3.1 K/UL (ref 1.8–7.7)
NEUTROPHILS NFR BLD: 82.8 % (ref 38–73)
NRBC BLD-RTO: 0 /100 WBC
PLATELET # BLD AUTO: 173 K/UL (ref 150–450)
PMV BLD AUTO: 9.3 FL (ref 9.2–12.9)
POTASSIUM SERPL-SCNC: 4.4 MMOL/L (ref 3.5–5.1)
PROT SERPL-MCNC: 7.6 G/DL (ref 6–8.4)
RBC # BLD AUTO: 3.72 M/UL (ref 4.6–6.2)
SODIUM SERPL-SCNC: 131 MMOL/L (ref 136–145)
WBC # BLD AUTO: 3.76 K/UL (ref 3.9–12.7)

## 2024-06-27 PROCEDURE — 85025 COMPLETE CBC W/AUTO DIFF WBC: CPT | Performed by: INTERNAL MEDICINE

## 2024-06-27 PROCEDURE — 36415 COLL VENOUS BLD VENIPUNCTURE: CPT | Performed by: INTERNAL MEDICINE

## 2024-06-27 PROCEDURE — 80053 COMPREHEN METABOLIC PANEL: CPT | Performed by: INTERNAL MEDICINE

## 2024-06-27 PROCEDURE — 99999 PR PBB SHADOW E&M-EST. PATIENT-LVL III: CPT | Mod: PBBFAC,,, | Performed by: INTERNAL MEDICINE

## 2024-06-27 RX ORDER — HYDROCODONE BITARTRATE AND ACETAMINOPHEN 5; 325 MG/1; MG/1
1 TABLET ORAL EVERY 12 HOURS PRN
Qty: 60 TABLET | Refills: 0 | Status: SHIPPED | OUTPATIENT
Start: 2024-06-27 | End: 2024-07-02

## 2024-06-27 NOTE — NURSING
ZARXIO DOSE #2 CANCELLED PER VANDANA.  COME IN FOR TREATMENT ON MONDAY AS PLANNED.  NO MORE LABS NEEDED BEFORE TREATMENT.

## 2024-06-28 RX ORDER — SODIUM CHLORIDE 0.9 % (FLUSH) 0.9 %
10 SYRINGE (ML) INJECTION
OUTPATIENT
Start: 2024-06-28

## 2024-06-28 RX ORDER — MEPERIDINE HYDROCHLORIDE 50 MG/ML
25 INJECTION INTRAMUSCULAR; INTRAVENOUS; SUBCUTANEOUS
OUTPATIENT
Start: 2024-06-28

## 2024-06-28 RX ORDER — DIPHENHYDRAMINE HYDROCHLORIDE 50 MG/ML
50 INJECTION INTRAMUSCULAR; INTRAVENOUS ONCE AS NEEDED
OUTPATIENT
Start: 2024-06-28

## 2024-06-28 RX ORDER — EPINEPHRINE 0.3 MG/.3ML
0.3 INJECTION SUBCUTANEOUS ONCE AS NEEDED
OUTPATIENT
Start: 2024-06-28

## 2024-06-28 RX ORDER — ACETAMINOPHEN 325 MG/1
650 TABLET ORAL
OUTPATIENT
Start: 2024-06-28

## 2024-06-28 RX ORDER — FAMOTIDINE 10 MG/ML
20 INJECTION INTRAVENOUS
OUTPATIENT
Start: 2024-06-28

## 2024-06-28 RX ORDER — HEPARIN 100 UNIT/ML
500 SYRINGE INTRAVENOUS
OUTPATIENT
Start: 2024-06-28

## 2024-06-28 RX ORDER — ACETAMINOPHEN 325 MG/1
650 TABLET ORAL
Start: 2024-06-28

## 2024-07-01 ENCOUNTER — TELEPHONE (OUTPATIENT)
Dept: HEMATOLOGY/ONCOLOGY | Facility: CLINIC | Age: 39
End: 2024-07-01
Payer: COMMERCIAL

## 2024-07-01 ENCOUNTER — HOSPITAL ENCOUNTER (EMERGENCY)
Facility: HOSPITAL | Age: 39
Discharge: HOME | End: 2024-07-01
Attending: EMERGENCY MEDICINE
Payer: COMMERCIAL

## 2024-07-01 ENCOUNTER — INFUSION (OUTPATIENT)
Dept: INFUSION THERAPY | Facility: HOSPITAL | Age: 39
End: 2024-07-01
Attending: INTERNAL MEDICINE
Payer: COMMERCIAL

## 2024-07-01 ENCOUNTER — PATIENT MESSAGE (OUTPATIENT)
Facility: CLINIC | Age: 39
End: 2024-07-01
Payer: COMMERCIAL

## 2024-07-01 VITALS
BODY MASS INDEX: 38.39 KG/M2 | HEIGHT: 69 IN | OXYGEN SATURATION: 96 % | SYSTOLIC BLOOD PRESSURE: 90 MMHG | TEMPERATURE: 98 F | RESPIRATION RATE: 18 BRPM | HEART RATE: 74 BPM | DIASTOLIC BLOOD PRESSURE: 51 MMHG | WEIGHT: 259.19 LBS

## 2024-07-01 VITALS
SYSTOLIC BLOOD PRESSURE: 152 MMHG | HEART RATE: 80 BPM | DIASTOLIC BLOOD PRESSURE: 83 MMHG | BODY MASS INDEX: 38.51 KG/M2 | TEMPERATURE: 99 F | OXYGEN SATURATION: 99 % | HEIGHT: 69 IN | RESPIRATION RATE: 16 BRPM | WEIGHT: 260 LBS

## 2024-07-01 DIAGNOSIS — C91.40 HAIRY CELL LEUKEMIA NOT HAVING ACHIEVED REMISSION: Primary | ICD-10-CM

## 2024-07-01 DIAGNOSIS — T78.40XA ALLERGIC REACTION TO DRUG, INITIAL ENCOUNTER: Primary | ICD-10-CM

## 2024-07-01 PROCEDURE — 63600175 PHARM REV CODE 636 W HCPCS: Mod: JZ,JG | Performed by: INTERNAL MEDICINE

## 2024-07-01 PROCEDURE — 96413 CHEMO IV INFUSION 1 HR: CPT

## 2024-07-01 PROCEDURE — 96415 CHEMO IV INFUSION ADDL HR: CPT

## 2024-07-01 PROCEDURE — 25000003 PHARM REV CODE 250: Performed by: INTERNAL MEDICINE

## 2024-07-01 PROCEDURE — 96367 TX/PROPH/DG ADDL SEQ IV INF: CPT

## 2024-07-01 PROCEDURE — 99283 EMERGENCY DEPT VISIT LOW MDM: CPT | Mod: 25

## 2024-07-01 PROCEDURE — 63600175 PHARM REV CODE 636 W HCPCS: Performed by: INTERNAL MEDICINE

## 2024-07-01 PROCEDURE — 96375 TX/PRO/DX INJ NEW DRUG ADDON: CPT

## 2024-07-01 RX ORDER — EPINEPHRINE 0.3 MG/.3ML
0.3 INJECTION SUBCUTANEOUS ONCE AS NEEDED
Status: DISCONTINUED | OUTPATIENT
Start: 2024-07-01 | End: 2024-07-01 | Stop reason: HOSPADM

## 2024-07-01 RX ORDER — ACETAMINOPHEN 325 MG/1
650 TABLET ORAL
Status: COMPLETED | OUTPATIENT
Start: 2024-07-01 | End: 2024-07-01

## 2024-07-01 RX ORDER — METHYLPREDNISOLONE 4 MG/1
TABLET ORAL
Qty: 1 EACH | Refills: 0 | Status: SHIPPED | OUTPATIENT
Start: 2024-07-01 | End: 2024-07-22

## 2024-07-01 RX ORDER — FAMOTIDINE 10 MG/ML
20 INJECTION INTRAVENOUS
Status: COMPLETED | OUTPATIENT
Start: 2024-07-01 | End: 2024-07-01

## 2024-07-01 RX ORDER — SODIUM CHLORIDE 0.9 % (FLUSH) 0.9 %
10 SYRINGE (ML) INJECTION
Status: DISCONTINUED | OUTPATIENT
Start: 2024-07-01 | End: 2024-07-01 | Stop reason: HOSPADM

## 2024-07-01 RX ORDER — MEPERIDINE HYDROCHLORIDE 25 MG/ML
25 INJECTION INTRAMUSCULAR; INTRAVENOUS; SUBCUTANEOUS
Status: COMPLETED | OUTPATIENT
Start: 2024-07-01 | End: 2024-07-01

## 2024-07-01 RX ORDER — DIPHENHYDRAMINE HYDROCHLORIDE 50 MG/ML
50 INJECTION INTRAMUSCULAR; INTRAVENOUS ONCE AS NEEDED
Status: DISCONTINUED | OUTPATIENT
Start: 2024-07-01 | End: 2024-07-01 | Stop reason: HOSPADM

## 2024-07-01 RX ADMIN — FAMOTIDINE 20 MG: 10 INJECTION INTRAVENOUS at 07:07

## 2024-07-01 RX ADMIN — DIPHENHYDRAMINE HYDROCHLORIDE 25 MG: 50 INJECTION, SOLUTION INTRAMUSCULAR; INTRAVENOUS at 01:07

## 2024-07-01 RX ADMIN — DIPHENHYDRAMINE HYDROCHLORIDE 50 MG: 50 INJECTION, SOLUTION INTRAMUSCULAR; INTRAVENOUS at 07:07

## 2024-07-01 RX ADMIN — MEPERIDINE HYDROCHLORIDE 25 MG: 25 INJECTION INTRAMUSCULAR; INTRAVENOUS; SUBCUTANEOUS at 09:07

## 2024-07-01 RX ADMIN — DIPHENHYDRAMINE HYDROCHLORIDE 25 MG: 50 INJECTION, SOLUTION INTRAMUSCULAR; INTRAVENOUS at 03:07

## 2024-07-01 RX ADMIN — ACETAMINOPHEN 650 MG: 325 TABLET ORAL at 07:07

## 2024-07-01 RX ADMIN — RITUXIMAB-ABBS 900 MG: 10 INJECTION, SOLUTION INTRAVENOUS at 08:07

## 2024-07-01 RX ADMIN — HYDROCORTISONE SODIUM SUCCINATE 100 MG: 100 INJECTION, POWDER, FOR SOLUTION INTRAMUSCULAR; INTRAVENOUS at 03:07

## 2024-07-01 RX ADMIN — HYDROCORTISONE SODIUM SUCCINATE 100 MG: 100 INJECTION, POWDER, FOR SOLUTION INTRAMUSCULAR; INTRAVENOUS at 09:07

## 2024-07-01 NOTE — TELEPHONE ENCOUNTER
Left pt voicemail informing them of appointment with Dr. Paredes tomorrow for 2:00 PM. Left call back number for questions.

## 2024-07-01 NOTE — NURSING
After almost an hour into his Truxima infusion at 100 ml/hr, pt's breathing changed. Infusion stopped. 100 mg Solucortef given. Breathing started to resolve, then rigors kicked in. Pharmacy and Kendra Hooks NP notified. 25 mg demerol given. 15 minutes later rigors was still unresolved. 25 mg demerol given additional. Infusion on hold for 1 hour. Truxima re-strated at 50 ml/hr. Dr Paredes instructed to re-challenge infusion, titrating dose by 25 ml/hr starting at 75 ml/hr. Once titrated up to 100 ml/hr, pt's hands became red and itchy. Infusion stopped again. More rescue meds given. Infusion resumed again at 75 ml/hr. Pt was fine for a while, but reacted again. Recue meds given again. Infusion stopped. Pt sent to hospital via ambulance

## 2024-07-01 NOTE — ED PROVIDER NOTES
Encounter Date: 2024       History     Chief Complaint   Patient presents with    Allergic Reaction     Allergic skin rxn to immunotherapy tx a [facility] today.      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  Patient with episode of rash itching associated rigors.  Patient was given Benadryl 75 mg IV, Solu-Cortef 200 mg IV, and Demerol 50 mg IV.  All symptoms are now resolved.  He has no rash, shortness of breath.  No rigors.      Review of patient's allergies indicates:   Allergen Reactions    Amoxicillin Hives     Past Medical History:   Diagnosis Date    Anemia, normocytic normochromic 2024    Anemia, unspecified 2024    Anxiety     Hyperlipidemia     Hypertension 2013    Inguinal hernia bilateral, non-recurrent 2020    right side more pronounced- surg scheduled    Leucopenia 2024    Pancytopenia 2024    Pneumonia     age 11    Sleep apnea     Sleep apnea-like behavior     needs sleep study- wife says he stops breathing while sleeping    Thrombocytopenia 2024     Past Surgical History:   Procedure Laterality Date    CHOLECYSTECTOMY  2019    w/ Small Umbilical hernia rep-     HERNIA REPAIR Bilateral 03/10/2020    Dr. Lanier - [facility]    UMBILICAL HERNIA REPAIR  2019        VASECTOMY Bilateral 2020    Procedure: VASECTOMY;  Surgeon: German Tobias MD;  Location: [facility];  Service: Urology;  Laterality: Bilateral;     Family History   Problem Relation Name Age of Onset    Diabetes Mother      Hypertension Mother      Hypertension Sister      Hyperlipidemia Sister      Anxiety disorder Sister      Coronary artery disease Maternal Grandfather      No Known Problems Father       Social History     Tobacco Use    Smoking status: Former     Current packs/day: 0.00     Types: Cigarettes, Vaping with nicotine     Quit date: 2022     Years since quittin.5    Smokeless tobacco: Never   Substance  Use Topics    Alcohol use: Yes     Comment: occ    Drug use: No     Review of Systems   Constitutional:  Negative for fever.   HENT:  Negative for sore throat.    Eyes:  Negative for photophobia and visual disturbance.   Respiratory:  Positive for shortness of breath.    Cardiovascular:  Negative for chest pain.   Gastrointestinal:  Negative for abdominal pain and vomiting.   Genitourinary:  Negative for dysuria.   Musculoskeletal:  Negative for joint swelling.   Skin:  Positive for rash.   Neurological:  Negative for weakness and headaches.   Psychiatric/Behavioral:  Negative for confusion.        Physical Exam     Initial Vitals [07/01/24 1635]   BP Pulse Resp Temp SpO2   117/65 84 18 98.8 °F (37.1 °C) 100 %      MAP       --         Physical Exam    Nursing note and vitals reviewed.  Constitutional: He is not diaphoretic. No distress.   HENT:   Head: Normocephalic and atraumatic.   Eyes: Conjunctivae are normal.   Neck:   Normal range of motion.  Cardiovascular:  Regular rhythm.           Pulmonary/Chest: Breath sounds normal.   Abdominal: Abdomen is soft. There is no abdominal tenderness.   Musculoskeletal:         General: Normal range of motion.      Cervical back: Normal range of motion.     Neurological: He is alert. He has normal strength. No cranial nerve deficit or sensory deficit.   Skin: No rash noted.   Psychiatric: He has a normal mood and affect.         ED Course   Procedures  Labs Reviewed - No data to display       Imaging Results    None          Medications - No data to display  Medical Decision Making  Patient presents with reaction to immunotherapy.  Patient was treated with Solu-Cortef, Benadryl and Demerol prior to arrival.  He has no symptoms since arrival.  Patient observed.  He continues with no symptoms.  Discussed with Dr. Cummings, he is okay with Medrol Dosepak.  Patient is stable for discharge.                                      Clinical Impression:  Final diagnoses:  [T78.40XA]  Allergic reaction to drug, initial encounter (Primary)          ED Disposition Condition    Discharge Stable          ED Prescriptions       Medication Sig Dispense Start Date End Date Auth. Provider    methylPREDNISolone (MEDROL DOSEPACK) 4 mg tablet Take as directed 1 each 7/1/2024 7/22/2024 Eduin Rosenberg MD          Follow-up Information       Follow up With Specialties Details Why Contact Info Additional Information    [facility] - Emergency Dept Emergency Medicine  If symptoms worsen [address] [id number]  [phone] 1st floor    Sung Cummings MD Hematology, Hematology and Oncology, Oncology Schedule an appointment as soon as possible for a visit   [address]  [address]  [place] [address]  [phone]                Eduin Rosenberg MD  07/01/24 2260

## 2024-07-02 ENCOUNTER — OFFICE VISIT (OUTPATIENT)
Dept: HEMATOLOGY/ONCOLOGY | Facility: CLINIC | Age: 39
End: 2024-07-02
Payer: COMMERCIAL

## 2024-07-02 VITALS
SYSTOLIC BLOOD PRESSURE: 115 MMHG | TEMPERATURE: 98 F | HEART RATE: 82 BPM | HEIGHT: 69 IN | DIASTOLIC BLOOD PRESSURE: 63 MMHG | OXYGEN SATURATION: 98 % | WEIGHT: 264.75 LBS | BODY MASS INDEX: 39.21 KG/M2 | RESPIRATION RATE: 18 BRPM

## 2024-07-02 DIAGNOSIS — T45.1X5A: ICD-10-CM

## 2024-07-02 DIAGNOSIS — C91.40 HAIRY CELL LEUKEMIA NOT HAVING ACHIEVED REMISSION: Primary | ICD-10-CM

## 2024-07-02 PROCEDURE — 99999 PR PBB SHADOW E&M-EST. PATIENT-LVL IV: CPT | Mod: PBBFAC,,, | Performed by: PHYSICIAN ASSISTANT

## 2024-07-02 NOTE — Clinical Note
Jorge! Can you make sure the plan has a slow infusion protocol for it? I added PO pred 50 as pre-med!   Thank you!! VIVIANA

## 2024-07-02 NOTE — PROGRESS NOTES
"Section of Hematology and Stem Cell Transplantation  URGENT CARE VISIT     Visit Date: 07/03/2024    Primary Oncologic Diagnosis: Hairy cell leukemia not having achieved remission [C91.40]  Attending Hematologist: Dr. Chan Paredes     Patient Identifier:    Gerardo Ramirez Jr. (Gerardo) is a pleasant 39 y.o.male with history of Hairy Cell Leukemia, followed by Dr. Cummings and Dr. Paredes - currently on Cladribine + Rituximab therapy.    Interval History - URGENT CARE:    Mr. Ramirez is seen as an urgent care visit due to concerns for an infusion reaction following his 3rd Rituximab administration yesterday at [facility].     Infusion History:  Rituximab #1 - rigors, chills; resolved with demerol and was able to complete infusion  Rituximab #2 - no infusion rxn; completed infusion  Rituximab #3 - sensation of throat tightness, intense rigors for 30+ minutes requiring 50mg of demerol, chills, and hives up both arms. Received 200mg solucortef with resolution in symptoms. Went to ED via ambulance, by the time he arrived at ED symptoms and hives resolved. No further symptoms.     We discussed the nuances of infusion reactions versus allergic reactions. He is willing to trial Rituximab infusion here at [facility]. We will plan for slower rate escalation and pre-medicate with Solumedrol.     Nursing infusion note reviewed:  "After almost an hour into his Truxima infusion at 100 ml/hr, pt's breathing changed. Infusion stopped. 100 mg Solucortef given. Breathing started to resolve, then rigors kicked in. Pharmacy and Kendra Hooks NP notified. 25 mg demerol given. 15 minutes later rigors was still unresolved. 25 mg demerol given additional. Infusion on hold for 1 hour. Truxima re-strated at 50 ml/hr. Dr Paredes instructed to re-challenge infusion, titrating dose by 25 ml/hr starting at 75 ml/hr. Once titrated up to 100 ml/hr, pt's hands became red and itchy. Infusion stopped again. More rescue meds given. Infusion resumed again " "at 75 ml/hr. Pt was fine for a while, but reacted again. Recue meds given again. Infusion stopped. Pt sent to hospital via ambulance"    Past Medical History, Social History, and Past Family History are unchanged since last evaluation except for HPI.     CURRENT MEDICATIONS:   Current Outpatient Medications   Medication Sig    acetaminophen (TYLENOL EXTRA STRENGTH) 500 MG tablet Take 1,000 mg by mouth once as needed for Pain.    acyclovir (ZOVIRAX) 400 MG tablet Take 1 tablet (400 mg total) by mouth 2 (two) times daily.    butalbital-acetaminophen-caff -40 mg Cap Take 1-2 capsules by mouth 3 (three) times daily.    ergocalciferol (VITAMIN D2) 50,000 unit Cap Take 1 capsule (50,000 Units total) by mouth every 7 days.    methylPREDNISolone (MEDROL DOSEPACK) 4 mg tablet Take as directed    metoprolol succinate (TOPROL-XL) 50 MG 24 hr tablet Take 1 tablet (50 mg total) by mouth once daily.    olmesartan (BENICAR) 40 MG tablet Take 1 tablet (40 mg total) by mouth once daily.    ondansetron (ZOFRAN) 8 MG tablet Take 1 tablet (8 mg total) by mouth every 8 (eight) hours as needed for Nausea.    pitavastatin calcium (LIVALO) 2 mg Tab tablet Take 1 tablet (2 mg total) by mouth every evening.    promethazine (PHENERGAN) 25 MG tablet Take 1 tablet (25 mg total) by mouth every 4 to 6 hours as needed for Nausea.    sulfamethoxazole-trimethoprim 800-160mg (BACTRIM DS) 800-160 mg Tab Take 1 tablet by mouth every Mon, Wed, Fri.     No current facility-administered medications for this visit.       ALLERGIES:   Review of patient's allergies indicates:   Allergen Reactions    Amoxicillin Hives         Review of Systems:     Review of Systems   Constitutional:  Positive for chills (following rituximab infusion) and malaise/fatigue.   HENT: Negative.     Eyes: Negative.    Respiratory:  Positive for shortness of breath.    Cardiovascular: Negative.    Gastrointestinal: Negative.    Genitourinary: Negative.  "   Musculoskeletal: Negative.    Skin:  Positive for rash (following rituximab infusion).   Neurological: Negative.    Psychiatric/Behavioral: Negative.         Physical Exam:     Vitals:    07/02/24 1352   BP: 115/63   Pulse: 82   Resp: 18   Temp: 98.2 °F (36.8 °C)       Physical Exam  Vitals reviewed.   Constitutional:       Appearance: Normal appearance. He is not toxic-appearing.   HENT:      Head: Normocephalic.      Right Ear: External ear normal.      Left Ear: External ear normal.      Nose: Nose normal.   Cardiovascular:      Rate and Rhythm: Normal rate and regular rhythm.      Pulses: Normal pulses.   Pulmonary:      Effort: Pulmonary effort is normal.      Breath sounds: No wheezing.   Abdominal:      General: Abdomen is flat. There is no distension.      Palpations: Abdomen is soft.   Musculoskeletal:         General: No swelling. Normal range of motion.      Cervical back: Normal range of motion and neck supple.   Skin:     General: Skin is warm.      Coloration: Skin is not jaundiced.      Findings: No rash.   Neurological:      General: No focal deficit present.      Mental Status: He is alert and oriented to person, place, and time.      Motor: No weakness.         ECOG Performance Status: (foot note - ECOG PS provided by Eastern Cooperative Oncology Group) 0 - Asymptomatic    Karnofsky Performance Score:  100%- Normal, No Complaints, No Evidence of Disease    Labs:   Lab Results   Component Value Date    WBC 3.76 (L) 06/27/2024    HGB 10.9 (L) 06/27/2024    HCT 31.7 (L) 06/27/2024    MCV 85 06/27/2024     06/27/2024       Lab Results   Component Value Date     (L) 06/27/2024    K 4.4 06/27/2024     06/27/2024    CO2 22 (L) 06/27/2024    BUN 18 06/27/2024    CREATININE 1.1 06/27/2024    ALBUMIN 4.0 06/27/2024    BILITOT 1.4 (H) 06/27/2024    ALKPHOS 56 06/27/2024    AST 12 06/27/2024    ALT 46 (H) 06/27/2024       Imaging:        Assessment and Plan:     Gerardo Ramirez Jr.  (Gerardo) is a pleasant 39 y.o.male with Hairy Cell Leukemia    Hairy Cell Leukemia  Infusion Reaction  Mr. Ramirez presents as urgent care visit for recent concern of infusion reaction versus hypersensitivity/allergic reaction following 3rd administration of Rituximab (he has completed Cladribine therapy). Infusion reaction described as above. Mr. Ramirez is willing to re-trial Rituximab infusion at [facility]. We will proceed with slow-rate Rituximab infusion with cautious dose escalation and steroid premedication (Prednisone 50mg), in addition to diphenhydramine, acetaminophen, and famotidine pre-medications.     We discussed that should he develop any concerns for hypersensitivity with development of hives/rashes or any difficulty breathing, we would plan to hold future administrations of Rituximab therapy.           Follow Up:      Will follow up as scheduled with Dr. Cummings and Dr. Paredes.    Proceed with future administrations of Rituximab therapy at [facility]. Have added patient to the waitlist, he is aware this will likely result in a 1-2 week therapy delay given transfer of care.     Thank you for allowing me to partake in the care of this patient.       BMT Chart Routing      Follow up with physician    Follow up with YECENIA    Provider visit type    Infusion scheduling note   change all future rituxan infusions to [facility]   Injection scheduling note    Labs    Imaging    Pharmacy appointment    Other referrals                Laura Gonzales PA-C  Hematology, Oncology, and Stem Cell Transplantation  [facility]            Urgent Care Oncology Visit    Date and Time: 07/03/2024 1:32 PM   Patient MRN: [id number]   Chief Complaint: No chief complaint on file.    Reason for Urgent Care Visit:  concern for infusion reaction  Intervention:  future infusions at [facility]  Dispo: return to clinic as previous  UrgOn appointment addressed via: dialoguing with RN  This UrgOnc visit was in  person  Time spent handling UC issue:  40 minutes    Was this virtual or in person UrgOnc visit appropriate? Yes

## 2024-07-05 ENCOUNTER — LAB VISIT (OUTPATIENT)
Dept: LAB | Facility: HOSPITAL | Age: 39
End: 2024-07-05
Attending: NURSE PRACTITIONER
Payer: COMMERCIAL

## 2024-07-05 ENCOUNTER — TELEPHONE (OUTPATIENT)
Facility: CLINIC | Age: 39
End: 2024-07-05
Payer: COMMERCIAL

## 2024-07-05 ENCOUNTER — OFFICE VISIT (OUTPATIENT)
Facility: CLINIC | Age: 39
End: 2024-07-05
Payer: COMMERCIAL

## 2024-07-05 VITALS
WEIGHT: 251 LBS | SYSTOLIC BLOOD PRESSURE: 121 MMHG | BODY MASS INDEX: 37.18 KG/M2 | HEART RATE: 74 BPM | RESPIRATION RATE: 20 BRPM | DIASTOLIC BLOOD PRESSURE: 73 MMHG | HEIGHT: 69 IN | TEMPERATURE: 97 F

## 2024-07-05 DIAGNOSIS — C91.40 HAIRY CELL LEUKEMIA NOT HAVING ACHIEVED REMISSION: Primary | ICD-10-CM

## 2024-07-05 DIAGNOSIS — C91.40 HAIRY CELL LEUKEMIA NOT HAVING ACHIEVED REMISSION: ICD-10-CM

## 2024-07-05 LAB
ALBUMIN SERPL BCP-MCNC: 3.7 G/DL (ref 3.5–5.2)
ALP SERPL-CCNC: 59 U/L (ref 55–135)
ALT SERPL W/O P-5'-P-CCNC: 116 U/L (ref 10–44)
ANION GAP SERPL CALC-SCNC: 7 MMOL/L (ref 8–16)
ANISOCYTOSIS BLD QL SMEAR: SLIGHT
AST SERPL-CCNC: 26 U/L (ref 10–40)
BASOPHILS # BLD AUTO: ABNORMAL K/UL (ref 0–0.2)
BASOPHILS NFR BLD: 1 % (ref 0–1.9)
BILIRUB SERPL-MCNC: 0.6 MG/DL (ref 0.1–1)
BUN SERPL-MCNC: 15 MG/DL (ref 6–20)
CALCIUM SERPL-MCNC: 8.7 MG/DL (ref 8.7–10.5)
CHLORIDE SERPL-SCNC: 102 MMOL/L (ref 95–110)
CO2 SERPL-SCNC: 23 MMOL/L (ref 23–29)
CREAT SERPL-MCNC: 0.8 MG/DL (ref 0.5–1.4)
DACRYOCYTES BLD QL SMEAR: ABNORMAL
DIFFERENTIAL METHOD BLD: ABNORMAL
EOSINOPHIL # BLD AUTO: ABNORMAL K/UL (ref 0–0.5)
EOSINOPHIL NFR BLD: 5 % (ref 0–8)
ERYTHROCYTE [DISTWIDTH] IN BLOOD BY AUTOMATED COUNT: 12.8 % (ref 11.5–14.5)
EST. GFR  (NO RACE VARIABLE): >60 ML/MIN/1.73 M^2
GLUCOSE SERPL-MCNC: 145 MG/DL (ref 70–110)
HCT VFR BLD AUTO: 31 % (ref 40–54)
HGB BLD-MCNC: 10.2 G/DL (ref 14–18)
HYPOCHROMIA BLD QL SMEAR: ABNORMAL
IMM GRANULOCYTES # BLD AUTO: ABNORMAL K/UL (ref 0–0.04)
IMM GRANULOCYTES NFR BLD AUTO: ABNORMAL % (ref 0–0.5)
LYMPHOCYTES # BLD AUTO: ABNORMAL K/UL (ref 1–4.8)
LYMPHOCYTES NFR BLD: 35 % (ref 18–48)
MCH RBC QN AUTO: 29.1 PG (ref 27–31)
MCHC RBC AUTO-ENTMCNC: 32.9 G/DL (ref 32–36)
MCV RBC AUTO: 89 FL (ref 82–98)
MONOCYTES # BLD AUTO: ABNORMAL K/UL (ref 0.3–1)
MONOCYTES NFR BLD: 1 % (ref 4–15)
MYELOCYTES NFR BLD MANUAL: 1 %
NEUTROPHILS NFR BLD: 46 % (ref 38–73)
NEUTS BAND NFR BLD MANUAL: 11 %
NRBC BLD-RTO: 6 /100 WBC
OVALOCYTES BLD QL SMEAR: ABNORMAL
PLATELET # BLD AUTO: 213 K/UL (ref 150–450)
PLATELET BLD QL SMEAR: ABNORMAL
PMV BLD AUTO: 9.2 FL (ref 9.2–12.9)
POLYCHROMASIA BLD QL SMEAR: ABNORMAL
POTASSIUM SERPL-SCNC: 4.3 MMOL/L (ref 3.5–5.1)
PROT SERPL-MCNC: 7.5 G/DL (ref 6–8.4)
RBC # BLD AUTO: 3.5 M/UL (ref 4.6–6.2)
SODIUM SERPL-SCNC: 132 MMOL/L (ref 136–145)
TOXIC GRANULES BLD QL SMEAR: PRESENT
WBC # BLD AUTO: 1.32 K/UL (ref 3.9–12.7)

## 2024-07-05 PROCEDURE — 80053 COMPREHEN METABOLIC PANEL: CPT | Performed by: NURSE PRACTITIONER

## 2024-07-05 PROCEDURE — 85007 BL SMEAR W/DIFF WBC COUNT: CPT | Performed by: NURSE PRACTITIONER

## 2024-07-05 PROCEDURE — 99215 OFFICE O/P EST HI 40 MIN: CPT | Mod: S$GLB,,, | Performed by: NURSE PRACTITIONER

## 2024-07-05 PROCEDURE — 36415 COLL VENOUS BLD VENIPUNCTURE: CPT | Performed by: NURSE PRACTITIONER

## 2024-07-05 PROCEDURE — G2211 COMPLEX E/M VISIT ADD ON: HCPCS | Mod: S$GLB,,, | Performed by: NURSE PRACTITIONER

## 2024-07-05 PROCEDURE — 85027 COMPLETE CBC AUTOMATED: CPT | Performed by: NURSE PRACTITIONER

## 2024-07-05 PROCEDURE — 99999 PR PBB SHADOW E&M-EST. PATIENT-LVL III: CPT | Mod: PBBFAC,,, | Performed by: NURSE PRACTITIONER

## 2024-07-05 NOTE — PROGRESS NOTES
[facility] Hematology/Oncology  PROGRESS NOTE -  Follow-up Visit      Subjective:       Patient ID:   NAME: Gerardo Ramirez Jr. : 1985     39 y.o. male    Referring Doc: Galindo Berrios Jr.,*  Other Physicians: Micheline Lanier/Brant Yeung           Chief Complaint: pancytopenia f/u    History of Present Illness:     Patient returns today for a regularly scheduled follow-up visit.  The patient is here today to go over the results of the recently ordered labs, tests and studies. He is here with his wife.    Recent reaction to Rituxan on cycle 3 and he was unable to finish the entire dose.   He ended up leaving to go via ambulance to hospital due to sustained itching and rash that did not resolve with rescue medications.     He met with [facility] escudero and he plans to get the next cycles of Rituxan with their team over a long period of time.     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)    ROS:   GEN: normal without any fever, night sweats or weight loss; aches/pains, malaise; night sweats every now and then  HEENT: normal with no HA's, sore throat, stiff neck, changes in vision  CV: normal with no CP, SOB, PND, TRUONG or orthopnea  PULM: normal with no SOB, cough, hemoptysis, sputum or pleuritic pain  GI: normal with no abdominal pain, nausea, vomiting, constipation, diarrhea, melanotic stools, BRBPR, or hematemesis  : normal with no hematuria, dysuria  BREAST: normal with no mass, discharge, pain  SKIN: normal with no rash, erythema, bruising, or swelling    Pain Scale:  0    Allergies:  Review of patient's allergies indicates:   Allergen Reactions    Amoxicillin Hives       Medications:    Current Outpatient Medications:     acetaminophen (TYLENOL EXTRA STRENGTH) 500 MG tablet, Take 1,000 mg by mouth once as needed for Pain., Disp: , Rfl:     acyclovir (ZOVIRAX) 400 MG tablet, Take 1 tablet (400 mg total) by  "mouth 2 (two) times daily., Disp: 60 tablet, Rfl: 11    butalbital-acetaminophen-caff -40 mg Cap, Take 1-2 capsules by mouth 3 (three) times daily., Disp: 30 capsule, Rfl: 1    ergocalciferol (VITAMIN D2) 50,000 unit Cap, Take 1 capsule (50,000 Units total) by mouth every 7 days., Disp: 12 capsule, Rfl: 1    methylPREDNISolone (MEDROL DOSEPACK) 4 mg tablet, Take as directed, Disp: 1 each, Rfl: 0    metoprolol succinate (TOPROL-XL) 50 MG 24 hr tablet, Take 1 tablet (50 mg total) by mouth once daily., Disp: 90 tablet, Rfl: 1    olmesartan (BENICAR) 40 MG tablet, Take 1 tablet (40 mg total) by mouth once daily., Disp: 90 tablet, Rfl: 1    ondansetron (ZOFRAN) 8 MG tablet, Take 1 tablet (8 mg total) by mouth every 8 (eight) hours as needed for Nausea., Disp: 30 tablet, Rfl: 2    pitavastatin calcium (LIVALO) 2 mg Tab tablet, Take 1 tablet (2 mg total) by mouth every evening., Disp: 90 tablet, Rfl: 1    promethazine (PHENERGAN) 25 MG tablet, Take 1 tablet (25 mg total) by mouth every 4 to 6 hours as needed for Nausea., Disp: 30 tablet, Rfl: 2    sulfamethoxazole-trimethoprim 800-160mg (BACTRIM DS) 800-160 mg Tab, Take 1 tablet by mouth every Mon, Wed, Fri., Disp: 24 tablet, Rfl: 3    PMHx/PSHx Updates:  See patient's last visit with me on 6/20/2024.  See H&P on 4/5/2024        Pathology:   Cancer Staging   No matching staging information was found for the patient.            Objective:     Vitals:  Blood pressure 121/73, pulse 74, temperature 97.1 °F (36.2 °C), resp. rate 20, height 5' 9" (1.753 m), weight 113.9 kg (251 lb).    Physical Examination:   GEN: no apparent distress, comfortable; AAOx3  HEAD: atraumatic and normocephalic  EYES: no pallor, no icterus, PERRLA  ENT: OMM, no pharyngeal erythema, external ears WNL; no nasal discharge; no thrush  NECK: no masses, thyroid normal, trachea midline, no LAD/LN's, supple  CV: RRR with no murmur; normal pulse; normal S1 and S2; no pedal edema  CHEST: Normal " respiratory effort; CTAB; normal breath sounds; no wheeze or crackles  ABDOM: nontender and nondistended; soft; normal bowel sounds; no rebound/guarding  MUSC/Skeletal: ROM normal; no crepitus; joints normal; no deformities or arthropathy  EXTREM: no clubbing, cyanosis, inflammation or swelling  SKIN: no rashes, lesions, ulcers, petechiae or subcutaneous nodules  : no bah  NEURO: grossly intact; motor/sensory WNL; AAOx3; no tremors  PSYCH: normal mood, affect and behavior  LYMPH: normal cervical, supraclavicular, axillary and groin LN's            Labs:     Lab Results   Component Value Date    WBC 1.32 (LL) 07/05/2024    HGB 10.2 (L) 07/05/2024    HCT 31.0 (L) 07/05/2024    MCV 89 07/05/2024     07/05/2024       CMP  Sodium   Date Value Ref Range Status   07/05/2024 132 (L) 136 - 145 mmol/L Final     Potassium   Date Value Ref Range Status   07/05/2024 4.3 3.5 - 5.1 mmol/L Final     Chloride   Date Value Ref Range Status   07/05/2024 102 95 - 110 mmol/L Final     CO2   Date Value Ref Range Status   07/05/2024 23 23 - 29 mmol/L Final     Glucose   Date Value Ref Range Status   07/05/2024 145 (H) 70 - 110 mg/dL Final     BUN   Date Value Ref Range Status   07/05/2024 15 6 - 20 mg/dL Final     Creatinine   Date Value Ref Range Status   07/05/2024 0.8 0.5 - 1.4 mg/dL Final     Calcium   Date Value Ref Range Status   07/05/2024 8.7 8.7 - 10.5 mg/dL Final     Total Protein   Date Value Ref Range Status   07/05/2024 7.5 6.0 - 8.4 g/dL Final     Albumin   Date Value Ref Range Status   07/05/2024 3.7 3.5 - 5.2 g/dL Final     Total Bilirubin   Date Value Ref Range Status   07/05/2024 0.6 0.1 - 1.0 mg/dL Final     Comment:     For infants and newborns, interpretation of results should be based  on gestational age, weight and in agreement with clinical  observations.    Premature Infant recommended reference ranges:  Up to 24 hours.............<8.0 mg/dL  Up to 48 hours............<12.0 mg/dL  3-5  days..................<15.0 mg/dL  6-29 days.................<15.0 mg/dL       Alkaline Phosphatase   Date Value Ref Range Status   07/05/2024 59 55 - 135 U/L Final     AST   Date Value Ref Range Status   07/05/2024 26 10 - 40 U/L Final     ALT   Date Value Ref Range Status   07/05/2024 116 (H) 10 - 44 U/L Final     Anion Gap   Date Value Ref Range Status   07/05/2024 7 (L) 8 - 16 mmol/L Final     eGFR   Date Value Ref Range Status   07/05/2024 >60.0 >60 mL/min/1.73 m^2 Final           Radiology/Diagnostic Studies:    CT Dx Bone Marrow Biopsy(ies) w/ Aspiration; Right(XPD)    Result Date: 4/25/2024  EXAMINATION: CT DIAGNOSTIC BONE MARROW BIOPSY(IES) WITH ASPIRATION RIGHT(XPD) CLINICAL HISTORY: Other pancytopenia COMPARISON: None. FINDINGS: After obtaining written informed consent, which included a discussion of the risks and benefits of the procedure to include infection and bleeding, and allowing the patient the opportunity to ask questions, the patient agreed to the procedure. The patient was placed prone on the CT fluoroscopy table. A suitable skin entrance site overlying the right posterior iliac spine was localized with CT guidance. The skin was marked, and then prepped and draped in sterile fashion, with several milliliters of lidocaine 1% injected subcutaneously and along the periosteum to achieve adequate local anesthesia. Following, a punch skin incision was made, and an 11-gauge Jamshidi bone biopsy needle was advanced into the posterior iliac spine under intermittent CT fluoroscopic guidance.  Several aspirate attempts were made, with very little aspirate obtained.  A core biopsy sample was obtained.  Attention was then turned to the left posterior iliac spine, with the skin prepped and draped in sterile fashion, and lidocaine 1% injected to achieve adequate local anesthesia.  A punch skin incision was made and an 11-gauge Jamshidi bone biopsy needle was advanced into the left posterior iliac spine with  intermittent CT fluoroscopic guidance.  Several aspirate attempts were made, with very little aspirate obtained.  The needle was removed and a bandage was applied to the skin. The patient tolerated the procedure well, with no immediate postprocedural complications. There was no significant blood loss. Total moderate conscious sedation time with supervision by the interventional radiologist and monitoring by the special procedures registered nurse was 25 minutes. The patient received Versed 4 mg and Fentanyl 200 mcg IV during the procedure.     CT guided bone marrow core biopsy and aspiration as described. Electronically signed by: MiguelA Goode Date:    04/25/2024 Time:    11:34    US Abdomen Complete    Result Date: 4/25/2024  EXAMINATION: US ABDOMEN COMPLETE CLINICAL HISTORY: Pancytopenia COMPARISON: June 2021 FINDINGS: Sonographic assessment of the abdomen was performed.  The pancreas is partially obscured by bowel gas.  Visualized portions are normal.  The aorta is normal in caliber in the upper abdomen.  The inferior vena cava is unremarkable. The liver has a normal sonographic appearance, with no mass or contour abnormality.  Maximal longitudinal dimension is 16.9 cm.  Hepato pedal flow is noted within the portal vein. The gallbladder is absent.  Common bile duct is normal in caliber at 5 mm. The bilateral kidneys have a normal appearance.  The spleen is enlarged measuring 15.0 cm in greatest longitudinal dimension.  There is no free fluid.     1. Mild splenomegaly. 2. Surgical absence of the gallbladder. Electronically signed by: Shaquille Mart Date:    04/25/2024 Time:    10:43      I have reviewed all available lab results and radiology reports.    Assessment/Plan:   (1) 39 y.o. male with diagnosis of pancytopenia who has been referred by Galindo Berrios Jr.,* for evaluation by medical hematology/oncology.      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.     5/22/2024:  - He saw Dr Chan Paredes on 5/20/2024 and they are planning to proceed with a regimen of therapy with 2CDA , he also ordered CT scans of his chest/abdom/pelvis  - He wants to start therapy sometime after June 4th 2024  - await the recs and note from Dr Paredes  - set up chemotherapy school in meantime    6/13/2024:  - He saw Dr Chan Paredes on 5/20/2024 and has proceeded  with a regimen of therapy this past Monday;   - seems to be tolerating things well so far  - he sees Dr Paredes after he completes the 8 weeks (Aug 19th tentatively)    6/27/2024:  - He was recently hospitalized and discharged last Thursday. He was given couple of days of oral antibiotics to take at home which he has since completed.   - No fevers, some residual malaise; having a lot of aches in joints and hips; he has been on neupogen injections and last one was yesterday  - check labs today and if WBC better, then can hold off on the GCSF today    7/8/2024:   - will receive treatment at 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 Infusion stopped. 100 mg Solucortef given. Breathing started to resolve, then rigors kicked in. Pharmacy and Kendra Hooks NP notified. 25 mg demerol given. 15 minutes later rigors was still unresolved. 25 mg demerol given additional. Infusion on hold for 1 hour. Truxima re-strated at 50 ml/hr. Dr Paredes instructed to re-challenge infusion, titrating dose by 25 ml/hr starting at 75 ml/hr. Once titrated up to 100 ml/hr, pt's hands became red and itchy. Infusion stopped again. More rescue meds given. Infusion resumed again at 75 ml/hr. Pt was fine for a while, but reacted again. Recue meds given again. Infusion stopped. Pt sent to hospital via ambulance        Will follow up with [facility] main   F/u with Dr Paredes planned for Aug 19th 2024  Check labs weekly while on therapy       RTC in 2 weeks with C and 4 with me     Discussion:     Pathology Discussion:    I reviewed and discussed the pathology report(s) and radiograph reports (if available) in as simple to understand and/or laymen's terms to the best of my ability. I had an indepth conversation with the patient and went over the patient's individual diagnosis based on the information that was currently available. I discussed the TNM staging process with regard to the patient's particular cancer type, and the calculated stage based on the currently available TNM data and literature. I discussed the available prognostic data with regard to the current staging information and how it relates to the prognosis of their particular neoplastic process.      NCCN Guidelines:    I discussed the available treatment option(s) in accordance with the latest literature  "from the NCCN Clinical Practice Guidelines for the patient's particular type of cancer disorder. The NCCN Guidelines provide a "document evidence-based (and) consensus-driven management" of the care of oncology patients. The treatment recommendations were made not only in accordance to the NCCN guidelines, but also factored in to account the patient's overall age, condition, performance status and their medical co-morbidities. I went over the risks and benefits of the the treatment options (if any could be made) with regard to their particular cancer type, their cancer stage, their age, and their co-morbidities.         I have explained all of the above in detail and the patient understands all of the current recommendation(s). I have answered all of their questions to the best of my ability and to their complete satisfaction.   The patient is to continue with the current management plan.            Electronically signed by Kendra Rose NP                "

## 2024-07-08 ENCOUNTER — PATIENT MESSAGE (OUTPATIENT)
Dept: HEMATOLOGY/ONCOLOGY | Facility: CLINIC | Age: 39
End: 2024-07-08
Payer: COMMERCIAL

## 2024-07-09 ENCOUNTER — TELEPHONE (OUTPATIENT)
Dept: HEMATOLOGY/ONCOLOGY | Facility: CLINIC | Age: 39
End: 2024-07-09
Payer: COMMERCIAL

## 2024-07-10 ENCOUNTER — INFUSION (OUTPATIENT)
Dept: INFUSION THERAPY | Facility: HOSPITAL | Age: 39
End: 2024-07-10
Payer: COMMERCIAL

## 2024-07-10 VITALS
HEIGHT: 69 IN | OXYGEN SATURATION: 97 % | RESPIRATION RATE: 18 BRPM | BODY MASS INDEX: 37.39 KG/M2 | HEART RATE: 95 BPM | DIASTOLIC BLOOD PRESSURE: 58 MMHG | WEIGHT: 252.44 LBS | SYSTOLIC BLOOD PRESSURE: 118 MMHG | TEMPERATURE: 98 F

## 2024-07-10 DIAGNOSIS — C91.40 HAIRY CELL LEUKEMIA NOT HAVING ACHIEVED REMISSION: Primary | ICD-10-CM

## 2024-07-10 PROCEDURE — 25000003 PHARM REV CODE 250: Performed by: INTERNAL MEDICINE

## 2024-07-10 PROCEDURE — 63600175 PHARM REV CODE 636 W HCPCS: Performed by: INTERNAL MEDICINE

## 2024-07-10 RX ORDER — ACETAMINOPHEN 325 MG/1
650 TABLET ORAL
Status: CANCELLED | OUTPATIENT
Start: 2024-07-10

## 2024-07-10 RX ORDER — PREDNISONE 5 MG/1
50 TABLET ORAL ONCE
Status: DISCONTINUED | OUTPATIENT
Start: 2024-07-10 | End: 2024-07-10

## 2024-07-10 RX ORDER — SODIUM CHLORIDE 0.9 % (FLUSH) 0.9 %
10 SYRINGE (ML) INJECTION
Status: CANCELLED | OUTPATIENT
Start: 2024-07-10

## 2024-07-10 RX ORDER — METHYLPREDNISOLONE SOD SUCC 125 MG
125 VIAL (EA) INJECTION
Status: COMPLETED | OUTPATIENT
Start: 2024-07-10 | End: 2024-07-10

## 2024-07-10 RX ORDER — HEPARIN 100 UNIT/ML
500 SYRINGE INTRAVENOUS
Status: CANCELLED | OUTPATIENT
Start: 2024-07-10

## 2024-07-10 RX ORDER — HEPARIN 100 UNIT/ML
500 SYRINGE INTRAVENOUS
Status: ACTIVE | OUTPATIENT
Start: 2024-07-10

## 2024-07-10 RX ORDER — EPINEPHRINE 0.3 MG/.3ML
0.3 INJECTION SUBCUTANEOUS ONCE AS NEEDED
Status: ACTIVE | OUTPATIENT
Start: 2024-07-10 | End: 2035-12-06

## 2024-07-10 RX ORDER — ACETAMINOPHEN 325 MG/1
650 TABLET ORAL
Status: COMPLETED | OUTPATIENT
Start: 2024-07-10 | End: 2024-07-10

## 2024-07-10 RX ORDER — PREDNISONE 50 MG/1
50 TABLET ORAL ONCE
Status: CANCELLED
Start: 2024-07-10 | End: 2024-07-10

## 2024-07-10 RX ORDER — FAMOTIDINE 10 MG/ML
20 INJECTION INTRAVENOUS
Status: COMPLETED | OUTPATIENT
Start: 2024-07-10 | End: 2024-07-10

## 2024-07-10 RX ORDER — EPINEPHRINE 0.3 MG/.3ML
0.3 INJECTION SUBCUTANEOUS ONCE AS NEEDED
Status: CANCELLED | OUTPATIENT
Start: 2024-07-10

## 2024-07-10 RX ORDER — FAMOTIDINE 10 MG/ML
20 INJECTION INTRAVENOUS
Status: CANCELLED | OUTPATIENT
Start: 2024-07-10

## 2024-07-10 RX ORDER — MEPERIDINE HYDROCHLORIDE 50 MG/ML
25 INJECTION INTRAMUSCULAR; INTRAVENOUS; SUBCUTANEOUS
Status: CANCELLED | OUTPATIENT
Start: 2024-07-10

## 2024-07-10 RX ORDER — DIPHENHYDRAMINE HYDROCHLORIDE 50 MG/ML
50 INJECTION INTRAMUSCULAR; INTRAVENOUS ONCE AS NEEDED
Status: COMPLETED | OUTPATIENT
Start: 2024-07-10 | End: 2024-07-10

## 2024-07-10 RX ORDER — MEPERIDINE HYDROCHLORIDE 50 MG/ML
25 INJECTION INTRAMUSCULAR; INTRAVENOUS; SUBCUTANEOUS
Status: ACTIVE | OUTPATIENT
Start: 2024-07-10 | End: 2024-07-10

## 2024-07-10 RX ORDER — DIPHENHYDRAMINE HYDROCHLORIDE 50 MG/ML
50 INJECTION INTRAMUSCULAR; INTRAVENOUS ONCE AS NEEDED
Status: CANCELLED | OUTPATIENT
Start: 2024-07-10

## 2024-07-10 RX ORDER — SODIUM CHLORIDE 0.9 % (FLUSH) 0.9 %
10 SYRINGE (ML) INJECTION
Status: ACTIVE | OUTPATIENT
Start: 2024-07-10

## 2024-07-10 RX ADMIN — METHYLPREDNISOLONE SODIUM SUCCINATE 125 MG: 125 INJECTION, POWDER, FOR SOLUTION INTRAMUSCULAR; INTRAVENOUS at 09:07

## 2024-07-10 RX ADMIN — ACETAMINOPHEN 650 MG: 325 TABLET ORAL at 08:07

## 2024-07-10 RX ADMIN — RITUXIMAB-ABBS 900 MG: 10 INJECTION, SOLUTION INTRAVENOUS at 09:07

## 2024-07-10 RX ADMIN — DIPHENHYDRAMINE HYDROCHLORIDE 50 MG: 50 INJECTION, SOLUTION INTRAMUSCULAR; INTRAVENOUS at 09:07

## 2024-07-10 RX ADMIN — FAMOTIDINE 20 MG: 10 INJECTION INTRAVENOUS at 09:07

## 2024-07-10 RX ADMIN — PROMETHAZINE HYDROCHLORIDE 12.5 MG: 25 INJECTION INTRAMUSCULAR; INTRAVENOUS at 08:07

## 2024-07-10 RX ADMIN — DIPHENHYDRAMINE HYDROCHLORIDE 50 MG: 50 INJECTION, SOLUTION INTRAMUSCULAR; INTRAVENOUS at 02:07

## 2024-07-10 RX ADMIN — HYDROCORTISONE SODIUM SUCCINATE 100 MG: 100 INJECTION, POWDER, FOR SOLUTION INTRAMUSCULAR; INTRAVENOUS at 02:07

## 2024-07-10 NOTE — NURSING
1505: Patient is better, palms of hands are light pink now, not itching, and patient is sitting up in chair eating and conversing with wife. No acute or respiratory distress observed, and Rituxin rate will remain at 100cc/hr for remainder of infusion.

## 2024-07-10 NOTE — PLAN OF CARE
"  Problem: Fatigue  Goal: Improved Activity Tolerance  7/10/2024 0854 by Gwen Sal RN  Outcome: Progressing  7/10/2024 0854 by Gwen Sal RN  Outcome: Progressing  Intervention: Promote Improved Energy  Flowsheets (Taken 7/10/2024 0854)  Fatigue Management:   activity schedule adjusted   activity assistance provided   fatigue-related activity identified   frequent rest breaks encouraged   paced activity encouraged  Sleep/Rest Enhancement:   awakenings minimized   consistent schedule promoted   family presence promoted   natural light exposure provided   noise level reduced   reading promoted   regular sleep/rest pattern promoted   relaxation techniques promoted  Activity Management: Ambulated -L4  Environmental Support:   calm environment promoted   caregiver consistency promoted   comfort object encouraged   distractions minimized   environmental consistency promoted   personal routine supported   rest periods encouraged BP (!) 118/58 (Patient Position: Sitting)   Pulse 95   Temp 98.1 °F (36.7 °C)   Resp 18   Ht 5' 9" (1.753 m)   Wt 114.5 kg (252 lb 6.8 oz)   SpO2 97%   BMI 37.28 kg/m² Pleasant, alert and oriented patient to Chemo Infusion per self with wife for C1D22 Tramixa Infusion - PIV started x1 attempt with immediate flashback observed, flushed with NS, site secured and patient tolerated procedure well - patient tolerated treatment with 1 reaction at 200cc/hr-see Nurses's notes, PIV discontinued, pressure dressing applied, and patient discharged to home with no concerns - RTC TBA           "

## 2024-07-10 NOTE — NURSING
1431: Patient at 200cc/hr for Rituxin, woke and hands itching and blotchy on palms bilat. Rate decreased to 100cc/hr, and Benadril 50/ Solu Cortef 100mg both given IVP. Will continue to monitor.

## 2024-07-11 ENCOUNTER — LAB VISIT (OUTPATIENT)
Dept: LAB | Facility: HOSPITAL | Age: 39
End: 2024-07-11
Attending: NURSE PRACTITIONER
Payer: COMMERCIAL

## 2024-07-11 DIAGNOSIS — C91.40 HAIRY CELL LEUKEMIA NOT HAVING ACHIEVED REMISSION: ICD-10-CM

## 2024-07-11 LAB
ALBUMIN SERPL BCP-MCNC: 3.6 G/DL (ref 3.5–5.2)
ALP SERPL-CCNC: 53 U/L (ref 55–135)
ALT SERPL W/O P-5'-P-CCNC: 48 U/L (ref 10–44)
ANION GAP SERPL CALC-SCNC: 9 MMOL/L (ref 8–16)
AST SERPL-CCNC: 14 U/L (ref 10–40)
BASOPHILS # BLD AUTO: 0.01 K/UL (ref 0–0.2)
BASOPHILS NFR BLD: 0.3 % (ref 0–1.9)
BILIRUB SERPL-MCNC: 0.6 MG/DL (ref 0.1–1)
BUN SERPL-MCNC: 19 MG/DL (ref 6–20)
CALCIUM SERPL-MCNC: 8.7 MG/DL (ref 8.7–10.5)
CHLORIDE SERPL-SCNC: 106 MMOL/L (ref 95–110)
CO2 SERPL-SCNC: 20 MMOL/L (ref 23–29)
CREAT SERPL-MCNC: 0.8 MG/DL (ref 0.5–1.4)
DIFFERENTIAL METHOD BLD: ABNORMAL
EOSINOPHIL # BLD AUTO: 0 K/UL (ref 0–0.5)
EOSINOPHIL NFR BLD: 0.3 % (ref 0–8)
ERYTHROCYTE [DISTWIDTH] IN BLOOD BY AUTOMATED COUNT: 14.6 % (ref 11.5–14.5)
EST. GFR  (NO RACE VARIABLE): >60 ML/MIN/1.73 M^2
GLUCOSE SERPL-MCNC: 188 MG/DL (ref 70–110)
HCT VFR BLD AUTO: 29 % (ref 40–54)
HGB BLD-MCNC: 9.4 G/DL (ref 14–18)
IMM GRANULOCYTES # BLD AUTO: 0.03 K/UL (ref 0–0.04)
IMM GRANULOCYTES NFR BLD AUTO: 0.8 % (ref 0–0.5)
LYMPHOCYTES # BLD AUTO: 0.2 K/UL (ref 1–4.8)
LYMPHOCYTES NFR BLD: 4.3 % (ref 18–48)
MCH RBC QN AUTO: 29.3 PG (ref 27–31)
MCHC RBC AUTO-ENTMCNC: 32.4 G/DL (ref 32–36)
MCV RBC AUTO: 90 FL (ref 82–98)
MONOCYTES # BLD AUTO: 0 K/UL (ref 0.3–1)
MONOCYTES NFR BLD: 1.1 % (ref 4–15)
NEUTROPHILS # BLD AUTO: 3.5 K/UL (ref 1.8–7.7)
NEUTROPHILS NFR BLD: 93.2 % (ref 38–73)
NRBC BLD-RTO: 1 /100 WBC
PLATELET # BLD AUTO: 184 K/UL (ref 150–450)
PMV BLD AUTO: 8.9 FL (ref 9.2–12.9)
POTASSIUM SERPL-SCNC: 3.8 MMOL/L (ref 3.5–5.1)
PROT SERPL-MCNC: 7.1 G/DL (ref 6–8.4)
RBC # BLD AUTO: 3.21 M/UL (ref 4.6–6.2)
SODIUM SERPL-SCNC: 135 MMOL/L (ref 136–145)
WBC # BLD AUTO: 3.75 K/UL (ref 3.9–12.7)

## 2024-07-11 PROCEDURE — 85025 COMPLETE CBC W/AUTO DIFF WBC: CPT | Performed by: NURSE PRACTITIONER

## 2024-07-11 PROCEDURE — 36415 COLL VENOUS BLD VENIPUNCTURE: CPT | Performed by: NURSE PRACTITIONER

## 2024-07-11 PROCEDURE — 80053 COMPREHEN METABOLIC PANEL: CPT | Performed by: NURSE PRACTITIONER

## 2024-07-15 ENCOUNTER — PATIENT MESSAGE (OUTPATIENT)
Facility: CLINIC | Age: 39
End: 2024-07-15
Payer: COMMERCIAL

## 2024-07-17 ENCOUNTER — INFUSION (OUTPATIENT)
Dept: INFUSION THERAPY | Facility: HOSPITAL | Age: 39
End: 2024-07-17
Payer: COMMERCIAL

## 2024-07-17 ENCOUNTER — OFFICE VISIT (OUTPATIENT)
Dept: HEMATOLOGY/ONCOLOGY | Facility: CLINIC | Age: 39
End: 2024-07-17
Payer: COMMERCIAL

## 2024-07-17 VITALS
TEMPERATURE: 98 F | OXYGEN SATURATION: 97 % | DIASTOLIC BLOOD PRESSURE: 52 MMHG | HEART RATE: 71 BPM | BODY MASS INDEX: 37.18 KG/M2 | WEIGHT: 251 LBS | SYSTOLIC BLOOD PRESSURE: 97 MMHG | RESPIRATION RATE: 166 BRPM | HEIGHT: 69 IN

## 2024-07-17 DIAGNOSIS — D64.9 ANEMIA, UNSPECIFIED TYPE: ICD-10-CM

## 2024-07-17 DIAGNOSIS — D69.6 THROMBOCYTOPENIA: ICD-10-CM

## 2024-07-17 DIAGNOSIS — D61.818 PANCYTOPENIA: ICD-10-CM

## 2024-07-17 DIAGNOSIS — D72.819 LEUKOPENIA, UNSPECIFIED TYPE: ICD-10-CM

## 2024-07-17 DIAGNOSIS — D84.822 IMMUNODEFICIENCY DUE TO EXTERNAL CAUSE: ICD-10-CM

## 2024-07-17 DIAGNOSIS — C91.40 HAIRY CELL LEUKEMIA NOT HAVING ACHIEVED REMISSION: Primary | ICD-10-CM

## 2024-07-17 DIAGNOSIS — T45.1X5A: ICD-10-CM

## 2024-07-17 DIAGNOSIS — D64.9 ANEMIA, NORMOCYTIC NORMOCHROMIC: ICD-10-CM

## 2024-07-17 DIAGNOSIS — D53.9 NUTRITIONAL ANEMIA, UNSPECIFIED: ICD-10-CM

## 2024-07-17 LAB
ALBUMIN SERPL BCP-MCNC: 3.5 G/DL (ref 3.5–5.2)
ALP SERPL-CCNC: 72 U/L (ref 55–135)
ALT SERPL W/O P-5'-P-CCNC: 43 U/L (ref 10–44)
ANION GAP SERPL CALC-SCNC: 9 MMOL/L (ref 8–16)
ANISOCYTOSIS BLD QL SMEAR: SLIGHT
AST SERPL-CCNC: 15 U/L (ref 10–40)
BASOPHILS NFR BLD: 0 % (ref 0–1.9)
BILIRUB SERPL-MCNC: 1 MG/DL (ref 0.1–1)
BUN SERPL-MCNC: 13 MG/DL (ref 6–20)
CALCIUM SERPL-MCNC: 9.2 MG/DL (ref 8.7–10.5)
CHLORIDE SERPL-SCNC: 108 MMOL/L (ref 95–110)
CO2 SERPL-SCNC: 19 MMOL/L (ref 23–29)
CREAT SERPL-MCNC: 0.9 MG/DL (ref 0.5–1.4)
DACRYOCYTES BLD QL SMEAR: ABNORMAL
DIFFERENTIAL METHOD BLD: ABNORMAL
EOSINOPHIL NFR BLD: 2 % (ref 0–8)
ERYTHROCYTE [DISTWIDTH] IN BLOOD BY AUTOMATED COUNT: 14.8 % (ref 11.5–14.5)
EST. GFR  (NO RACE VARIABLE): >60 ML/MIN/1.73 M^2
GLUCOSE SERPL-MCNC: 203 MG/DL (ref 70–110)
HCT VFR BLD AUTO: 30.2 % (ref 40–54)
HGB BLD-MCNC: 10.2 G/DL (ref 14–18)
HYPOCHROMIA BLD QL SMEAR: ABNORMAL
IMM GRANULOCYTES # BLD AUTO: ABNORMAL K/UL (ref 0–0.04)
IMM GRANULOCYTES NFR BLD AUTO: ABNORMAL % (ref 0–0.5)
LYMPHOCYTES NFR BLD: 1 % (ref 18–48)
MCH RBC QN AUTO: 30 PG (ref 27–31)
MCHC RBC AUTO-ENTMCNC: 33.8 G/DL (ref 32–36)
MCV RBC AUTO: 89 FL (ref 82–98)
MONOCYTES NFR BLD: 9 % (ref 4–15)
MYELOCYTES NFR BLD MANUAL: 6 %
NEUTROPHILS NFR BLD: 75 % (ref 38–73)
NEUTS BAND NFR BLD MANUAL: 7 %
NRBC BLD-RTO: 4 /100 WBC
OVALOCYTES BLD QL SMEAR: ABNORMAL
PLATELET # BLD AUTO: 134 K/UL (ref 150–450)
PLATELET BLD QL SMEAR: ABNORMAL
PMV BLD AUTO: 9.2 FL (ref 9.2–12.9)
POIKILOCYTOSIS BLD QL SMEAR: SLIGHT
POLYCHROMASIA BLD QL SMEAR: ABNORMAL
POTASSIUM SERPL-SCNC: 4.2 MMOL/L (ref 3.5–5.1)
PROT SERPL-MCNC: 7.3 G/DL (ref 6–8.4)
RBC # BLD AUTO: 3.4 M/UL (ref 4.6–6.2)
SODIUM SERPL-SCNC: 136 MMOL/L (ref 136–145)
WBC # BLD AUTO: 2.02 K/UL (ref 3.9–12.7)

## 2024-07-17 PROCEDURE — 80053 COMPREHEN METABOLIC PANEL: CPT | Performed by: NURSE PRACTITIONER

## 2024-07-17 PROCEDURE — 96375 TX/PRO/DX INJ NEW DRUG ADDON: CPT

## 2024-07-17 PROCEDURE — 96367 TX/PROPH/DG ADDL SEQ IV INF: CPT

## 2024-07-17 PROCEDURE — 25000003 PHARM REV CODE 250: Performed by: PHYSICIAN ASSISTANT

## 2024-07-17 PROCEDURE — 99999 PR PBB SHADOW E&M-EST. PATIENT-LVL II: CPT | Mod: PBBFAC,,, | Performed by: PHYSICIAN ASSISTANT

## 2024-07-17 PROCEDURE — 85027 COMPLETE CBC AUTOMATED: CPT | Performed by: NURSE PRACTITIONER

## 2024-07-17 PROCEDURE — 96413 CHEMO IV INFUSION 1 HR: CPT

## 2024-07-17 PROCEDURE — 99215 OFFICE O/P EST HI 40 MIN: CPT | Mod: S$GLB,,, | Performed by: PHYSICIAN ASSISTANT

## 2024-07-17 PROCEDURE — 85007 BL SMEAR W/DIFF WBC COUNT: CPT | Performed by: NURSE PRACTITIONER

## 2024-07-17 PROCEDURE — 63600175 PHARM REV CODE 636 W HCPCS: Performed by: PHYSICIAN ASSISTANT

## 2024-07-17 PROCEDURE — 96415 CHEMO IV INFUSION ADDL HR: CPT

## 2024-07-17 RX ORDER — DIPHENHYDRAMINE HYDROCHLORIDE 50 MG/ML
50 INJECTION INTRAMUSCULAR; INTRAVENOUS ONCE AS NEEDED
Status: CANCELLED | OUTPATIENT
Start: 2024-07-17

## 2024-07-17 RX ORDER — SODIUM CHLORIDE 0.9 % (FLUSH) 0.9 %
10 SYRINGE (ML) INJECTION
Status: DISCONTINUED | OUTPATIENT
Start: 2024-07-17 | End: 2024-07-17 | Stop reason: HOSPADM

## 2024-07-17 RX ORDER — MEPERIDINE HYDROCHLORIDE 50 MG/ML
25 INJECTION INTRAMUSCULAR; INTRAVENOUS; SUBCUTANEOUS
Status: DISCONTINUED | OUTPATIENT
Start: 2024-07-17 | End: 2024-07-17 | Stop reason: HOSPADM

## 2024-07-17 RX ORDER — SODIUM CHLORIDE 0.9 % (FLUSH) 0.9 %
10 SYRINGE (ML) INJECTION
Status: CANCELLED | OUTPATIENT
Start: 2024-07-17

## 2024-07-17 RX ORDER — HEPARIN 100 UNIT/ML
500 SYRINGE INTRAVENOUS
Status: DISCONTINUED | OUTPATIENT
Start: 2024-07-17 | End: 2024-07-17 | Stop reason: HOSPADM

## 2024-07-17 RX ORDER — FAMOTIDINE 10 MG/ML
20 INJECTION INTRAVENOUS
Status: COMPLETED | OUTPATIENT
Start: 2024-07-17 | End: 2024-07-17

## 2024-07-17 RX ORDER — MEPERIDINE HYDROCHLORIDE 50 MG/ML
25 INJECTION INTRAMUSCULAR; INTRAVENOUS; SUBCUTANEOUS
Status: CANCELLED | OUTPATIENT
Start: 2024-07-17

## 2024-07-17 RX ORDER — ACETAMINOPHEN 325 MG/1
650 TABLET ORAL
Status: CANCELLED | OUTPATIENT
Start: 2024-07-17

## 2024-07-17 RX ORDER — EPINEPHRINE 0.3 MG/.3ML
0.3 INJECTION SUBCUTANEOUS ONCE AS NEEDED
Status: CANCELLED | OUTPATIENT
Start: 2024-07-17

## 2024-07-17 RX ORDER — DIPHENHYDRAMINE HYDROCHLORIDE 50 MG/ML
50 INJECTION INTRAMUSCULAR; INTRAVENOUS ONCE AS NEEDED
Status: DISCONTINUED | OUTPATIENT
Start: 2024-07-17 | End: 2024-07-17 | Stop reason: HOSPADM

## 2024-07-17 RX ORDER — METHYLPREDNISOLONE SOD SUCC 125 MG
125 VIAL (EA) INJECTION
Status: COMPLETED | OUTPATIENT
Start: 2024-07-17 | End: 2024-07-17

## 2024-07-17 RX ORDER — PREDNISONE 50 MG/1
50 TABLET ORAL ONCE
Status: CANCELLED
Start: 2024-07-17 | End: 2024-07-17

## 2024-07-17 RX ORDER — METHYLPREDNISOLONE SOD SUCC 125 MG
125 VIAL (EA) INJECTION
Status: CANCELLED
Start: 2024-07-17

## 2024-07-17 RX ORDER — EPINEPHRINE 0.3 MG/.3ML
0.3 INJECTION SUBCUTANEOUS ONCE AS NEEDED
Status: DISCONTINUED | OUTPATIENT
Start: 2024-07-17 | End: 2024-07-17 | Stop reason: HOSPADM

## 2024-07-17 RX ORDER — ACETAMINOPHEN 325 MG/1
650 TABLET ORAL
Status: COMPLETED | OUTPATIENT
Start: 2024-07-17 | End: 2024-07-17

## 2024-07-17 RX ORDER — HEPARIN 100 UNIT/ML
500 SYRINGE INTRAVENOUS
Status: CANCELLED | OUTPATIENT
Start: 2024-07-17

## 2024-07-17 RX ORDER — FAMOTIDINE 10 MG/ML
20 INJECTION INTRAVENOUS
Status: CANCELLED | OUTPATIENT
Start: 2024-07-17

## 2024-07-17 RX ORDER — PREDNISONE 5 MG/1
50 TABLET ORAL ONCE
Status: DISCONTINUED | OUTPATIENT
Start: 2024-07-17 | End: 2024-07-17

## 2024-07-17 RX ADMIN — DIPHENHYDRAMINE HYDROCHLORIDE 50 MG: 50 INJECTION, SOLUTION INTRAMUSCULAR; INTRAVENOUS at 08:07

## 2024-07-17 RX ADMIN — METHYLPREDNISOLONE SODIUM SUCCINATE 125 MG: 125 INJECTION, POWDER, FOR SOLUTION INTRAMUSCULAR; INTRAVENOUS at 08:07

## 2024-07-17 RX ADMIN — RITUXIMAB-ABBS 900 MG: 10 INJECTION, SOLUTION INTRAVENOUS at 08:07

## 2024-07-17 RX ADMIN — FAMOTIDINE 20 MG: 10 INJECTION INTRAVENOUS at 08:07

## 2024-07-17 RX ADMIN — ACETAMINOPHEN 650 MG: 325 TABLET ORAL at 08:07

## 2024-07-17 NOTE — LETTER
July 17, 2024    Gerardo Ramirez Jr.  [address]  [place] [address]             [facility] - Hematology 5th Fl  [address] [facility] [address]  Phone: [phone] To Whom It May Concern,    Mr. Ramirez has been under the care of Dr. Paredes for treatment of Hairy Cell Leukemia. He continues on therapy, but will be approved to return to work on July 29th under 'light duty restrictions' such as office/desk work. We advise that he not participate in any manual labor or work that requires physical exertion as he continues to recover.      We request that he remain under light duty restrictions for 4-6 weeks and that he be allowed to attend all doctor/medical appointments related to his care. Should his condition change, light duty restrictions may need to be extended.    If you have any questions or concerns, please don't hesitate to call.    Sincerely,        Laura Gonzales [facility], PAJeffC  Malignant Hematology & Bone Marrow Transplant  [facility] [place]

## 2024-07-17 NOTE — PROGRESS NOTES
HEMATOLOGIC MALIGNANCIES PROGRESS NOTE    IDENTIFYING STATEMENT   Gerardo Ramirez Jr. (Gerardo) is a 39 y.o. male with a  of 1985 from [place], [place] with the diagnosis of Hairy Cell Leukemia.      ONCOLOGY HISTORY:  Hairy Cell Leukemia  May 2024 - Bone marrow biopsy with Cellular marrow with near complete involvement by hairy cell leukemia (95% of total cellularity) and scant residual trilineage hypoplasia with relatively increased erythroid precursors. BRAF [id number] mutation was detected.   Sunshine 10, 2024 - C1D1 Cladribine - Rituximab therapy      INTERVAL HISTORY:    Seen chairside at infusion center with his wife. Tolerating C5 Rituxan well, no concerns for reaction. Generally feeling well. Does have TRUONG when walking 1-2 miles for exercise. No chest pain. Fatigue persists. No fevers/chills. Appetite normal. No other complaints.       Past Medical History, Past Social History and Past Family History have been reviewed and are unchanged except as noted in the interval history.    MEDICATIONS:     Current Outpatient Medications on File Prior to Visit   Medication Sig Dispense Refill    acetaminophen (TYLENOL EXTRA STRENGTH) 500 MG tablet Take 1,000 mg by mouth once as needed for Pain.      acyclovir (ZOVIRAX) 400 MG tablet Take 1 tablet (400 mg total) by mouth 2 (two) times daily. 60 tablet 11    butalbital-acetaminophen-caff -40 mg Cap Take 1-2 capsules by mouth 3 (three) times daily. 30 capsule 1    ergocalciferol (VITAMIN D2) 50,000 unit Cap Take 1 capsule (50,000 Units total) by mouth every 7 days. 12 capsule 1    methylPREDNISolone (MEDROL DOSEPACK) 4 mg tablet Take as directed 1 each 0    metoprolol succinate (TOPROL-XL) 50 MG 24 hr tablet Take 1 tablet (50 mg total) by mouth once daily. 90 tablet 1    olmesartan (BENICAR) 40 MG tablet Take 1 tablet (40 mg total) by mouth once daily. 90 tablet 1    ondansetron (ZOFRAN) 8 MG tablet Take 1 tablet (8 mg total) by mouth every 8 (eight) hours as  needed for Nausea. 30 tablet 2    pitavastatin calcium (LIVALO) 2 mg Tab tablet Take 1 tablet (2 mg total) by mouth every evening. 90 tablet 1    promethazine (PHENERGAN) 25 MG tablet Take 1 tablet (25 mg total) by mouth every 4 to 6 hours as needed for Nausea. 30 tablet 2    sulfamethoxazole-trimethoprim 800-160mg (BACTRIM DS) 800-160 mg Tab Take 1 tablet by mouth every Mon, Wed, Fri. 24 tablet 3     Current Facility-Administered Medications on File Prior to Visit   Medication Dose Route Frequency Provider Last Rate Last Admin    [COMPLETED] acetaminophen tablet 650 mg  650 mg Oral 1 time in Clinic/HOD Laura Gonzales PA-C   650 mg at 07/17/24 0809    alteplase injection 2 mg  2 mg Intra-Catheter PRN Chan Paredes MD        alteplase injection 2 mg  2 mg Intra-Catheter PRN Laura Gonzales PA-C        [COMPLETED] diphenhydrAMINE (BENADRYL) 50 mg in NS 50 mL IVPB  50 mg Intravenous 1 time in Clinic/HOD Laura Gonzales PA-C   Stopped at 07/17/24 0836    diphenhydrAMINE injection 50 mg  50 mg Intravenous Once PRN Laura Gonzales PA-C        EPINEPHrine (EPIPEN) 0.3 mg/0.3 mL pen injection 0.3 mg  0.3 mg Intramuscular Once PRN Chan Paredes MD        EPINEPHrine (EPIPEN) 0.3 mg/0.3 mL pen injection 0.3 mg  0.3 mg Intramuscular Once PRN Laura Gonzales PA-C        [COMPLETED] famotidine (PF) injection 20 mg  20 mg Intravenous 1 time in Clinic/HOD Laura Gonzales PA-C   20 mg at 07/17/24 0808    heparin, porcine (PF) 100 unit/mL injection flush 500 Units  500 Units Intravenous PRN Chan Paredes MD        heparin, porcine (PF) 100 unit/mL injection flush 500 Units  500 Units Intravenous PRN Laura Gonzales PA-ISRRAEL        hydrocortisone sodium succinate injection 100 mg  100 mg Intravenous Once PRN Laura Gonzales PA-C        meperidine injection 25 mg  25 mg Intravenous PRN Laura Gonzales PA-C        [COMPLETED] methylPREDNISolone sodium succinate injection 125 mg  125  mg Intravenous 1 time in Clinic/HOD Laura Gonzales PA-C   125 mg at 07/17/24 0825    promethazine (PHENERGAN) 12.5 mg in D5W 50 mL IVPB  12.5 mg Intravenous 1 time in Clinic/HOD Laura Gonzales PA-C        [COMPLETED] riTUXimab-abbs (TRUXIMA) 900 mg in 0.9% NaCl 900 mL infusion (conc: 1 mg/mL)  900 mg Intravenous 1 time in Clinic/HOD Laura Gonzales PA-C 50 mL/hr at 07/17/24 0847 900 mg at 07/17/24 0847    sodium chloride 0.9% flush 10 mL  10 mL Intravenous PRN Chan Paredes MD        sodium chloride 0.9% flush 10 mL  10 mL Intravenous PRN Laura Gonzales PA-C        [DISCONTINUED] predniSONE tablet 50 mg  50 mg Oral Once Laura Gonzales PA-C           ALLERGIES:   Review of patient's allergies indicates:   Allergen Reactions    Amoxicillin Hives        ROS:       Review of Systems   Constitutional:  Positive for fatigue.   HENT:  Negative.     Respiratory:  Positive for shortness of breath (with exertion).    Cardiovascular: Negative.    Gastrointestinal: Negative.    Genitourinary: Negative.     Musculoskeletal: Negative.    Skin: Negative.    Neurological: Negative.    Psychiatric/Behavioral: Negative.         PHYSICAL EXAM:  There were no vitals filed for this visit.    KARNOFSKY PERFORMANCE STATUS 80  ECOG 1    Physical Exam  Constitutional:       General: He is not in acute distress.     Appearance: Normal appearance.   HENT:      Head: Normocephalic.      Right Ear: External ear normal.      Left Ear: External ear normal.      Nose: Nose normal.      Mouth/Throat:      Mouth: Mucous membranes are moist.      Pharynx: Oropharynx is clear.   Eyes:      Extraocular Movements: Extraocular movements intact.      Pupils: Pupils are equal, round, and reactive to light.   Pulmonary:      Effort: Pulmonary effort is normal.   Abdominal:      General: Abdomen is flat.   Musculoskeletal:         General: Normal range of motion.      Cervical back: Normal range of motion.   Skin:      General: Skin is warm.      Coloration: Skin is not jaundiced.   Neurological:      General: No focal deficit present.      Mental Status: He is alert.      Motor: No weakness.   Psychiatric:         Mood and Affect: Mood normal.         LAB:   Results for orders placed or performed in visit on 07/11/24   CBC W/ AUTO DIFFERENTIAL   Result Value Ref Range    WBC 3.75 (L) 3.90 - 12.70 K/uL    RBC 3.21 (L) 4.60 - 6.20 M/uL    Hemoglobin 9.4 (L) 14.0 - 18.0 g/dL    Hematocrit 29.0 (L) 40.0 - 54.0 %    MCV 90 82 - 98 fL    MCH 29.3 27.0 - 31.0 pg    MCHC 32.4 32.0 - 36.0 g/dL    RDW 14.6 (H) 11.5 - 14.5 %    Platelets 184 150 - 450 K/uL    MPV 8.9 (L) 9.2 - 12.9 fL    Immature Granulocytes 0.8 (H) 0.0 - 0.5 %    Gran # (ANC) 3.5 1.8 - 7.7 K/uL    Immature Grans (Abs) 0.03 0.00 - 0.04 K/uL    Lymph # 0.2 (L) 1.0 - 4.8 K/uL    Mono # 0.0 (L) 0.3 - 1.0 K/uL    Eos # 0.0 0.0 - 0.5 K/uL    Baso # 0.01 0.00 - 0.20 K/uL    nRBC 1 (A) 0 /100 WBC    Gran % 93.2 (H) 38.0 - 73.0 %    Lymph % 4.3 (L) 18.0 - 48.0 %    Mono % 1.1 (L) 4.0 - 15.0 %    Eosinophil % 0.3 0.0 - 8.0 %    Basophil % 0.3 0.0 - 1.9 %    Differential Method Automated    CMP   Result Value Ref Range    Sodium 135 (L) 136 - 145 mmol/L    Potassium 3.8 3.5 - 5.1 mmol/L    Chloride 106 95 - 110 mmol/L    CO2 20 (L) 23 - 29 mmol/L    Glucose 188 (H) 70 - 110 mg/dL    BUN 19 6 - 20 mg/dL    Creatinine 0.8 0.5 - 1.4 mg/dL    Calcium 8.7 8.7 - 10.5 mg/dL    Total Protein 7.1 6.0 - 8.4 g/dL    Albumin 3.6 3.5 - 5.2 g/dL    Total Bilirubin 0.6 0.1 - 1.0 mg/dL    Alkaline Phosphatase 53 (L) 55 - 135 U/L    AST 14 10 - 40 U/L    ALT 48 (H) 10 - 44 U/L    eGFR >60.0 >60 mL/min/1.73 m^2    Anion Gap 9 8 - 16 mmol/L       PROBLEMS ASSESSED THIS VISIT:    1. Hairy cell leukemia not having achieved remission    2. Adverse reaction to rituximab infusion    3. Pancytopenia    4. 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 Kumar has Hairy  Cell Leukemia diagnosed by bone marrow biopsy in May 2015. Follows locally with Dr. Cummings in [place].     He began Cladribine (D1-5) and Rituximab (weekly x8) therapy on Sunshine 10. He has now completed Cladribine and continues weekly Rituximab. Treatment course briefly delayed due to infusion reaction related to Rituximab. He presents today to proceed with 5th dose of Rituximab therapy.     Returning to light-duty work on 7/29, work note provided.     History of Infusion Reaction  History of Rituximab infusion reaction.   Pre-medicate with hydrocortisone 125mg IV prior to infusions with slow escalation of rate.  Has tolerated well with steroid pre-med.     Immunodeficiency  Due to hairy cell leukemia and antineoplastic therapy.    Continue prophylaxis with acyclovir 400 mg PO BID and TMP/SMX DS PO qMWF     Pancytopenia  Due to hairy cell leukemia. Will require weekly monitoring throughout therapy. With evidence of anticipated recovery following completion of treatment, can extend time between lab visits.    FOLLOW UP:  Continue weekly labs, infusions  Follow up as scheduled with Dr. Cummings, Dr. Reggie Gonzales PA-C  Hematology and Stem Cell Transplant

## 2024-07-17 NOTE — PLAN OF CARE
Problem: Chemotherapy Effects  Goal: Anemia Symptom Improvement  Outcome: Progressing  Goal: Safety Maintained  Outcome: Progressing  Goal: Absence of Hematuria  Outcome: Progressing  Goal: Nausea and Vomiting Relief  Outcome: Progressing  Goal: Neurotoxicity Symptom Control  Outcome: Progressing  Goal: Absence of Infection  Outcome: Progressing  Goal: Absence of Bleeding  Outcome: Progressing     Ambulatory to clinic with family.  No c/o adverse effects or s/s of infection.  PIV inserted, flushed with out difficulty, blood return noted and infused with no problems.  Premeds and Truxima tolerated with no problems.  RTW letter from PA given to pt.  Ambulatory home with family.  NAD.

## 2024-07-18 ENCOUNTER — PATIENT MESSAGE (OUTPATIENT)
Dept: ADMINISTRATIVE | Facility: OTHER | Age: 39
End: 2024-07-18
Payer: COMMERCIAL

## 2024-07-23 NOTE — PROGRESS NOTES
Section of Hematology and Stem Cell Transplantation  Follow-Up Note     07/24/2024    Primary Oncologic Diagnosis: Hairy cell leukemia not having achieved remission [C91.40]    History of Present Ilness:   Gerardo Ramirez Jr. (Gerardo) is a pleasant 39 y.o.male from [place], [place], here for follow up of his hairy cell leukemia. Follows locally with Dr. Cummings in [place], [place].    Oncologic History:  Hairy Cell Leukemia  May 2024 - Bone marrow biopsy with Cellular marrow with near complete involvement by hairy cell leukemia (95% of total cellularity) and scant residual trilineage hypoplasia with relatively increased erythroid precursors. BRAF V600E mutation was detected.   Sunshine 10, 2024 - C1D1 Cladribine - Rituximab therapy     INTERVAL HISTORY:    Seen chairside at infusion center with his wife. Tolerating C5 Rituxan well, no concerns for reaction. Generally feeling well. Does have TRUONG when walking 1-2 miles for exercise. No chest pain. Fatigue persists. No fevers/chills. Appetite normal. No other complaints.     Interval History 7/24/24:  Patient is here today for cycle 6 of his Rituxan. He tolerated cycle 5 last with without any adverse effects. He endorses TRUONG with exercise, which is unchanged. He denies fevers, chills, night sweats, chest pain, shortness of breath, N/V/D.    Past Medical History, Social History, and Past Family History are unchanged since last evaluation except for HPI.    CURRENT MEDICATIONS:   Current Outpatient Medications   Medication Sig    acetaminophen (TYLENOL EXTRA STRENGTH) 500 MG tablet Take 1,000 mg by mouth once as needed for Pain.    acyclovir (ZOVIRAX) 400 MG tablet Take 1 tablet (400 mg total) by mouth 2 (two) times daily.    butalbital-acetaminophen-caff -40 mg Cap Take 1-2 capsules by mouth 3 (three) times daily.    ergocalciferol (VITAMIN D2) 50,000 unit Cap Take 1 capsule (50,000 Units total) by mouth every 7 days.    metoprolol succinate (TOPROL-XL) 50 MG 24 hr  tablet Take 1 tablet (50 mg total) by mouth once daily.    olmesartan (BENICAR) 40 MG tablet Take 1 tablet (40 mg total) by mouth once daily.    ondansetron (ZOFRAN) 8 MG tablet Take 1 tablet (8 mg total) by mouth every 8 (eight) hours as needed for Nausea.    pitavastatin calcium (LIVALO) 2 mg Tab tablet Take 1 tablet (2 mg total) by mouth every evening.    promethazine (PHENERGAN) 25 MG tablet Take 1 tablet (25 mg total) by mouth every 4 to 6 hours as needed for Nausea.    sulfamethoxazole-trimethoprim 800-160mg (BACTRIM DS) 800-160 mg Tab Take 1 tablet by mouth every Mon, Wed, Fri.     No current facility-administered medications for this visit.     Facility-Administered Medications Ordered in Other Visits   Medication    alteplase injection 2 mg    alteplase injection 2 mg    diphenhydrAMINE (BENADRYL) 50 mg in NS 50 mL IVPB    diphenhydrAMINE injection 50 mg    EPINEPHrine (EPIPEN) 0.3 mg/0.3 mL pen injection 0.3 mg    EPINEPHrine (EPIPEN) 0.3 mg/0.3 mL pen injection 0.3 mg    heparin, porcine (PF) 100 unit/mL injection flush 500 Units    heparin, porcine (PF) 100 unit/mL injection flush 500 Units    hydrocortisone sodium succinate injection 100 mg    meperidine injection 25 mg    promethazine (PHENERGAN) 12.5 mg in D5W 50 mL IVPB    riTUXimab-abbs (TRUXIMA) 900 mg in 0.9% NaCl 900 mL infusion (conc: 1 mg/mL)    sodium chloride 0.9% flush 10 mL    sodium chloride 0.9% flush 10 mL     ALLERGIES:   Review of patient's allergies indicates:   Allergen Reactions    Amoxicillin Hives       Review of Systems:     Review of Systems   Constitutional:  Negative for chills, fever, malaise/fatigue and weight loss.   HENT:  Negative for congestion, nosebleeds, sinus pain and sore throat.    Eyes:  Negative for blurred vision, double vision, photophobia and pain.   Respiratory:  Negative for cough and shortness of breath (dyspnea on exertion).    Cardiovascular:  Negative for chest pain and palpitations.   Gastrointestinal:   Negative for constipation, diarrhea, heartburn, nausea and vomiting.   Genitourinary:  Negative for dysuria and hematuria.   Musculoskeletal:  Negative for falls.   Skin:  Negative for rash.   Neurological:  Negative for dizziness, sensory change, weakness and headaches.   Endo/Heme/Allergies:  Negative for environmental allergies.   Psychiatric/Behavioral:  The patient does not have insomnia.        Physical Exam:     /68, HR 62, RR 18, Temp 97.9     Physical Exam  Vitals reviewed.   Constitutional:       Appearance: Normal appearance.   HENT:      Head: Normocephalic and atraumatic.      Nose: Nose normal.   Eyes:      Pupils: Pupils are equal, round, and reactive to light.   Cardiovascular:      Rate and Rhythm: Normal rate and regular rhythm.      Pulses: Normal pulses.      Heart sounds: Normal heart sounds.   Pulmonary:      Effort: Pulmonary effort is normal.      Breath sounds: Normal breath sounds. No wheezing.   Abdominal:      General: Bowel sounds are normal. There is no distension.      Palpations: Abdomen is soft.   Musculoskeletal:         General: Normal range of motion.      Cervical back: Normal range of motion and neck supple.      Right lower leg: No edema.      Left lower leg: No edema.   Skin:     General: Skin is warm and dry.      Capillary Refill: Capillary refill takes less than 2 seconds.      Coloration: Skin is not jaundiced.      Findings: No rash.   Neurological:      Mental Status: He is alert and oriented to person, place, and time.   Psychiatric:         Mood and Affect: Mood normal.         Thought Content: Thought content normal.        ECOG Performance Status: (foot note - ECOG PS provided by [employer]cology [employer]) 1 - Symptomatic but completely ambulatory    Karnofsky Performance Score:  90%- Able to Carry on Normal Activity: Minor Symptoms of Disease    Labs:   Lab Results   Component Value Date    WBC 2.02 (L) 07/17/2024    HGB 10.2 (L) 07/17/2024    HCT  30.2 (L) 07/17/2024    MCV 89 07/17/2024     (L) 07/17/2024     Lab Results   Component Value Date     07/17/2024    K 4.2 07/17/2024     07/17/2024    CO2 19 (L) 07/17/2024    BUN 13 07/17/2024    CREATININE 0.9 07/17/2024    ALBUMIN 3.5 07/17/2024    BILITOT 1.0 07/17/2024    ALKPHOS 72 07/17/2024    AST 15 07/17/2024    ALT 43 07/17/2024        Assessment and Plan:     Problem List Items Addressed This Visit       Hairy cell leukemia - Primary  He began Cladribine (D1-5) and Rituximab (weekly x8) therapy on Sunshine 10, 2024. He has now completed Cladribine and continues weekly Rituximab. Treatment course briefly delayed due neutropenia with GCSF and subsequent infusion reaction on the next cycle when Rituximab resumed.  Continue with dose 6 of 8 of Rituximab therapy.  Will obtain labs once/week over the next 2 weeks in [place], [place] until treatments completed, then at MD discretion.  Returning to light-duty work on 7/29, work note provided last week.      Adverse reaction to rituximab infusion      History of Rituximab infusion reaction.   Pre-medicate with hydrocortisone 125mg IV prior to infusions with slow escalation of rate.  Has tolerated well with steroid pre-med.   Continue at previously tolerated rate.      Immunodeficiency due to drugs      Secondary to hairy cell leukemia and ongoing chemotherapy treatment  Continue prophylaxis with acyclovir 400 mg PO BID and TMP/SMX DS PO q MWF      Pancytopenia  Due to hairy cell leukemia.  Continue weekly monitoring throughout therapy  May extend time between labs once treatment is complete and count recovery at discretion of Dr. Cummings/Reggie.      Dyspnea on Exertion  Likely secondary to leukemia and current treatment regimen  Discussed continuing current walking regimen, adjusting based on fatigue each day  Maintain current level of conditioning and pace activity, as tolerated       BMT Chart Routing      Follow up with physician . As scheduled  with Dr. Cummings and Dr. Paredes   Follow up with YECENIA    Provider visit type    Infusion scheduling note    Injection scheduling note    Labs   Scheduling:  Preferred lab:  Lab interval:  As scheduled   Imaging    Pharmacy appointment    Other referrals                  Total time of this visit was 32 minutes, including time spent face to face with patient and/or via video/audio, and also in preparing for today's visit for MDM and documentation. (Medical Decision Making, including consideration of possible diagnoses, management options, complex medical record review, review of diagnostic tests and information, consideration and discussion of significant complications based on comorbidities, and discussion with providers involved with the care of the patient). Greater than 50% was spent face to face with the patient counseling and coordinating care.    Thank you for allowing me to partake in the care of this patient. If there are any questions, please do not hesitate to reach out.    Vivienne Monae, RYANP-ISRRAEL  Hematologic Malignancies, Stem Cell Transplantation, and Cellular Therapy  [facility]

## 2024-07-24 ENCOUNTER — PATIENT MESSAGE (OUTPATIENT)
Dept: HEMATOLOGY/ONCOLOGY | Facility: CLINIC | Age: 39
End: 2024-07-24

## 2024-07-24 ENCOUNTER — OFFICE VISIT (OUTPATIENT)
Dept: HEMATOLOGY/ONCOLOGY | Facility: CLINIC | Age: 39
End: 2024-07-24
Payer: COMMERCIAL

## 2024-07-24 ENCOUNTER — INFUSION (OUTPATIENT)
Dept: INFUSION THERAPY | Facility: HOSPITAL | Age: 39
End: 2024-07-24
Payer: COMMERCIAL

## 2024-07-24 VITALS
DIASTOLIC BLOOD PRESSURE: 70 MMHG | HEART RATE: 98 BPM | RESPIRATION RATE: 20 BRPM | BODY MASS INDEX: 37.26 KG/M2 | WEIGHT: 251.56 LBS | HEIGHT: 69 IN | SYSTOLIC BLOOD PRESSURE: 122 MMHG | TEMPERATURE: 99 F

## 2024-07-24 DIAGNOSIS — T45.1X5A: ICD-10-CM

## 2024-07-24 DIAGNOSIS — D61.818 PANCYTOPENIA: ICD-10-CM

## 2024-07-24 DIAGNOSIS — D84.821 IMMUNODEFICIENCY DUE TO DRUGS: ICD-10-CM

## 2024-07-24 DIAGNOSIS — C91.40 HAIRY CELL LEUKEMIA NOT HAVING ACHIEVED REMISSION: Primary | ICD-10-CM

## 2024-07-24 DIAGNOSIS — Z79.899 IMMUNODEFICIENCY DUE TO DRUGS: ICD-10-CM

## 2024-07-24 LAB
ALBUMIN SERPL BCP-MCNC: 3.5 G/DL (ref 3.5–5.2)
ALP SERPL-CCNC: 64 U/L (ref 55–135)
ALT SERPL W/O P-5'-P-CCNC: 36 U/L (ref 10–44)
ANION GAP SERPL CALC-SCNC: 6 MMOL/L (ref 8–16)
ANISOCYTOSIS BLD QL SMEAR: SLIGHT
AST SERPL-CCNC: 16 U/L (ref 10–40)
BASOPHILS NFR BLD: 2 % (ref 0–1.9)
BILIRUB SERPL-MCNC: 0.6 MG/DL (ref 0.1–1)
BUN SERPL-MCNC: 11 MG/DL (ref 6–20)
CALCIUM SERPL-MCNC: 8.6 MG/DL (ref 8.7–10.5)
CHLORIDE SERPL-SCNC: 108 MMOL/L (ref 95–110)
CO2 SERPL-SCNC: 22 MMOL/L (ref 23–29)
CREAT SERPL-MCNC: 0.8 MG/DL (ref 0.5–1.4)
DIFFERENTIAL METHOD BLD: ABNORMAL
EOSINOPHIL NFR BLD: 4 % (ref 0–8)
ERYTHROCYTE [DISTWIDTH] IN BLOOD BY AUTOMATED COUNT: 15.1 % (ref 11.5–14.5)
EST. GFR  (NO RACE VARIABLE): >60 ML/MIN/1.73 M^2
GLUCOSE SERPL-MCNC: 124 MG/DL (ref 70–110)
HCT VFR BLD AUTO: 33.6 % (ref 40–54)
HGB BLD-MCNC: 11 G/DL (ref 14–18)
HYPOCHROMIA BLD QL SMEAR: ABNORMAL
IMM GRANULOCYTES # BLD AUTO: ABNORMAL K/UL (ref 0–0.04)
IMM GRANULOCYTES NFR BLD AUTO: ABNORMAL % (ref 0–0.5)
LYMPHOCYTES NFR BLD: 8 % (ref 18–48)
MCH RBC QN AUTO: 29.6 PG (ref 27–31)
MCHC RBC AUTO-ENTMCNC: 32.7 G/DL (ref 32–36)
MCV RBC AUTO: 90 FL (ref 82–98)
MONOCYTES NFR BLD: 2 % (ref 4–15)
NEUTROPHILS NFR BLD: 82 % (ref 38–73)
NEUTS BAND NFR BLD MANUAL: 2 %
NRBC BLD-RTO: 1 /100 WBC
OVALOCYTES BLD QL SMEAR: ABNORMAL
PLATELET # BLD AUTO: 123 K/UL (ref 150–450)
PLATELET BLD QL SMEAR: ABNORMAL
PMV BLD AUTO: 10.2 FL (ref 9.2–12.9)
POIKILOCYTOSIS BLD QL SMEAR: SLIGHT
POLYCHROMASIA BLD QL SMEAR: ABNORMAL
POTASSIUM SERPL-SCNC: 3.9 MMOL/L (ref 3.5–5.1)
PROT SERPL-MCNC: 6.8 G/DL (ref 6–8.4)
RBC # BLD AUTO: 3.72 M/UL (ref 4.6–6.2)
SODIUM SERPL-SCNC: 136 MMOL/L (ref 136–145)
WBC # BLD AUTO: 4.69 K/UL (ref 3.9–12.7)

## 2024-07-24 PROCEDURE — 25000003 PHARM REV CODE 250: Performed by: INTERNAL MEDICINE

## 2024-07-24 PROCEDURE — 99214 OFFICE O/P EST MOD 30 MIN: CPT | Mod: S$GLB,,,

## 2024-07-24 PROCEDURE — 96413 CHEMO IV INFUSION 1 HR: CPT

## 2024-07-24 PROCEDURE — 96415 CHEMO IV INFUSION ADDL HR: CPT

## 2024-07-24 PROCEDURE — 63600175 PHARM REV CODE 636 W HCPCS: Mod: JZ,JG | Performed by: INTERNAL MEDICINE

## 2024-07-24 PROCEDURE — 99999 PR PBB SHADOW E&M-EST. PATIENT-LVL III: CPT | Mod: PBBFAC,,,

## 2024-07-24 PROCEDURE — 96375 TX/PRO/DX INJ NEW DRUG ADDON: CPT

## 2024-07-24 PROCEDURE — 96367 TX/PROPH/DG ADDL SEQ IV INF: CPT

## 2024-07-24 PROCEDURE — 85027 COMPLETE CBC AUTOMATED: CPT | Performed by: NURSE PRACTITIONER

## 2024-07-24 PROCEDURE — 85007 BL SMEAR W/DIFF WBC COUNT: CPT | Performed by: NURSE PRACTITIONER

## 2024-07-24 PROCEDURE — 80053 COMPREHEN METABOLIC PANEL: CPT | Performed by: NURSE PRACTITIONER

## 2024-07-24 RX ORDER — ACETAMINOPHEN 325 MG/1
650 TABLET ORAL
Status: COMPLETED | OUTPATIENT
Start: 2024-07-24 | End: 2024-07-24

## 2024-07-24 RX ORDER — SODIUM CHLORIDE 0.9 % (FLUSH) 0.9 %
10 SYRINGE (ML) INJECTION
OUTPATIENT
Start: 2024-08-07

## 2024-07-24 RX ORDER — METHYLPREDNISOLONE SOD SUCC 125 MG
125 VIAL (EA) INJECTION
Status: COMPLETED | OUTPATIENT
Start: 2024-07-24 | End: 2024-07-24

## 2024-07-24 RX ORDER — FAMOTIDINE 10 MG/ML
20 INJECTION INTRAVENOUS
OUTPATIENT
Start: 2024-07-31

## 2024-07-24 RX ORDER — FAMOTIDINE 10 MG/ML
20 INJECTION INTRAVENOUS
Status: COMPLETED | OUTPATIENT
Start: 2024-07-24 | End: 2024-07-24

## 2024-07-24 RX ORDER — ACETAMINOPHEN 325 MG/1
650 TABLET ORAL
OUTPATIENT
Start: 2024-08-07

## 2024-07-24 RX ORDER — ACETAMINOPHEN 325 MG/1
650 TABLET ORAL
OUTPATIENT
Start: 2024-07-31

## 2024-07-24 RX ORDER — EPINEPHRINE 0.3 MG/.3ML
0.3 INJECTION SUBCUTANEOUS ONCE AS NEEDED
OUTPATIENT
Start: 2024-07-31

## 2024-07-24 RX ORDER — DIPHENHYDRAMINE HYDROCHLORIDE 50 MG/ML
50 INJECTION INTRAMUSCULAR; INTRAVENOUS ONCE AS NEEDED
Status: CANCELLED | OUTPATIENT
Start: 2024-07-24

## 2024-07-24 RX ORDER — METHYLPREDNISOLONE SOD SUCC 125 MG
125 VIAL (EA) INJECTION
Start: 2024-08-07

## 2024-07-24 RX ORDER — HEPARIN 100 UNIT/ML
500 SYRINGE INTRAVENOUS
Status: CANCELLED | OUTPATIENT
Start: 2024-07-24

## 2024-07-24 RX ORDER — DIPHENHYDRAMINE HYDROCHLORIDE 50 MG/ML
50 INJECTION INTRAMUSCULAR; INTRAVENOUS ONCE AS NEEDED
OUTPATIENT
Start: 2024-08-07

## 2024-07-24 RX ORDER — ACETAMINOPHEN 325 MG/1
650 TABLET ORAL
Status: CANCELLED | OUTPATIENT
Start: 2024-07-24

## 2024-07-24 RX ORDER — HEPARIN 100 UNIT/ML
500 SYRINGE INTRAVENOUS
OUTPATIENT
Start: 2024-07-31

## 2024-07-24 RX ORDER — FAMOTIDINE 10 MG/ML
20 INJECTION INTRAVENOUS
Status: CANCELLED | OUTPATIENT
Start: 2024-07-24

## 2024-07-24 RX ORDER — HEPARIN 100 UNIT/ML
500 SYRINGE INTRAVENOUS
Status: DISCONTINUED | OUTPATIENT
Start: 2024-07-24 | End: 2024-07-24 | Stop reason: HOSPADM

## 2024-07-24 RX ORDER — MEPERIDINE HYDROCHLORIDE 50 MG/ML
25 INJECTION INTRAMUSCULAR; INTRAVENOUS; SUBCUTANEOUS
OUTPATIENT
Start: 2024-07-31

## 2024-07-24 RX ORDER — MEPERIDINE HYDROCHLORIDE 50 MG/ML
25 INJECTION INTRAMUSCULAR; INTRAVENOUS; SUBCUTANEOUS
Status: CANCELLED | OUTPATIENT
Start: 2024-07-24

## 2024-07-24 RX ORDER — SODIUM CHLORIDE 0.9 % (FLUSH) 0.9 %
10 SYRINGE (ML) INJECTION
Status: CANCELLED | OUTPATIENT
Start: 2024-07-24

## 2024-07-24 RX ORDER — SODIUM CHLORIDE 0.9 % (FLUSH) 0.9 %
10 SYRINGE (ML) INJECTION
OUTPATIENT
Start: 2024-07-31

## 2024-07-24 RX ORDER — MEPERIDINE HYDROCHLORIDE 50 MG/ML
25 INJECTION INTRAMUSCULAR; INTRAVENOUS; SUBCUTANEOUS
Status: DISCONTINUED | OUTPATIENT
Start: 2024-07-24 | End: 2024-07-24 | Stop reason: HOSPADM

## 2024-07-24 RX ORDER — FAMOTIDINE 10 MG/ML
20 INJECTION INTRAVENOUS
OUTPATIENT
Start: 2024-08-07

## 2024-07-24 RX ORDER — SODIUM CHLORIDE 0.9 % (FLUSH) 0.9 %
10 SYRINGE (ML) INJECTION
Status: DISCONTINUED | OUTPATIENT
Start: 2024-07-24 | End: 2024-07-24 | Stop reason: HOSPADM

## 2024-07-24 RX ORDER — EPINEPHRINE 0.3 MG/.3ML
0.3 INJECTION SUBCUTANEOUS ONCE AS NEEDED
OUTPATIENT
Start: 2024-08-07

## 2024-07-24 RX ORDER — DIPHENHYDRAMINE HYDROCHLORIDE 50 MG/ML
50 INJECTION INTRAMUSCULAR; INTRAVENOUS ONCE AS NEEDED
Status: DISCONTINUED | OUTPATIENT
Start: 2024-07-24 | End: 2024-07-24 | Stop reason: HOSPADM

## 2024-07-24 RX ORDER — MEPERIDINE HYDROCHLORIDE 50 MG/ML
25 INJECTION INTRAMUSCULAR; INTRAVENOUS; SUBCUTANEOUS
OUTPATIENT
Start: 2024-08-07

## 2024-07-24 RX ORDER — METHYLPREDNISOLONE SOD SUCC 125 MG
125 VIAL (EA) INJECTION
Start: 2024-07-31

## 2024-07-24 RX ORDER — METHYLPREDNISOLONE SOD SUCC 125 MG
125 VIAL (EA) INJECTION
Status: CANCELLED
Start: 2024-07-24

## 2024-07-24 RX ORDER — HEPARIN 100 UNIT/ML
500 SYRINGE INTRAVENOUS
OUTPATIENT
Start: 2024-08-07

## 2024-07-24 RX ORDER — DIPHENHYDRAMINE HYDROCHLORIDE 50 MG/ML
50 INJECTION INTRAMUSCULAR; INTRAVENOUS ONCE AS NEEDED
OUTPATIENT
Start: 2024-07-31

## 2024-07-24 RX ORDER — EPINEPHRINE 0.3 MG/.3ML
0.3 INJECTION SUBCUTANEOUS ONCE AS NEEDED
Status: DISCONTINUED | OUTPATIENT
Start: 2024-07-24 | End: 2024-07-24 | Stop reason: HOSPADM

## 2024-07-24 RX ORDER — EPINEPHRINE 0.3 MG/.3ML
0.3 INJECTION SUBCUTANEOUS ONCE AS NEEDED
Status: CANCELLED | OUTPATIENT
Start: 2024-07-24

## 2024-07-24 RX ADMIN — METHYLPREDNISOLONE SODIUM SUCCINATE 125 MG: 125 INJECTION, POWDER, FOR SOLUTION INTRAMUSCULAR; INTRAVENOUS at 08:07

## 2024-07-24 RX ADMIN — RITUXIMAB-ABBS 900 MG: 10 INJECTION, SOLUTION INTRAVENOUS at 09:07

## 2024-07-24 RX ADMIN — ACETAMINOPHEN 650 MG: 325 TABLET ORAL at 08:07

## 2024-07-24 RX ADMIN — DIPHENHYDRAMINE HYDROCHLORIDE 50 MG: 50 INJECTION, SOLUTION INTRAMUSCULAR; INTRAVENOUS at 08:07

## 2024-07-24 RX ADMIN — FAMOTIDINE 20 MG: 10 INJECTION INTRAVENOUS at 08:07

## 2024-07-24 NOTE — PLAN OF CARE
Problem: Chemotherapy Effects  Goal: Anemia Symptom Improvement  Outcome: Progressing  Goal: Safety Maintained  Outcome: Progressing  Goal: Absence of Hematuria  Outcome: Progressing  Goal: Nausea and Vomiting Relief  Outcome: Progressing  Goal: Neurotoxicity Symptom Control  Outcome: Progressing  Goal: Absence of Infection  Outcome: Progressing  Goal: Absence of Bleeding  Outcome: Progressing     Problem: Fatigue  Goal: Improved Activity Tolerance  Outcome: Progressing     Problem: Anemia  Goal: Anemia Symptom Improvement  Outcome: Progressing     Problem: Infection  Goal: Absence of Infection Signs and Symptoms  Outcome: Progressing   PT completed Truxima tx  via PIV without adverse effects. VS WNL discharged AAOX4 and ambulatory

## 2024-07-31 ENCOUNTER — LAB VISIT (OUTPATIENT)
Dept: LAB | Facility: HOSPITAL | Age: 39
End: 2024-07-31
Payer: COMMERCIAL

## 2024-07-31 ENCOUNTER — INFUSION (OUTPATIENT)
Dept: INFUSION THERAPY | Facility: HOSPITAL | Age: 39
End: 2024-07-31
Payer: COMMERCIAL

## 2024-07-31 VITALS
HEIGHT: 69 IN | OXYGEN SATURATION: 98 % | DIASTOLIC BLOOD PRESSURE: 56 MMHG | HEART RATE: 87 BPM | WEIGHT: 250.44 LBS | TEMPERATURE: 98 F | SYSTOLIC BLOOD PRESSURE: 122 MMHG | BODY MASS INDEX: 37.09 KG/M2 | RESPIRATION RATE: 16 BRPM

## 2024-07-31 DIAGNOSIS — C91.40 HAIRY CELL LEUKEMIA NOT HAVING ACHIEVED REMISSION: Primary | ICD-10-CM

## 2024-07-31 DIAGNOSIS — C91.40 HAIRY CELL LEUKEMIA NOT HAVING ACHIEVED REMISSION: ICD-10-CM

## 2024-07-31 LAB
ABO + RH BLD: NORMAL
ALBUMIN SERPL BCP-MCNC: 3.8 G/DL (ref 3.5–5.2)
ALP SERPL-CCNC: 63 U/L (ref 55–135)
ALT SERPL W/O P-5'-P-CCNC: 38 U/L (ref 10–44)
ANION GAP SERPL CALC-SCNC: 8 MMOL/L (ref 8–16)
AST SERPL-CCNC: 16 U/L (ref 10–40)
BASOPHILS # BLD AUTO: 0.03 K/UL (ref 0–0.2)
BASOPHILS NFR BLD: 0.7 % (ref 0–1.9)
BILIRUB SERPL-MCNC: 0.7 MG/DL (ref 0.1–1)
BLD GP AB SCN CELLS X3 SERPL QL: NORMAL
BUN SERPL-MCNC: 13 MG/DL (ref 6–20)
CALCIUM SERPL-MCNC: 8.9 MG/DL (ref 8.7–10.5)
CHLORIDE SERPL-SCNC: 106 MMOL/L (ref 95–110)
CO2 SERPL-SCNC: 23 MMOL/L (ref 23–29)
CREAT SERPL-MCNC: 0.8 MG/DL (ref 0.5–1.4)
DIFFERENTIAL METHOD BLD: ABNORMAL
EOSINOPHIL # BLD AUTO: 0.1 K/UL (ref 0–0.5)
EOSINOPHIL NFR BLD: 2 % (ref 0–8)
ERYTHROCYTE [DISTWIDTH] IN BLOOD BY AUTOMATED COUNT: 14.8 % (ref 11.5–14.5)
EST. GFR  (NO RACE VARIABLE): >60 ML/MIN/1.73 M^2
GLUCOSE SERPL-MCNC: 131 MG/DL (ref 70–110)
HCT VFR BLD AUTO: 34.1 % (ref 40–54)
HGB BLD-MCNC: 11 G/DL (ref 14–18)
IMM GRANULOCYTES # BLD AUTO: 0.14 K/UL (ref 0–0.04)
IMM GRANULOCYTES NFR BLD AUTO: 3.2 % (ref 0–0.5)
LDH SERPL L TO P-CCNC: 179 U/L (ref 110–260)
LYMPHOCYTES # BLD AUTO: 0.3 K/UL (ref 1–4.8)
LYMPHOCYTES NFR BLD: 6.5 % (ref 18–48)
MCH RBC QN AUTO: 28.8 PG (ref 27–31)
MCHC RBC AUTO-ENTMCNC: 32.3 G/DL (ref 32–36)
MCV RBC AUTO: 89 FL (ref 82–98)
MONOCYTES # BLD AUTO: 0.4 K/UL (ref 0.3–1)
MONOCYTES NFR BLD: 8.8 % (ref 4–15)
NEUTROPHILS # BLD AUTO: 3.5 K/UL (ref 1.8–7.7)
NEUTROPHILS NFR BLD: 78.8 % (ref 38–73)
NRBC BLD-RTO: 1 /100 WBC
PLATELET # BLD AUTO: 164 K/UL (ref 150–450)
PMV BLD AUTO: 8.9 FL (ref 9.2–12.9)
POTASSIUM SERPL-SCNC: 4.1 MMOL/L (ref 3.5–5.1)
PROT SERPL-MCNC: 7.2 G/DL (ref 6–8.4)
RBC # BLD AUTO: 3.82 M/UL (ref 4.6–6.2)
SODIUM SERPL-SCNC: 137 MMOL/L (ref 136–145)
SPECIMEN OUTDATE: NORMAL
WBC # BLD AUTO: 4.43 K/UL (ref 3.9–12.7)

## 2024-07-31 PROCEDURE — 80053 COMPREHEN METABOLIC PANEL: CPT | Performed by: INTERNAL MEDICINE

## 2024-07-31 PROCEDURE — 86900 BLOOD TYPING SEROLOGIC ABO: CPT | Performed by: INTERNAL MEDICINE

## 2024-07-31 PROCEDURE — 96367 TX/PROPH/DG ADDL SEQ IV INF: CPT

## 2024-07-31 PROCEDURE — 86901 BLOOD TYPING SEROLOGIC RH(D): CPT | Performed by: INTERNAL MEDICINE

## 2024-07-31 PROCEDURE — 96413 CHEMO IV INFUSION 1 HR: CPT

## 2024-07-31 PROCEDURE — 96375 TX/PRO/DX INJ NEW DRUG ADDON: CPT

## 2024-07-31 PROCEDURE — 83615 LACTATE (LD) (LDH) ENZYME: CPT | Performed by: INTERNAL MEDICINE

## 2024-07-31 PROCEDURE — 85025 COMPLETE CBC W/AUTO DIFF WBC: CPT | Performed by: INTERNAL MEDICINE

## 2024-07-31 PROCEDURE — 86850 RBC ANTIBODY SCREEN: CPT | Performed by: INTERNAL MEDICINE

## 2024-07-31 PROCEDURE — 63600175 PHARM REV CODE 636 W HCPCS: Mod: JZ,JG | Performed by: INTERNAL MEDICINE

## 2024-07-31 PROCEDURE — 25000003 PHARM REV CODE 250: Performed by: INTERNAL MEDICINE

## 2024-07-31 PROCEDURE — 96415 CHEMO IV INFUSION ADDL HR: CPT

## 2024-07-31 RX ORDER — SODIUM CHLORIDE 0.9 % (FLUSH) 0.9 %
10 SYRINGE (ML) INJECTION
Status: DISCONTINUED | OUTPATIENT
Start: 2024-07-31 | End: 2024-07-31 | Stop reason: HOSPADM

## 2024-07-31 RX ORDER — ACETAMINOPHEN 325 MG/1
650 TABLET ORAL
Status: COMPLETED | OUTPATIENT
Start: 2024-07-31 | End: 2024-07-31

## 2024-07-31 RX ORDER — FAMOTIDINE 10 MG/ML
20 INJECTION INTRAVENOUS
Status: COMPLETED | OUTPATIENT
Start: 2024-07-31 | End: 2024-07-31

## 2024-07-31 RX ORDER — EPINEPHRINE 0.3 MG/.3ML
0.3 INJECTION SUBCUTANEOUS ONCE AS NEEDED
Status: DISCONTINUED | OUTPATIENT
Start: 2024-07-31 | End: 2024-07-31 | Stop reason: HOSPADM

## 2024-07-31 RX ORDER — DIPHENHYDRAMINE HYDROCHLORIDE 50 MG/ML
50 INJECTION INTRAMUSCULAR; INTRAVENOUS ONCE AS NEEDED
Status: DISCONTINUED | OUTPATIENT
Start: 2024-07-31 | End: 2024-07-31 | Stop reason: HOSPADM

## 2024-07-31 RX ORDER — METHYLPREDNISOLONE SOD SUCC 125 MG
125 VIAL (EA) INJECTION
Status: COMPLETED | OUTPATIENT
Start: 2024-07-31 | End: 2024-07-31

## 2024-07-31 RX ORDER — HEPARIN 100 UNIT/ML
500 SYRINGE INTRAVENOUS
Status: DISCONTINUED | OUTPATIENT
Start: 2024-07-31 | End: 2024-07-31 | Stop reason: HOSPADM

## 2024-07-31 RX ORDER — MEPERIDINE HYDROCHLORIDE 50 MG/ML
25 INJECTION INTRAMUSCULAR; INTRAVENOUS; SUBCUTANEOUS
Status: DISCONTINUED | OUTPATIENT
Start: 2024-07-31 | End: 2024-07-31 | Stop reason: HOSPADM

## 2024-07-31 RX ADMIN — RITUXIMAB-ABBS 900 MG: 10 INJECTION, SOLUTION INTRAVENOUS at 09:07

## 2024-07-31 RX ADMIN — ACETAMINOPHEN 650 MG: 325 TABLET ORAL at 08:07

## 2024-07-31 RX ADMIN — METHYLPREDNISOLONE SODIUM SUCCINATE 125 MG: 125 INJECTION, POWDER, FOR SOLUTION INTRAMUSCULAR; INTRAVENOUS at 08:07

## 2024-07-31 RX ADMIN — DIPHENHYDRAMINE HYDROCHLORIDE 50 MG: 50 INJECTION, SOLUTION INTRAMUSCULAR; INTRAVENOUS at 08:07

## 2024-07-31 RX ADMIN — FAMOTIDINE 20 MG: 10 INJECTION INTRAVENOUS at 08:07

## 2024-07-31 NOTE — PLAN OF CARE
1520 Patient tolerated C1D43 Truxima without incident. Labs reviewed and within parameters for treatment. Vitals stable before, during, and after treatment.  PIV flushed without resistance and positive for blood return before, during and after infusion. Pre-medicated per orders. Truxima started at 25 ml/hr and increased by 25 ml/hr until max rate reached of 200 ml/hr per order parameters: tolerated well. Patient aware of his next appointment date/time. To contact provider with questions or concerns. D/C ambulatory and stable with his wife.

## 2024-08-05 ENCOUNTER — TELEPHONE (OUTPATIENT)
Facility: CLINIC | Age: 39
End: 2024-08-05
Payer: COMMERCIAL

## 2024-08-06 ENCOUNTER — LAB VISIT (OUTPATIENT)
Dept: LAB | Facility: HOSPITAL | Age: 39
End: 2024-08-06
Attending: NURSE PRACTITIONER
Payer: COMMERCIAL

## 2024-08-06 DIAGNOSIS — C91.40 HAIRY CELL LEUKEMIA NOT HAVING ACHIEVED REMISSION: ICD-10-CM

## 2024-08-06 LAB
ALBUMIN SERPL BCP-MCNC: 4.4 G/DL (ref 3.5–5.2)
ALP SERPL-CCNC: 58 U/L (ref 55–135)
ALT SERPL W/O P-5'-P-CCNC: 38 U/L (ref 10–44)
ANION GAP SERPL CALC-SCNC: 7 MMOL/L (ref 8–16)
AST SERPL-CCNC: 15 U/L (ref 10–40)
BASOPHILS # BLD AUTO: 0.03 K/UL (ref 0–0.2)
BASOPHILS NFR BLD: 0.6 % (ref 0–1.9)
BILIRUB SERPL-MCNC: 0.6 MG/DL (ref 0.1–1)
BUN SERPL-MCNC: 15 MG/DL (ref 6–20)
CALCIUM SERPL-MCNC: 9.1 MG/DL (ref 8.7–10.5)
CHLORIDE SERPL-SCNC: 105 MMOL/L (ref 95–110)
CO2 SERPL-SCNC: 24 MMOL/L (ref 23–29)
CREAT SERPL-MCNC: 0.9 MG/DL (ref 0.5–1.4)
DIFFERENTIAL METHOD BLD: ABNORMAL
EOSINOPHIL # BLD AUTO: 0.2 K/UL (ref 0–0.5)
EOSINOPHIL NFR BLD: 3 % (ref 0–8)
ERYTHROCYTE [DISTWIDTH] IN BLOOD BY AUTOMATED COUNT: 14.6 % (ref 11.5–14.5)
EST. GFR  (NO RACE VARIABLE): >60 ML/MIN/1.73 M^2
GLUCOSE SERPL-MCNC: 99 MG/DL (ref 70–110)
HCT VFR BLD AUTO: 34.3 % (ref 40–54)
HGB BLD-MCNC: 11.4 G/DL (ref 14–18)
IMM GRANULOCYTES # BLD AUTO: 0.09 K/UL (ref 0–0.04)
IMM GRANULOCYTES NFR BLD AUTO: 1.8 % (ref 0–0.5)
LYMPHOCYTES # BLD AUTO: 0.5 K/UL (ref 1–4.8)
LYMPHOCYTES NFR BLD: 9 % (ref 18–48)
MCH RBC QN AUTO: 29.4 PG (ref 27–31)
MCHC RBC AUTO-ENTMCNC: 33.2 G/DL (ref 32–36)
MCV RBC AUTO: 88 FL (ref 82–98)
MONOCYTES # BLD AUTO: 0.4 K/UL (ref 0.3–1)
MONOCYTES NFR BLD: 8.8 % (ref 4–15)
NEUTROPHILS # BLD AUTO: 3.8 K/UL (ref 1.8–7.7)
NEUTROPHILS NFR BLD: 76.8 % (ref 38–73)
NRBC BLD-RTO: 0 /100 WBC
PLATELET # BLD AUTO: 162 K/UL (ref 150–450)
PMV BLD AUTO: 9.1 FL (ref 9.2–12.9)
POTASSIUM SERPL-SCNC: 3.7 MMOL/L (ref 3.5–5.1)
PROT SERPL-MCNC: 7.5 G/DL (ref 6–8.4)
RBC # BLD AUTO: 3.88 M/UL (ref 4.6–6.2)
SODIUM SERPL-SCNC: 136 MMOL/L (ref 136–145)
WBC # BLD AUTO: 4.99 K/UL (ref 3.9–12.7)

## 2024-08-06 PROCEDURE — 36415 COLL VENOUS BLD VENIPUNCTURE: CPT | Performed by: NURSE PRACTITIONER

## 2024-08-06 PROCEDURE — 85025 COMPLETE CBC W/AUTO DIFF WBC: CPT | Performed by: NURSE PRACTITIONER

## 2024-08-06 PROCEDURE — 80053 COMPREHEN METABOLIC PANEL: CPT | Performed by: NURSE PRACTITIONER

## 2024-08-07 ENCOUNTER — INFUSION (OUTPATIENT)
Dept: INFUSION THERAPY | Facility: HOSPITAL | Age: 39
End: 2024-08-07
Payer: COMMERCIAL

## 2024-08-07 VITALS
HEIGHT: 69 IN | DIASTOLIC BLOOD PRESSURE: 71 MMHG | WEIGHT: 247.25 LBS | SYSTOLIC BLOOD PRESSURE: 128 MMHG | BODY MASS INDEX: 36.62 KG/M2 | TEMPERATURE: 98 F | HEART RATE: 79 BPM | OXYGEN SATURATION: 99 % | RESPIRATION RATE: 17 BRPM

## 2024-08-07 DIAGNOSIS — C91.40 HAIRY CELL LEUKEMIA NOT HAVING ACHIEVED REMISSION: Primary | ICD-10-CM

## 2024-08-07 PROCEDURE — 96367 TX/PROPH/DG ADDL SEQ IV INF: CPT

## 2024-08-07 PROCEDURE — 96413 CHEMO IV INFUSION 1 HR: CPT

## 2024-08-07 PROCEDURE — 25000003 PHARM REV CODE 250: Performed by: INTERNAL MEDICINE

## 2024-08-07 PROCEDURE — 96415 CHEMO IV INFUSION ADDL HR: CPT

## 2024-08-07 PROCEDURE — 63600175 PHARM REV CODE 636 W HCPCS: Mod: JZ,JG | Performed by: INTERNAL MEDICINE

## 2024-08-07 PROCEDURE — 96375 TX/PRO/DX INJ NEW DRUG ADDON: CPT

## 2024-08-07 RX ORDER — FAMOTIDINE 10 MG/ML
20 INJECTION INTRAVENOUS
Status: COMPLETED | OUTPATIENT
Start: 2024-08-07 | End: 2024-08-07

## 2024-08-07 RX ORDER — SODIUM CHLORIDE 0.9 % (FLUSH) 0.9 %
10 SYRINGE (ML) INJECTION
Status: DISCONTINUED | OUTPATIENT
Start: 2024-08-07 | End: 2024-08-07 | Stop reason: HOSPADM

## 2024-08-07 RX ORDER — ACETAMINOPHEN 325 MG/1
650 TABLET ORAL
Status: COMPLETED | OUTPATIENT
Start: 2024-08-07 | End: 2024-08-07

## 2024-08-07 RX ORDER — METHYLPREDNISOLONE SOD SUCC 125 MG
125 VIAL (EA) INJECTION
Status: COMPLETED | OUTPATIENT
Start: 2024-08-07 | End: 2024-08-07

## 2024-08-07 RX ORDER — EPINEPHRINE 0.3 MG/.3ML
0.3 INJECTION SUBCUTANEOUS ONCE AS NEEDED
Status: DISCONTINUED | OUTPATIENT
Start: 2024-08-07 | End: 2024-08-07 | Stop reason: HOSPADM

## 2024-08-07 RX ORDER — DIPHENHYDRAMINE HYDROCHLORIDE 50 MG/ML
50 INJECTION INTRAMUSCULAR; INTRAVENOUS ONCE AS NEEDED
Status: DISCONTINUED | OUTPATIENT
Start: 2024-08-07 | End: 2024-08-07 | Stop reason: HOSPADM

## 2024-08-07 RX ORDER — MEPERIDINE HYDROCHLORIDE 50 MG/ML
25 INJECTION INTRAMUSCULAR; INTRAVENOUS; SUBCUTANEOUS
Status: DISCONTINUED | OUTPATIENT
Start: 2024-08-07 | End: 2024-08-07 | Stop reason: HOSPADM

## 2024-08-07 RX ORDER — HEPARIN 100 UNIT/ML
500 SYRINGE INTRAVENOUS
Status: DISCONTINUED | OUTPATIENT
Start: 2024-08-07 | End: 2024-08-07 | Stop reason: HOSPADM

## 2024-08-07 RX ADMIN — FAMOTIDINE 20 MG: 10 INJECTION INTRAVENOUS at 07:08

## 2024-08-07 RX ADMIN — RITUXIMAB-ABBS 900 MG: 10 INJECTION, SOLUTION INTRAVENOUS at 07:08

## 2024-08-07 RX ADMIN — ACETAMINOPHEN 650 MG: 325 TABLET ORAL at 07:08

## 2024-08-07 RX ADMIN — METHYLPREDNISOLONE SODIUM SUCCINATE 125 MG: 125 INJECTION, POWDER, FOR SOLUTION INTRAMUSCULAR; INTRAVENOUS at 07:08

## 2024-08-07 RX ADMIN — DIPHENHYDRAMINE HYDROCHLORIDE 50 MG: 50 INJECTION, SOLUTION INTRAMUSCULAR; INTRAVENOUS at 07:08

## 2024-08-08 ENCOUNTER — OFFICE VISIT (OUTPATIENT)
Facility: CLINIC | Age: 39
End: 2024-08-08
Payer: COMMERCIAL

## 2024-08-08 ENCOUNTER — TELEPHONE (OUTPATIENT)
Facility: CLINIC | Age: 39
End: 2024-08-08

## 2024-08-08 VITALS
HEIGHT: 69 IN | WEIGHT: 255.38 LBS | DIASTOLIC BLOOD PRESSURE: 58 MMHG | BODY MASS INDEX: 37.83 KG/M2 | SYSTOLIC BLOOD PRESSURE: 126 MMHG | HEART RATE: 75 BPM | TEMPERATURE: 98 F | RESPIRATION RATE: 16 BRPM

## 2024-08-08 DIAGNOSIS — D64.9 ANEMIA, UNSPECIFIED TYPE: ICD-10-CM

## 2024-08-08 DIAGNOSIS — D72.819 LEUKOPENIA, UNSPECIFIED TYPE: ICD-10-CM

## 2024-08-08 DIAGNOSIS — C91.40 HAIRY CELL LEUKEMIA NOT HAVING ACHIEVED REMISSION: Primary | ICD-10-CM

## 2024-08-08 DIAGNOSIS — D69.6 THROMBOCYTOPENIA: ICD-10-CM

## 2024-08-08 DIAGNOSIS — D70.1 CHEMOTHERAPY INDUCED NEUTROPENIA: ICD-10-CM

## 2024-08-08 DIAGNOSIS — D64.9 ANEMIA, NORMOCYTIC NORMOCHROMIC: ICD-10-CM

## 2024-08-08 DIAGNOSIS — D61.818 PANCYTOPENIA: ICD-10-CM

## 2024-08-08 DIAGNOSIS — T45.1X5A CHEMOTHERAPY INDUCED NEUTROPENIA: ICD-10-CM

## 2024-08-08 PROCEDURE — 99999 PR PBB SHADOW E&M-EST. PATIENT-LVL IV: CPT | Mod: PBBFAC,,, | Performed by: INTERNAL MEDICINE

## 2024-08-09 ENCOUNTER — PATIENT MESSAGE (OUTPATIENT)
Facility: CLINIC | Age: 39
End: 2024-08-09
Payer: COMMERCIAL

## 2024-08-19 ENCOUNTER — LAB VISIT (OUTPATIENT)
Dept: LAB | Facility: HOSPITAL | Age: 39
End: 2024-08-19
Attending: INTERNAL MEDICINE
Payer: COMMERCIAL

## 2024-08-19 ENCOUNTER — OFFICE VISIT (OUTPATIENT)
Dept: HEMATOLOGY/ONCOLOGY | Facility: CLINIC | Age: 39
End: 2024-08-19
Payer: COMMERCIAL

## 2024-08-19 VITALS
WEIGHT: 254.94 LBS | HEIGHT: 69 IN | BODY MASS INDEX: 37.76 KG/M2 | HEART RATE: 63 BPM | OXYGEN SATURATION: 98 % | RESPIRATION RATE: 18 BRPM | DIASTOLIC BLOOD PRESSURE: 77 MMHG | TEMPERATURE: 98 F | SYSTOLIC BLOOD PRESSURE: 124 MMHG

## 2024-08-19 DIAGNOSIS — T45.1X5A ANEMIA ASSOCIATED WITH CHEMOTHERAPY: ICD-10-CM

## 2024-08-19 DIAGNOSIS — C91.40 HAIRY CELL LEUKEMIA NOT HAVING ACHIEVED REMISSION: ICD-10-CM

## 2024-08-19 DIAGNOSIS — D64.81 ANEMIA ASSOCIATED WITH CHEMOTHERAPY: ICD-10-CM

## 2024-08-19 DIAGNOSIS — C91.40 HAIRY CELL LEUKEMIA NOT HAVING ACHIEVED REMISSION: Primary | ICD-10-CM

## 2024-08-19 DIAGNOSIS — D70.1 LEUKOPENIA DUE TO ANTINEOPLASTIC CHEMOTHERAPY: ICD-10-CM

## 2024-08-19 DIAGNOSIS — T45.1X5A LEUKOPENIA DUE TO ANTINEOPLASTIC CHEMOTHERAPY: ICD-10-CM

## 2024-08-19 LAB
ABO + RH BLD: NORMAL
ALBUMIN SERPL BCP-MCNC: 3.7 G/DL (ref 3.5–5.2)
ALP SERPL-CCNC: 62 U/L (ref 55–135)
ALT SERPL W/O P-5'-P-CCNC: 45 U/L (ref 10–44)
ANION GAP SERPL CALC-SCNC: 7 MMOL/L (ref 8–16)
AST SERPL-CCNC: 18 U/L (ref 10–40)
BASOPHILS # BLD AUTO: 0.02 K/UL (ref 0–0.2)
BASOPHILS NFR BLD: 0.5 % (ref 0–1.9)
BILIRUB SERPL-MCNC: 0.5 MG/DL (ref 0.1–1)
BLD GP AB SCN CELLS X3 SERPL QL: NORMAL
BUN SERPL-MCNC: 14 MG/DL (ref 6–20)
CALCIUM SERPL-MCNC: 9.4 MG/DL (ref 8.7–10.5)
CHLORIDE SERPL-SCNC: 106 MMOL/L (ref 95–110)
CO2 SERPL-SCNC: 24 MMOL/L (ref 23–29)
CREAT SERPL-MCNC: 1.1 MG/DL (ref 0.5–1.4)
DIFFERENTIAL METHOD BLD: ABNORMAL
EOSINOPHIL # BLD AUTO: 0.2 K/UL (ref 0–0.5)
EOSINOPHIL NFR BLD: 4.2 % (ref 0–8)
ERYTHROCYTE [DISTWIDTH] IN BLOOD BY AUTOMATED COUNT: 14.2 % (ref 11.5–14.5)
EST. GFR  (NO RACE VARIABLE): >60 ML/MIN/1.73 M^2
GLUCOSE SERPL-MCNC: 91 MG/DL (ref 70–110)
HCT VFR BLD AUTO: 33.4 % (ref 40–54)
HGB BLD-MCNC: 11.1 G/DL (ref 14–18)
IMM GRANULOCYTES # BLD AUTO: 0.02 K/UL (ref 0–0.04)
IMM GRANULOCYTES NFR BLD AUTO: 0.5 % (ref 0–0.5)
LDH SERPL L TO P-CCNC: 174 U/L (ref 110–260)
LYMPHOCYTES # BLD AUTO: 0.3 K/UL (ref 1–4.8)
LYMPHOCYTES NFR BLD: 8.6 % (ref 18–48)
MCH RBC QN AUTO: 30.1 PG (ref 27–31)
MCHC RBC AUTO-ENTMCNC: 33.2 G/DL (ref 32–36)
MCV RBC AUTO: 91 FL (ref 82–98)
MONOCYTES # BLD AUTO: 0.4 K/UL (ref 0.3–1)
MONOCYTES NFR BLD: 11.4 % (ref 4–15)
NEUTROPHILS # BLD AUTO: 2.9 K/UL (ref 1.8–7.7)
NEUTROPHILS NFR BLD: 74.8 % (ref 38–73)
NRBC BLD-RTO: 1 /100 WBC
PLATELET # BLD AUTO: 162 K/UL (ref 150–450)
PMV BLD AUTO: 9.8 FL (ref 9.2–12.9)
POTASSIUM SERPL-SCNC: 3.8 MMOL/L (ref 3.5–5.1)
PROT SERPL-MCNC: 6.8 G/DL (ref 6–8.4)
RBC # BLD AUTO: 3.69 M/UL (ref 4.6–6.2)
SODIUM SERPL-SCNC: 137 MMOL/L (ref 136–145)
SPECIMEN OUTDATE: NORMAL
WBC # BLD AUTO: 3.85 K/UL (ref 3.9–12.7)

## 2024-08-19 PROCEDURE — 86900 BLOOD TYPING SEROLOGIC ABO: CPT | Performed by: INTERNAL MEDICINE

## 2024-08-19 PROCEDURE — 86850 RBC ANTIBODY SCREEN: CPT | Performed by: INTERNAL MEDICINE

## 2024-08-19 PROCEDURE — 86901 BLOOD TYPING SEROLOGIC RH(D): CPT | Performed by: INTERNAL MEDICINE

## 2024-08-19 PROCEDURE — 36415 COLL VENOUS BLD VENIPUNCTURE: CPT | Performed by: INTERNAL MEDICINE

## 2024-08-19 PROCEDURE — 85025 COMPLETE CBC W/AUTO DIFF WBC: CPT | Performed by: INTERNAL MEDICINE

## 2024-08-19 PROCEDURE — 99999 PR PBB SHADOW E&M-EST. PATIENT-LVL IV: CPT | Mod: PBBFAC,,, | Performed by: INTERNAL MEDICINE

## 2024-08-19 PROCEDURE — 83615 LACTATE (LD) (LDH) ENZYME: CPT | Performed by: INTERNAL MEDICINE

## 2024-08-19 PROCEDURE — 80053 COMPREHEN METABOLIC PANEL: CPT | Performed by: INTERNAL MEDICINE

## 2024-08-19 PROCEDURE — 99214 OFFICE O/P EST MOD 30 MIN: CPT | Mod: S$GLB,,, | Performed by: INTERNAL MEDICINE

## 2024-08-19 NOTE — PROGRESS NOTES
Route Chart for Scheduling    BMT Chart Routing      Follow up with physician 4 months.   Follow up with YECENIA    Provider visit type Malignant hem   Infusion scheduling note    Injection scheduling note    Labs CBC, CMP and LDH   Scheduling:  Preferred lab:  Lab interval:  labs at time of return visit   Imaging    Pharmacy appointment    Other referrals

## 2024-08-26 NOTE — PROGRESS NOTES
HEMATOLOGIC MALIGNANCIES PROGRESS NOTE    IDENTIFYING STATEMENT   Gerardo Ramirez Jr. (Gerardo) is a 39 y.o. male with a  of 1985 from [place], [place] with the diagnosis of hairy cell leukemia.      ONCOLOGY HISTORY:    1. Hairy cell leukemia              A. 2024: Bone marrow biopsy - hairy cell leukemia with 95% marrow involvement; positive for BRAF V600E mutation     2. Anxiety  3. Hypertension  4. Hyperlipidemia  5. Obesity - Body mass index is 37.65 kg/m².  6. Hepatic steatosis    INTERVAL HISTORY:      Mr. Ramirez returns to clinic in follow-up of hairy cell leukemia. He has now completed concurrent rituximab-cladribine induction therapy. He presents to review therapy course and discuss next steps in disease management.     Course was complicated by rituximab infusion reactions at Dr. Cummings's office, and he eventually completed infusions here at a slower rate and with substantial premedication. Final infusions were uncomplicated.     He feels well. No residual adverse effects of therapy.     Past Medical History, Past Social History and Past Family History have been reviewed and are unchanged except as noted in the interval history.    MEDICATIONS:     Current Outpatient Medications on File Prior to Visit   Medication Sig Dispense Refill    acetaminophen (TYLENOL EXTRA STRENGTH) 500 MG tablet Take 1,000 mg by mouth once as needed for Pain.      acyclovir (ZOVIRAX) 400 MG tablet Take 1 tablet (400 mg total) by mouth 2 (two) times daily. 60 tablet 11    butalbital-acetaminophen-caff -40 mg Cap Take 1-2 capsules by mouth 3 (three) times daily. 30 capsule 1    ergocalciferol (VITAMIN D2) 50,000 unit Cap Take 1 capsule (50,000 Units total) by mouth every 7 days. 12 capsule 1    metoprolol succinate (TOPROL-XL) 50 MG 24 hr tablet Take 1 tablet (50 mg total) by mouth once daily. 90 tablet 1    olmesartan (BENICAR) 40 MG tablet Take 1 tablet (40 mg total) by mouth once daily. 90 tablet 1     ondansetron (ZOFRAN) 8 MG tablet Take 1 tablet (8 mg total) by mouth every 8 (eight) hours as needed for Nausea. 30 tablet 2    pitavastatin calcium (LIVALO) 2 mg Tab tablet Take 1 tablet (2 mg total) by mouth every evening. 90 tablet 1    promethazine (PHENERGAN) 25 MG tablet Take 1 tablet (25 mg total) by mouth every 4 to 6 hours as needed for Nausea. 30 tablet 2    sulfamethoxazole-trimethoprim 800-160mg (BACTRIM DS) 800-160 mg Tab Take 1 tablet by mouth every Mon, Wed, Fri. 24 tablet 3     Current Facility-Administered Medications on File Prior to Visit   Medication Dose Route Frequency Provider Last Rate Last Admin    alteplase injection 2 mg  2 mg Intra-Catheter PRN Chan Paredes MD        EPINEPHrine (EPIPEN) 0.3 mg/0.3 mL pen injection 0.3 mg  0.3 mg Intramuscular Once PRN Chan Paredes MD        heparin, porcine (PF) 100 unit/mL injection flush 500 Units  500 Units Intravenous PRN Chan Paredes MD        sodium chloride 0.9% flush 10 mL  10 mL Intravenous PRN Chan Paredes MD           ALLERGIES:   Review of patient's allergies indicates:   Allergen Reactions    Amoxicillin Hives        ROS:       Review of Systems   Constitutional:  Negative for diaphoresis, fatigue, fever and unexpected weight change.   HENT:   Negative for lump/mass and sore throat.    Eyes:  Negative for icterus.   Respiratory:  Negative for cough and shortness of breath.    Cardiovascular:  Negative for chest pain and palpitations.   Gastrointestinal:  Negative for abdominal distention, constipation, diarrhea, nausea and vomiting.   Genitourinary:  Negative for dysuria and frequency.    Musculoskeletal:  Negative for arthralgias, gait problem and myalgias.   Skin:  Negative for rash.   Neurological:  Negative for dizziness, gait problem and headaches.   Hematological:  Negative for adenopathy. Does not bruise/bleed easily.   Psychiatric/Behavioral:  The patient is not nervous/anxious.        PHYSICAL EXAM:  Vitals:     "08/19/24 1603   BP: 124/77   Pulse: 63   Resp: 18   Temp: 98.1 °F (36.7 °C)   TempSrc: Oral   SpO2: 98%   Weight: 115.6 kg (254 lb 15.4 oz)   Height: 5' 9" (1.753 m)   PainSc: 0-No pain       KARNOFSKY PERFORMANCE STATUS 80%  ECOG 1    Physical Exam  Constitutional:       General: He is not in acute distress.     Appearance: He is well-developed.   HENT:      Head: Normocephalic and atraumatic.      Mouth/Throat:      Mouth: No oral lesions.   Eyes:      Conjunctiva/sclera: Conjunctivae normal.   Neck:      Thyroid: No thyromegaly.   Cardiovascular:      Rate and Rhythm: Normal rate and regular rhythm.      Heart sounds: Normal heart sounds. No murmur heard.  Pulmonary:      Breath sounds: Normal breath sounds. No wheezing or rales.   Abdominal:      General: There is no distension.      Palpations: Abdomen is soft. There is no hepatomegaly, splenomegaly or mass.      Tenderness: There is no abdominal tenderness.   Lymphadenopathy:      Cervical: No cervical adenopathy.      Right cervical: No deep cervical adenopathy.     Left cervical: No deep cervical adenopathy.   Skin:     Findings: No rash.   Neurological:      Mental Status: He is alert and oriented to person, place, and time.      Cranial Nerves: No cranial nerve deficit.      Coordination: Coordination normal.      Deep Tendon Reflexes: Reflexes are normal and symmetric.         LAB:   Results for orders placed or performed in visit on 08/19/24   CBC Auto Differential   Result Value Ref Range    WBC 3.85 (L) 3.90 - 12.70 K/uL    RBC 3.69 (L) 4.60 - 6.20 M/uL    Hemoglobin 11.1 (L) 14.0 - 18.0 g/dL    Hematocrit 33.4 (L) 40.0 - 54.0 %    MCV 91 82 - 98 fL    MCH 30.1 27.0 - 31.0 pg    MCHC 33.2 32.0 - 36.0 g/dL    RDW 14.2 11.5 - 14.5 %    Platelets 162 150 - 450 K/uL    MPV 9.8 9.2 - 12.9 fL    Immature Granulocytes 0.5 0.0 - 0.5 %    Gran # (ANC) 2.9 1.8 - 7.7 K/uL    Immature Grans (Abs) 0.02 0.00 - 0.04 K/uL    Lymph # 0.3 (L) 1.0 - 4.8 K/uL    Mono # " 0.4 0.3 - 1.0 K/uL    Eos # 0.2 0.0 - 0.5 K/uL    Baso # 0.02 0.00 - 0.20 K/uL    nRBC 1 (A) 0 /100 WBC    Gran % 74.8 (H) 38.0 - 73.0 %    Lymph % 8.6 (L) 18.0 - 48.0 %    Mono % 11.4 4.0 - 15.0 %    Eosinophil % 4.2 0.0 - 8.0 %    Basophil % 0.5 0.0 - 1.9 %    Differential Method Automated    Comprehensive Metabolic Panel   Result Value Ref Range    Sodium 137 136 - 145 mmol/L    Potassium 3.8 3.5 - 5.1 mmol/L    Chloride 106 95 - 110 mmol/L    CO2 24 23 - 29 mmol/L    Glucose 91 70 - 110 mg/dL    BUN 14 6 - 20 mg/dL    Creatinine 1.1 0.5 - 1.4 mg/dL    Calcium 9.4 8.7 - 10.5 mg/dL    Total Protein 6.8 6.0 - 8.4 g/dL    Albumin 3.7 3.5 - 5.2 g/dL    Total Bilirubin 0.5 0.1 - 1.0 mg/dL    Alkaline Phosphatase 62 55 - 135 U/L    AST 18 10 - 40 U/L    ALT 45 (H) 10 - 44 U/L    eGFR >60.0 >60 mL/min/1.73 m^2    Anion Gap 7 (L) 8 - 16 mmol/L   Lactate Dehydrogenase   Result Value Ref Range     110 - 260 U/L   Type & Screen   Result Value Ref Range    Group & Rh A POS     Indirect Karin NEG     Specimen Outdate 08/22/2024 23:59        PROBLEMS ASSESSED THIS VISIT:    1. Hairy cell leukemia not having achieved remission    2. Leukopenia due to antineoplastic chemotherapy    3. Anemia associated with chemotherapy        PLAN:       Hairy cell leukemia  Mr. Ramirez has completed rituximab-cladribine induction therapy and has evidence of complete hematologic response.     We discussed that bone marrow biopsy for response assessment should be delayed 4-6 months from completion of therapy.     He will continue to follow with Dr. Cummings and return here for surveillance as well.     Leukopenia  Anemia  Mild. Due to chemotherapy. Monitor to recovery.     Follow-up  4 months     Chan Paredes MD  Hematology and Stem Cell Transplant

## 2024-09-03 ENCOUNTER — LAB VISIT (OUTPATIENT)
Dept: LAB | Facility: HOSPITAL | Age: 39
End: 2024-09-03
Attending: INTERNAL MEDICINE
Payer: COMMERCIAL

## 2024-09-03 DIAGNOSIS — C91.40 HAIRY CELL LEUKEMIA NOT HAVING ACHIEVED REMISSION: ICD-10-CM

## 2024-09-03 LAB
ALBUMIN SERPL BCP-MCNC: 4.3 G/DL (ref 3.5–5.2)
ALP SERPL-CCNC: 64 U/L (ref 55–135)
ALT SERPL W/O P-5'-P-CCNC: 34 U/L (ref 10–44)
ANION GAP SERPL CALC-SCNC: 8 MMOL/L (ref 8–16)
AST SERPL-CCNC: 16 U/L (ref 10–40)
BASOPHILS # BLD AUTO: 0.04 K/UL (ref 0–0.2)
BASOPHILS NFR BLD: 1 % (ref 0–1.9)
BILIRUB SERPL-MCNC: 0.5 MG/DL (ref 0.1–1)
BUN SERPL-MCNC: 16 MG/DL (ref 6–20)
CALCIUM SERPL-MCNC: 9.2 MG/DL (ref 8.7–10.5)
CHLORIDE SERPL-SCNC: 105 MMOL/L (ref 95–110)
CO2 SERPL-SCNC: 24 MMOL/L (ref 23–29)
CREAT SERPL-MCNC: 1 MG/DL (ref 0.5–1.4)
DIFFERENTIAL METHOD BLD: ABNORMAL
EOSINOPHIL # BLD AUTO: 0.2 K/UL (ref 0–0.5)
EOSINOPHIL NFR BLD: 5.2 % (ref 0–8)
ERYTHROCYTE [DISTWIDTH] IN BLOOD BY AUTOMATED COUNT: 13.4 % (ref 11.5–14.5)
EST. GFR  (NO RACE VARIABLE): >60 ML/MIN/1.73 M^2
GLUCOSE SERPL-MCNC: 96 MG/DL (ref 70–110)
HCT VFR BLD AUTO: 37.1 % (ref 40–54)
HGB BLD-MCNC: 12.4 G/DL (ref 14–18)
IMM GRANULOCYTES # BLD AUTO: 0.03 K/UL (ref 0–0.04)
IMM GRANULOCYTES NFR BLD AUTO: 0.8 % (ref 0–0.5)
LYMPHOCYTES # BLD AUTO: 0.4 K/UL (ref 1–4.8)
LYMPHOCYTES NFR BLD: 10.2 % (ref 18–48)
MCH RBC QN AUTO: 29.1 PG (ref 27–31)
MCHC RBC AUTO-ENTMCNC: 33.4 G/DL (ref 32–36)
MCV RBC AUTO: 87 FL (ref 82–98)
MONOCYTES # BLD AUTO: 0.4 K/UL (ref 0.3–1)
MONOCYTES NFR BLD: 11.2 % (ref 4–15)
NEUTROPHILS # BLD AUTO: 2.8 K/UL (ref 1.8–7.7)
NEUTROPHILS NFR BLD: 71.6 % (ref 38–73)
NRBC BLD-RTO: 1 /100 WBC
PLATELET # BLD AUTO: 198 K/UL (ref 150–450)
PMV BLD AUTO: 9.2 FL (ref 9.2–12.9)
POTASSIUM SERPL-SCNC: 3.9 MMOL/L (ref 3.5–5.1)
PROT SERPL-MCNC: 7.4 G/DL (ref 6–8.4)
RBC # BLD AUTO: 4.26 M/UL (ref 4.6–6.2)
SODIUM SERPL-SCNC: 137 MMOL/L (ref 136–145)
WBC # BLD AUTO: 3.84 K/UL (ref 3.9–12.7)

## 2024-09-03 PROCEDURE — 80053 COMPREHEN METABOLIC PANEL: CPT | Performed by: INTERNAL MEDICINE

## 2024-09-03 PROCEDURE — 85025 COMPLETE CBC W/AUTO DIFF WBC: CPT | Performed by: INTERNAL MEDICINE

## 2024-09-03 PROCEDURE — 36415 COLL VENOUS BLD VENIPUNCTURE: CPT | Performed by: INTERNAL MEDICINE

## 2024-09-05 ENCOUNTER — OFFICE VISIT (OUTPATIENT)
Facility: CLINIC | Age: 39
End: 2024-09-05
Payer: COMMERCIAL

## 2024-09-05 VITALS
RESPIRATION RATE: 16 BRPM | SYSTOLIC BLOOD PRESSURE: 146 MMHG | HEIGHT: 69 IN | TEMPERATURE: 97 F | DIASTOLIC BLOOD PRESSURE: 73 MMHG | BODY MASS INDEX: 37.95 KG/M2 | WEIGHT: 256.19 LBS | HEART RATE: 65 BPM

## 2024-09-05 DIAGNOSIS — T45.1X5A CHEMOTHERAPY-INDUCED PERIPHERAL NEUROPATHY: ICD-10-CM

## 2024-09-05 DIAGNOSIS — G62.0 CHEMOTHERAPY-INDUCED PERIPHERAL NEUROPATHY: ICD-10-CM

## 2024-09-05 DIAGNOSIS — C91.40 HAIRY CELL LEUKEMIA NOT HAVING ACHIEVED REMISSION: Primary | ICD-10-CM

## 2024-09-05 PROCEDURE — G2211 COMPLEX E/M VISIT ADD ON: HCPCS | Mod: S$GLB,,, | Performed by: NURSE PRACTITIONER

## 2024-09-05 PROCEDURE — 99999 PR PBB SHADOW E&M-EST. PATIENT-LVL III: CPT | Mod: PBBFAC,,, | Performed by: NURSE PRACTITIONER

## 2024-09-05 PROCEDURE — 99215 OFFICE O/P EST HI 40 MIN: CPT | Mod: S$GLB,,, | Performed by: NURSE PRACTITIONER

## 2024-09-05 NOTE — LETTER
September 5, 2024                     [facility]  [address]  [address]  [place] [address]  Phone: [phone]  Fax: [phone]   Patient: Gerardo Ramirez Jr.   MR Number: [id number]   YOB: 1985   Date of Visit: 9/5/2024     To whom it may concern,   Gerardo Ramirez Jr., will need to have light duty extended until October 15th due to side effects from chemotherapy. He is still under the care of Dr. Sung Cummings and myself for leukemia.       Sincerely,            Kendra Hooks, NP

## 2024-09-05 NOTE — PROGRESS NOTES
[facility]  PROGRESS NOTE -  Follow-up Visit      Subjective:       Patient ID:   NAME: Gerardo Ramirez Jr. : 1985     39 y.o. male    Referring Doc: Galindo Berrios Jr.,*  Other Physicians: Micheline Lanier/Brant Yeung           Chief Complaint: pancytopenia f/u    History of Present Illness:     Patient returns today for a regularly scheduled follow-up visit.  The patient is here today to go over the results of the recently ordered labs, tests and studies. He is here by himself.       He completed the last cycle of chemotherapy about a month ago. He is back to work doing light duty. He saw Dr Chan Paredes again on 2024. He is in CR. And due for BM due in December.     He is doing well now post-therapy      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)        ROS:   GEN: normal without any fever, night sweats or weight loss; no aches/pains,+ mild neuropathy  HEENT: normal with no HA's, sore throat, stiff neck, changes in vision  CV: normal with no CP, SOB, PND, TRUONG or orthopnea  PULM: normal with no SOB, cough, hemoptysis, sputum or pleuritic pain  GI: normal with no abdominal pain, nausea, vomiting, constipation, diarrhea, melanotic stools, BRBPR, or hematemesis  : normal with no hematuria, dysuria  BREAST: normal with no mass, discharge, pain  SKIN: normal with no rash, erythema, bruising, or swelling    Pain Scale:  0    Allergies:  Review of patient's allergies indicates:   Allergen Reactions    Amoxicillin Hives       Medications:    Current Outpatient Medications:     acetaminophen (TYLENOL EXTRA STRENGTH) 500 MG tablet, Take 1,000 mg by mouth once as needed for Pain., Disp: , Rfl:     acyclovir (ZOVIRAX) 400 MG tablet, Take 1 tablet (400 mg total) by mouth 2 (two) times daily., Disp: 60 tablet, Rfl: 11    butalbital-acetaminophen-caff -40 mg Cap, Take 1-2 capsules  "by mouth 3 (three) times daily., Disp: 30 capsule, Rfl: 1    ergocalciferol (VITAMIN D2) 50,000 unit Cap, Take 1 capsule (50,000 Units total) by mouth every 7 days., Disp: 12 capsule, Rfl: 1    metoprolol succinate (TOPROL-XL) 50 MG 24 hr tablet, Take 1 tablet (50 mg total) by mouth once daily., Disp: 90 tablet, Rfl: 1    olmesartan (BENICAR) 40 MG tablet, Take 1 tablet (40 mg total) by mouth once daily., Disp: 90 tablet, Rfl: 1    ondansetron (ZOFRAN) 8 MG tablet, Take 1 tablet (8 mg total) by mouth every 8 (eight) hours as needed for Nausea., Disp: 30 tablet, Rfl: 2    pitavastatin calcium (LIVALO) 2 mg Tab tablet, Take 1 tablet (2 mg total) by mouth every evening., Disp: 90 tablet, Rfl: 1    promethazine (PHENERGAN) 25 MG tablet, Take 1 tablet (25 mg total) by mouth every 4 to 6 hours as needed for Nausea., Disp: 30 tablet, Rfl: 2    sulfamethoxazole-trimethoprim 800-160mg (BACTRIM DS) 800-160 mg Tab, Take 1 tablet by mouth every Mon, Wed, Fri., Disp: 24 tablet, Rfl: 3  No current facility-administered medications for this visit.    Facility-Administered Medications Ordered in Other Visits:     alteplase injection 2 mg, 2 mg, Intra-Catheter, PRN, Chan Paredes MD    EPINEPHrine (EPIPEN) 0.3 mg/0.3 mL pen injection 0.3 mg, 0.3 mg, Intramuscular, Once PRN, Chan Paredes MD    heparin, porcine (PF) 100 unit/mL injection flush 500 Units, 500 Units, Intravenous, PRNReggie Carter T., MD    sodium chloride 0.9% flush 10 mL, 10 mL, Intravenous, PRNReggie Carter T., MD    PMHx/PSHx Updates:  See patient's last visit with Dr. Cummings on 8/8/2024  See H&P on 4/5/2024        Pathology:   Cancer Staging   No matching staging information was found for the patient.            Objective:     Vitals:  Blood pressure (!) 146/73, pulse 65, temperature 97 °F (36.1 °C), temperature source Temporal, resp. rate 16, height 5' 9" (1.753 m), weight 116.2 kg (256 lb 3.2 oz).    Physical Examination:   GEN: no apparent " distress, comfortable; AAOx3  HEAD: atraumatic and normocephalic  EYES: no pallor, no icterus, PERRLA  ENT: OMM, no pharyngeal erythema, external ears WNL; no nasal discharge; no thrush  NECK: no masses, thyroid normal, trachea midline, no LAD/LN's, supple  CV: RRR with no murmur; normal pulse; normal S1 and S2; no pedal edema  CHEST: Normal respiratory effort; CTAB; normal breath sounds; no wheeze or crackles  ABDOM: nontender and nondistended; soft; normal bowel sounds; no rebound/guarding  MUSC/Skeletal: ROM normal; no crepitus; joints normal; no deformities or arthropathy  EXTREM: no clubbing, cyanosis, inflammation or swelling  SKIN: no rashes, lesions, ulcers, petechiae or subcutaneous nodules  : no bah  NEURO: grossly intact; motor/sensory WNL; AAOx3; no tremors  PSYCH: normal mood, affect and behavior  LYMPH: normal cervical, supraclavicular, axillary and groin LN's            Labs:     Lab Results   Component Value Date    WBC 3.84 (L) 09/03/2024    HGB 12.4 (L) 09/03/2024    HCT 37.1 (L) 09/03/2024    MCV 87 09/03/2024     09/03/2024       CMP  Sodium   Date Value Ref Range Status   09/03/2024 137 136 - 145 mmol/L Final     Potassium   Date Value Ref Range Status   09/03/2024 3.9 3.5 - 5.1 mmol/L Final     Chloride   Date Value Ref Range Status   09/03/2024 105 95 - 110 mmol/L Final     CO2   Date Value Ref Range Status   09/03/2024 24 23 - 29 mmol/L Final     Glucose   Date Value Ref Range Status   09/03/2024 96 70 - 110 mg/dL Final     BUN   Date Value Ref Range Status   09/03/2024 16 6 - 20 mg/dL Final     Creatinine   Date Value Ref Range Status   09/03/2024 1.0 0.5 - 1.4 mg/dL Final     Calcium   Date Value Ref Range Status   09/03/2024 9.2 8.7 - 10.5 mg/dL Final     Total Protein   Date Value Ref Range Status   09/03/2024 7.4 6.0 - 8.4 g/dL Final     Albumin   Date Value Ref Range Status   09/03/2024 4.3 3.5 - 5.2 g/dL Final     Total Bilirubin   Date Value Ref Range Status   09/03/2024  0.5 0.1 - 1.0 mg/dL Final     Comment:     For infants and newborns, interpretation of results should be based  on gestational age, weight and in agreement with clinical  observations.    Premature Infant recommended reference ranges:  Up to 24 hours.............<8.0 mg/dL  Up to 48 hours............<12.0 mg/dL  3-5 days..................<15.0 mg/dL  6-29 days.................<15.0 mg/dL       Alkaline Phosphatase   Date Value Ref Range Status   09/03/2024 64 55 - 135 U/L Final     AST   Date Value Ref Range Status   09/03/2024 16 10 - 40 U/L Final     ALT   Date Value Ref Range Status   09/03/2024 34 10 - 44 U/L Final     Anion Gap   Date Value Ref Range Status   09/03/2024 8 8 - 16 mmol/L Final     eGFR   Date Value Ref Range Status   09/03/2024 >60.0 >60 mL/min/1.73 m^2 Final           Radiology/Diagnostic Studies:    CT Dx Bone Marrow Biopsy(ies) w/ Aspiration; Right(XPD)    Result Date: 4/25/2024  EXAMINATION: CT DIAGNOSTIC BONE MARROW BIOPSY(IES) WITH ASPIRATION RIGHT(XPD) CLINICAL HISTORY: Other pancytopenia COMPARISON: None. FINDINGS: After obtaining written informed consent, which included a discussion of the risks and benefits of the procedure to include infection and bleeding, and allowing the patient the opportunity to ask questions, the patient agreed to the procedure. The patient was placed prone on the CT fluoroscopy table. A suitable skin entrance site overlying the right posterior iliac spine was localized with CT guidance. The skin was marked, and then prepped and draped in sterile fashion, with several milliliters of lidocaine 1% injected subcutaneously and along the periosteum to achieve adequate local anesthesia. Following, a punch skin incision was made, and an 11-gauge Watly BV bone biopsy needle was advanced into the posterior iliac spine under intermittent CT fluoroscopic guidance.  Several aspirate attempts were made, with very little aspirate obtained.  A core biopsy sample was obtained.   Attention was then turned to the left posterior iliac spine, with the skin prepped and draped in sterile fashion, and lidocaine 1% injected to achieve adequate local anesthesia.  A punch skin incision was made and an 11-gauge Jamshidi bone biopsy needle was advanced into the left posterior iliac spine with intermittent CT fluoroscopic guidance.  Several aspirate attempts were made, with very little aspirate obtained.  The needle was removed and a bandage was applied to the skin. The patient tolerated the procedure well, with no immediate postprocedural complications. There was no significant blood loss. Total moderate conscious sedation time with supervision by the interventional radiologist and monitoring by the special procedures registered nurse was 25 minutes. The patient received Versed 4 mg and Fentanyl 200 mcg IV during the procedure.     CT guided bone marrow core biopsy and aspiration as described. Electronically signed by: Miguel A Goode Date:    04/25/2024 Time:    11:34    US Abdomen Complete    Result Date: 4/25/2024  EXAMINATION: US ABDOMEN COMPLETE CLINICAL HISTORY: Pancytopenia COMPARISON: June 2021 FINDINGS: Sonographic assessment of the abdomen was performed.  The pancreas is partially obscured by bowel gas.  Visualized portions are normal.  The aorta is normal in caliber in the upper abdomen.  The inferior vena cava is unremarkable. The liver has a normal sonographic appearance, with no mass or contour abnormality.  Maximal longitudinal dimension is 16.9 cm.  Hepato pedal flow is noted within the portal vein. The gallbladder is absent.  Common bile duct is normal in caliber at 5 mm. The bilateral kidneys have a normal appearance.  The spleen is enlarged measuring 15.0 cm in greatest longitudinal dimension.  There is no free fluid.     1. Mild splenomegaly. 2. Surgical absence of the gallbladder.        I have reviewed all available lab results and radiology reports.    Assessment/Plan:   (1) 39 y.o.  male with diagnosis of pancytopenia who has been referred by Galindo Berrios Jr.,* for evaluation by medical hematology/oncology.      - leucopenia with WBC at 2.7 with relatively adequate differential breakdown  - anemia with hgb at 12.3 - NCNC parameters  - mild thrombocytopenia with plats at 118,000     - his lab work was normal about 3 yrs ago; recent Hep C ab was nonreactive and HIV was negative     - he only took one dose of the imitrex and has not been on it with any consistence  - he has been on Livalo since Feb 2024 5/1/2024:  - He had bone marrow biopsy on 4/25/2024 with pathology showing 95% of the marrow with hairy cell Leukemia (HCL)  - US with only mild splenomegaly  - Already discussed and made [place] [facility] [place]     5/22/2024:  - He saw Dr Chan Paredes on 5/20/2024 and they are planning to proceed with a regimen of therapy with 2CDA , he also ordered CT scans of his chest/abdom/pelvis  - He wants to start therapy sometime after June 4th 2024  - await the recs and note from Dr Paredes  - set up chemotherapy school in meantime    6/13/2024:  - He saw Dr Chan Paredes on 5/20/2024 and has proceeded  with a regimen of therapy this past Monday;   - seems to be tolerating things well so far  - he sees Dr Paredes after he completes the 8 weeks (Aug 19th tentatively)    6/27/2024:  - He was recently hospitalized and discharged last Thursday. He was given couple of days of oral antibiotics to take at home which he has since completed.   - No fevers, some residual malaise; having a lot of aches in joints and hips; he has been on neupogen injections and last one was yesterday  - check labs today and if WBC better, then can hold off on the GCSF today    8/8/2024:  - He completed the last cycle of chemotherapy yesterday and has been on modified infusion due to his propensity to have reaction. He is back to work doing light duty.   - He sees Dr Chan Paredes again on 8/19/2024.  - check labs  monthly for now    9/5/2024:   - doing well post treatment   - due for bone marrow biopsy in December   - labs stable        (2) HTN and hypercholesterolemia     (3) Atrial fibrillation     (4) Hepatic steatosis; elevated LFts     (5) Anxiety     (6) Migraine HA's - was on Imitrex but only took one dose     (7) EASTON     (8) Former smoker - quit Nov 2022 (vapes)      (9) Occasional alcohol intake     (10) Hx/of hernia repair, cholecystectomy and vasectomy            VISIT DIAGNOSES:      Hairy cell leukemia not having achieved remission  -     CT Dx Bone Marrow Biopsy(ies) w/ Aspiration: Left(XPD); Future; Expected date: 12/12/2024    Chemotherapy-induced peripheral neuropathy        PLAN:  Check labs monthly for now  F/u with Dr Chan Paredes on 8/19; expect repeat bone marrow biopsy in December   Therapy as per Dr Paredes directives - s/p chemotherapy school with [facility] - discussed the side-effect profile of the drugs, provided literature on the drugs and obtained consents  F/u with PCP as directed  Doing well, having some neuropathy   His light duty will need to extend until middle of October due to side effects from chemotherapy     RTC in 4 weeks with Dr. ARCOS and 8 with me       Discussion:     Pathology Discussion:    I reviewed and discussed the pathology report(s) and radiograph reports (if available) in as simple to understand and/or laymen's terms to the best of my ability. I had an indepth conversation with the patient and went over the patient's individual diagnosis based on the information that was currently available. I discussed the TNM staging process with regard to the patient's particular cancer type, and the calculated stage based on the currently available TNM data and literature. I discussed the available prognostic data with regard to the current staging information and how it relates to the prognosis of their particular neoplastic process.      NCCN Guidelines:    I discussed the available treatment  "option(s) in accordance with the latest literature from the NCCN Clinical Practice Guidelines for the patient's particular type of cancer disorder. The NCCN Guidelines provide a "document evidence-based (and) consensus-driven management" of the care of oncology patients. The treatment recommendations were made not only in accordance to the NCCN guidelines, but also factored in to account the patient's overall age, condition, performance status and their medical co-morbidities. I went over the risks and benefits of the the treatment options (if any could be made) with regard to their particular cancer type, their cancer stage, their age, and their co-morbidities.       I have explained all of the above in detail and the patient understands all of the current recommendation(s). I have answered all of their questions to the best of my ability and to their complete satisfaction.   The patient is to continue with the current management plan.            Electronically signed by Kendra Rose NP                  "

## 2024-10-03 ENCOUNTER — TELEPHONE (OUTPATIENT)
Facility: CLINIC | Age: 39
End: 2024-10-03
Payer: COMMERCIAL

## 2024-10-08 ENCOUNTER — TELEPHONE (OUTPATIENT)
Dept: HEMATOLOGY/ONCOLOGY | Facility: CLINIC | Age: 39
End: 2024-10-08

## 2024-10-08 ENCOUNTER — LAB VISIT (OUTPATIENT)
Dept: LAB | Facility: HOSPITAL | Age: 39
End: 2024-10-08
Attending: INTERNAL MEDICINE
Payer: COMMERCIAL

## 2024-10-08 DIAGNOSIS — C91.40 HAIRY CELL LEUKEMIA NOT HAVING ACHIEVED REMISSION: ICD-10-CM

## 2024-10-08 LAB
ALBUMIN SERPL BCP-MCNC: 4.2 G/DL (ref 3.5–5.2)
ALP SERPL-CCNC: 61 U/L (ref 55–135)
ALT SERPL W/O P-5'-P-CCNC: 30 U/L (ref 10–44)
ANION GAP SERPL CALC-SCNC: 8 MMOL/L (ref 8–16)
AST SERPL-CCNC: 16 U/L (ref 10–40)
BASOPHILS # BLD AUTO: 0.03 K/UL (ref 0–0.2)
BASOPHILS NFR BLD: 0.6 % (ref 0–1.9)
BILIRUB SERPL-MCNC: 0.5 MG/DL (ref 0.1–1)
BUN SERPL-MCNC: 13 MG/DL (ref 6–20)
CALCIUM SERPL-MCNC: 9.1 MG/DL (ref 8.7–10.5)
CHLORIDE SERPL-SCNC: 104 MMOL/L (ref 95–110)
CO2 SERPL-SCNC: 23 MMOL/L (ref 23–29)
CREAT SERPL-MCNC: 1 MG/DL (ref 0.5–1.4)
DIFFERENTIAL METHOD BLD: ABNORMAL
EOSINOPHIL # BLD AUTO: 0.2 K/UL (ref 0–0.5)
EOSINOPHIL NFR BLD: 3.8 % (ref 0–8)
ERYTHROCYTE [DISTWIDTH] IN BLOOD BY AUTOMATED COUNT: 13.1 % (ref 11.5–14.5)
EST. GFR  (NO RACE VARIABLE): >60 ML/MIN/1.73 M^2
GLUCOSE SERPL-MCNC: 96 MG/DL (ref 70–110)
HCT VFR BLD AUTO: 40.3 % (ref 40–54)
HGB BLD-MCNC: 13.4 G/DL (ref 14–18)
IMM GRANULOCYTES # BLD AUTO: 0.04 K/UL (ref 0–0.04)
IMM GRANULOCYTES NFR BLD AUTO: 0.8 % (ref 0–0.5)
LYMPHOCYTES # BLD AUTO: 0.4 K/UL (ref 1–4.8)
LYMPHOCYTES NFR BLD: 8.2 % (ref 18–48)
MCH RBC QN AUTO: 29.1 PG (ref 27–31)
MCHC RBC AUTO-ENTMCNC: 33.3 G/DL (ref 32–36)
MCV RBC AUTO: 88 FL (ref 82–98)
MONOCYTES # BLD AUTO: 0.5 K/UL (ref 0.3–1)
MONOCYTES NFR BLD: 11.1 % (ref 4–15)
NEUTROPHILS # BLD AUTO: 3.6 K/UL (ref 1.8–7.7)
NEUTROPHILS NFR BLD: 75.5 % (ref 38–73)
NRBC BLD-RTO: 0 /100 WBC
PLATELET # BLD AUTO: 189 K/UL (ref 150–450)
PMV BLD AUTO: 9.8 FL (ref 9.2–12.9)
POTASSIUM SERPL-SCNC: 3.9 MMOL/L (ref 3.5–5.1)
PROT SERPL-MCNC: 7 G/DL (ref 6–8.4)
RBC # BLD AUTO: 4.6 M/UL (ref 4.6–6.2)
SODIUM SERPL-SCNC: 135 MMOL/L (ref 136–145)
WBC # BLD AUTO: 4.77 K/UL (ref 3.9–12.7)

## 2024-10-08 PROCEDURE — 85025 COMPLETE CBC W/AUTO DIFF WBC: CPT | Performed by: INTERNAL MEDICINE

## 2024-10-08 PROCEDURE — 36415 COLL VENOUS BLD VENIPUNCTURE: CPT | Performed by: INTERNAL MEDICINE

## 2024-10-08 PROCEDURE — 80053 COMPREHEN METABOLIC PANEL: CPT | Performed by: INTERNAL MEDICINE

## 2024-10-08 NOTE — TELEPHONE ENCOUNTER
"----- Message from Mandy sent at 10/8/2024  2:08 PM CDT -----  Regarding: Consult/Advisory          Name Of Caller: Self     Contact Preference?:   [phone]     Provider Name:   Reggie     Does patient feel the need to be seen today? No     What is the nature of the call?:    Calling to inform nurse a prior authorization for the pt's upcoming infusion was approved. It will need to be scheduled within 45 days. Pt will need to be transitioned out of hospital to an alternative infusion center        Additional Notes:  "Thank you for all that you do for our patients  "

## 2024-10-10 ENCOUNTER — OFFICE VISIT (OUTPATIENT)
Facility: CLINIC | Age: 39
End: 2024-10-10
Payer: COMMERCIAL

## 2024-10-10 VITALS
BODY MASS INDEX: 37.6 KG/M2 | HEART RATE: 75 BPM | SYSTOLIC BLOOD PRESSURE: 130 MMHG | OXYGEN SATURATION: 97 % | TEMPERATURE: 98 F | DIASTOLIC BLOOD PRESSURE: 77 MMHG | HEIGHT: 69 IN | WEIGHT: 253.88 LBS

## 2024-10-10 DIAGNOSIS — D69.6 THROMBOCYTOPENIA: ICD-10-CM

## 2024-10-10 DIAGNOSIS — C91.40 HAIRY CELL LEUKEMIA NOT HAVING ACHIEVED REMISSION: Primary | ICD-10-CM

## 2024-10-10 DIAGNOSIS — D61.818 PANCYTOPENIA: ICD-10-CM

## 2024-10-10 DIAGNOSIS — D64.9 ANEMIA, UNSPECIFIED TYPE: ICD-10-CM

## 2024-10-10 DIAGNOSIS — D72.819 LEUKOPENIA, UNSPECIFIED TYPE: ICD-10-CM

## 2024-10-10 DIAGNOSIS — D64.9 ANEMIA, NORMOCYTIC NORMOCHROMIC: ICD-10-CM

## 2024-10-10 PROCEDURE — 99999 PR PBB SHADOW E&M-EST. PATIENT-LVL III: CPT | Mod: PBBFAC,,, | Performed by: INTERNAL MEDICINE

## 2024-10-10 PROCEDURE — 99215 OFFICE O/P EST HI 40 MIN: CPT | Mod: S$GLB,,, | Performed by: INTERNAL MEDICINE

## 2024-10-10 PROCEDURE — G2211 COMPLEX E/M VISIT ADD ON: HCPCS | Mod: S$GLB,,, | Performed by: INTERNAL MEDICINE

## 2024-10-10 RX ORDER — TESTOSTERONE CYPIONATE 200 MG/ML
INJECTION, SOLUTION INTRAMUSCULAR
COMMUNITY
Start: 2024-10-10

## 2024-10-10 NOTE — PROGRESS NOTES
[facility]  PROGRESS NOTE -  Follow-up Visit      Subjective:       Patient ID:   NAME: Gerardo Ramirez Jr. : 1985     39 y.o. male    Referring Doc: Galindo Berrios Jr.,*  Other Physicians: Micheline Lanier/Brant Yeung           Chief Complaint: pancytopenia f/u    History of Present Illness:     Patient returns today for a regularly scheduled follow-up visit.  The patient is here today to go over the results of the recently ordered labs, tests and studies. He is here by himself today.    He has been back to work since Aug 2024 and is now back to full duty.      He completed the last cycle of chemotherapy in early Aug 2024. He  last saw Dr Chan Paredes again on 2024. Bone marrow biopsy follow-up is already ordered for in Dec 2024                              ROS:   GEN: normal without any fever, night sweats or weight loss; no aches/pains, no fevers; energy better  HEENT: normal with no HA's, sore throat, stiff neck, changes in vision  CV: normal with no CP, SOB, PND, TRUONG or orthopnea  PULM: normal with no SOB, cough, hemoptysis, sputum or pleuritic pain  GI: normal with no abdominal pain, nausea, vomiting, constipation, diarrhea, melanotic stools, BRBPR, or hematemesis  : normal with no hematuria, dysuria  BREAST: normal with no mass, discharge, pain  SKIN: normal with no rash, erythema, bruising, or swelling    Pain Scale:  0    Allergies:  Review of patient's allergies indicates:   Allergen Reactions    Amoxicillin Hives       Medications:    Current Outpatient Medications:     acetaminophen (TYLENOL EXTRA STRENGTH) 500 MG tablet, Take 1,000 mg by mouth once as needed for Pain., Disp: , Rfl:     acyclovir (ZOVIRAX) 400 MG tablet, Take 1 tablet (400 mg total) by mouth 2 (two) times daily., Disp: 60 tablet, Rfl: 11    butalbital-acetaminophen-caff -40 mg Cap, Take 1-2 capsules by mouth 3 (three) times daily., Disp: 30 capsule, Rfl: 1     "ergocalciferol (VITAMIN D2) 50,000 unit Cap, Take 1 capsule (50,000 Units total) by mouth every 7 days., Disp: 12 capsule, Rfl: 1    metoprolol succinate (TOPROL-XL) 50 MG 24 hr tablet, Take 1 tablet (50 mg total) by mouth once daily., Disp: 90 tablet, Rfl: 1    olmesartan (BENICAR) 40 MG tablet, Take 1 tablet (40 mg total) by mouth once daily., Disp: 90 tablet, Rfl: 1    ondansetron (ZOFRAN) 8 MG tablet, Take 1 tablet (8 mg total) by mouth every 8 (eight) hours as needed for Nausea., Disp: 30 tablet, Rfl: 2    pitavastatin calcium (LIVALO) 2 mg Tab tablet, Take 1 tablet (2 mg total) by mouth every evening., Disp: 90 tablet, Rfl: 1    promethazine (PHENERGAN) 25 MG tablet, Take 1 tablet (25 mg total) by mouth every 4 to 6 hours as needed for Nausea., Disp: 30 tablet, Rfl: 2    sulfamethoxazole-trimethoprim 800-160mg (BACTRIM DS) 800-160 mg Tab, Take 1 tablet by mouth every Mon, Wed, Fri., Disp: 24 tablet, Rfl: 3    testosterone cypionate (DEPOTESTOTERONE CYPIONATE) 200 mg/mL injection, SMARTSIG:Milliliter(s) IM, Disp: , Rfl:   No current facility-administered medications for this visit.    Facility-Administered Medications Ordered in Other Visits:     alteplase injection 2 mg, 2 mg, Intra-Catheter, PRN, Chan Paredes MD    EPINEPHrine (EPIPEN) 0.3 mg/0.3 mL pen injection 0.3 mg, 0.3 mg, Intramuscular, Once PRN, Chan Paredes MD    heparin, porcine (PF) 100 unit/mL injection flush 500 Units, 500 Units, Intravenous, PRNReggie Carter T., MD    sodium chloride 0.9% flush 10 mL, 10 mL, Intravenous, PRNReggie Carter T., MD    PMHx/PSHx Updates:  See patient's last visit with me on 8/8/2024.  See H&P on 4/5/2024        Pathology:   Cancer Staging   No matching staging information was found for the patient.            Objective:     Vitals:  Blood pressure 130/77, pulse 75, temperature 98 °F (36.7 °C), temperature source Temporal, height 5' 9" (1.753 m), weight 115.2 kg (253 lb 14.4 oz), SpO2 97%.    Physical " Examination:   GEN: no apparent distress, comfortable; AAOx3  HEAD: atraumatic and normocephalic  EYES: no pallor, no icterus, PERRLA  ENT: OMM, no pharyngeal erythema, external ears WNL; no nasal discharge; no thrush  NECK: no masses, thyroid normal, trachea midline, no LAD/LN's, supple  CV: RRR with no murmur; normal pulse; normal S1 and S2; no pedal edema  CHEST: Normal respiratory effort; CTAB; normal breath sounds; no wheeze or crackles  ABDOM: nontender and nondistended; soft; normal bowel sounds; no rebound/guarding  MUSC/Skeletal: ROM normal; no crepitus; joints normal; no deformities or arthropathy  EXTREM: no clubbing, cyanosis, inflammation or swelling  SKIN: no rashes, lesions, ulcers, petechiae or subcutaneous nodules  : no bah  NEURO: grossly intact; motor/sensory WNL; AAOx3; no tremors  PSYCH: normal mood, affect and behavior  LYMPH: normal cervical, supraclavicular, axillary and groin LN's            Labs:     Lab Results   Component Value Date    WBC 4.77 10/08/2024    HGB 13.4 (L) 10/08/2024    HCT 40.3 10/08/2024    MCV 88 10/08/2024     10/08/2024       Gran % 38.0 - 73.0 % 75     Lymph % 18.0 - 48.0 % 8.2     Immature Granulocytes 0.0 - 0.5 % 0.8 High      CMP  Sodium   Date Value Ref Range Status   10/08/2024 135 (L) 136 - 145 mmol/L Final     Potassium   Date Value Ref Range Status   10/08/2024 3.9 3.5 - 5.1 mmol/L Final     Chloride   Date Value Ref Range Status   10/08/2024 104 95 - 110 mmol/L Final     CO2   Date Value Ref Range Status   10/08/2024 23 23 - 29 mmol/L Final     Glucose   Date Value Ref Range Status   10/08/2024 96 70 - 110 mg/dL Final     BUN   Date Value Ref Range Status   10/08/2024 13 6 - 20 mg/dL Final     Creatinine   Date Value Ref Range Status   10/08/2024 1.0 0.5 - 1.4 mg/dL Final     Calcium   Date Value Ref Range Status   10/08/2024 9.1 8.7 - 10.5 mg/dL Final     Total Protein   Date Value Ref Range Status   10/08/2024 7.0 6.0 - 8.4 g/dL Final     Albumin    Date Value Ref Range Status   10/08/2024 4.2 3.5 - 5.2 g/dL Final     Total Bilirubin   Date Value Ref Range Status   10/08/2024 0.5 0.1 - 1.0 mg/dL Final     Comment:     For infants and newborns, interpretation of results should be based  on gestational age, weight and in agreement with clinical  observations.    Premature Infant recommended reference ranges:  Up to 24 hours.............<8.0 mg/dL  Up to 48 hours............<12.0 mg/dL  3-5 days..................<15.0 mg/dL  6-29 days.................<15.0 mg/dL       Alkaline Phosphatase   Date Value Ref Range Status   10/08/2024 61 55 - 135 U/L Final     AST   Date Value Ref Range Status   10/08/2024 16 10 - 40 U/L Final     ALT   Date Value Ref Range Status   10/08/2024 30 10 - 44 U/L Final     Anion Gap   Date Value Ref Range Status   10/08/2024 8 8 - 16 mmol/L Final     eGFR   Date Value Ref Range Status   10/08/2024 >60.0 >60 mL/min/1.73 m^2 Final           Radiology/Diagnostic Studies:    CT Dx Bone Marrow Biopsy(ies) w/ Aspiration; Right(XPD)    Result Date: 4/25/2024  EXAMINATION: CT DIAGNOSTIC BONE MARROW BIOPSY(IES) WITH ASPIRATION RIGHT(XPD) CLINICAL HISTORY: Other pancytopenia COMPARISON: None. FINDINGS: After obtaining written informed consent, which included a discussion of the risks and benefits of the procedure to include infection and bleeding, and allowing the patient the opportunity to ask questions, the patient agreed to the procedure. The patient was placed prone on the CT fluoroscopy table. A suitable skin entrance site overlying the right posterior iliac spine was localized with CT guidance. The skin was marked, and then prepped and draped in sterile fashion, with several milliliters of lidocaine 1% injected subcutaneously and along the periosteum to achieve adequate local anesthesia. Following, a punch skin incision was made, and an 11-gauge Light Harmonic bone biopsy needle was advanced into the posterior iliac spine under intermittent CT  fluoroscopic guidance.  Several aspirate attempts were made, with very little aspirate obtained.  A core biopsy sample was obtained.  Attention was then turned to the left posterior iliac spine, with the skin prepped and draped in sterile fashion, and lidocaine 1% injected to achieve adequate local anesthesia.  A punch skin incision was made and an 11-gauge Jamshidi bone biopsy needle was advanced into the left posterior iliac spine with intermittent CT fluoroscopic guidance.  Several aspirate attempts were made, with very little aspirate obtained.  The needle was removed and a bandage was applied to the skin. The patient tolerated the procedure well, with no immediate postprocedural complications. There was no significant blood loss. Total moderate conscious sedation time with supervision by the interventional radiologist and monitoring by the special procedures registered nurse was 25 minutes. The patient received Versed 4 mg and Fentanyl 200 mcg IV during the procedure.     CT guided bone marrow core biopsy and aspiration as described. Electronically signed by: Miguel A Goode Date:    04/25/2024 Time:    11:34    US Abdomen Complete    Result Date: 4/25/2024  EXAMINATION: US ABDOMEN COMPLETE CLINICAL HISTORY: Pancytopenia COMPARISON: June 2021 FINDINGS: Sonographic assessment of the abdomen was performed.  The pancreas is partially obscured by bowel gas.  Visualized portions are normal.  The aorta is normal in caliber in the upper abdomen.  The inferior vena cava is unremarkable. The liver has a normal sonographic appearance, with no mass or contour abnormality.  Maximal longitudinal dimension is 16.9 cm.  Hepato pedal flow is noted within the portal vein. The gallbladder is absent.  Common bile duct is normal in caliber at 5 mm. The bilateral kidneys have a normal appearance.  The spleen is enlarged measuring 15.0 cm in greatest longitudinal dimension.  There is no free fluid.     1. Mild splenomegaly. 2. Surgical  absence of the gallbladder.        I have reviewed all available lab results and radiology reports.    Assessment/Plan:   (1) 39 y.o. male with diagnosis of pancytopenia who has been referred by Galindo Berrios Jr.,* for evaluation by medical hematology/oncology.      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     5/22/2024:  - He saw Dr Chan Paredes on 5/20/2024 and they are planning to proceed with a regimen of therapy with 2CDA , he also ordered CT scans of his chest/abdom/pelvis  - He wants to start therapy sometime after June 4th 2024  - await the recs and note from Dr Paredes  - set up chemotherapy school in meantime    6/13/2024:  - He saw Dr Chan Paredes on 5/20/2024 and has proceeded  with a regimen of therapy this past Monday;   - seems to be tolerating things well so far  - he sees Dr Paredes after he completes the 8 weeks (Aug 19th tentatively)    6/27/2024:  - He was recently hospitalized and discharged last Thursday. He was given couple of days of oral antibiotics to take at home which he has since completed.   - No fevers, some residual malaise; having a lot of aches in joints and hips; he has been on neupogen injections and last one was yesterday  - check labs today and if WBC better, then can hold off on the GCSF today    8/8/2024:  - He completed the last cycle of chemotherapy yesterday and has been on modified infusion due to  his propensity to have reaction. He is back to work doing light duty.   - He sees Dr Chan Paredes again on 8/19/2024.  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    Anemia, unspecified type    Anemia, normocytic normochromic    Leukopenia, unspecified type    Pancytopenia    Thrombocytopenia          PLAN:  Check labs monthly for now  F/u with Dr Chan Paredes on 12/19; expect repeat bone marrow biopsy on 12/12  Therapy as per Dr Paredes directives - s/p chemotherapy school with Do - discussed the side-effect profile of the drugs, provided literature on the drugs and obtained consents  F/u with PCP as directed     RTC in  4 weeks with Do and 8 weeks with Myself  Fax note to Galindo Berrios Jr.,* Brant Lanier C Beth[place], O'Quin Sharp, Jonathan SHETH III, MD, Chan Paredes    Discussion:     Pathology Discussion:    I reviewed and discussed the pathology report(s) and radiograph reports (if available) in as simple to understand and/or laymen's terms to the best of my ability. I had an indepth conversation with the patient and went over the patient's individual diagnosis based on the information that was currently available. I discussed the TNM staging process with regard to the patient's particular cancer type, and the calculated stage based on the currently available TNM data and literature. I discussed the available  "prognostic data with regard to the current staging information and how it relates to the prognosis of their particular neoplastic process.      NCCN Guidelines:    I discussed the available treatment option(s) in accordance with the latest literature from the NCCN Clinical Practice Guidelines for the patient's particular type of cancer disorder. The NCCN Guidelines provide a "document evidence-based (and) consensus-driven management" of the care of oncology patients. The treatment recommendations were made not only in accordance to the NCCN guidelines, but also factored in to account the patient's overall age, condition, performance status and their medical co-morbidities. I went over the risks and benefits of the the treatment options (if any could be made) with regard to their particular cancer type, their cancer stage, their age, and their co-morbidities.     I spent over 25 mins of time with the patient. Reviewed results of the recently ordered labs, tests and studies; made directives with regards to the results. Over half of this time was spent couseling and coordinating care.    I have explained all of the above in detail and the patient understands all of the current recommendation(s). I have answered all of their questions to the best of my ability and to their complete satisfaction.   The patient is to continue with the current management plan.            Electronically signed by Sung Cummings MD                    "

## 2024-10-31 ENCOUNTER — TELEPHONE (OUTPATIENT)
Facility: CLINIC | Age: 39
End: 2024-10-31
Payer: COMMERCIAL

## 2024-11-04 ENCOUNTER — OFFICE VISIT (OUTPATIENT)
Dept: FAMILY MEDICINE | Facility: CLINIC | Age: 39
End: 2024-11-04
Payer: COMMERCIAL

## 2024-11-04 VITALS
DIASTOLIC BLOOD PRESSURE: 76 MMHG | SYSTOLIC BLOOD PRESSURE: 138 MMHG | TEMPERATURE: 98 F | BODY MASS INDEX: 38.35 KG/M2 | OXYGEN SATURATION: 97 % | HEART RATE: 97 BPM | WEIGHT: 258.94 LBS | HEIGHT: 69 IN

## 2024-11-04 DIAGNOSIS — I10 ESSENTIAL HYPERTENSION: ICD-10-CM

## 2024-11-04 DIAGNOSIS — K76.0 FATTY LIVER: ICD-10-CM

## 2024-11-04 DIAGNOSIS — E29.1 HYPOGONADISM IN MALE: ICD-10-CM

## 2024-11-04 DIAGNOSIS — G47.33 OBSTRUCTIVE SLEEP APNEA: ICD-10-CM

## 2024-11-04 DIAGNOSIS — Z98.52 S/P VASECTOMY: ICD-10-CM

## 2024-11-04 DIAGNOSIS — T45.1X5A CHEMOTHERAPY-INDUCED PERIPHERAL NEUROPATHY: Primary | ICD-10-CM

## 2024-11-04 DIAGNOSIS — Z87.19 S/P BILATERAL INGUINAL HERNIA REPAIR: ICD-10-CM

## 2024-11-04 DIAGNOSIS — Z98.890 S/P BILATERAL INGUINAL HERNIA REPAIR: ICD-10-CM

## 2024-11-04 DIAGNOSIS — C91.40 HAIRY CELL LEUKEMIA NOT HAVING ACHIEVED REMISSION: ICD-10-CM

## 2024-11-04 DIAGNOSIS — E78.2 MIXED HYPERLIPIDEMIA: ICD-10-CM

## 2024-11-04 DIAGNOSIS — G62.0 CHEMOTHERAPY-INDUCED PERIPHERAL NEUROPATHY: Primary | ICD-10-CM

## 2024-11-04 DIAGNOSIS — N52.9 ERECTILE DYSFUNCTION, UNSPECIFIED ERECTILE DYSFUNCTION TYPE: ICD-10-CM

## 2024-11-04 PROCEDURE — 99999 PR PBB SHADOW E&M-EST. PATIENT-LVL IV: CPT | Mod: PBBFAC,,, | Performed by: FAMILY MEDICINE

## 2024-11-04 RX ORDER — SILDENAFIL 100 MG/1
TABLET, FILM COATED ORAL
Qty: 10 TABLET | Refills: 2 | Status: SHIPPED | OUTPATIENT
Start: 2024-11-04

## 2024-11-04 RX ORDER — ANASTROZOLE 1 MG/1
1 TABLET ORAL
COMMUNITY
Start: 2024-10-29

## 2024-11-05 NOTE — PROGRESS NOTES
Subjective:       Patient ID: Gerardo Ramirez Jr. is a 39 y.o. male.    Chief Complaint: Establish Care    Here for new patient visit.  Was seeing Dr. Martinez.      Social history former smoker quit 3 years ago.  Pack per day for 22 years.  No significant alcohol intake now.  Does exercise at the gym over the past month.  Works at [employer]    Family history diabetes mellitus type 2 hypertension coronary artery disease.  Colon cancer in maternal uncle.  Maternal grandmother pancreatic cancer.  Maternal aunt with brain cancer.      Immunizations declines flu shot.      Past medical history.  Status post chemotherapy and immunotherapy for hairy cell leukemia.  Completed treatment this past summer.  Bilateral inguinal hernia repairs.  Cholecystectomy.  Vasectomy.  Chronic neuropathy.  Started B vitamins in his somewhat better.  Hyperlipidemia on Livalo.  BMI of 38.  Fatty liver.  Obstructive sleep apnea with CPAP.  Hypertension well-controlled.  Hypogonadism started on testosterone injections and Arimidex.  Also erectile dysfunction.  Headache with Cialis.      Review of Systems   Constitutional: Negative.    HENT: Negative.     Eyes: Negative.    Respiratory: Negative.     Cardiovascular: Negative.    Gastrointestinal: Negative.    Endocrine: Negative.    Genitourinary: Negative.    Musculoskeletal: Negative.    Skin: Negative.    Neurological: Negative.    Hematological: Negative.    Psychiatric/Behavioral: Negative.         Objective:      Physical Exam  Vitals reviewed.   Constitutional:       Appearance: Normal appearance. He is well-developed and normal weight.   HENT:      Head: Normocephalic and atraumatic.      Right Ear: Tympanic membrane normal.      Left Ear: Tympanic membrane normal.      Nose: Nose normal.      Mouth/Throat:      Mouth: Mucous membranes are moist.      Pharynx: No oropharyngeal exudate.   Eyes:      Conjunctiva/sclera: Conjunctivae normal.      Pupils: Pupils are equal,  round, and reactive to light.   Neck:      Vascular: No carotid bruit.   Cardiovascular:      Rate and Rhythm: Normal rate and regular rhythm.      Pulses: Normal pulses.      Heart sounds: Normal heart sounds. No murmur heard.     No gallop.   Pulmonary:      Effort: Pulmonary effort is normal.      Breath sounds: Normal breath sounds.   Abdominal:      General: Bowel sounds are normal.      Palpations: Abdomen is soft. There is no mass.      Tenderness: There is no abdominal tenderness.   Musculoskeletal:         General: Normal range of motion.      Cervical back: Normal range of motion.   Skin:     General: Skin is warm and dry.   Neurological:      General: No focal deficit present.      Mental Status: He is alert and oriented to person, place, and time.   Psychiatric:         Mood and Affect: Mood normal.         Behavior: Behavior normal.         Assessment:       1. Chemotherapy-induced peripheral neuropathy    2. Essential hypertension    3. Mixed hyperlipidemia    4. 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 S/P bilateral inguinal hernia repair    6. S/P vasectomy    7. BMI 28.0-28.9,adult    8. Fatty liver    9. Hypogonadism in male    10. Erectile dysfunction, unspecified erectile dysfunction type    11. Obstructive sleep apnea        Plan:       Chemotherapy-induced peripheral neuropathy    Essential hypertension  -     Cancel: Hemoglobin A1C; Future; Expected date: 11/04/2024  -     Cancel: Lipid Panel; Future; Expected date: 11/04/2024  -     Lipid Panel; Future; Expected date: 11/04/2024  -     Hemoglobin A1C; Future; Expected date: 11/04/2024    Mixed hyperlipidemia  -     Cancel: Hemoglobin A1C; Future; Expected date: 11/04/2024  -     Cancel: Lipid Panel; Future; Expected date: 11/04/2024  -     Lipid Panel; Future; Expected date: 11/04/2024  -     Hemoglobin A1C; Future; Expected date: 11/04/2024    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  Diet and weight reduction.  Try Viagra 100 mg instead of the Cialis try 1/2 pill p.r.n..  Continue Oncology follow-up.  Continue with his current medications.

## 2024-11-06 ENCOUNTER — LAB VISIT (OUTPATIENT)
Dept: LAB | Facility: HOSPITAL | Age: 39
End: 2024-11-06
Attending: INTERNAL MEDICINE
Payer: COMMERCIAL

## 2024-11-06 DIAGNOSIS — C91.40 HAIRY CELL LEUKEMIA NOT HAVING ACHIEVED REMISSION: ICD-10-CM

## 2024-11-06 LAB
ALBUMIN SERPL BCP-MCNC: 4.8 G/DL (ref 3.5–5.2)
ALP SERPL-CCNC: 69 U/L (ref 55–135)
ALT SERPL W/O P-5'-P-CCNC: 38 U/L (ref 10–44)
ANION GAP SERPL CALC-SCNC: 9 MMOL/L (ref 8–16)
AST SERPL-CCNC: 33 U/L (ref 10–40)
BASOPHILS # BLD AUTO: 0.04 K/UL (ref 0–0.2)
BASOPHILS NFR BLD: 0.6 % (ref 0–1.9)
BILIRUB SERPL-MCNC: 0.8 MG/DL (ref 0.1–1)
BUN SERPL-MCNC: 14 MG/DL (ref 6–20)
CALCIUM SERPL-MCNC: 9.3 MG/DL (ref 8.7–10.5)
CHLORIDE SERPL-SCNC: 103 MMOL/L (ref 95–110)
CO2 SERPL-SCNC: 22 MMOL/L (ref 23–29)
CREAT SERPL-MCNC: 1 MG/DL (ref 0.5–1.4)
DIFFERENTIAL METHOD BLD: ABNORMAL
EOSINOPHIL # BLD AUTO: 0.1 K/UL (ref 0–0.5)
EOSINOPHIL NFR BLD: 1.5 % (ref 0–8)
ERYTHROCYTE [DISTWIDTH] IN BLOOD BY AUTOMATED COUNT: 13.3 % (ref 11.5–14.5)
EST. GFR  (NO RACE VARIABLE): >60 ML/MIN/1.73 M^2
GLUCOSE SERPL-MCNC: 102 MG/DL (ref 70–110)
HCT VFR BLD AUTO: 44.2 % (ref 40–54)
HGB BLD-MCNC: 14.9 G/DL (ref 14–18)
IMM GRANULOCYTES # BLD AUTO: 0.03 K/UL (ref 0–0.04)
IMM GRANULOCYTES NFR BLD AUTO: 0.5 % (ref 0–0.5)
LYMPHOCYTES # BLD AUTO: 0.5 K/UL (ref 1–4.8)
LYMPHOCYTES NFR BLD: 7.4 % (ref 18–48)
MCH RBC QN AUTO: 28.4 PG (ref 27–31)
MCHC RBC AUTO-ENTMCNC: 33.7 G/DL (ref 32–36)
MCV RBC AUTO: 84 FL (ref 82–98)
MONOCYTES # BLD AUTO: 0.7 K/UL (ref 0.3–1)
MONOCYTES NFR BLD: 10.9 % (ref 4–15)
NEUTROPHILS # BLD AUTO: 5.2 K/UL (ref 1.8–7.7)
NEUTROPHILS NFR BLD: 79.1 % (ref 38–73)
NRBC BLD-RTO: 0 /100 WBC
PLATELET # BLD AUTO: 209 K/UL (ref 150–450)
PMV BLD AUTO: 9.1 FL (ref 9.2–12.9)
POTASSIUM SERPL-SCNC: 3.7 MMOL/L (ref 3.5–5.1)
PROT SERPL-MCNC: 7.9 G/DL (ref 6–8.4)
RBC # BLD AUTO: 5.24 M/UL (ref 4.6–6.2)
SODIUM SERPL-SCNC: 134 MMOL/L (ref 136–145)
WBC # BLD AUTO: 6.53 K/UL (ref 3.9–12.7)

## 2024-11-06 PROCEDURE — 36415 COLL VENOUS BLD VENIPUNCTURE: CPT | Performed by: NURSE PRACTITIONER

## 2024-11-06 PROCEDURE — 80053 COMPREHEN METABOLIC PANEL: CPT | Performed by: NURSE PRACTITIONER

## 2024-11-06 PROCEDURE — 85025 COMPLETE CBC W/AUTO DIFF WBC: CPT | Performed by: NURSE PRACTITIONER

## 2024-11-07 ENCOUNTER — OFFICE VISIT (OUTPATIENT)
Facility: CLINIC | Age: 39
End: 2024-11-07
Payer: COMMERCIAL

## 2024-11-07 VITALS
DIASTOLIC BLOOD PRESSURE: 75 MMHG | WEIGHT: 254.88 LBS | TEMPERATURE: 98 F | BODY MASS INDEX: 37.64 KG/M2 | SYSTOLIC BLOOD PRESSURE: 130 MMHG | RESPIRATION RATE: 16 BRPM | HEART RATE: 77 BPM

## 2024-11-07 DIAGNOSIS — T45.1X5A CHEMOTHERAPY-INDUCED PERIPHERAL NEUROPATHY: ICD-10-CM

## 2024-11-07 DIAGNOSIS — C91.41 HAIRY CELL LEUKEMIA, IN REMISSION: Primary | ICD-10-CM

## 2024-11-07 DIAGNOSIS — G62.0 CHEMOTHERAPY-INDUCED PERIPHERAL NEUROPATHY: ICD-10-CM

## 2024-11-07 PROCEDURE — 99999 PR PBB SHADOW E&M-EST. PATIENT-LVL III: CPT | Mod: PBBFAC,,, | Performed by: NURSE PRACTITIONER

## 2024-11-07 PROCEDURE — 99214 OFFICE O/P EST MOD 30 MIN: CPT | Mod: S$GLB,,, | Performed by: NURSE PRACTITIONER

## 2024-11-07 PROCEDURE — G2211 COMPLEX E/M VISIT ADD ON: HCPCS | Mod: S$GLB,,, | Performed by: NURSE PRACTITIONER

## 2024-11-07 NOTE — PROGRESS NOTES
[facility]  PROGRESS NOTE -  Follow-up Visit      Subjective:       Patient ID:   NAME: Gerardo Ramirez Jr. : 1985     39 y.o. male    Referring Doc: Galindo Berrios Jr.,*  Other Physicians: Micheline Lanier/Brant Yeung           Chief Complaint: pancytopenia f/u    History of Present Illness:     Patient returns today for a regularly scheduled follow-up visit.  The patient is here today to go over the results of the recently ordered labs, tests and studies. He is here by himself today.    He has been back to work since Aug 2024 and is now back to full duty.      He completed the last cycle of chemotherapy in early Aug 2024. He  last saw Dr Chan Paredes again on 2024. Bone marrow biopsy follow-up is already ordered for in Dec 2024                              ROS:   GEN: normal without any fever, night sweats or weight loss; no aches/pains, no fevers; energy better  HEENT: normal with no HA's, sore throat, stiff neck, changes in vision  CV: normal with no CP, SOB, PND, TRUONG or orthopnea  PULM: normal with no SOB, cough, hemoptysis, sputum or pleuritic pain  GI: normal with no abdominal pain, nausea, vomiting, constipation, diarrhea, melanotic stools, BRBPR, or hematemesis  : normal with no hematuria, dysuria  BREAST: normal with no mass, discharge, pain  SKIN: normal with no rash, erythema, bruising, or swelling    Pain Scale:  0    Allergies:  Review of patient's allergies indicates:   Allergen Reactions    Amoxicillin Hives       Medications:    Current Outpatient Medications:     acetaminophen (TYLENOL EXTRA STRENGTH) 500 MG tablet, Take 1,000 mg by mouth once as needed for Pain., Disp: , Rfl:     acyclovir (ZOVIRAX) 400 MG tablet, Take 1 tablet (400 mg total) by mouth 2 (two) times daily., Disp: 60 tablet, Rfl: 11    anastrozole (ARIMIDEX) 1 mg Tab, Take 1 mg by mouth every 7 days., Disp: , Rfl:     butalbital-acetaminophen-caff -40 mg Cap, Take  1-2 capsules by mouth 3 (three) times daily., Disp: 30 capsule, Rfl: 1    ergocalciferol (VITAMIN D2) 50,000 unit Cap, Take 1 capsule (50,000 Units total) by mouth every 7 days., Disp: 12 capsule, Rfl: 1    metoprolol succinate (TOPROL-XL) 50 MG 24 hr tablet, Take 1 tablet (50 mg total) by mouth once daily., Disp: 90 tablet, Rfl: 1    olmesartan (BENICAR) 40 MG tablet, Take 1 tablet (40 mg total) by mouth once daily., Disp: 90 tablet, Rfl: 1    pitavastatin calcium (LIVALO) 2 mg Tab tablet, Take 1 tablet (2 mg total) by mouth every evening., Disp: 90 tablet, Rfl: 1    sildenafiL (VIAGRA) 100 MG tablet, Take 1- 1/2 tab daily prn erectile dysfunction, Disp: 10 tablet, Rfl: 2    sulfamethoxazole-trimethoprim 800-160mg (BACTRIM DS) 800-160 mg Tab, Take 1 tablet by mouth every Mon, Wed, Fri., Disp: 24 tablet, Rfl: 3    testosterone cypionate (DEPOTESTOTERONE CYPIONATE) 200 mg/mL injection, SMARTSIG:Milliliter(s) IM, Disp: , Rfl:     ondansetron (ZOFRAN) 8 MG tablet, Take 1 tablet (8 mg total) by mouth every 8 (eight) hours as needed for Nausea. (Patient not taking: Reported on 11/7/2024), Disp: 30 tablet, Rfl: 2    promethazine (PHENERGAN) 25 MG tablet, Take 1 tablet (25 mg total) by mouth every 4 to 6 hours as needed for Nausea. (Patient not taking: Reported on 11/7/2024), Disp: 30 tablet, Rfl: 2  No current facility-administered medications for this visit.    Facility-Administered Medications Ordered in Other Visits:     alteplase injection 2 mg, 2 mg, Intra-Catheter, PRN, Chan Paredes MD    EPINEPHrine (EPIPEN) 0.3 mg/0.3 mL pen injection 0.3 mg, 0.3 mg, Intramuscular, Once PRN, Chan Paredes MD    heparin, porcine (PF) 100 unit/mL injection flush 500 Units, 500 Units, Intravenous, PRN, Chan Paredes MD    sodium chloride 0.9% flush 10 mL, 10 mL, Intravenous, PRN, Chan Paredes MD    PMHx/PSHx Updates:  See patient's last visit with me on 8/8/2024.  See H&P on 4/5/2024        Pathology:   Cancer  Staging   No matching staging information was found for the patient.            Objective:     Vitals:  Blood pressure 130/75, pulse 77, temperature 98.2 °F (36.8 °C), resp. rate 16, weight 115.6 kg (254 lb 14.4 oz).    Physical Examination:   GEN: no apparent distress, comfortable; AAOx3  HEAD: atraumatic and normocephalic  EYES: no pallor, no icterus, PERRLA  ENT: OMM, no pharyngeal erythema, external ears WNL; no nasal discharge; no thrush  NECK: no masses, thyroid normal, trachea midline, no LAD/LN's, supple  CV: RRR with no murmur; normal pulse; normal S1 and S2; no pedal edema  CHEST: Normal respiratory effort; CTAB; normal breath sounds; no wheeze or crackles  ABDOM: nontender and nondistended; soft; normal bowel sounds; no rebound/guarding  MUSC/Skeletal: ROM normal; no crepitus; joints normal; no deformities or arthropathy  EXTREM: no clubbing, cyanosis, inflammation or swelling  SKIN: no rashes, lesions, ulcers, petechiae or subcutaneous nodules  : no bah  NEURO: grossly intact; motor/sensory WNL; AAOx3; no tremors  PSYCH: normal mood, affect and behavior  LYMPH: normal cervical, supraclavicular, axillary and groin LN's            Labs:     Lab Results   Component Value Date    WBC 6.53 11/06/2024    HGB 14.9 11/06/2024    HCT 44.2 11/06/2024    MCV 84 11/06/2024     11/06/2024       Gran % 38.0 - 73.0 % 75     Lymph % 18.0 - 48.0 % 8.2     Immature Granulocytes 0.0 - 0.5 % 0.8 High      CMP  Sodium   Date Value Ref Range Status   11/06/2024 134 (L) 136 - 145 mmol/L Final     Potassium   Date Value Ref Range Status   11/06/2024 3.7 3.5 - 5.1 mmol/L Final     Chloride   Date Value Ref Range Status   11/06/2024 103 95 - 110 mmol/L Final     CO2   Date Value Ref Range Status   11/06/2024 22 (L) 23 - 29 mmol/L Final     Glucose   Date Value Ref Range Status   11/06/2024 102 70 - 110 mg/dL Final     BUN   Date Value Ref Range Status   11/06/2024 14 6 - 20 mg/dL Final     Creatinine   Date Value Ref  Range Status   11/06/2024 1.0 0.5 - 1.4 mg/dL Final     Calcium   Date Value Ref Range Status   11/06/2024 9.3 8.7 - 10.5 mg/dL Final     Total Protein   Date Value Ref Range Status   11/06/2024 7.9 6.0 - 8.4 g/dL Final     Albumin   Date Value Ref Range Status   11/06/2024 4.8 3.5 - 5.2 g/dL Final     Total Bilirubin   Date Value Ref Range Status   11/06/2024 0.8 0.1 - 1.0 mg/dL Final     Comment:     For infants and newborns, interpretation of results should be based  on gestational age, weight and in agreement with clinical  observations.    Premature Infant recommended reference ranges:  Up to 24 hours.............<8.0 mg/dL  Up to 48 hours............<12.0 mg/dL  3-5 days..................<15.0 mg/dL  6-29 days.................<15.0 mg/dL       Alkaline Phosphatase   Date Value Ref Range Status   11/06/2024 69 55 - 135 U/L Final     AST   Date Value Ref Range Status   11/06/2024 33 10 - 40 U/L Final     ALT   Date Value Ref Range Status   11/06/2024 38 10 - 44 U/L Final     Anion Gap   Date Value Ref Range Status   11/06/2024 9 8 - 16 mmol/L Final     eGFR   Date Value Ref Range Status   11/06/2024 >60.0 >60 mL/min/1.73 m^2 Final           Radiology/Diagnostic Studies:    CT Dx Bone Marrow Biopsy(ies) w/ Aspiration; Right(XPD)    Result Date: 4/25/2024  EXAMINATION: CT DIAGNOSTIC BONE MARROW BIOPSY(IES) WITH ASPIRATION RIGHT(XPD) CLINICAL HISTORY: Other pancytopenia COMPARISON: None. FINDINGS: After obtaining written informed consent, which included a discussion of the risks and benefits of the procedure to include infection and bleeding, and allowing the patient the opportunity to ask questions, the patient agreed to the procedure. The patient was placed prone on the CT fluoroscopy table. A suitable skin entrance site overlying the right posterior iliac spine was localized with CT guidance. The skin was marked, and then prepped and draped in sterile fashion, with several milliliters of lidocaine 1% injected  subcutaneously and along the periosteum to achieve adequate local anesthesia. Following, a punch skin incision was made, and an 11-gauge Jamshidi bone biopsy needle was advanced into the posterior iliac spine under intermittent CT fluoroscopic guidance.  Several aspirate attempts were made, with very little aspirate obtained.  A core biopsy sample was obtained.  Attention was then turned to the left posterior iliac spine, with the skin prepped and draped in sterile fashion, and lidocaine 1% injected to achieve adequate local anesthesia.  A punch skin incision was made and an 11-gauge Jamshidi bone biopsy needle was advanced into the left posterior iliac spine with intermittent CT fluoroscopic guidance.  Several aspirate attempts were made, with very little aspirate obtained.  The needle was removed and a bandage was applied to the skin. The patient tolerated the procedure well, with no immediate postprocedural complications. There was no significant blood loss. Total moderate conscious sedation time with supervision by the interventional radiologist and monitoring by the special procedures registered nurse was 25 minutes. The patient received Versed 4 mg and Fentanyl 200 mcg IV during the procedure.     CT guided bone marrow core biopsy and aspiration as described. Electronically signed by: Miguel A Goode Date:    04/25/2024 Time:    11:34    US Abdomen Complete    Result Date: 4/25/2024  EXAMINATION: US ABDOMEN COMPLETE CLINICAL HISTORY: Pancytopenia COMPARISON: June 2021 FINDINGS: Sonographic assessment of the abdomen was performed.  The pancreas is partially obscured by bowel gas.  Visualized portions are normal.  The aorta is normal in caliber in the upper abdomen.  The inferior vena cava is unremarkable. The liver has a normal sonographic appearance, with no mass or contour abnormality.  Maximal longitudinal dimension is 16.9 cm.  Hepato pedal flow is noted within the portal vein. The gallbladder is absent.   Common bile duct is normal in caliber at 5 mm. The bilateral kidneys have a normal appearance.  The spleen is enlarged measuring 15.0 cm in greatest longitudinal dimension.  There is no free fluid.     1. Mild splenomegaly. 2. Surgical absence of the gallbladder.        I have reviewed all available lab results and radiology reports.    Assessment/Plan:   (1) 39 y.o. male with diagnosis of pancytopenia who has been referred by Galindo Berrios Jr.,* for evaluation by medical hematology/oncology.      - leucopenia with WBC at 2.7 with relatively adequate differential breakdown  - anemia with hgb at 12.3 - NCNC parameters  - mild thrombocytopenia with plats at 118,000     - his lab work was normal about 3 yrs ago; recent Hep C ab was nonreactive and HIV was negative     - he only took one dose of the imitrex and has not been on it with any consistence  - he has been on Livalo since Feb 2024 5/1/2024:  - He had bone marrow biopsy on 4/25/2024 with pathology showing 95% of the marrow with hairy cell Leukemia (HCL)  - US with only mild splenomegaly  - Already discussed and made referral to Dr [facility] with [facility].     5/22/2024:  - He saw Dr Chan Paredes on 5/20/2024 and they are planning to proceed with a regimen of therapy with 2CDA , he also ordered CT scans of his chest/abdom/pelvis  - He wants to start therapy sometime after June 4th 2024  - await the recs and note from Dr Paredes  - set up chemotherapy school in meantime    6/13/2024:  - He saw Dr Chan Paredes on 5/20/2024 and has proceeded  with a regimen of therapy this past Monday;   - seems to be tolerating things well so far  - he sees Dr Paredes after he completes the 8 weeks (Aug 19th tentatively)    6/27/2024:  - He was recently hospitalized and discharged last Thursday. He was given couple of days of oral antibiotics to take at home which he has since completed.   - No fevers, some residual malaise; having a lot of aches in joints and hips; he has been  on neupogen injections and last one was yesterday  - check labs today and if WBC better, then can hold off on the GCSF today    8/8/2024:  - He completed the last cycle of chemotherapy yesterday and has been on modified infusion due to his propensity to have reaction. He is back to work doing light duty.   - He sees Dr Chan Peace again on 8/19/2024.  - check labs monthly for now    10/10/2024:  - his labs are looking better and better and he is feeling better and better  - he has bone marrow biopsy planned for early Dec 2024  - he sees Dr peace again on Dec 19th 2024    11/7/2024:   - labs are great   - bone marrow biopsy on 12/6  - seeing Dr. Peace after   - he is on testosterone injections with PCP      (2) HTN and hypercholesterolemia     (3) Atrial fibrillation     (4) Hepatic steatosis; elevated LFts     (5) Anxiety     (6) Migraine HA's - was on Imitrex but only took one dose     (7) EASTON     (8) Former smoker - quit Nov 2022 (vapes)      (9) Occasional alcohol intake     (10) Hx/of hernia repair, cholecystectomy and vasectomy            VISIT DIAGNOSES:      Hairy cell leukemia, in remission    Chemotherapy-induced peripheral neuropathy        PLAN:  Check labs monthly for now  F/u with Dr Chan Peace on 12/19; expect repeat bone marrow biopsy on 12/6  Therapy as per Dr Peace directives - s/p chemotherapy school with [facility] - discussed the side-effect profile of the drugs, provided literature on the drugs and obtained consents  F/u with PCP as directed     RTC in  4 weeks with Dr. Cummings and 12 weeks with me   Discussion:     Pathology Discussion:    I reviewed and discussed the pathology report(s) and radiograph reports (if available) in as simple to understand and/or laymen's terms to the best of my ability. I had an indepth conversation with the patient and went over the patient's individual diagnosis based on the information that was currently available. I discussed the TNM staging process with regard  "to the patient's particular cancer type, and the calculated stage based on the currently available TNM data and literature. I discussed the available prognostic data with regard to the current staging information and how it relates to the prognosis of their particular neoplastic process.      NCCN Guidelines:    I discussed the available treatment option(s) in accordance with the latest literature from the NCCN Clinical Practice Guidelines for the patient's particular type of cancer disorder. The NCCN Guidelines provide a "document evidence-based (and) consensus-driven management" of the care of oncology patients. The treatment recommendations were made not only in accordance to the NCCN guidelines, but also factored in to account the patient's overall age, condition, performance status and their medical co-morbidities. I went over the risks and benefits of the the treatment options (if any could be made) with regard to their particular cancer type, their cancer stage, their age, and their co-morbidities.       I have explained all of the above in detail and the patient understands all of the current recommendation(s). I have answered all of their questions to the best of my ability and to their complete satisfaction.   The patient is to continue with the current management plan.            Electronically signed by Kendra Rose NP                    "

## 2024-11-08 ENCOUNTER — LAB VISIT (OUTPATIENT)
Dept: LAB | Facility: HOSPITAL | Age: 39
End: 2024-11-08
Attending: FAMILY MEDICINE
Payer: COMMERCIAL

## 2024-11-08 DIAGNOSIS — E78.2 MIXED HYPERLIPIDEMIA: ICD-10-CM

## 2024-11-08 DIAGNOSIS — I10 ESSENTIAL HYPERTENSION: ICD-10-CM

## 2024-11-08 LAB
CHOLEST SERPL-MCNC: 96 MG/DL (ref 120–199)
CHOLEST/HDLC SERPL: 3.1 {RATIO} (ref 2–5)
ESTIMATED AVG GLUCOSE: 117 MG/DL (ref 68–131)
HBA1C MFR BLD: 5.7 % (ref 4.5–6.2)
HDLC SERPL-MCNC: 31 MG/DL (ref 40–75)
HDLC SERPL: 32.3 % (ref 20–50)
LDLC SERPL CALC-MCNC: 37 MG/DL (ref 63–159)
NONHDLC SERPL-MCNC: 65 MG/DL
TRIGL SERPL-MCNC: 140 MG/DL (ref 30–150)

## 2024-11-08 PROCEDURE — 36415 COLL VENOUS BLD VENIPUNCTURE: CPT | Performed by: FAMILY MEDICINE

## 2024-11-08 PROCEDURE — 83036 HEMOGLOBIN GLYCOSYLATED A1C: CPT | Performed by: FAMILY MEDICINE

## 2024-11-08 PROCEDURE — 80061 LIPID PANEL: CPT | Performed by: FAMILY MEDICINE

## 2024-11-09 ENCOUNTER — PATIENT MESSAGE (OUTPATIENT)
Dept: FAMILY MEDICINE | Facility: CLINIC | Age: 39
End: 2024-11-09
Payer: COMMERCIAL

## 2024-11-11 RX ORDER — TADALAFIL 20 MG/1
20 TABLET ORAL DAILY PRN
Qty: 10 TABLET | Refills: 1 | Status: SHIPPED | OUTPATIENT
Start: 2024-11-11 | End: 2025-11-11

## 2024-11-11 NOTE — TELEPHONE ENCOUNTER
No care due was identified.  [facility] Embedded Care Due Messages. Reference number: [id number].   11/11/2024 10:08:39 AM CST

## 2024-11-13 ENCOUNTER — TELEPHONE (OUTPATIENT)
Dept: FAMILY MEDICINE | Facility: CLINIC | Age: 39
End: 2024-11-13
Payer: COMMERCIAL

## 2024-11-13 NOTE — TELEPHONE ENCOUNTER
Ret call to pt and pharmacy , the cialis was to be 30 days and one refill per juaquin , so pt aware and [facility] called and for pt to disregard the portal message.

## 2024-12-03 ENCOUNTER — PATIENT MESSAGE (OUTPATIENT)
Dept: INTERVENTIONAL RADIOLOGY/VASCULAR | Facility: HOSPITAL | Age: 39
End: 2024-12-03

## 2024-12-03 ENCOUNTER — TELEPHONE (OUTPATIENT)
Dept: INTERVENTIONAL RADIOLOGY/VASCULAR | Facility: HOSPITAL | Age: 39
End: 2024-12-03

## 2024-12-03 NOTE — NURSING
Radiology Pre-Procedural Instructions- [facility]    Radiology Nurse contacted patient to provide instructions for scheduled CT Bone Marrow Biopsy at 0900 on 12/9/24.   No answer, left message and sent [employer] message.   Patient instructed to arrive no later than 730 am to outpatient registration.   Registration will direct patient to outpatient lab for pre-op lab work if required.    Patient instructed to remain NPO after midnight with the exception of approved essential meds taken with a small sip of water.  Instructed patient to bathe the night before and the morning of their procedure with an anti bacterial soap or Hibiclens.   Patient is aware of their responsibility to make post transportation arrangements home by a responsible adult.

## 2024-12-09 ENCOUNTER — HOSPITAL ENCOUNTER (OUTPATIENT)
Dept: RADIOLOGY | Facility: HOSPITAL | Age: 39
Discharge: HOME | End: 2024-12-09
Attending: NURSE PRACTITIONER
Payer: COMMERCIAL

## 2024-12-09 VITALS
TEMPERATURE: 98 F | SYSTOLIC BLOOD PRESSURE: 128 MMHG | RESPIRATION RATE: 16 BRPM | HEART RATE: 82 BPM | OXYGEN SATURATION: 95 % | DIASTOLIC BLOOD PRESSURE: 74 MMHG | BODY MASS INDEX: 35.55 KG/M2 | HEIGHT: 69 IN | WEIGHT: 240 LBS

## 2024-12-09 DIAGNOSIS — D64.9 ANEMIA, NORMOCYTIC NORMOCHROMIC: Primary | ICD-10-CM

## 2024-12-09 DIAGNOSIS — C91.40 HAIRY CELL LEUKEMIA NOT HAVING ACHIEVED REMISSION: ICD-10-CM

## 2024-12-09 PROCEDURE — 63600175 PHARM REV CODE 636 W HCPCS: Performed by: RADIOLOGY

## 2024-12-09 PROCEDURE — 63600175 PHARM REV CODE 636 W HCPCS

## 2024-12-09 PROCEDURE — 38222 DX BONE MARROW BX & ASPIR: CPT | Mod: 26,RT,, | Performed by: RADIOLOGY

## 2024-12-09 PROCEDURE — 77012 CT SCAN FOR NEEDLE BIOPSY: CPT | Mod: 26,,, | Performed by: RADIOLOGY

## 2024-12-09 PROCEDURE — 77012 CT SCAN FOR NEEDLE BIOPSY: CPT

## 2024-12-09 RX ORDER — LIDOCAINE HYDROCHLORIDE 10 MG/ML
INJECTION, SOLUTION INFILTRATION; PERINEURAL
Status: COMPLETED
Start: 2024-12-09 | End: 2024-12-09

## 2024-12-09 RX ORDER — LIDOCAINE HYDROCHLORIDE 10 MG/ML
1 INJECTION, SOLUTION EPIDURAL; INFILTRATION; INTRACAUDAL; PERINEURAL ONCE AS NEEDED
Status: DISCONTINUED | OUTPATIENT
Start: 2024-12-09 | End: 2024-12-10 | Stop reason: HOSPADM

## 2024-12-09 RX ORDER — MIDAZOLAM HYDROCHLORIDE 1 MG/ML
INJECTION, SOLUTION INTRAMUSCULAR; INTRAVENOUS
Status: COMPLETED | OUTPATIENT
Start: 2024-12-09 | End: 2024-12-09

## 2024-12-09 RX ORDER — MIDAZOLAM HYDROCHLORIDE 2 MG/2ML
INJECTION, SOLUTION INTRAMUSCULAR; INTRAVENOUS
Status: DISPENSED
Start: 2024-12-09 | End: 2024-12-09

## 2024-12-09 RX ORDER — FENTANYL CITRATE 50 UG/ML
INJECTION, SOLUTION INTRAMUSCULAR; INTRAVENOUS
Status: COMPLETED | OUTPATIENT
Start: 2024-12-09 | End: 2024-12-09

## 2024-12-09 RX ORDER — FENTANYL CITRATE 50 UG/ML
INJECTION, SOLUTION INTRAMUSCULAR; INTRAVENOUS
Status: DISPENSED
Start: 2024-12-09 | End: 2024-12-09

## 2024-12-09 RX ADMIN — FENTANYL CITRATE 25 MCG: 0.05 INJECTION, SOLUTION INTRAMUSCULAR; INTRAVENOUS at 09:12

## 2024-12-09 RX ADMIN — MIDAZOLAM 2 MG: 1 INJECTION INTRAMUSCULAR; INTRAVENOUS at 09:12

## 2024-12-09 RX ADMIN — LIDOCAINE HYDROCHLORIDE 100 MG: 10 INJECTION, SOLUTION INFILTRATION; PERINEURAL at 09:12

## 2024-12-09 RX ADMIN — MIDAZOLAM 1 MG: 1 INJECTION INTRAMUSCULAR; INTRAVENOUS at 09:12

## 2024-12-09 RX ADMIN — FENTANYL CITRATE 25 MCG: 50 INJECTION INTRAMUSCULAR; INTRAVENOUS at 09:12

## 2024-12-09 RX ADMIN — FENTANYL CITRATE 50 MCG: 50 INJECTION INTRAMUSCULAR; INTRAVENOUS at 09:12

## 2024-12-09 NOTE — DISCHARGE INSTRUCTIONS
Ok to remove dressing in 24 hours. Shower in 24 hours        General Information:    1.  Do not drink alcoholic beverages including beer for 24 hours or as long as you are on pain medication..  2.  Do not drive a motor vehicle, operate machinery or power tools, or signs legal papers for 24 hours or as long as you are on pain medication.   3.  You may experience light-headedness, dizziness, and sleepiness following surgery. Please do not stay alone. A responsible adult should be with you for this 24 hour period.  4.  Go home and rest.    5. Progress slowly to a normal diet unless instructed.  Otherwise, begin with liquids such as soft drinks, then soup and crackers working up to solid foods. Drink plenty of nonalcoholic fluids.  6.  Certain anesthetics and pain medications produce nausea and vomiting in certain       individuals. If nausea becomes a problem at home, call you doctor.    7. A nurse will be calling you sometime after surgery. Do not be alarmed. This is our way of finding out how you are doing.    8. Several times every hour while you are awake, take 2-3 deep breaths and cough. If you had stomach surgery hold a pillow or rolled towel firmly against your stomach before you cough. This will help with any pain the cough might cause.  9. Several times every hour while you are awake, pump and flex your feet 5-6 times and do foot circles. This will help prevent blood clots.    10.Call your doctor for severe pain, bleeding, fever, or signs or symptoms of infection (pain, swelling, redness, foul odor, drainage).      We hope your stay was comfortable as you heal now, mend and rest.    For we have enjoyed taking care of you by giving your our best.    And as you get better, by regaining your health and strength;   We count it as a privilege to have served you and hope your time at [facility] was well spent.      Thank  You!!!

## 2024-12-09 NOTE — H&P
Count includes the [facility] - CT Scan  History & Physical    Subjective:      Chief Complaint/Reason for Admission: CT guided bone marrow biopsy    Gerardo Ramirez Jr. is a 39 y.o. male.    Past Medical History:   Diagnosis Date    Anemia, normocytic normochromic 2024    Anemia, unspecified 2024    Anxiety     Cancer     leukemia    Hyperlipidemia     Hypertension 2013    Inguinal hernia bilateral, non-recurrent 2020    right side more pronounced- surg scheduled    Leucopenia 2024    Pancytopenia 2024    Pneumonia     age 11    Sleep apnea     Sleep apnea-like behavior     needs sleep study- wife says he stops breathing while sleeping    Thrombocytopenia 2024     Past Surgical History:   Procedure Laterality Date    CHOLECYSTECTOMY  2019    w/ Small Umbilical hernia rep-     HERNIA REPAIR Bilateral 03/10/2020    Dr. Lanier [facility]    UMBILICAL HERNIA REPAIR  2019        VASECTOMY Bilateral 2020    Procedure: VASECTOMY;  Surgeon: German Tobias MD;  Location: [facility];  Service: Urology;  Laterality: Bilateral;     Social History     Tobacco Use    Smoking status: Former     Current packs/day: 0.00     Types: Cigarettes, Vaping with nicotine     Quit date: 2022     Years since quittin.0    Smokeless tobacco: Never   Substance Use Topics    Alcohol use: Yes     Comment: occ    Drug use: No       (Not in a hospital admission)    Review of patient's allergies indicates:   Allergen Reactions    Amoxicillin Hives        Review of Systems   Constitutional:  Negative for chills and fever.   Respiratory:  Negative for cough and shortness of breath.    Cardiovascular:  Negative for chest pain.   Gastrointestinal:  Negative for nausea and vomiting.   Neurological:  Negative for weakness.       Objective:      Vital Signs (Most Recent)  Temp: 98.1 °F (36.7 °C) (24)  Pulse: 76 (24)  Resp: 16 (24)  BP: 138/77  (12/09/24 0748)  SpO2: 97 % (12/09/24 0748)    Vital Signs Range (Last 24H):  Temp:  [98.1 °F (36.7 °C)]   Pulse:  [76]   Resp:  [16]   BP: (138)/(77)   SpO2:  [97 %]     Physical Exam  Constitutional:       Appearance: Normal appearance.   Cardiovascular:      Rate and Rhythm: Normal rate.   Pulmonary:      Effort: Pulmonary effort is normal.   Neurological:      Mental Status: He is alert and oriented to person, place, and time.       ASA 2   Mallampati 2    Assessment:    Pancytopenia       Plan:    CT guided bone marrow aspiration and core biopsy  The procedure was discussed in detail, including risks and alternatives. The patient voices understanding and all questions were answered. The patient agrees to proceed as planned.

## 2024-12-09 NOTE — PLAN OF CARE
Pt prepared for surgery. Pt resting in bed, with family/friend at bedside. Family signed up for text messaging. Belongings in pt's room. Fall risk agreement reviewed and armband placed. Allergy armband placed.

## 2024-12-09 NOTE — NURSING
POST-OP BONE MARROW    Patient transferred via stretcher from DSU to CT 1.  Consents obtained by Radiologist in pre-op.   Bone Marrow biopsy performed by A СВЕТЛАНА MARIE under supervision of Dr Rebolledo. Versed 5mg and Fentanyl 100mcg were administered IV.  Pathology Rashida collier present, received specimens and verified acceptable sample.    Vital signs were monitored and remained stable for duration of procedure.  Patient tolerated procedure well.   Bandaid applied to lower back - clean, dry and intact.  Report given to post op nurse  janes Little .   Patient transported via stretcher to post op recovery.

## 2024-12-19 ENCOUNTER — LAB VISIT (OUTPATIENT)
Dept: LAB | Facility: HOSPITAL | Age: 39
End: 2024-12-19
Payer: COMMERCIAL

## 2024-12-19 ENCOUNTER — OFFICE VISIT (OUTPATIENT)
Dept: HEMATOLOGY/ONCOLOGY | Facility: CLINIC | Age: 39
End: 2024-12-19
Payer: COMMERCIAL

## 2024-12-19 VITALS
SYSTOLIC BLOOD PRESSURE: 134 MMHG | DIASTOLIC BLOOD PRESSURE: 73 MMHG | HEIGHT: 69 IN | OXYGEN SATURATION: 96 % | BODY MASS INDEX: 36.6 KG/M2 | TEMPERATURE: 98 F | HEART RATE: 69 BPM | RESPIRATION RATE: 18 BRPM | WEIGHT: 247.13 LBS

## 2024-12-19 DIAGNOSIS — D72.810 LYMPHOPENIA: ICD-10-CM

## 2024-12-19 DIAGNOSIS — C91.41 HAIRY CELL LEUKEMIA, IN REMISSION: Primary | ICD-10-CM

## 2024-12-19 DIAGNOSIS — C91.40 HAIRY CELL LEUKEMIA NOT HAVING ACHIEVED REMISSION: ICD-10-CM

## 2024-12-19 LAB
ALBUMIN SERPL BCP-MCNC: 3.9 G/DL (ref 3.5–5.2)
ALP SERPL-CCNC: 78 U/L (ref 40–150)
ALT SERPL W/O P-5'-P-CCNC: 40 U/L (ref 10–44)
ANION GAP SERPL CALC-SCNC: 8 MMOL/L (ref 8–16)
AST SERPL-CCNC: 22 U/L (ref 10–40)
BASOPHILS # BLD AUTO: 0.03 K/UL (ref 0–0.2)
BASOPHILS NFR BLD: 0.7 % (ref 0–1.9)
BILIRUB SERPL-MCNC: 0.7 MG/DL (ref 0.1–1)
BUN SERPL-MCNC: 15 MG/DL (ref 6–20)
CALCIUM SERPL-MCNC: 9.1 MG/DL (ref 8.7–10.5)
CHLORIDE SERPL-SCNC: 107 MMOL/L (ref 95–110)
CO2 SERPL-SCNC: 23 MMOL/L (ref 23–29)
CREAT SERPL-MCNC: 0.9 MG/DL (ref 0.5–1.4)
DIFFERENTIAL METHOD BLD: ABNORMAL
EOSINOPHIL # BLD AUTO: 0.2 K/UL (ref 0–0.5)
EOSINOPHIL NFR BLD: 4.6 % (ref 0–8)
ERYTHROCYTE [DISTWIDTH] IN BLOOD BY AUTOMATED COUNT: 14.2 % (ref 11.5–14.5)
EST. GFR  (NO RACE VARIABLE): >60 ML/MIN/1.73 M^2
GLUCOSE SERPL-MCNC: 107 MG/DL (ref 70–110)
HCT VFR BLD AUTO: 47.2 % (ref 40–54)
HGB BLD-MCNC: 15.2 G/DL (ref 14–18)
IMM GRANULOCYTES # BLD AUTO: 0.02 K/UL (ref 0–0.04)
IMM GRANULOCYTES NFR BLD AUTO: 0.5 % (ref 0–0.5)
LDH SERPL L TO P-CCNC: 143 U/L (ref 110–260)
LYMPHOCYTES # BLD AUTO: 0.4 K/UL (ref 1–4.8)
LYMPHOCYTES NFR BLD: 10.4 % (ref 18–48)
MCH RBC QN AUTO: 28 PG (ref 27–31)
MCHC RBC AUTO-ENTMCNC: 32.2 G/DL (ref 32–36)
MCV RBC AUTO: 87 FL (ref 82–98)
MONOCYTES # BLD AUTO: 0.5 K/UL (ref 0.3–1)
MONOCYTES NFR BLD: 11.9 % (ref 4–15)
NEUTROPHILS # BLD AUTO: 3 K/UL (ref 1.8–7.7)
NEUTROPHILS NFR BLD: 71.9 % (ref 38–73)
NRBC BLD-RTO: 0 /100 WBC
PLATELET # BLD AUTO: 184 K/UL (ref 150–450)
PMV BLD AUTO: 9.6 FL (ref 9.2–12.9)
POTASSIUM SERPL-SCNC: 4 MMOL/L (ref 3.5–5.1)
PROT SERPL-MCNC: 7.2 G/DL (ref 6–8.4)
RBC # BLD AUTO: 5.43 M/UL (ref 4.6–6.2)
SODIUM SERPL-SCNC: 138 MMOL/L (ref 136–145)
WBC # BLD AUTO: 4.13 K/UL (ref 3.9–12.7)

## 2024-12-19 PROCEDURE — 80053 COMPREHEN METABOLIC PANEL: CPT | Performed by: INTERNAL MEDICINE

## 2024-12-19 PROCEDURE — 85025 COMPLETE CBC W/AUTO DIFF WBC: CPT | Performed by: INTERNAL MEDICINE

## 2024-12-19 PROCEDURE — 99999 PR PBB SHADOW E&M-EST. PATIENT-LVL IV: CPT | Mod: PBBFAC,,, | Performed by: INTERNAL MEDICINE

## 2024-12-19 PROCEDURE — 83615 LACTATE (LD) (LDH) ENZYME: CPT | Performed by: INTERNAL MEDICINE

## 2024-12-19 NOTE — PROGRESS NOTES
Route Chart for Scheduling    BMT Chart Routing      Follow up with physician 6 months.   Follow up with YECENIA    Provider visit type Malignant hem   Infusion scheduling note    Injection scheduling note    Labs CBC, CMP and LDH   Scheduling:  Preferred lab:  Lab interval:  labs at time of return visit   Imaging    Pharmacy appointment    Other referrals

## 2024-12-26 NOTE — PROGRESS NOTES
HEMATOLOGIC MALIGNANCIES PROGRESS NOTE    IDENTIFYING STATEMENT   Gerardo Ramirez Jr. (Gerardo) is a 39 y.o. male with a  of 1985 from [place], [place] with the diagnosis of hairy cell leukemia.      ONCOLOGY HISTORY:    1. Hairy cell leukemia              A. 2024: Bone marrow biopsy - hairy cell leukemia with 95% marrow involvement; positive for BRAF [id number] mutation     2. Anxiety  3. Hypertension  4. Hyperlipidemia  5. Obesity - Body mass index is 36.5 kg/m².  6. Hepatic steatosis    INTERVAL HISTORY:      Mr. Ramirez returns to clinic in follow-up of hairy cell leukemia. He had follow-up bone marrow biopsy on 2024, which showed no evidence of disease. He feels well and presents in follow-up today.     Past Medical History, Past Social History and Past Family History have been reviewed and are unchanged except as noted in the interval history.    MEDICATIONS:     Current Outpatient Medications on File Prior to Visit   Medication Sig Dispense Refill    acetaminophen (TYLENOL EXTRA STRENGTH) 500 MG tablet Take 1,000 mg by mouth once as needed for Pain.      acyclovir (ZOVIRAX) 400 MG tablet Take 1 tablet (400 mg total) by mouth 2 (two) times daily. 60 tablet 11    butalbital-acetaminophen-caff -40 mg Cap Take 1-2 capsules by mouth 3 (three) times daily. 30 capsule 1    ergocalciferol (VITAMIN D2) 50,000 unit Cap Take 1 capsule (50,000 Units total) by mouth every 7 days. 12 capsule 1    metoprolol succinate (TOPROL-XL) 50 MG 24 hr tablet Take 1 tablet (50 mg total) by mouth once daily. 90 tablet 1    NON FORMULARY MEDICATION human chorionicgonadotropin 3xs a week total dose 300 weekly      olmesartan (BENICAR) 40 MG tablet Take 1 tablet (40 mg total) by mouth once daily. 90 tablet 1    pitavastatin calcium (LIVALO) 2 mg Tab tablet Take 1 tablet (2 mg total) by mouth every evening. 90 tablet 1    sulfamethoxazole-trimethoprim 800-160mg (BACTRIM DS) 800-160 mg Tab Take 1 tablet by mouth every  Mon, Wed, Fri. 24 tablet 3    tadalafiL (CIALIS) 20 MG Tab Take 1 tablet (20 mg total) by mouth daily as needed (erectile dysfunction). 10 tablet 1    testosterone cypionate (DEPOTESTOTERONE CYPIONATE) 200 mg/mL injection SMARTSIG:Milliliter(s) IM      anastrozole (ARIMIDEX) 1 mg Tab Take 1 mg by mouth every 7 days. (Patient not taking: Reported on 12/19/2024.)       Current Facility-Administered Medications on File Prior to Visit   Medication Dose Route Frequency Provider Last Rate Last Admin    alteplase injection 2 mg  2 mg Intra-Catheter PRN Chan Paredes MD        EPINEPHrine (EPIPEN) 0.3 mg/0.3 mL pen injection 0.3 mg  0.3 mg Intramuscular Once PRN Chan Paredes MD        heparin, porcine (PF) 100 unit/mL injection flush 500 Units  500 Units Intravenous PRN Chan Paredes MD        sodium chloride 0.9% flush 10 mL  10 mL Intravenous PRN Chan Paredes MD           ALLERGIES:   Review of patient's allergies indicates:   Allergen Reactions    Amoxicillin Hives        ROS:       Review of Systems   Constitutional:  Negative for diaphoresis, fatigue, fever and unexpected weight change.   HENT:   Negative for lump/mass and sore throat.    Eyes:  Negative for icterus.   Respiratory:  Negative for cough and shortness of breath.    Cardiovascular:  Negative for chest pain and palpitations.   Gastrointestinal:  Negative for abdominal distention, constipation, diarrhea, nausea and vomiting.   Genitourinary:  Negative for dysuria and frequency.    Musculoskeletal:  Negative for arthralgias, gait problem and myalgias.   Skin:  Negative for rash.   Neurological:  Negative for dizziness, gait problem and headaches.   Hematological:  Negative for adenopathy. Does not bruise/bleed easily.   Psychiatric/Behavioral:  The patient is not nervous/anxious.        PHYSICAL EXAM:  Vitals:    12/19/24 1451   BP: 134/73   Pulse: 69   Resp: 18   Temp: 98.2 °F (36.8 °C)   TempSrc: Oral   SpO2: 96%   Weight: 112.1 kg (247 lb  "2.2 oz)   Height: 5' 9" (1.753 m)   PainSc: 0-No pain       KARNOFSKY PERFORMANCE STATUS 80%  ECOG 1    Physical Exam  Constitutional:       General: He is not in acute distress.     Appearance: He is well-developed.   HENT:      Head: Normocephalic and atraumatic.      Mouth/Throat:      Mouth: No oral lesions.   Eyes:      Conjunctiva/sclera: Conjunctivae normal.   Neck:      Thyroid: No thyromegaly.   Cardiovascular:      Rate and Rhythm: Normal rate and regular rhythm.      Heart sounds: Normal heart sounds. No murmur heard.  Pulmonary:      Breath sounds: Normal breath sounds. No wheezing or rales.   Abdominal:      General: There is no distension.      Palpations: Abdomen is soft. There is no hepatomegaly, splenomegaly or mass.      Tenderness: There is no abdominal tenderness.   Lymphadenopathy:      Cervical: No cervical adenopathy.      Right cervical: No deep cervical adenopathy.     Left cervical: No deep cervical adenopathy.   Skin:     Findings: No rash.   Neurological:      Mental Status: He is alert and oriented to person, place, and time.      Cranial Nerves: No cranial nerve deficit.      Coordination: Coordination normal.      Deep Tendon Reflexes: Reflexes are normal and symmetric.         LAB:   Results for orders placed or performed in visit on 12/19/24   CBC Auto Differential    Collection Time: 12/19/24  2:43 PM   Result Value Ref Range    WBC 4.13 3.90 - 12.70 K/uL    RBC 5.43 4.60 - 6.20 M/uL    Hemoglobin 15.2 14.0 - 18.0 g/dL    Hematocrit 47.2 40.0 - 54.0 %    MCV 87 82 - 98 fL    MCH 28.0 27.0 - 31.0 pg    MCHC 32.2 32.0 - 36.0 g/dL    RDW 14.2 11.5 - 14.5 %    Platelets 184 150 - 450 K/uL    MPV 9.6 9.2 - 12.9 fL    Immature Granulocytes 0.5 0.0 - 0.5 %    Gran # (ANC) 3.0 1.8 - 7.7 K/uL    Immature Grans (Abs) 0.02 0.00 - 0.04 K/uL    Lymph # 0.4 (L) 1.0 - 4.8 K/uL    Mono # 0.5 0.3 - 1.0 K/uL    Eos # 0.2 0.0 - 0.5 K/uL    Baso # 0.03 0.00 - 0.20 K/uL    nRBC 0 0 /100 WBC    Gran % " 71.9 38.0 - 73.0 %    Lymph % 10.4 (L) 18.0 - 48.0 %    Mono % 11.9 4.0 - 15.0 %    Eosinophil % 4.6 0.0 - 8.0 %    Basophil % 0.7 0.0 - 1.9 %    Differential Method Automated    Comprehensive Metabolic Panel    Collection Time: 12/19/24  2:43 PM   Result Value Ref Range    Sodium 138 136 - 145 mmol/L    Potassium 4.0 3.5 - 5.1 mmol/L    Chloride 107 95 - 110 mmol/L    CO2 23 23 - 29 mmol/L    Glucose 107 70 - 110 mg/dL    BUN 15 6 - 20 mg/dL    Creatinine 0.9 0.5 - 1.4 mg/dL    Calcium 9.1 8.7 - 10.5 mg/dL    Total Protein 7.2 6.0 - 8.4 g/dL    Albumin 3.9 3.5 - 5.2 g/dL    Total Bilirubin 0.7 0.1 - 1.0 mg/dL    Alkaline Phosphatase 78 40 - 150 U/L    AST 22 10 - 40 U/L    ALT 40 10 - 44 U/L    eGFR >60.0 >60 mL/min/1.73 m^2    Anion Gap 8 8 - 16 mmol/L   Lactate Dehydrogenase    Collection Time: 12/19/24  2:43 PM   Result Value Ref Range     110 - 260 U/L       PROBLEMS ASSESSED THIS VISIT:    1. Hairy cell leukemia, in remission    2. Lymphopenia      PLAN:       Hairy cell leukemia  Mr. Ramirez has completed rituximab-cladribine induction therapy and has evidence of complete hematologic response.     Bone marrow biopsy at this time shows no evidence of disease.     He should continue clinical follow-up with Dr. Cummings.     Lymphopenia  Due to chemotherapy. Monitor to recovery.     Follow-up  - continue clinical follow-up with Dr. Cummings  - [place] [facility]     Chan Paredes MD  Hematology and Stem Cell Transplant

## 2025-01-14 ENCOUNTER — LAB VISIT (OUTPATIENT)
Dept: LAB | Facility: HOSPITAL | Age: 40
End: 2025-01-14
Attending: INTERNAL MEDICINE
Payer: COMMERCIAL

## 2025-01-14 DIAGNOSIS — C91.40 HAIRY CELL LEUKEMIA NOT HAVING ACHIEVED REMISSION: ICD-10-CM

## 2025-01-14 LAB
ALBUMIN SERPL BCP-MCNC: 4.2 G/DL (ref 3.5–5.2)
ALP SERPL-CCNC: 54 U/L (ref 55–135)
ALT SERPL W/O P-5'-P-CCNC: 20 U/L (ref 10–44)
ANION GAP SERPL CALC-SCNC: 6 MMOL/L (ref 8–16)
AST SERPL-CCNC: 13 U/L (ref 10–40)
BASOPHILS # BLD AUTO: 0.02 K/UL (ref 0–0.2)
BASOPHILS NFR BLD: 0.6 % (ref 0–1.9)
BILIRUB SERPL-MCNC: 0.5 MG/DL (ref 0.1–1)
BUN SERPL-MCNC: 13 MG/DL (ref 6–20)
CALCIUM SERPL-MCNC: 9 MG/DL (ref 8.7–10.5)
CHLORIDE SERPL-SCNC: 103 MMOL/L (ref 95–110)
CO2 SERPL-SCNC: 27 MMOL/L (ref 23–29)
CREAT SERPL-MCNC: 1 MG/DL (ref 0.5–1.4)
DIFFERENTIAL METHOD BLD: ABNORMAL
EOSINOPHIL # BLD AUTO: 0.3 K/UL (ref 0–0.5)
EOSINOPHIL NFR BLD: 7.4 % (ref 0–8)
ERYTHROCYTE [DISTWIDTH] IN BLOOD BY AUTOMATED COUNT: 14 % (ref 11.5–14.5)
EST. GFR  (NO RACE VARIABLE): >60 ML/MIN/1.73 M^2
GLUCOSE SERPL-MCNC: 100 MG/DL (ref 70–110)
HCT VFR BLD AUTO: 46.3 % (ref 40–54)
HGB BLD-MCNC: 14.8 G/DL (ref 14–18)
IMM GRANULOCYTES # BLD AUTO: 0.02 K/UL (ref 0–0.04)
IMM GRANULOCYTES NFR BLD AUTO: 0.6 % (ref 0–0.5)
LYMPHOCYTES # BLD AUTO: 0.4 K/UL (ref 1–4.8)
LYMPHOCYTES NFR BLD: 11.4 % (ref 18–48)
MCH RBC QN AUTO: 27.4 PG (ref 27–31)
MCHC RBC AUTO-ENTMCNC: 32 G/DL (ref 32–36)
MCV RBC AUTO: 86 FL (ref 82–98)
MONOCYTES # BLD AUTO: 0.7 K/UL (ref 0.3–1)
MONOCYTES NFR BLD: 18.6 % (ref 4–15)
NEUTROPHILS # BLD AUTO: 2.2 K/UL (ref 1.8–7.7)
NEUTROPHILS NFR BLD: 61.4 % (ref 38–73)
NRBC BLD-RTO: 0 /100 WBC
PLATELET # BLD AUTO: 166 K/UL (ref 150–450)
PMV BLD AUTO: 9.7 FL (ref 9.2–12.9)
POTASSIUM SERPL-SCNC: 4 MMOL/L (ref 3.5–5.1)
PROT SERPL-MCNC: 7 G/DL (ref 6–8.4)
RBC # BLD AUTO: 5.41 M/UL (ref 4.6–6.2)
SODIUM SERPL-SCNC: 136 MMOL/L (ref 136–145)
WBC # BLD AUTO: 3.5 K/UL (ref 3.9–12.7)

## 2025-01-14 PROCEDURE — 80053 COMPREHEN METABOLIC PANEL: CPT | Performed by: INTERNAL MEDICINE

## 2025-01-14 PROCEDURE — 85025 COMPLETE CBC W/AUTO DIFF WBC: CPT | Performed by: INTERNAL MEDICINE

## 2025-01-14 PROCEDURE — 36415 COLL VENOUS BLD VENIPUNCTURE: CPT | Performed by: INTERNAL MEDICINE

## 2025-01-15 NOTE — PROGRESS NOTES
[facility] Hematology/Oncology  PROGRESS NOTE -  Follow-up Visit      Subjective:       Patient ID:   NAME: Gerardo Ramirez Jr. : 1985     39 y.o. male    Referring Doc: Galindo Berrios Jr.,*  Other Physicians: Micheline Lanier/Brant Yeung           Chief Complaint: pancytopenia f/u    History of Present Illness:     Patient returns today for a regularly scheduled follow-up visit.  The patient is here today to go over the results of the recently ordered labs, tests and studies. He is here by himself today.    He had bone marrow biopsy in Dec 2024 and saw Dr nicolle Paredes with good report.       He completed the last cycle of chemotherapy in early Aug 2024.     Plan is to check labs every 3 months and f/u with us every 3 months and see Dr valverde every 6 months. He is to continue prophylactic antivirals for another 6 months          Breathing ok; no CP, SOB, HA's or N/V                     ROS:   GEN: normal without any fever, night sweats or weight loss; no aches/pains, no fevers   HEENT: normal with no HA's, sore throat, stiff neck, changes in vision  CV: normal with no CP, SOB, PND, TRUONG or orthopnea  PULM: normal with no SOB, cough, hemoptysis, sputum or pleuritic pain  GI: normal with no abdominal pain, nausea, vomiting, constipation, diarrhea, melanotic stools, BRBPR, or hematemesis  : normal with no hematuria, dysuria  BREAST: normal with no mass, discharge, pain  SKIN: normal with no rash, erythema, bruising, or swelling    Pain Scale:  0    Allergies:  Review of patient's allergies indicates:   Allergen Reactions    Amoxicillin Hives       Medications:    Current Outpatient Medications:     acetaminophen (TYLENOL EXTRA STRENGTH) 500 MG tablet, Take 1,000 mg by mouth once as needed for Pain., Disp: , Rfl:     acyclovir (ZOVIRAX) 400 MG tablet, Take 1 tablet (400 mg total) by mouth 2 (two) times daily., Disp: 60 tablet, Rfl: 11    butalbital-acetaminophen-caff -40 mg Cap,  Take 1-2 capsules by mouth 3 (three) times daily., Disp: 30 capsule, Rfl: 1    ergocalciferol (VITAMIN D2) 50,000 unit Cap, Take 1 capsule (50,000 Units total) by mouth every 7 days., Disp: 12 capsule, Rfl: 1    metoprolol succinate (TOPROL-XL) 50 MG 24 hr tablet, Take 1 tablet (50 mg total) by mouth once daily., Disp: 90 tablet, Rfl: 1    NON FORMULARY MEDICATION, human chorionicgonadotropin 3xs a week total dose 300 weekly, Disp: , Rfl:     olmesartan (BENICAR) 40 MG tablet, Take 1 tablet (40 mg total) by mouth once daily., Disp: 90 tablet, Rfl: 1    pitavastatin calcium (LIVALO) 2 mg Tab tablet, Take 1 tablet (2 mg total) by mouth every evening., Disp: 90 tablet, Rfl: 1    sulfamethoxazole-trimethoprim 800-160mg (BACTRIM DS) 800-160 mg Tab, Take 1 tablet by mouth every Mon, Wed, Fri., Disp: 24 tablet, Rfl: 3    tadalafiL (CIALIS) 20 MG Tab, Take 1 tablet (20 mg total) by mouth daily as needed (erectile dysfunction)., Disp: 10 tablet, Rfl: 1    testosterone cypionate (DEPOTESTOTERONE CYPIONATE) 200 mg/mL injection, SMARTSIG:Milliliter(s) IM, Disp: , Rfl:     anastrozole (ARIMIDEX) 1 mg Tab, Take 1 mg by mouth every 7 days. (Patient not taking: Reported on 1/16/2025.), Disp: , Rfl:   No current facility-administered medications for this visit.    Facility-Administered Medications Ordered in Other Visits:     alteplase injection 2 mg, 2 mg, Intra-Catheter, PRN, Chan Paredes MD    EPINEPHrine (EPIPEN) 0.3 mg/0.3 mL pen injection 0.3 mg, 0.3 mg, Intramuscular, Once PRN, Chan Paredes MD    heparin, porcine (PF) 100 unit/mL injection flush 500 Units, 500 Units, Intravenous, PRNReggie Carter T., MD    sodium chloride 0.9% flush 10 mL, 10 mL, Intravenous, PRNReggie Carter T., MD    PMHx/PSHx Updates:  See patient's last visit with me on 10/10/2024.  See H&P on 4/5/2024        Pathology:   Cancer Staging   No matching staging information was found for the patient.    Bone Marrow Biopsy: 12/9/2024:    BONE  "MARROW, RIGHT ILIAC CREST, ASPIRATE, CLOT SECTION, AND CORE    BIOPSY:--NORMOCELLULAR MARROW (APPROXIMATELY 50% TO 60%) WITH    TRILINEAGE      HEMATOPOIETIC ELEMENTS.     --NO MORPHOLOGIC EVIDENCE OF LYMPHOID NEOPLASM.     --RARE-TO-ABSENT STAINABLE IRON.   --PERIPHERAL BLOOD WITH NORMAL THROMBOCYTE COUNT (180,000/MICROLITER);    NORMAL HEMOGLOBIN (14.5 GRAM/DECILITER); AND LOW-NORMAL LEUKOCYTE COUNT    (4,090/MICROLITER), WITH MOSTLY UNREMARKABLE DIFFERENTIAL COUNT      AND MORPHOLOGY.           Objective:     Vitals:  Blood pressure (!) 141/90, pulse 72, temperature 98 °F (36.7 °C), temperature source Temporal, resp. rate 16, height 5' 9" (1.753 m), weight 112.5 kg (248 lb 1.6 oz).    Physical Examination:   GEN: no apparent distress, comfortable; AAOx3  HEAD: atraumatic and normocephalic  EYES: no pallor, no icterus, PERRLA  ENT: OMM, no pharyngeal erythema, external ears WNL; no nasal discharge; no thrush  NECK: no masses, thyroid normal, trachea midline, no LAD/LN's, supple  CV: RRR with no murmur; normal pulse; normal S1 and S2; no pedal edema  CHEST: Normal respiratory effort; CTAB; normal breath sounds; no wheeze or crackles  ABDOM: nontender and nondistended; soft; normal bowel sounds; no rebound/guarding  MUSC/Skeletal: ROM normal; no crepitus; joints normal; no deformities or arthropathy  EXTREM: no clubbing, cyanosis, inflammation or swelling  SKIN: no rashes, lesions, ulcers, petechiae or subcutaneous nodules  : no bah  NEURO: grossly intact; motor/sensory WNL; AAOx3; no tremors  PSYCH: normal mood, affect and behavior  LYMPH: normal cervical, supraclavicular, axillary and groin LN's            Labs:     Lab Results   Component Value Date    WBC 3.50 (L) 01/14/2025    HGB 14.8 01/14/2025    HCT 46.3 01/14/2025    MCV 86 01/14/2025     01/14/2025          CMP  Sodium   Date Value Ref Range Status   01/14/2025 136 136 - 145 mmol/L Final     Potassium   Date Value Ref Range Status   01/14/2025 " 4.0 3.5 - 5.1 mmol/L Final     Chloride   Date Value Ref Range Status   01/14/2025 103 95 - 110 mmol/L Final     CO2   Date Value Ref Range Status   01/14/2025 27 23 - 29 mmol/L Final     Glucose   Date Value Ref Range Status   01/14/2025 100 70 - 110 mg/dL Final     BUN   Date Value Ref Range Status   01/14/2025 13 6 - 20 mg/dL Final     Creatinine   Date Value Ref Range Status   01/14/2025 1.0 0.5 - 1.4 mg/dL Final     Calcium   Date Value Ref Range Status   01/14/2025 9.0 8.7 - 10.5 mg/dL Final     Total Protein   Date Value Ref Range Status   01/14/2025 7.0 6.0 - 8.4 g/dL Final     Albumin   Date Value Ref Range Status   01/14/2025 4.2 3.5 - 5.2 g/dL Final     Total Bilirubin   Date Value Ref Range Status   01/14/2025 0.5 0.1 - 1.0 mg/dL Final     Comment:     For infants and newborns, interpretation of results should be based  on gestational age, weight and in agreement with clinical  observations.    Premature Infant recommended reference ranges:  Up to 24 hours.............<8.0 mg/dL  Up to 48 hours............<12.0 mg/dL  3-5 days..................<15.0 mg/dL  6-29 days.................<15.0 mg/dL       Alkaline Phosphatase   Date Value Ref Range Status   01/14/2025 54 (L) 55 - 135 U/L Final     AST   Date Value Ref Range Status   01/14/2025 13 10 - 40 U/L Final     ALT   Date Value Ref Range Status   01/14/2025 20 10 - 44 U/L Final     Anion Gap   Date Value Ref Range Status   01/14/2025 6 (L) 8 - 16 mmol/L Final     eGFR   Date Value Ref Range Status   01/14/2025 >60.0 >60 mL/min/1.73 m^2 Final           Radiology/Diagnostic Studies:    CT Dx Bone Marrow Biopsy(ies) w/ Aspiration; Right(XPD)    Result Date: 4/25/2024  EXAMINATION: CT DIAGNOSTIC BONE MARROW BIOPSY(IES) WITH ASPIRATION RIGHT(XPD) CLINICAL HISTORY: Other pancytopenia COMPARISON: None. FINDINGS: After obtaining written informed consent, which included a discussion of the risks and benefits of the procedure to include infection and bleeding,  and allowing the patient the opportunity to ask questions, the patient agreed to the procedure. The patient was placed prone on the CT fluoroscopy table. A suitable skin entrance site overlying the right posterior iliac spine was localized with CT guidance. The skin was marked, and then prepped and draped in sterile fashion, with several milliliters of lidocaine 1% injected subcutaneously and along the periosteum to achieve adequate local anesthesia. Following, a punch skin incision was made, and an 11-gauge Jamshidi bone biopsy needle was advanced into the posterior iliac spine under intermittent CT fluoroscopic guidance.  Several aspirate attempts were made, with very little aspirate obtained.  A core biopsy sample was obtained.  Attention was then turned to the left posterior iliac spine, with the skin prepped and draped in sterile fashion, and lidocaine 1% injected to achieve adequate local anesthesia.  A punch skin incision was made and an 11-gauge Jamshidi bone biopsy needle was advanced into the left posterior iliac spine with intermittent CT fluoroscopic guidance.  Several aspirate attempts were made, with very little aspirate obtained.  The needle was removed and a bandage was applied to the skin. The patient tolerated the procedure well, with no immediate postprocedural complications. There was no significant blood loss. Total moderate conscious sedation time with supervision by the interventional radiologist and monitoring by the special procedures registered nurse was 25 minutes. The patient received Versed 4 mg and Fentanyl 200 mcg IV during the procedure.     CT guided bone marrow core biopsy and aspiration as described. Electronically signed by: Miguel A Goode Date:    04/25/2024 Time:    11:34    US Abdomen Complete    Result Date: 4/25/2024  EXAMINATION: US ABDOMEN COMPLETE CLINICAL HISTORY: Pancytopenia COMPARISON: June 2021 FINDINGS: Sonographic assessment of the abdomen was performed.  The pancreas  is partially obscured by bowel gas.  Visualized portions are normal.  The aorta is normal in caliber in the upper abdomen.  The inferior vena cava is unremarkable. The liver has a normal sonographic appearance, with no mass or contour abnormality.  Maximal longitudinal dimension is 16.9 cm.  Hepato pedal flow is noted within the portal vein. The gallbladder is absent.  Common bile duct is normal in caliber at 5 mm. The bilateral kidneys have a normal appearance.  The spleen is enlarged measuring 15.0 cm in greatest longitudinal dimension.  There is no free fluid.     1. Mild splenomegaly. 2. Surgical absence of the gallbladder.        I have reviewed all available lab results and radiology reports.    Assessment/Plan:   (1) 39 y.o. male with diagnosis of pancytopenia who has been referred by Galindo Berrios Jr.,* for evaluation by medical hematology/oncology.      - leucopenia with WBC at 2.7 with relatively adequate differential breakdown  - anemia with hgb at 12.3 - NCNC parameters  - mild thrombocytopenia with plats at 118,000     - his lab work was normal about 3 yrs ago; recent Hep C ab was nonreactive and HIV was negative     - he only took one dose of the imitrex and has not been on it with any consistence  - he has been on Livalo since Feb 2024 5/1/2024:  - He had bone marrow biopsy on 4/25/2024 with pathology showing 95% of the marrow with hairy cell Leukemia (HCL)  - US with only mild splenomegaly  - Already discussed and made referral to Dr Sea Hensley with [facility] [place] [place].     5/22/2024:  - He saw Dr Chan Paredes on 5/20/2024 and they are planning to proceed with a regimen of therapy with 2CDA , he also ordered CT scans of his chest/abdom/pelvis  - He wants to start therapy sometime after June 4th 2024  - await the recs and note from Dr Paredes  - set up chemotherapy school in meantime    6/13/2024:  - He saw Dr Chan Paredes on 5/20/2024 and has proceeded  with a regimen of therapy this past Monday;   - seems  to be tolerating things well so far  - he sees Dr Peace after he completes the 8 weeks (Aug 19th tentatively)    6/27/2024:  - He was recently hospitalized and discharged last Thursday. He was given couple of days of oral antibiotics to take at home which he has since completed.   - No fevers, some residual malaise; having a lot of aches in joints and hips; he has been on neupogen injections and last one was yesterday  - check labs today and if WBC better, then can hold off on the GCSF today    8/8/2024:  - He completed the last cycle of chemotherapy yesterday and has been on modified infusion due to his propensity to have reaction. He is back to work doing light duty.   - He sees Dr Chan Peace again on 8/19/2024.  - check labs monthly for now    10/10/2024:  - his labs are looking better and better and he is feeling better and better  - he has bone marrow biopsy planned for early Dec 2024  - he sees Dr peace again on Dec 19th 2024    1/16/2025:  - CBC adequate  - he had bone marrow biopsy on 12/9  - he saw Dr Chan Peace on 12/19/2024  - plan to continue antivirals for another 6 months  - check labs every 3months     (2) HTN and hypercholesterolemia     (3) Atrial fibrillation     (4) Hepatic steatosis; elevated LFts     (5) Anxiety     (6) Migraine HA's - was on Imitrex but only took one dose     (7) EASTON     (8) Former smoker - quit Nov 2022 (vapes)      (9) Occasional alcohol intake     (10) Hx/of hernia repair, cholecystectomy and vasectomy            VISIT DIAGNOSES:      Thrombocytopenia    Pancytopenia    Leukopenia, unspecified type    Anemia, normocytic normochromic    Anemia, unspecified type    Hairy cell leukemia, in remission          PLAN:  Check labs every 3 months for now  F/u with Dr Chan Peace in 6 months (June 2025)  Continue observation as per Dr Peace directives - s/p chemotherapy school with [facility] - discussed the side-effect profile of the drugs, provided literature on the drugs and  "obtained consents  F/u with PCP as directed     RTC in  12 weeks with Myself  Fax note to   Brant Lanier C Bethala, O'Quin Sharp, Clinton H. III, MD, Chan Paredes    Discussion:     Pathology Discussion:    I reviewed and discussed the pathology report(s) and radiograph reports (if available) in as simple to understand and/or laymen's terms to the best of my ability. I had an indepth conversation with the patient and went over the patient's individual diagnosis based on the information that was currently available. I discussed the TNM staging process with regard to the patient's particular cancer type, and the calculated stage based on the currently available TNM data and literature. I discussed the available prognostic data with regard to the current staging information and how it relates to the prognosis of their particular neoplastic process.      NCCN Guidelines:    I discussed the available treatment option(s) in accordance with the latest literature from the NCCN Clinical Practice Guidelines for the patient's particular type of cancer disorder. The NCCN Guidelines provide a "document evidence-based (and) consensus-driven management" of the care of oncology patients. The treatment recommendations were made not only in accordance to the NCCN guidelines, but also factored in to account the patient's overall age, condition, performance status and their medical co-morbidities. I went over the risks and benefits of the the treatment options (if any could be made) with regard to their particular cancer type, their cancer stage, their age, and their co-morbidities.     I spent over 25 mins of time with the patient. Reviewed results of the recently ordered labs, tests and studies; made directives with regards to the results. Over half of this time was spent couseling and coordinating care.    I have explained all of the above in detail and the patient understands all of the current recommendation(s). I have " answered all of their questions to the best of my ability and to their complete satisfaction.   The patient is to continue with the current management plan.            Electronically signed by Sung Cummings MD

## 2025-01-16 ENCOUNTER — OFFICE VISIT (OUTPATIENT)
Facility: CLINIC | Age: 40
End: 2025-01-16
Payer: COMMERCIAL

## 2025-01-16 VITALS
HEART RATE: 72 BPM | HEIGHT: 69 IN | WEIGHT: 248.13 LBS | RESPIRATION RATE: 16 BRPM | BODY MASS INDEX: 36.75 KG/M2 | SYSTOLIC BLOOD PRESSURE: 141 MMHG | TEMPERATURE: 98 F | DIASTOLIC BLOOD PRESSURE: 90 MMHG

## 2025-01-16 DIAGNOSIS — D64.9 ANEMIA, NORMOCYTIC NORMOCHROMIC: ICD-10-CM

## 2025-01-16 DIAGNOSIS — D69.6 THROMBOCYTOPENIA: Primary | ICD-10-CM

## 2025-01-16 DIAGNOSIS — D64.9 ANEMIA, UNSPECIFIED TYPE: ICD-10-CM

## 2025-01-16 DIAGNOSIS — D61.818 PANCYTOPENIA: ICD-10-CM

## 2025-01-16 DIAGNOSIS — C91.41 HAIRY CELL LEUKEMIA, IN REMISSION: ICD-10-CM

## 2025-01-16 DIAGNOSIS — D72.819 LEUKOPENIA, UNSPECIFIED TYPE: ICD-10-CM

## 2025-01-16 PROCEDURE — 99215 OFFICE O/P EST HI 40 MIN: CPT | Mod: S$GLB,,, | Performed by: INTERNAL MEDICINE

## 2025-01-16 PROCEDURE — G2211 COMPLEX E/M VISIT ADD ON: HCPCS | Mod: S$GLB,,, | Performed by: INTERNAL MEDICINE

## 2025-01-16 PROCEDURE — 99999 PR PBB SHADOW E&M-EST. PATIENT-LVL IV: CPT | Mod: PBBFAC,,, | Performed by: INTERNAL MEDICINE

## 2025-02-03 DIAGNOSIS — I10 ESSENTIAL HYPERTENSION: ICD-10-CM

## 2025-02-03 DIAGNOSIS — I49.3 PVC'S (PREMATURE VENTRICULAR CONTRACTIONS): ICD-10-CM

## 2025-02-03 RX ORDER — METOPROLOL SUCCINATE 50 MG/1
50 TABLET, EXTENDED RELEASE ORAL DAILY
Qty: 90 TABLET | Refills: 0 | Status: SHIPPED | OUTPATIENT
Start: 2025-02-03

## 2025-02-03 NOTE — TELEPHONE ENCOUNTER
No care due was identified.  [facility] Embedded Care Due Messages. Reference number: [id number].   2/03/2025 9:01:40 AM CST

## 2025-03-09 NOTE — TELEPHONE ENCOUNTER
No care due was identified.  [facility] Embedded Care Due Messages. Reference number: [id number].   3/09/2025 7:21:58 AM CDT

## 2025-03-10 RX ORDER — TADALAFIL 20 MG/1
20 TABLET ORAL DAILY PRN
Qty: 30 TABLET | Refills: 8 | Status: SHIPPED | OUTPATIENT
Start: 2025-03-10 | End: 2026-03-10

## 2025-03-11 ENCOUNTER — PATIENT MESSAGE (OUTPATIENT)
Dept: FAMILY MEDICINE | Facility: CLINIC | Age: 40
End: 2025-03-11
Payer: COMMERCIAL

## 2025-03-25 ENCOUNTER — OFFICE VISIT (OUTPATIENT)
Dept: FAMILY MEDICINE | Facility: CLINIC | Age: 40
End: 2025-03-25
Payer: COMMERCIAL

## 2025-03-25 ENCOUNTER — LAB VISIT (OUTPATIENT)
Dept: LAB | Facility: HOSPITAL | Age: 40
End: 2025-03-25
Attending: FAMILY MEDICINE
Payer: COMMERCIAL

## 2025-03-25 VITALS
WEIGHT: 257 LBS | OXYGEN SATURATION: 98 % | SYSTOLIC BLOOD PRESSURE: 124 MMHG | HEIGHT: 69 IN | BODY MASS INDEX: 38.06 KG/M2 | TEMPERATURE: 99 F | DIASTOLIC BLOOD PRESSURE: 90 MMHG | HEART RATE: 82 BPM

## 2025-03-25 DIAGNOSIS — C91.41 HAIRY CELL LEUKEMIA, IN REMISSION: ICD-10-CM

## 2025-03-25 DIAGNOSIS — C91.40 HAIRY CELL LEUKEMIA NOT HAVING ACHIEVED REMISSION: ICD-10-CM

## 2025-03-25 DIAGNOSIS — I10 ESSENTIAL HYPERTENSION: ICD-10-CM

## 2025-03-25 DIAGNOSIS — Z13.31 POSITIVE DEPRESSION SCREENING: Primary | ICD-10-CM

## 2025-03-25 DIAGNOSIS — F41.9 ANXIETY: ICD-10-CM

## 2025-03-25 DIAGNOSIS — R51.9 INTRACTABLE HEADACHE, UNSPECIFIED CHRONICITY PATTERN, UNSPECIFIED HEADACHE TYPE: ICD-10-CM

## 2025-03-25 LAB
ABSOLUTE EOSINOPHIL (SMH): 0.28 K/UL
ABSOLUTE MONOCYTE (SMH): 0.64 K/UL (ref 0.3–1)
ABSOLUTE NEUTROPHIL COUNT (SMH): 4.1 K/UL (ref 1.8–7.7)
ALBUMIN SERPL-MCNC: 4.3 G/DL (ref 3.5–5.2)
ALP SERPL-CCNC: 61 UNIT/L (ref 55–135)
ALT SERPL-CCNC: 47 UNIT/L (ref 10–44)
ANION GAP (SMH): 8 MMOL/L (ref 8–16)
AST SERPL-CCNC: 24 UNIT/L (ref 10–40)
BASOPHILS # BLD AUTO: 0.05 K/UL
BASOPHILS NFR BLD AUTO: 0.9 %
BILIRUB SERPL-MCNC: 0.6 MG/DL (ref 0.1–1)
BUN SERPL-MCNC: 15 MG/DL (ref 6–20)
CALCIUM SERPL-MCNC: 9.9 MG/DL (ref 8.7–10.5)
CHLORIDE SERPL-SCNC: 102 MMOL/L (ref 95–110)
CO2 SERPL-SCNC: 26 MMOL/L (ref 23–29)
CREAT SERPL-MCNC: 1.1 MG/DL (ref 0.5–1.4)
ERYTHROCYTE [DISTWIDTH] IN BLOOD BY AUTOMATED COUNT: 13.8 % (ref 11.5–14.5)
GFR SERPLBLD CREATININE-BSD FMLA CKD-EPI: >60 ML/MIN/1.73/M2
GLUCOSE SERPL-MCNC: 98 MG/DL (ref 70–110)
HCT VFR BLD AUTO: 46.1 % (ref 40–54)
HGB BLD-MCNC: 15.4 GM/DL (ref 14–18)
IMM GRANULOCYTES # BLD AUTO: 0.05 K/UL (ref 0–0.04)
IMM GRANULOCYTES NFR BLD AUTO: 0.9 % (ref 0–0.5)
LYMPHOCYTES # BLD AUTO: 0.67 K/UL (ref 1–4.8)
MCH RBC QN AUTO: 27.7 PG (ref 27–31)
MCHC RBC AUTO-ENTMCNC: 33.4 G/DL (ref 32–36)
MCV RBC AUTO: 83 FL (ref 82–98)
NUCLEATED RBC (/100WBC) (SMH): 0 /100 WBC
PLATELET # BLD AUTO: 190 K/UL (ref 150–450)
PMV BLD AUTO: 9.5 FL (ref 9.2–12.9)
POTASSIUM SERPL-SCNC: 3.9 MMOL/L (ref 3.5–5.1)
PROT SERPL-MCNC: 7.3 GM/DL (ref 6–8.4)
RBC # BLD AUTO: 5.55 M/UL (ref 4.6–6.2)
RELATIVE EOSINOPHIL (SMH): 4.8 % (ref 0–8)
RELATIVE LYMPHOCYTE (SMH): 11.5 % (ref 18–48)
RELATIVE MONOCYTE (SMH): 11 % (ref 4–15)
RELATIVE NEUTROPHIL (SMH): 70.9 % (ref 38–73)
SODIUM SERPL-SCNC: 136 MMOL/L (ref 136–145)
WBC # BLD AUTO: 5.82 K/UL (ref 3.9–12.7)

## 2025-03-25 PROCEDURE — 84075 ASSAY ALKALINE PHOSPHATASE: CPT

## 2025-03-25 PROCEDURE — 99214 OFFICE O/P EST MOD 30 MIN: CPT | Mod: S$GLB,,,

## 2025-03-25 PROCEDURE — 85025 COMPLETE CBC W/AUTO DIFF WBC: CPT

## 2025-03-25 PROCEDURE — 36415 COLL VENOUS BLD VENIPUNCTURE: CPT

## 2025-03-25 PROCEDURE — 99999 PR PBB SHADOW E&M-EST. PATIENT-LVL IV: CPT | Mod: PBBFAC,,,

## 2025-03-25 RX ORDER — LOSARTAN POTASSIUM 100 MG/1
100 TABLET ORAL DAILY
Qty: 90 TABLET | Refills: 1 | Status: SHIPPED | OUTPATIENT
Start: 2025-03-25 | End: 2026-03-25

## 2025-03-25 NOTE — PROGRESS NOTES
Subjective:       Patient ID: Gerardo Ramirez Jr. is a 40 y.o. male.    Chief Complaint: Headache (X 3days) and Anxiety (Worst last week )    Gerardo Ramirez Jr.is a 40 y.o. male patient that presents to clinic with complaints of headache.  He states he went out drinking on Friday night and developed a headache on Saturday.  He has had a daily headache since that time.  He is taking tylenol, BC powder, Fioricet, and Zanaflex which dull the headache but it has not resolved. He feels like there is a pressure in the back of his head.  He states that he always gets headaches when he drinks and they will last a few days. He has a history of hypertension and his blood pressure is high today. He is on olmesartan 40 mg and metoprolol 50 mg.  He is not sure if this is cause of his headaches.  He has no chest pain, palpitations, or shortness of breath.  He has a positive depression screening today.  He states he is under a lot of stress at home.  Having some anxiety trying to help his daughter deal with a stressful situation.  He denies thoughts of suicide or homicide.  He has a history of leukemia and sees Dr Ernst.  He is taking testosterone replacement and had high blood count.  His H&H was 17.5/54.5. which was done through the TRT clinic.  He did phlebotomy and would like his CBC rechecked.            Review of patient's allergies indicates:   Allergen Reactions    Amoxicillin Hives     Social Drivers of Health     Tobacco Use: Medium Risk (3/25/2025)    Patient History     Smoking Tobacco Use: Former     Smokeless Tobacco Use: Never     Passive Exposure: Not on file   Alcohol Use: Alcohol Misuse (11/7/2018)    AUDIT-C     Frequency of Alcohol Consumption: 2-4 times a month     Average Number of Drinks: 7 to 9     Frequency of Binge Drinking: Weekly   Financial Resource Strain: Not on file   Food Insecurity: Not on file   Transportation Needs: Not on file   Physical Activity: Not on file   Stress: Stress  Concern Present (10/16/2020)    [employer] [place] [employer] - Occupational Stress Questionnaire     Feeling of Stress : To some extent   Housing Stability: Not on file   Depression: Medium Risk (3/25/2025)    Depression     Last PHQ-4: Flowsheet Data: 8   Utilities: Not on file   Health Literacy: Not on file   Social Isolation: Not on file      Past Medical History:   Diagnosis Date    Anemia, normocytic normochromic 04/05/2024    Anemia, unspecified 04/05/2024    Anxiety     Cancer     leukemia    Hyperlipidemia     Hypertension 2013    Inguinal hernia bilateral, non-recurrent 02/2020    right side more pronounced- surg scheduled    Leucopenia 04/05/2024    Pancytopenia 04/05/2024    Pneumonia     age 11    Sleep apnea     Sleep apnea-like behavior     needs sleep study- wife says he stops breathing while sleeping    Thrombocytopenia 04/05/2024      Past Surgical History:   Procedure Laterality Date    CHOLECYSTECTOMY  04/08/2019    w/ Small Umbilical hernia rep-     HERNIA REPAIR Bilateral 03/10/2020    Dr. Lanier [facility]    UMBILICAL HERNIA REPAIR  04/08/2019        VASECTOMY Bilateral 11/30/2020    Procedure: VASECTOMY;  Surgeon: German Tobias MD;  Location: [facility];  Service: Urology;  Laterality: Bilateral;      Social History[1]    Current Medications[2]    Lab Results   Component Value Date    WBC 5.82 03/25/2025    HGB 15.4 03/25/2025    HCT 46.1 03/25/2025     03/25/2025    CHOL 96 (L) 11/08/2024    TRIG 140 11/08/2024    HDL 31 (L) 11/08/2024    ALT 47 (H) 03/25/2025    AST 24 03/25/2025     03/25/2025    K 3.9 03/25/2025     01/14/2025    CREATININE 1.1 03/25/2025    BUN 15 03/25/2025    CO2 26 03/25/2025    TSH 1.860 02/16/2024    INR 1.0 04/25/2024    HGBA1C 5.7 11/08/2024       Review of Systems   Constitutional: Negative.    Respiratory: Negative.     Cardiovascular: Negative.    Gastrointestinal: Negative.    Musculoskeletal: Negative.     Neurological:  Positive for headaches. Negative for dizziness, speech difficulty, weakness and numbness.   Psychiatric/Behavioral:  Positive for depressed mood. Negative for suicidal ideas. The patient is nervous/anxious.        Objective:      Physical Exam  Vitals reviewed.   Constitutional:       Appearance: Normal appearance. He is obese.   HENT:      Right Ear: Tympanic membrane normal.      Left Ear: Tympanic membrane normal.      Nose: Nose normal.      Mouth/Throat:      Mouth: Mucous membranes are moist.      Pharynx: Oropharynx is clear.   Eyes:      Conjunctiva/sclera: Conjunctivae normal.   Cardiovascular:      Rate and Rhythm: Normal rate and regular rhythm.      Pulses: Normal pulses.      Heart sounds: Normal heart sounds, S1 normal and S2 normal. No murmur heard.  Pulmonary:      Effort: Pulmonary effort is normal.      Breath sounds: Normal breath sounds.   Musculoskeletal:         General: Normal range of motion.      Cervical back: Normal range of motion.      Right lower leg: No edema.      Left lower leg: No edema.   Lymphadenopathy:      Cervical: No cervical adenopathy.   Skin:     General: Skin is warm and dry.      Capillary Refill: Capillary refill takes less than 2 seconds.   Neurological:      General: No focal deficit present.      Mental Status: He is alert and oriented to person, place, and time.      GCS: GCS eye subscore is 4. GCS verbal subscore is 5. GCS motor subscore is 6.      Cranial Nerves: Cranial nerves 2-12 are intact.      Motor: Motor function is intact.      Coordination: Coordination is intact.      Gait: Gait is intact.   Psychiatric:         Mood and Affect: Mood and affect normal.         Speech: Speech normal.         Behavior: Behavior normal. Behavior is cooperative.         Thought Content: Thought content normal. Thought content does not include homicidal or suicidal ideation.         Judgment: Judgment normal.         Assessment:       1. Positive depression  screening    2. Anxiety    3. Essential hypertension    4. Intractable headache, unspecified chronicity pattern, unspecified headache type        Plan:       Gerardo was seen today for headache and anxiety.    Diagnoses and all orders for this visit:    Positive depression screening  Comments:  I have reviewed the positive depression score which warrants active treatment with psychotherapy and/or medications.    Anxiety    Essential hypertension  -     losartan (COZAAR) 100 MG tablet; Take 1 tablet (100 mg total) by mouth once daily.  -     CBC Auto Differential; Future  -     Comprehensive Metabolic Panel; Future    Intractable headache, unspecified chronicity pattern, unspecified headache type  -     CBC Auto Differential; Future  -     Comprehensive Metabolic Panel; Future    Depression and anxiety  -  I have used clinical judgement based on duration and functional status to consider definite necessity for treatment. Patient does not wish to be on medication at this time.  He plans on making an appointment with a Therapist.  He is non suicidal and non homicidal.    - patient aware he can schedule appointment in our office for treatment if he would like to start medication.  He is also aware he would need to go to ED if he has any thoughts of suicide or homicide.      Hypertension  - discontinue olmesartan  - start losartan 100 mg daily  - continue metoprolol xl 50 mg daily  - keep daily blood pressure log  - follow up in 3-4 weeks to recheck    Headache  - hopefully improving his blood pressure will resolve his headache  - continue tylenol, ibuprofen or Excedrin migraine.  If no relief then take Fioricet.    - avoid alcohol intake.      Have labs done. Follow up in 3-4 weeks or sooner if needed.                  [1]   Social History  Socioeconomic History    Marital status:    [2]   Current Outpatient Medications:     acetaminophen (TYLENOL EXTRA STRENGTH) 500 MG tablet, Take 1,000 mg by mouth once as  needed for Pain., Disp: , Rfl:     butalbital-acetaminophen-caff -40 mg Cap, Take 1-2 capsules by mouth 3 (three) times daily., Disp: 30 capsule, Rfl: 1    ergocalciferol (VITAMIN D2) 50,000 unit Cap, Take 1 capsule (50,000 Units total) by mouth every 7 days., Disp: 12 capsule, Rfl: 1    metoprolol succinate (TOPROL-XL) 50 MG 24 hr tablet, Take 1 tablet (50 mg total) by mouth once daily., Disp: 90 tablet, Rfl: 0    NON FORMULARY MEDICATION, human chorionicgonadotropin 3xs a week total dose 300 weekly, Disp: , Rfl:     pitavastatin calcium (LIVALO) 2 mg Tab tablet, Take 1 tablet (2 mg total) by mouth every evening., Disp: 90 tablet, Rfl: 1    tadalafiL (CIALIS) 20 MG Tab, Take 1 tablet (20 mg total) by mouth daily as needed (erectile dysfunction)., Disp: 30 tablet, Rfl: 8    testosterone cypionate (DEPOTESTOTERONE CYPIONATE) 200 mg/mL injection, SMARTSIG:Milliliter(s) IM, Disp: , Rfl:     acyclovir (ZOVIRAX) 400 MG tablet, Take 1 tablet (400 mg total) by mouth 2 (two) times daily., Disp: 60 tablet, Rfl: 11    anastrozole (ARIMIDEX) 1 mg Tab, Take 1 mg by mouth every 7 days. (Patient not taking: Reported on 1/16/2025.), Disp: , Rfl:     losartan (COZAAR) 100 MG tablet, Take 1 tablet (100 mg total) by mouth once daily., Disp: 90 tablet, Rfl: 1    sulfamethoxazole-trimethoprim 800-160mg (BACTRIM DS) 800-160 mg Tab, Take 1 tablet by mouth every Mon, Wed, Fri. (Patient not taking: Reported on 3/25/2025), Disp: 24 tablet, Rfl: 3  No current facility-administered medications for this visit.    Facility-Administered Medications Ordered in Other Visits:     alteplase injection 2 mg, 2 mg, Intra-Catheter, PRN, Chan Paredes MD    EPINEPHrine (EPIPEN) 0.3 mg/0.3 mL pen injection 0.3 mg, 0.3 mg, Intramuscular, Once PRN, Chan Paredes MD    heparin, porcine (PF) 100 unit/mL injection flush 500 Units, 500 Units, Intravenous, PRN, Chan Paredes MD    sodium chloride 0.9% flush 10 mL, 10 mL, Intravenous, PRN,  Chan Paredes MD

## 2025-03-31 ENCOUNTER — TELEPHONE (OUTPATIENT)
Dept: CARDIOLOGY | Facility: CLINIC | Age: 40
End: 2025-03-31
Payer: COMMERCIAL

## 2025-03-31 NOTE — TELEPHONE ENCOUNTER
----- Message from [employer] sent at 3/29/2025 10:47 AM CDT -----  Type:  Sooner Appointment RequestCaller is requesting a sooner appointment.  Caller declined first available appointment listed below.  Caller will not accept being placed on the waitlist and is requesting a message be sent to doctor.Name of Caller:  Nhi is the first available appointment?  No solutions found Symptoms:  ECA/ history of cardiac issues/ HypertensionWould the patient rather a call back or a response via [facility]? Call [facility] Call Back Number:  [phone] Information:

## 2025-03-31 NOTE — TELEPHONE ENCOUNTER
----- Message from Miguelangel Oleary sent at 3/31/2025  8:33 AM CDT -----    ----- Message -----  From: Devika Kramer  Sent: 3/29/2025  10:50 AM CDT  To: [facility] Clinical Support [facility]    Type:  Sooner Appointment RequestCaller is requesting a sooner appointment.  Caller declined first available appointment listed below.  Caller will not accept being placed on the waitlist and is requesting a message be sent to doctor.Name of Caller:  Nhi is the first available appointment?  No solutions found Symptoms:  ECA/ history of cardiac issues/ HypertensionWould the patient rather a call back or a response via [facility]? Call [facility] Call Back Number:  [phone] Information:   no wheezing/no dyspnea/no hemoptysis/no pleuritic chest pain/no cough

## 2025-04-01 ENCOUNTER — PATIENT MESSAGE (OUTPATIENT)
Dept: FAMILY MEDICINE | Facility: CLINIC | Age: 40
End: 2025-04-01
Payer: COMMERCIAL

## 2025-04-01 ENCOUNTER — TELEPHONE (OUTPATIENT)
Dept: FAMILY MEDICINE | Facility: CLINIC | Age: 40
End: 2025-04-01
Payer: COMMERCIAL

## 2025-04-01 RX ORDER — TADALAFIL 20 MG/1
20 TABLET ORAL DAILY PRN
Qty: 30 TABLET | Refills: 8 | Status: CANCELLED | OUTPATIENT
Start: 2025-04-01 | End: 2026-04-01

## 2025-04-01 NOTE — TELEPHONE ENCOUNTER
No care due was identified.  [facility] Embedded Care Due Messages. Reference number: [id number].   4/01/2025 9:11:03 AM CDT

## 2025-04-01 NOTE — TELEPHONE ENCOUNTER
Spoke with patient and let know insurance only covers 6 tablets every 25 days and that pharmacy has discount card he can use and get 30 tables for 20.58. he states he will call pharmacy and get it filled.

## 2025-04-09 ENCOUNTER — TELEPHONE (OUTPATIENT)
Facility: CLINIC | Age: 40
End: 2025-04-09
Payer: COMMERCIAL

## 2025-04-09 NOTE — TELEPHONE ENCOUNTER
Call could not be completed as dialed; not able to LM to confirm appt for 4/17 at 3:00 with Dr. Cummings and remind pt to call the office to schedule appt for lab that are due prior to visit.

## 2025-04-21 ENCOUNTER — OFFICE VISIT (OUTPATIENT)
Dept: FAMILY MEDICINE | Facility: CLINIC | Age: 40
End: 2025-04-21
Payer: COMMERCIAL

## 2025-04-21 VITALS
TEMPERATURE: 99 F | DIASTOLIC BLOOD PRESSURE: 76 MMHG | OXYGEN SATURATION: 97 % | HEIGHT: 69 IN | SYSTOLIC BLOOD PRESSURE: 134 MMHG | HEART RATE: 80 BPM | BODY MASS INDEX: 38.29 KG/M2 | WEIGHT: 258.5 LBS

## 2025-04-21 DIAGNOSIS — I10 ESSENTIAL HYPERTENSION: Primary | ICD-10-CM

## 2025-04-21 DIAGNOSIS — F41.9 ANXIETY: ICD-10-CM

## 2025-04-21 PROCEDURE — 99999 PR PBB SHADOW E&M-EST. PATIENT-LVL IV: CPT | Mod: PBBFAC,,,

## 2025-04-21 PROCEDURE — 99214 OFFICE O/P EST MOD 30 MIN: CPT | Mod: S$GLB,,,

## 2025-04-21 RX ORDER — HYDROXYZINE HYDROCHLORIDE 25 MG/1
25 TABLET, FILM COATED ORAL 3 TIMES DAILY
Qty: 90 TABLET | Refills: 2 | Status: SHIPPED | OUTPATIENT
Start: 2025-04-21

## 2025-04-21 NOTE — PROGRESS NOTES
All of a sudden a about the patient's so appointment Subjective:       Patient ID: Gerardo Ramirez Jr. is a 40 y.o. male.    Chief Complaint: Anxiety    Gerardo Ramirez Jr. Is a 40 y.o. male patient that presents to clinic with complaints of anxiety.  Patient  had a postive depression score last visit but did not feel like he needed medication at that time.  Since that visit his daughter over dosed on benadryl and [facility]  There is plans to send her to long term care.  This has increased his anxiety.  He is reluctant to take and everyday medication.  He would like something that is as needed.  He denies thoughts of suicide or homicide.   He has a history of hypertension and last visit his blood pressure was high.  His olmesartan was discontinued and he was put on losartan 100 mg.  He is doing better with this.  He has not chest pain or palpitations.           Review of patient's allergies indicates:   Allergen Reactions    Amoxicillin Hives     Social Drivers of Health     Tobacco Use: Medium Risk (4/21/2025)    Patient History     Smoking Tobacco Use: Former     Smokeless Tobacco Use: Never     Passive Exposure: Not on file   Alcohol Use: Alcohol Misuse (11/7/2018)    AUDIT-C     Frequency of Alcohol Consumption: 2-4 times a month     Average Number of Drinks: 7 to 9     Frequency of Binge Drinking: Weekly   Financial Resource Strain: Not on file   Food Insecurity: Not on file   Transportation Needs: Not on file   Physical Activity: Not on file   Stress: Stress Concern Present (10/16/2020)    [employer] [place] [employer] - Occupational Stress Questionnaire     Feeling of Stress : To some extent   Housing Stability: Not on file   Depression: Medium Risk (3/25/2025)    Depression     Last PHQ-4: Flowsheet Data: 8   Utilities: Not on file   Health Literacy: Not on file   Social Isolation: Not on file      Past Medical History:   Diagnosis Date    Anemia,  normocytic normochromic 04/05/2024    Anemia, unspecified 04/05/2024    Anxiety     Cancer     leukemia    Hyperlipidemia     Hypertension 2013    Inguinal hernia bilateral, non-recurrent 02/2020    right side more pronounced- surg scheduled    Leucopenia 04/05/2024    Pancytopenia 04/05/2024    Pneumonia     age 11    Sleep apnea     Sleep apnea-like behavior     needs sleep study- wife says he stops breathing while sleeping    Thrombocytopenia 04/05/2024      Past Surgical History:   Procedure Laterality Date    CHOLECYSTECTOMY  04/08/2019    w/ Small Umbilical hernia rep-     HERNIA REPAIR Bilateral 03/10/2020    Dr. Lanier - [facility]    UMBILICAL HERNIA REPAIR  04/08/2019        VASECTOMY Bilateral 11/30/2020    Procedure: VASECTOMY;  Surgeon: German Tobias MD;  Location: [facility];  Service: Urology;  Laterality: Bilateral;      Social History[1]    Current Medications[2]    Lab Results   Component Value Date    WBC 5.82 03/25/2025    HGB 15.4 03/25/2025    HCT 46.1 03/25/2025     03/25/2025    CHOL 96 (L) 11/08/2024    TRIG 140 11/08/2024    HDL 31 (L) 11/08/2024    ALT 47 (H) 03/25/2025    AST 24 03/25/2025     03/25/2025    K 3.9 03/25/2025     01/14/2025    CREATININE 1.1 03/25/2025    BUN 15 03/25/2025    CO2 26 03/25/2025    TSH 1.860 02/16/2024    INR 1.0 04/25/2024    HGBA1C 5.7 11/08/2024       Review of Systems   Constitutional: Negative.    Respiratory: Negative.     Cardiovascular: Negative.    Psychiatric/Behavioral:  Negative for depressed mood and suicidal ideas. The patient is nervous/anxious.        Objective:      Physical Exam  Vitals reviewed.   Constitutional:       Appearance: Normal appearance.   Cardiovascular:      Rate and Rhythm: Normal rate and regular rhythm.      Pulses: Normal pulses.      Heart sounds: Normal heart sounds.   Pulmonary:      Effort: Pulmonary effort is normal.      Breath sounds: Normal breath sounds.   Neurological:      Mental  Status: He is alert.   Psychiatric:         Mood and Affect: Mood and affect normal.         Speech: Speech normal.         Behavior: Behavior normal.         Thought Content: Thought content normal. Thought content does not include homicidal or suicidal ideation.         Judgment: Judgment normal.         Assessment:       1. Essential hypertension    2. Anxiety        Plan:       Gerrado was seen today for anxiety.    Diagnoses and all orders for this visit:    Essential hypertension    Anxiety  -     hydrOXYzine HCL (ATARAX) 25 MG tablet; Take 1 tablet (25 mg total) by mouth 3 (three) times daily.    Hypertension  - controlled  - continue losartan 100 mg daily  - continue metoprolol xl 50 mg daily    Anxiety  - hydroxyzine 25 mg TID prn.    - keep follow up with Dr Mccormack as scheduled on 5/5/2025              [1]   Social History  Socioeconomic History    Marital status:    [2]   Current Outpatient Medications:     acyclovir (ZOVIRAX) 400 MG tablet, Take 1 tablet (400 mg total) by mouth 2 (two) times daily., Disp: 60 tablet, Rfl: 11    butalbital-acetaminophen-caff -40 mg Cap, Take 1-2 capsules by mouth 3 (three) times daily., Disp: 30 capsule, Rfl: 1    ergocalciferol (VITAMIN D2) 50,000 unit Cap, Take 1 capsule (50,000 Units total) by mouth every 7 days., Disp: 12 capsule, Rfl: 1    losartan (COZAAR) 100 MG tablet, Take 1 tablet (100 mg total) by mouth once daily., Disp: 90 tablet, Rfl: 1    metoprolol succinate (TOPROL-XL) 50 MG 24 hr tablet, Take 1 tablet (50 mg total) by mouth once daily., Disp: 90 tablet, Rfl: 0    NON FORMULARY MEDICATION, human chorionicgonadotropin 3xs a week total dose 300 weekly, Disp: , Rfl:     pitavastatin calcium (LIVALO) 2 mg Tab tablet, Take 1 tablet (2 mg total) by mouth every evening., Disp: 90 tablet, Rfl: 1    tadalafiL (CIALIS) 20 MG Tab, Take 1 tablet (20 mg total) by mouth daily as needed (erectile dysfunction)., Disp: 30 tablet, Rfl: 8    testosterone cypionate  (DEPOTESTOTERONE CYPIONATE) 200 mg/mL injection, SMARTSIG:Milliliter(s) IM, Disp: , Rfl:     acetaminophen (TYLENOL EXTRA STRENGTH) 500 MG tablet, Take 1,000 mg by mouth once as needed for Pain., Disp: , Rfl:     anastrozole (ARIMIDEX) 1 mg Tab, Take 1 mg by mouth every 7 days., Disp: , Rfl:     hydrOXYzine HCL (ATARAX) 25 MG tablet, Take 1 tablet (25 mg total) by mouth 3 (three) times daily., Disp: 90 tablet, Rfl: 2    sulfamethoxazole-trimethoprim 800-160mg (BACTRIM DS) 800-160 mg Tab, Take 1 tablet by mouth every Mon, Wed, Fri., Disp: 24 tablet, Rfl: 3  No current facility-administered medications for this visit.    Facility-Administered Medications Ordered in Other Visits:     alteplase injection 2 mg, 2 mg, Intra-Catheter, PRN, Chan Paredes MD    EPINEPHrine (EPIPEN) 0.3 mg/0.3 mL pen injection 0.3 mg, 0.3 mg, Intramuscular, Once PRN, Chan Paredes MD    heparin, porcine (PF) 100 unit/mL injection flush 500 Units, 500 Units, Intravenous, PRN, Chan Paredes MD    sodium chloride 0.9% flush 10 mL, 10 mL, Intravenous, PRNReggie Carter T., MD

## 2025-04-24 ENCOUNTER — PATIENT MESSAGE (OUTPATIENT)
Dept: FAMILY MEDICINE | Facility: CLINIC | Age: 40
End: 2025-04-24
Payer: COMMERCIAL

## 2025-05-01 DIAGNOSIS — I49.3 PVC'S (PREMATURE VENTRICULAR CONTRACTIONS): ICD-10-CM

## 2025-05-01 DIAGNOSIS — I10 ESSENTIAL HYPERTENSION: ICD-10-CM

## 2025-05-01 RX ORDER — METOPROLOL SUCCINATE 50 MG/1
TABLET, EXTENDED RELEASE ORAL
Qty: 90 TABLET | Refills: 0 | Status: SHIPPED | OUTPATIENT
Start: 2025-05-01

## 2025-05-01 NOTE — TELEPHONE ENCOUNTER
Refill Routing Note   Medication(s) are not appropriate for processing by [facility] for the following reason(s):        New or recently adjusted medication: Less than 90 days under signature of responsible physician    [facility] action(s):  Defer             Appointments  past 12m or future 3m with PCP    Date Provider   Last Visit   11/4/2024 Jonathan Mccormack III, MD   Next Visit   5/30/2025 Jonathan Mccormack III, MD   ED visits in past 90 days: 0        Note composed:3:36 AM 05/01/2025

## 2025-05-01 NOTE — TELEPHONE ENCOUNTER
No care due was identified.  [facility] Embedded Care Due Messages. Reference number: [id number].   5/01/2025 12:01:27 AM CDT

## 2025-05-09 DIAGNOSIS — D84.822 IMMUNODEFICIENCY DUE TO EXTERNAL CAUSE: ICD-10-CM

## 2025-05-12 RX ORDER — ACYCLOVIR 400 MG/1
400 TABLET ORAL 2 TIMES DAILY
Qty: 180 TABLET | Refills: 3 | Status: SHIPPED | OUTPATIENT
Start: 2025-05-12

## 2025-05-19 ENCOUNTER — OFFICE VISIT (OUTPATIENT)
Dept: CARDIOLOGY | Facility: CLINIC | Age: 40
End: 2025-05-19
Payer: COMMERCIAL

## 2025-05-19 VITALS
SYSTOLIC BLOOD PRESSURE: 120 MMHG | BODY MASS INDEX: 37.82 KG/M2 | OXYGEN SATURATION: 98 % | HEART RATE: 78 BPM | WEIGHT: 255.31 LBS | HEIGHT: 69 IN | DIASTOLIC BLOOD PRESSURE: 70 MMHG

## 2025-05-19 DIAGNOSIS — Z13.6 ENCOUNTER FOR SCREENING FOR CARDIOVASCULAR DISORDERS: ICD-10-CM

## 2025-05-19 DIAGNOSIS — I10 PRIMARY HYPERTENSION: Primary | ICD-10-CM

## 2025-05-19 PROCEDURE — 99204 OFFICE O/P NEW MOD 45 MIN: CPT | Mod: S$GLB,,, | Performed by: STUDENT IN AN ORGANIZED HEALTH CARE EDUCATION/TRAINING PROGRAM

## 2025-05-19 PROCEDURE — 99999 PR PBB SHADOW E&M-EST. PATIENT-LVL IV: CPT | Mod: PBBFAC,,, | Performed by: STUDENT IN AN ORGANIZED HEALTH CARE EDUCATION/TRAINING PROGRAM

## 2025-05-19 RX ORDER — OLMESARTAN MEDOXOMIL 40 MG/1
40 TABLET ORAL DAILY
COMMUNITY

## 2025-05-20 NOTE — PROGRESS NOTES
Part of this note has been created using [employer] dictation system. Errors in transcription may not be completely avoided. Some syntax or spelling errors may persist. Please contact the author of this note for any clarification.    Patient ID:  Gerardo Ramirez Jr.  40 y.o.  male      Assessment:     1. Primary hypertension    2. Encounter for screening for cardiovascular disorders        Plan:     Asymptomatic from cardiac standpoint.  Discussed ASCVD risk factor control and lifestyle interventions including diet, exercise and weight loss.  Continue pitavastatin 2 mg daily.  LDL is at goal.  Continue Benicar 40 mg daily.  Blood pressure is controlled.  Restrict daily sodium intake.  Obtain TTE to evaluate heart function and structure given the history of hypertension.  Last echo from 2020 was normal.  He is on metoprolol 50 mg daily for a questionable episode of AFib years ago that was reportedly seen on bedside monitor in the pre op area prior to a procedure.  It was a short episode. No evidence of AFib on all the EKGs or the Holter monitor he has had.  Metoprolol can be discontinued.  Verbal instruction given to wean it off.   Discuss cardiovascular implication of untreated sleep apnea.  Strongly encourage to use CPAP for EASTON or discuss with sleep specialist for alternative treatment of sleep apnea.  He does not use his CPAP as he does not like the mask.  Discuss potential cardiovascular risk of long-term TRT      Subjective:     Chief Complaint   Patient presents with    [facility] Care    Hyperlipidemia    Hypertension       HPI:  Gerardo Ramirez Jr. is a 40 y.o. male who presents to establish care.  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  He is on testosterone replacement therapy; follows at TRT clinic.  At times he has to do phlebotomy when his hematocrit increases due to  the TRT.  He is a former smoker and quit smoking in 2022.    Reports no orthopnea, PND, swelling of extremities, palpitations, syncope or near syncope.  He had some nonexertional chest pain and shortness of breath several weeks ago when his Benicar was switched to losartan. He then switched back to his Benicar on his own and the symptoms resolved after that. No recurrence of CP or dyspnea after that. Denies any episodes prior to that.  Denies any symptoms with exertion.    PREVIOUS CARDIAC TESTING/PROCEDURES HISTORY:  Most Recent Echocardiogram Results  Results for orders placed in visit on 03/05/20    Echo Color Flow Doppler? Yes    Interpretation Summary  · Normal left ventricular systolic function. The estimated ejection fraction is 60%.  · Normal LV diastolic function.  · No wall motion abnormalities.  · Normal right ventricular systolic function.  · Normal central venous pressure (3 mmHg).      Most Recent EKG Results  Results for orders placed or performed during the hospital encounter of 06/18/24   EKG 12-lead    Collection Time: 06/18/24  7:54 PM   Result Value Ref Range    QRS Duration 80 ms    [facility] QTC Calculation 411 ms    Narrative    Test Reason : R50.9,    Vent. Rate : 098 BPM     Atrial Rate : 098 BPM     P-R Int : 160 ms          QRS Dur : 080 ms      QT Int : 322 ms       P-R-T Axes : 030 007 039 degrees     QTc Int : 411 ms    Normal sinus rhythm  Normal ECG  When compared with ECG of 24-NOV-2020 14:07,  No significant change was found  Confirmed by Kevon Vela MD ([id number]) on 6/22/2024 4:10:15 PM    Referred By: [email or web address]   SELF           Confirmed By:Kevon Vela MD         Review of patient's allergies indicates:   Allergen Reactions    Amoxicillin Hives       Past Medical History:   Diagnosis Date    Anemia, normocytic normochromic 04/05/2024    Anemia, unspecified 04/05/2024    Anxiety     Cancer     leukemia    Hyperlipidemia     Hypertension 2013    Inguinal hernia bilateral,  non-recurrent 02/2020    right side more pronounced- surg scheduled    Leucopenia 04/05/2024    Pancytopenia 04/05/2024    Pneumonia     age 11    Sleep apnea     Sleep apnea-like behavior     needs sleep study- wife says he stops breathing while sleeping    Thrombocytopenia 04/05/2024     Past Surgical History:   Procedure Laterality Date    CHOLECYSTECTOMY  04/08/2019    w/ Small Umbilical hernia rep-     HERNIA REPAIR Bilateral 03/10/2020    Dr. Lanier - [facility]    UMBILICAL HERNIA REPAIR  04/08/2019        VASECTOMY Bilateral 11/30/2020    Procedure: VASECTOMY;  Surgeon: German Tobias MD;  Location: [facility];  Service: Urology;  Laterality: Bilateral;     Social History[1]       REVIEW OF SYSTEMS  CONSTITUTIONAL: No chills.   EYES: No double vision  NEURO: No alteration in mental status  RESPIRATORY: No wheezing.    CARDIOVASCULAR: See HPI   GI: No melena/hematochezia/hematemesis, no diarrhea, no nausea or vomiting.   : No dysuria and frequency, no hematuria  SKIN: No sloughing  PSYCHIATRIC: No hallucinations  ENDOCRINE: no polyphagia  MUSCULOSKELETAL: no muscle weakness        Objective:        Vitals:    05/19/25 1436   BP: 120/70   Pulse: 78       Wt Readings from Last 2 Encounters:   05/19/25 115.8 kg (255 lb 4.7 oz)   04/21/25 117.3 kg (258 lb 7.8 oz)       PHYSICAL EXAM  CONSTITUTIONAL: In no apparent distress  HEENT: Normocephalic. Pupils normal and conjunctivae normal.   LUNGS: B/L air entry to the lungs  HEART: Normal rate and regular rhythm. Normal S1 and S2.   ABDOMEN: soft, non-tender  NEURO: AAO X 3, no gross sensory or motor deficits  SKIN:  Intact  Psych:  Normal affect     Lab Results   Component Value Date    WBC 5.82 03/25/2025    HGB 15.4 03/25/2025    HCT 46.1 03/25/2025     03/25/2025    CHOL 96 (L) 11/08/2024    TRIG 140 11/08/2024    HDL 31 (L) 11/08/2024    ALT 47 (H) 03/25/2025    AST 24 03/25/2025     03/25/2025    K 3.9 03/25/2025     03/25/2025  "   CREATININE 1.1 03/25/2025    BUN 15 03/25/2025    CO2 26 03/25/2025    TSH 1.860 02/16/2024    INR 1.0 04/25/2024    HGBA1C 5.7 11/08/2024        @  Lab Results   Component Value Date    CHOL 96 (L) 11/08/2024    CHOL 104 03/18/2024    CHOL 276 (H) 02/16/2024     Lab Results   Component Value Date    HDL 31 (L) 11/08/2024    HDL 28 (L) 03/18/2024    HDL 33 (L) 02/16/2024     Lab Results   Component Value Date    LDLCALC 37.0 (L) 11/08/2024    LDLCALC 49 03/18/2024    LDLCALC 152 (H) 02/16/2024     No results found for: "DLDL"  Lab Results   Component Value Date    TRIG 140 11/08/2024    TRIG 155 (H) 03/18/2024    TRIG 478 (H) 02/16/2024         Current Outpatient Medications   Medication Instructions    acyclovir (ZOVIRAX) 400 mg, Oral, 2 times daily    butalbital-acetaminophen-caff -40 mg Cap 1-2 capsules, Oral, 3 times daily    ergocalciferol (VITAMIN D2) 50,000 Units, Oral, Every 7 days    hydrOXYzine HCL (ATARAX) 25 mg, Oral, 3 times daily    NON FORMULARY MEDICATION human chorionicgonadotropin 3xs a week total dose 300 weekly    olmesartan (BENICAR) 40 mg, Oral, Daily    pitavastatin calcium (LIVALO) 2 mg, Oral, Nightly    tadalafiL (CIALIS) 20 mg, Oral, Daily PRN    testosterone cypionate (DEPOTESTOTERONE CYPIONATE) 200 mg/mL injection SMARTSIG:Milliliter(s) IM       Medication List with Changes/Refills   Current Medications    ACYCLOVIR (ZOVIRAX) 400 MG TABLET    TAKE 1 TABLET BY MOUTH TWICE A DAY    BUTALBITAL-ACETAMINOPHEN-CAFF -40 MG CAP    Take 1-2 capsules by mouth 3 (three) times daily.    ERGOCALCIFEROL (VITAMIN D2) 50,000 UNIT CAP    Take 1 capsule (50,000 Units total) by mouth every 7 days.    HYDROXYZINE HCL (ATARAX) 25 MG TABLET    Take 1 tablet (25 mg total) by mouth 3 (three) times daily.    NON FORMULARY MEDICATION    human chorionicgonadotropin 3xs a week total dose 300 weekly    OLMESARTAN (BENICAR) 40 MG TABLET    Take 40 mg by mouth once daily.    PITAVASTATIN CALCIUM (LIVALO) " 2 MG TAB TABLET    Take 1 tablet (2 mg total) by mouth every evening.    TADALAFIL (CIALIS) 20 MG TAB    Take 1 tablet (20 mg total) by mouth daily as needed (erectile dysfunction).    TESTOSTERONE CYPIONATE (DEPOTESTOTERONE CYPIONATE) 200 MG/ML INJECTION    SMARTSIG:Milliliter(s) IM   Discontinued Medications         METOPROLOL SUCCINATE (TOPROL-XL) 50 MG 24 HR TABLET    TAKE 1 BY MOUTH EVREY DAY           All pertinent labs, imaging, and EKGs reviewed.  Patient's most recent EKG tracing was personally interpreted by this provider.    Problem List Items Addressed This Visit    None  Visit Diagnoses         Primary hypertension    -  Primary    Relevant Orders    Echo      Encounter for screening for cardiovascular disorders        Relevant Orders    Echo            Follow up in about 7 weeks (around 2025).         [1]   Social History  Tobacco Use    Smoking status: Former     Current packs/day: 0.00     Types: Cigarettes, Vaping with nicotine     Quit date: 2022     Years since quittin.4    Smokeless tobacco: Never   Substance Use Topics    Alcohol use: Yes     Comment: occ    Drug use: No

## 2025-05-26 ENCOUNTER — TELEPHONE (OUTPATIENT)
Facility: CLINIC | Age: 40
End: 2025-05-26
Payer: COMMERCIAL

## 2025-05-26 NOTE — TELEPHONE ENCOUNTER
LVM to confirm appointment scheduled for 5/30 at 3:00 with Kendra, Np and remind pt to call the office and schedule labs that are due prior to visit.

## 2025-05-31 DIAGNOSIS — D84.822 IMMUNODEFICIENCY DUE TO EXTERNAL CAUSE: ICD-10-CM

## 2025-06-02 RX ORDER — SULFAMETHOXAZOLE AND TRIMETHOPRIM 800; 160 MG/1; MG/1
1 TABLET ORAL
Qty: 10 TABLET | Refills: 10 | Status: SHIPPED | OUTPATIENT
Start: 2025-06-02

## 2025-06-03 ENCOUNTER — HOSPITAL ENCOUNTER (OUTPATIENT)
Dept: CARDIOLOGY | Facility: HOSPITAL | Age: 40
Discharge: HOME | End: 2025-06-03
Attending: STUDENT IN AN ORGANIZED HEALTH CARE EDUCATION/TRAINING PROGRAM
Payer: COMMERCIAL

## 2025-06-03 VITALS — WEIGHT: 255.31 LBS | HEIGHT: 69 IN | BODY MASS INDEX: 37.82 KG/M2

## 2025-06-03 DIAGNOSIS — Z13.6 ENCOUNTER FOR SCREENING FOR CARDIOVASCULAR DISORDERS: ICD-10-CM

## 2025-06-03 DIAGNOSIS — I10 PRIMARY HYPERTENSION: ICD-10-CM

## 2025-06-03 LAB
AORTIC ROOT ANNULUS: 3.6 CM
AORTIC VALVE CUSP SEPERATION: 2.6 CM
APICAL FOUR CHAMBER EJECTION FRACTION: 65 %
APICAL TWO CHAMBER EJECTION FRACTION: 60 %
AV INDEX (PROSTH): 0.84
AV MEAN GRADIENT: 4 MMHG
AV PEAK GRADIENT: 6 MMHG
AV VALVE AREA BY VELOCITY RATIO: 4.5 CM²
AV VALVE AREA: 4.1 CM²
AV VELOCITY RATIO: 0.92
BSA FOR ECHO PROCEDURE: 2.37 M2
CV ECHO LV RWT: 0.5 CM
DOP CALC AO PEAK VEL: 1.2 M/S
DOP CALC AO VTI: 23.8 CM
DOP CALC LVOT AREA: 4.9 CM2
DOP CALC LVOT DIAMETER: 2.5 CM
DOP CALC LVOT PEAK VEL: 1.1 M/S
DOP CALC LVOT STROKE VOLUME: 98.1 CM3
DOP CALC MV VTI: 22.3 CM
DOP CALCLVOT PEAK VEL VTI: 20 CM
E WAVE DECELERATION TIME: 220 MSEC
E/A RATIO: 1.15
E/E' RATIO: 9 M/S
ECHO LV POSTERIOR WALL: 1.3 CM (ref 0.6–1.1)
FRACTIONAL SHORTENING: 36.5 % (ref 28–44)
HR MV ECHO: 71 BPM
INTERVENTRICULAR SEPTUM: 1.3 CM (ref 0.6–1.1)
IVC DIAMETER: 1.4 CM
IVRT: 77 MSEC
LEFT ATRIUM AREA SYSTOLIC (APICAL 2 CHAMBER): 21.6 CM2
LEFT ATRIUM AREA SYSTOLIC (APICAL 4 CHAMBER): 19.4 CM2
LEFT ATRIUM SIZE: 3.7 CM
LEFT ATRIUM VOLUME INDEX MOD: 27 ML/M2
LEFT ATRIUM VOLUME MOD: 62 ML
LEFT INTERNAL DIMENSION IN SYSTOLE: 3.3 CM (ref 2.1–4)
LEFT VENTRICLE DIASTOLIC VOLUME INDEX: 56.77 ML/M2
LEFT VENTRICLE DIASTOLIC VOLUME: 130 ML
LEFT VENTRICLE END DIASTOLIC VOLUME APICAL 2 CHAMBER: 74.2 ML
LEFT VENTRICLE END DIASTOLIC VOLUME APICAL 4 CHAMBER: 127 ML
LEFT VENTRICLE END SYSTOLIC VOLUME APICAL 2 CHAMBER: 62.4 ML
LEFT VENTRICLE END SYSTOLIC VOLUME APICAL 4 CHAMBER: 45.9 ML
LEFT VENTRICLE MASS INDEX: 121.6 G/M2
LEFT VENTRICLE SYSTOLIC VOLUME INDEX: 19.2 ML/M2
LEFT VENTRICLE SYSTOLIC VOLUME: 44 ML
LEFT VENTRICULAR INTERNAL DIMENSION IN DIASTOLE: 5.2 CM (ref 3.5–6)
LEFT VENTRICULAR MASS: 278.4 G
LV LATERAL E/E' RATIO: 8.7 M/S
LV SEPTAL E/E' RATIO: 9.8 M/S
LVED V (TEICH): 130 ML
LVES V (TEICH): 44.1 ML
LVOT MG: 2 MMHG
LVOT MV: 0.68 CM/S
MV MEAN GRADIENT: 1 MMHG
MV PEAK A VEL: 0.68 M/S
MV PEAK E VEL: 0.78 M/S
MV PEAK GRADIENT: 2 MMHG
MV VALVE AREA BY CONTINUITY EQUATION: 4.4 CM2
OHS CV RV/LV RATIO: 0.44 CM
OHS LV EJECTION FRACTION SIMPSONS BIPLANE MOD: 62 %
PV MV: 0.64 M/S
PV PEAK GRADIENT: 4 MMHG
PV PEAK VELOCITY: 0.94 M/S
RA PRESSURE ESTIMATED: 3 MMHG
RIGHT ATRIAL AREA: 16 CM2
RIGHT VENTRICLE DIASTOLIC BASEL DIMENSION: 2.3 CM
RIGHT VENTRICULAR END-DIASTOLIC DIMENSION: 2.3 CM
RV TISSUE DOPPLER FREE WALL SYSTOLIC VELOCITY 1 (APICAL 4 CHAMBER VIEW): 11.4 CM/S
TDI LATERAL: 0.09 M/S
TDI SEPTAL: 0.08 M/S
TDI: 0.09 M/S
TRICUSPID ANNULAR PLANE SYSTOLIC EXCURSION: 2.2 CM
Z-SCORE OF LEFT VENTRICULAR DIMENSION IN END DIASTOLE: -5.2
Z-SCORE OF LEFT VENTRICULAR DIMENSION IN END SYSTOLE: -3.72

## 2025-06-03 PROCEDURE — 93306 TTE W/DOPPLER COMPLETE: CPT | Mod: 26,,, | Performed by: STUDENT IN AN ORGANIZED HEALTH CARE EDUCATION/TRAINING PROGRAM

## 2025-06-03 PROCEDURE — 93306 TTE W/DOPPLER COMPLETE: CPT

## 2025-06-30 ENCOUNTER — TELEPHONE (OUTPATIENT)
Dept: CARDIOLOGY | Facility: CLINIC | Age: 40
End: 2025-06-30
Payer: COMMERCIAL

## 2025-06-30 NOTE — TELEPHONE ENCOUNTER
Copied from CRM #[id number]. Topic: Appointments - Appointment Rescheduling  >> Jun 30, 2025  2:20 PM Kaseyon wrote:  Pt is maia 7/2 & pt st he will be at work. He is unable to make appt. Pt is cancelling appt. Pt ask if staff can give him a call. Please call to discuss.    Phone [phone]

## 2025-07-31 DIAGNOSIS — I49.3 PVC'S (PREMATURE VENTRICULAR CONTRACTIONS): ICD-10-CM

## 2025-07-31 DIAGNOSIS — I10 ESSENTIAL HYPERTENSION: ICD-10-CM

## 2025-07-31 RX ORDER — METOPROLOL SUCCINATE 50 MG/1
TABLET, EXTENDED RELEASE ORAL
Qty: 90 TABLET | Refills: 0 | OUTPATIENT
Start: 2025-07-31

## 2025-07-31 NOTE — TELEPHONE ENCOUNTER
No care due was identified.  [facility] Embedded Care Due Messages. Reference number: [id number].   7/31/2025 12:01:34 AM CDT

## 2025-07-31 NOTE — TELEPHONE ENCOUNTER
Refill Decision Note   Gerardo Ramirez  is requesting a refill authorization.  Brief Assessment and Rationale for Refill:  Quick Discontinue     Medication Therapy Plan:  Med d/c by John Feng MD on 5/19/2025; [facility]      Comments:     Note composed:5:14 AM 07/31/2025

## 2025-08-02 DIAGNOSIS — F41.9 ANXIETY: ICD-10-CM

## 2025-08-05 RX ORDER — HYDROXYZINE HYDROCHLORIDE 25 MG/1
25 TABLET, FILM COATED ORAL 3 TIMES DAILY
Qty: 90 TABLET | Refills: 2 | OUTPATIENT
Start: 2025-08-05

## 2025-08-06 ENCOUNTER — LAB VISIT (OUTPATIENT)
Dept: LAB | Facility: HOSPITAL | Age: 40
End: 2025-08-06
Attending: NURSE PRACTITIONER
Payer: COMMERCIAL

## 2025-08-06 ENCOUNTER — OFFICE VISIT (OUTPATIENT)
Dept: FAMILY MEDICINE | Facility: CLINIC | Age: 40
End: 2025-08-06
Payer: COMMERCIAL

## 2025-08-06 VITALS
HEART RATE: 84 BPM | OXYGEN SATURATION: 97 % | BODY MASS INDEX: 37.69 KG/M2 | WEIGHT: 254.5 LBS | SYSTOLIC BLOOD PRESSURE: 142 MMHG | HEIGHT: 69 IN | TEMPERATURE: 98 F | DIASTOLIC BLOOD PRESSURE: 100 MMHG

## 2025-08-06 DIAGNOSIS — K76.0 FATTY LIVER: ICD-10-CM

## 2025-08-06 DIAGNOSIS — R73.03 PREDIABETES: ICD-10-CM

## 2025-08-06 DIAGNOSIS — E55.9 VITAMIN D DEFICIENCY: ICD-10-CM

## 2025-08-06 DIAGNOSIS — Z00.00 PHYSICAL EXAM: ICD-10-CM

## 2025-08-06 DIAGNOSIS — I10 ESSENTIAL HYPERTENSION: ICD-10-CM

## 2025-08-06 DIAGNOSIS — I10 ESSENTIAL HYPERTENSION: Primary | ICD-10-CM

## 2025-08-06 DIAGNOSIS — G47.33 OBSTRUCTIVE SLEEP APNEA: ICD-10-CM

## 2025-08-06 DIAGNOSIS — E78.2 MIXED HYPERLIPIDEMIA: ICD-10-CM

## 2025-08-06 LAB
25(OH)D3+25(OH)D2 SERPL-MCNC: 36 NG/ML (ref 30–96)
ABSOLUTE EOSINOPHIL (SMH): 0.35 K/UL
ABSOLUTE MONOCYTE (SMH): 0.76 K/UL (ref 0.3–1)
ABSOLUTE NEUTROPHIL COUNT (SMH): 4.5 K/UL (ref 1.8–7.7)
ALBUMIN SERPL-MCNC: 4.6 G/DL (ref 3.5–5.2)
ALP SERPL-CCNC: 53 UNIT/L (ref 55–135)
ALT SERPL-CCNC: 35 UNIT/L (ref 10–44)
ANION GAP (SMH): 8 MMOL/L (ref 8–16)
AST SERPL-CCNC: 17 UNIT/L (ref 10–40)
BASOPHILS # BLD AUTO: 0.05 K/UL
BASOPHILS NFR BLD AUTO: 0.8 %
BILIRUB SERPL-MCNC: 0.6 MG/DL (ref 0.1–1)
BUN SERPL-MCNC: 23 MG/DL (ref 6–20)
CALCIUM SERPL-MCNC: 9.4 MG/DL (ref 8.7–10.5)
CHLORIDE SERPL-SCNC: 103 MMOL/L (ref 95–110)
CHOLEST SERPL-MCNC: 173 MG/DL (ref 120–199)
CHOLEST/HDLC SERPL: 4.8 {RATIO} (ref 2–5)
CO2 SERPL-SCNC: 25 MMOL/L (ref 23–29)
CREAT SERPL-MCNC: 1 MG/DL (ref 0.5–1.4)
ERYTHROCYTE [DISTWIDTH] IN BLOOD BY AUTOMATED COUNT: 13.4 % (ref 11.5–14.5)
GFR SERPLBLD CREATININE-BSD FMLA CKD-EPI: >60 ML/MIN/1.73/M2
GLUCOSE SERPL-MCNC: 99 MG/DL (ref 70–110)
HCT VFR BLD AUTO: 46.8 % (ref 40–54)
HDLC SERPL-MCNC: 36 MG/DL (ref 40–75)
HDLC SERPL: 20.8 % (ref 20–50)
HGB BLD-MCNC: 16 GM/DL (ref 14–18)
IMM GRANULOCYTES # BLD AUTO: 0.04 K/UL (ref 0–0.04)
IMM GRANULOCYTES NFR BLD AUTO: 0.6 % (ref 0–0.5)
LDLC SERPL CALC-MCNC: 89 MG/DL (ref 63–159)
LYMPHOCYTES # BLD AUTO: 0.83 K/UL (ref 1–4.8)
MCH RBC QN AUTO: 28.9 PG (ref 27–31)
MCHC RBC AUTO-ENTMCNC: 34.2 G/DL (ref 32–36)
MCV RBC AUTO: 85 FL (ref 82–98)
NONHDLC SERPL-MCNC: 137 MG/DL
NUCLEATED RBC (/100WBC) (SMH): 0 /100 WBC
PLATELET # BLD AUTO: 199 K/UL (ref 150–450)
PMV BLD AUTO: 9.2 FL (ref 9.2–12.9)
POTASSIUM SERPL-SCNC: 4 MMOL/L (ref 3.5–5.1)
PROT SERPL-MCNC: 7.5 GM/DL (ref 6–8.4)
RBC # BLD AUTO: 5.53 M/UL (ref 4.6–6.2)
RELATIVE EOSINOPHIL (SMH): 5.4 % (ref 0–8)
RELATIVE LYMPHOCYTE (SMH): 12.7 % (ref 18–48)
RELATIVE MONOCYTE (SMH): 11.6 % (ref 4–15)
RELATIVE NEUTROPHIL (SMH): 68.9 % (ref 38–73)
SODIUM SERPL-SCNC: 136 MMOL/L (ref 136–145)
TRIGL SERPL-MCNC: 240 MG/DL (ref 30–150)
WBC # BLD AUTO: 6.54 K/UL (ref 3.9–12.7)

## 2025-08-06 PROCEDURE — 80053 COMPREHEN METABOLIC PANEL: CPT

## 2025-08-06 PROCEDURE — 85025 COMPLETE CBC W/AUTO DIFF WBC: CPT

## 2025-08-06 PROCEDURE — 82306 VITAMIN D 25 HYDROXY: CPT

## 2025-08-06 PROCEDURE — 36415 COLL VENOUS BLD VENIPUNCTURE: CPT

## 2025-08-06 PROCEDURE — 99999 PR PBB SHADOW E&M-EST. PATIENT-LVL IV: CPT | Mod: PBBFAC,,, | Performed by: NURSE PRACTITIONER

## 2025-08-06 PROCEDURE — 80061 LIPID PANEL: CPT

## 2025-08-06 PROCEDURE — 83036 HEMOGLOBIN GLYCOSYLATED A1C: CPT

## 2025-08-06 RX ORDER — ERGOCALCIFEROL 1.25 MG/1
50000 CAPSULE ORAL
Qty: 12 CAPSULE | Refills: 1 | Status: SHIPPED | OUTPATIENT
Start: 2025-08-06

## 2025-08-06 RX ORDER — CHORIOGONADOTROPIN ALFA 10000 UNIT
750 KIT INTRAMUSCULAR
COMMUNITY
Start: 2025-05-16

## 2025-08-06 RX ORDER — OLMESARTAN MEDOXOMIL 40 MG/1
40 TABLET ORAL DAILY
Qty: 90 TABLET | Refills: 1 | Status: SHIPPED | OUTPATIENT
Start: 2025-08-06

## 2025-08-06 NOTE — PROGRESS NOTES
Subjective:       Patient ID: Gerardo Ramirez Jr. is a 40 y.o. male.    Chief Complaint: Follow-up    History of Present Illness    CHIEF COMPLAINT:  Mr. Ramirez presents today for medication refills    HYPERTENSION:  He takes Olmesartan 40mg at night for blood pressure management. He previously tried a different medication which was ineffective and caused adverse effects, so he returned to Olmesartan, after which his blood pressure stabilized. He occasionally checks blood pressure at home when experiencing symptoms such as flushing and red ears, though he has not felt symptomatic recently. He acknowledges his blood pressure was elevated during today's visit, which is the first time it has been high in a while. He is willing to monitor blood pressure at home and maintain a log for follow-up assessment. His wife is a nurse and has a home blood pressure cuff available. Today BP is 142/100. Pt took olmesartan last night per his routine.     SLEEP APNEA:  He has ongoing difficulties with sleep apnea management. He is diagnosed with sleep apnea and prescribed CPAP, but experiences significant challenges with consistent usage. He frequently wakes up and discards the device due to sleep disruption. He reports recent worsening of sleep quality over the past few weeks, noting he was previously managing well but now experiencing poor sleep. His CPAP mask clip is broken, which is preventing consistent mask usage. He denies successful nightly CPAP compliance and expresses uncertainty about obtaining a replacement mask. He is considering obtaining another sleep study at his wife's recommendation.    TESTOSTERONE REPLACEMENT THERAPY:  He has been on testosterone replacement therapy for approximately one year, managed by a nurse practitioner at a specialized clinic, not affiliated with [facility]. Due to elevated hematocrit associated with TRT, he donates blood every 5-6 weeks.. He acknowledges understanding that TRT, in  combination with untreated sleep apnea, contributes to increased hemoglobin and hematocrit levels, which potentially increases cardiovascular risk.    PREDIABETES:  Previous A1C results indicated prediabetes, with improvement from 6.0 to 5.7 over the past year. He acknowledges prior elevated results and is interested in current status of glycemic control.    MEDICATIONS:  He previously was on metoprolol, which was discontinued by his cardiologist. Pitavastatin was previously prescribed by prior PCP in May 2024, but he is uncertain about current status of this medication. He takes Vitamin D weekly but is currently out of medication, with dosage unchanged since originally prescribed by previous physician.    Portions of this note were generated with the assistance of ambient listening technology.      ROS:  General: +sleep disturbances,   Psychiatric: +sleep difficulty       Review of patient's allergies indicates:   Allergen Reactions    Amoxicillin Hives     Social Drivers of Health     Tobacco Use: Medium Risk (8/6/2025)    Patient History     Smoking Tobacco Use: Former     Smokeless Tobacco Use: Never     Passive Exposure: Not on file   Alcohol Use: Alcohol Misuse (11/7/2018)    AUDIT-C     Frequency of Alcohol Consumption: 2-4 times a month     Average Number of Drinks: 7 to 9     Frequency of Binge Drinking: Weekly   Financial Resource Strain: Not on file   Food Insecurity: Not on file   Transportation Needs: Not on file   Physical Activity: Not on file   Stress: Stress Concern Present (10/16/2020)    [employer] - Occupational Stress Questionnaire     Feeling of Stress : To some extent   Housing Stability: Not on file   Depression: Low Risk  (8/6/2025)    Depression     Last PHQ-4: Flowsheet Data: 0   Utilities: Not on file   Health Literacy: Not on file   Social Isolation: Not on file      Past Medical History:   Diagnosis Date    Anemia, normocytic normochromic 04/05/2024     "Anemia, unspecified 04/05/2024    Anxiety     Cancer     leukemia    Hyperlipidemia     Hypertension 2013    Inguinal hernia bilateral, non-recurrent 02/2020    right side more pronounced- surg scheduled    Leucopenia 04/05/2024    Pancytopenia 04/05/2024    Pneumonia     age 11    Sleep apnea     Sleep apnea-like behavior     needs sleep study- wife says he stops breathing while sleeping    Thrombocytopenia 04/05/2024      Past Surgical History:   Procedure Laterality Date    CHOLECYSTECTOMY  04/08/2019    w/ Small Umbilical hernia rep-     HERNIA REPAIR Bilateral 03/10/2020    Dr. Lanier - [facility]    UMBILICAL HERNIA REPAIR  04/08/2019        VASECTOMY Bilateral 11/30/2020    Procedure: VASECTOMY;  Surgeon: German Tobias MD;  Location: [facility];  Service: Urology;  Laterality: Bilateral;      Social History[1]    Current Medications[2]      Objective:      Vitals:    08/06/25 1538 08/06/25 1553   BP: (!) 138/90 (!) 142/100   BP Location:  Left arm   Patient Position:  Sitting   Pulse: 84    Temp: 98.2 °F (36.8 °C)    SpO2: 97%    Weight: 115.4 kg (254 lb 8 oz)    Height: 5' 9" (1.753 m)       Physical Exam  Constitutional:       Appearance: Normal appearance.   HENT:      Head: Normocephalic and atraumatic.   Eyes:      Conjunctiva/sclera: Conjunctivae normal.   Cardiovascular:      Rate and Rhythm: Normal rate.      Heart sounds: S1 normal and S2 normal.   Pulmonary:      Effort: Pulmonary effort is normal.   Musculoskeletal:      Right lower leg: No edema.      Left lower leg: No edema.   Skin:     General: Skin is warm.   Neurological:      Mental Status: He is alert and oriented to person, place, and time.   Psychiatric:         Mood and Affect: Mood normal.         Behavior: Behavior normal.         Thought Content: Thought content normal.         Judgment: Judgment normal.         Assessment:       1. Essential hypertension    2. Mixed hyperlipidemia    3. Obstructive sleep apnea    4. " Physical exam    5. Prediabetes    6. Vitamin D deficiency    7. Fatty liver        Plan:       Assessment & Plan    IMPRESSION:  - BP elevated at 142/100.  - History of prediabetes with previous A1C level of 6.0 and 5.7.  - Evaluated medication regimen, including Olmesartan (Benicar) for HTN. Recommend trying in the morning instead of at night to potentially improve daytime BP control.  - Non-compliance with CPAP therapy for diagnosed sleep apnea.  - Risks associated with testosterone replacement therapy (TRT) in combination with untreated sleep apnea and polycythemia.  - Determined need for fasting labs to reassess cholesterol and A1C levels.    Portions of this note were generated with the assistance of ambient listening technology.    PLAN SUMMARY:  - Prescribed olmesartan for hypertension management  - Ordered fasting lipid panel  - Ordered fasting A1C  - Ordered Vitamin D level  - Ordered new CPAP mask  - Mr. Ramirez to consider resuming pitavastatin after reviewing upcoming lab results  - Advise patient to inform TRT prescriber about sleep apnea status and CPAP non-compliance  - Follow-up in 1 month for BP check and potential medication adjustment    HYPERTENSION:  - Measured blood pressure at 142/100 during the visit, indicating it is currently elevated.  - Mr. Ramirez occasionally checks blood pressure at home, but reports it has been running high recently.  - refilled olmesartan for hypertension management.  - Instructed patient to monitor BP at home over the next few weeks, maintain a log of readings, and bring this log to next appointment.  - Scheduled follow up in 1 month for BP check and medication adjustment if needed.    HYPERLIPIDEMIA:  - Noted cholesterol was well-controlled while on pitavastatin, but current levels are unknown due to lack of recent laboratory evaluation.  - Ordered fasting lipid panel.  - Discussed patient's consideration of resuming pitavastatin (previously prescribed by   Galindo Snow in May 2024 for 6 months) after reviewing upcoming lab results.    OBSTRUCTIVE SLEEP APNEA:  - Identified patient is currently noncompliant with CPAP therapy due to broken mask clip.  - Ordered new CPAP mask and discussed importance of consistent CPAP compliance for managing sleep apnea and reducing cardiovascular risks.  - Advised patient to inform TRT prescriber about sleep apnea status and current non-compliance with CPAP.    SECONDARY POLYCYTHEMIA:  - Identified polycythemia secondary to testosterone replacement therapy and untreated sleep apnea, resulting in increased hemoglobin and hematocrit levels.  - Explained the relationship between these conditions and elevated blood values.  - Advised patient of associated risks, including stroke and heart attack, particularly in combination with other cardiovascular risk factors.  - Noted patient donates blood every 5-6 weeks as management strategy.    PREDIABETES:  - Documented previous HbA1c values of 6% one year ago decreasing to 5.7%, both within prediabetic range.  - Ordered fasting A1C to monitor current status.    VITAMIN D DEFICIENCY:  - Noted patient has been on unchanged Vitamin D supplementation since originally prescribed by Dr. Snow.  - Ordered Vitamin D level to determine if dosage adjustment is necessary.    CARDIOVASCULAR RISK MANAGEMENT:  - Explained risks associated with combination of untreated sleep apnea, testosterone replacement therapy, high cholesterol, and high blood pressure, including increased risk of stroke and heart attack.       Gerardo was seen today for follow-up.    Diagnoses and all orders for this visit:    Essential hypertension  -     olmesartan (BENICAR) 40 MG tablet; Take 1 tablet (40 mg total) by mouth once daily.  -     Comprehensive Metabolic Panel; Future  -     CBC Auto Differential; Future    Mixed hyperlipidemia  -     Lipid Panel; Future    Obstructive sleep apnea  -     CPAP/BIPAP SUPPLIES    Physical  exam    Prediabetes  -     Hemoglobin A1C; Future    Vitamin D deficiency  -     Vitamin D 25 hydroxy; Future    Fatty liver  -     Comprehensive Metabolic Panel; Future     Discussed patient's due or missing age appropriate immunizations.  Pt is aware of risks and benefits of these vaccines and DECLINES all vaccinations today.             Follow up in about 1 month (around 9/6/2025) for blood pressure.    Future Appointments       Date Provider Specialty Appt Notes    9/8/2025 Yohana Mcgovern FNP-C Family Medicine 1 month    9/19/2025  Lab Labs    9/19/2025 Chan Paredes MD Hematology and Oncology 6 month F/U            This note was generated with the assistance of ambient listening technology. Verbal consent was obtained by the patient and accompanying visitor(s) for the recording of patient appointment to facilitate this note. I attest to having reviewed and edited the generated note for accuracy, though some syntax or spelling errors may persist. Please contact the author of this note for any clarification.      JOSE Zuleta  Family Medicine         [1]   Social History  Socioeconomic History    Marital status:    [2]   Current Outpatient Medications:     acyclovir (ZOVIRAX) 400 MG tablet, TAKE 1 TABLET BY MOUTH TWICE A DAY, Disp: 180 tablet, Rfl: 3    butalbital-acetaminophen-caff -40 mg Cap, Take 1-2 capsules by mouth 3 (three) times daily., Disp: 30 capsule, Rfl: 1    ergocalciferol (VITAMIN D2) 50,000 unit Cap, Take 1 capsule (50,000 Units total) by mouth every 7 days., Disp: 12 capsule, Rfl: 1    NON FORMULARY MEDICATION, human chorionicgonadotropin 3xs a week total dose 300 weekly, Disp: , Rfl:     PREGNYL 10,000 unit injection, Inject 750 Units into the muscle 3 (three) times a week., Disp: , Rfl:     tadalafiL (CIALIS) 20 MG Tab, Take 1 tablet (20 mg total) by mouth daily as needed (erectile dysfunction)., Disp: 30 tablet, Rfl: 8    testosterone cypionate  (DEPOTESTOTERONE CYPIONATE) 200 mg/mL injection, SMARTSIG:Milliliter(s) IM, Disp: , Rfl:     hydrOXYzine HCL (ATARAX) 25 MG tablet, Take 1 tablet (25 mg total) by mouth 3 (three) times daily. (Patient not taking: Reported on 8/6/2025), Disp: 90 tablet, Rfl: 2    olmesartan (BENICAR) 40 MG tablet, Take 1 tablet (40 mg total) by mouth once daily., Disp: 90 tablet, Rfl: 1    pitavastatin calcium (LIVALO) 2 mg Tab tablet, Take 1 tablet (2 mg total) by mouth every evening., Disp: 90 tablet, Rfl: 1  No current facility-administered medications for this visit.    Facility-Administered Medications Ordered in Other Visits:     alteplase injection 2 mg, 2 mg, Intra-Catheter, PRN, Chan Paredes MD    EPINEPHrine (EPIPEN) 0.3 mg/0.3 mL pen injection 0.3 mg, 0.3 mg, Intramuscular, Once PRN, Chan Paredes MD    heparin, porcine (PF) 100 unit/mL injection flush 500 Units, 500 Units, Intravenous, PRNReggie Carter T., MD    sodium chloride 0.9% flush 10 mL, 10 mL, Intravenous, Reggie ALLEN Carter T., MD

## 2025-08-07 ENCOUNTER — RESULTS FOLLOW-UP (OUTPATIENT)
Dept: FAMILY MEDICINE | Facility: CLINIC | Age: 40
End: 2025-08-07
Payer: COMMERCIAL

## 2025-08-07 DIAGNOSIS — E78.2 MIXED HYPERLIPIDEMIA: Primary | ICD-10-CM

## 2025-08-07 LAB
EAG (SMH): 114 MG/DL (ref 68–131)
HBA1C MFR BLD: 5.6 % (ref 4.5–6.2)

## 2025-08-07 RX ORDER — PITAVASTATIN CALCIUM 2.09 MG/1
2 TABLET, FILM COATED ORAL NIGHTLY
Qty: 90 TABLET | Refills: 1 | Status: SHIPPED | OUTPATIENT
Start: 2025-08-07 | End: 2026-08-07

## 2025-08-07 RX ORDER — ATORVASTATIN CALCIUM 10 MG/1
10 TABLET, FILM COATED ORAL DAILY
Qty: 90 TABLET | Refills: 3 | Status: CANCELLED | OUTPATIENT
Start: 2025-08-07 | End: 2026-08-07

## 2025-08-07 NOTE — PROGRESS NOTES
Overall your labs look stable. Your cholesterol is elevated. I recommend restarting pitavastatin. Will send refills to your pharmacy.

## 2025-08-08 ENCOUNTER — PATIENT MESSAGE (OUTPATIENT)
Dept: FAMILY MEDICINE | Facility: CLINIC | Age: 40
End: 2025-08-08
Payer: COMMERCIAL

## 2025-08-16 ENCOUNTER — PATIENT MESSAGE (OUTPATIENT)
Dept: FAMILY MEDICINE | Facility: CLINIC | Age: 40
End: 2025-08-16
Payer: COMMERCIAL

## 2025-08-19 ENCOUNTER — OFFICE VISIT (OUTPATIENT)
Facility: CLINIC | Age: 40
End: 2025-08-19
Payer: COMMERCIAL

## 2025-08-19 VITALS
WEIGHT: 257.19 LBS | OXYGEN SATURATION: 96 % | TEMPERATURE: 99 F | SYSTOLIC BLOOD PRESSURE: 131 MMHG | DIASTOLIC BLOOD PRESSURE: 82 MMHG | BODY MASS INDEX: 38.09 KG/M2 | HEART RATE: 106 BPM | RESPIRATION RATE: 18 BRPM | HEIGHT: 69 IN

## 2025-08-19 DIAGNOSIS — E34.9 TESTOSTERONE DEFICIENCY: ICD-10-CM

## 2025-08-19 DIAGNOSIS — C91.41 HAIRY CELL LEUKEMIA, IN REMISSION: Primary | ICD-10-CM

## 2025-08-19 PROCEDURE — G2211 COMPLEX E/M VISIT ADD ON: HCPCS | Mod: S$GLB,,, | Performed by: NURSE PRACTITIONER

## 2025-08-19 PROCEDURE — 99214 OFFICE O/P EST MOD 30 MIN: CPT | Mod: S$GLB,,, | Performed by: NURSE PRACTITIONER

## 2025-08-19 PROCEDURE — 99999 PR PBB SHADOW E&M-EST. PATIENT-LVL IV: CPT | Mod: PBBFAC,,, | Performed by: NURSE PRACTITIONER

## (undated) DEVICE — DRAPE LAP TIBURON 77X122IN

## (undated) DEVICE — DRAIN PENROSE STERILE 12X1/4

## (undated) DEVICE — SEE MEDLINE ITEM 146292

## (undated) DEVICE — SUTURE MONOCRYL 4-0 PS-2 27 MCP426H

## (undated) DEVICE — DRESSING POST OP MEPILEX AG 4X8

## (undated) DEVICE — UNDERGLOVE BIOGEL PI MICRO BLUE SZ 7

## (undated) DEVICE — TRAY GENERAL SURGERY

## (undated) DEVICE — GAUZE VASELINE PETRO 3X9

## (undated) DEVICE — SUT CHROMIC 2-0 SH 27IN BRN

## (undated) DEVICE — SOLUTION IRRI NS BOTTLE 1000ML R5200-01

## (undated) DEVICE — SOL PVP-I SCRUB 7.5% 4OZ

## (undated) DEVICE — BLADE SCALPEL #15 371115

## (undated) DEVICE — PACK CUSTOM UNIV BASIN SLI

## (undated) DEVICE — SUTURE SILK 3-0 18 A184H

## (undated) DEVICE — SUPPORT SCROTAL LG 2570LG

## (undated) DEVICE — TRAY DRY SPONGE SCRUB W FOAM

## (undated) DEVICE — PAD BOVIE ADULT

## (undated) DEVICE — NEEDLE SAFETY ECLIPSE 25G 1-1/2IN 305767

## (undated) DEVICE — DRAPE LAPAROTOMY TRANSVERSE 89281

## (undated) DEVICE — NDL SAFETY 25G X 1.5 ECLIPSE

## (undated) DEVICE — GLOVE BIOGEL MICRO SURGEON PINK SZ 7

## (undated) DEVICE — TIP BOVIE TEFLON E1450X

## (undated) DEVICE — SYRINGE 20ML 302830

## (undated) DEVICE — SPONGE PEANUT 3/8 7103

## (undated) DEVICE — GOWN X-LG STERILE BACK

## (undated) DEVICE — SYR 10CC LUER LOCK

## (undated) DEVICE — SPONGE SUPER KERLIX 6X6.75IN

## (undated) DEVICE — SUTURE PROLENE 2-0 SH 36 8523H

## (undated) DEVICE — SOL 9P NACL IRR PIC IL

## (undated) DEVICE — GLOVE SURG ULTRA TOUCH 7.5

## (undated) DEVICE — CLIPPER BLADE MOD 4406 (CAREF)

## (undated) DEVICE — SEE MEDLINE ITEM 157117

## (undated) DEVICE — SUTURE SILK 2-0 18 A185H

## (undated) DEVICE — SPONGE LAP 18X18

## (undated) DEVICE — SLEEVE SCD EXPRESS CALF MEDIUM

## (undated) DEVICE — SUPPORTER 3IN ELSTC WAIST KNIT

## (undated) DEVICE — SUT 4-0 CHROMIC GUT / RB1

## (undated) DEVICE — SEE MEDLINE ITEM 152622

## (undated) DEVICE — SCRUB 10% POVIDONE IODINE 4OZ

## (undated) DEVICE — LINER SUCTION 3000CC

## (undated) DEVICE — SYRINGE BULB ASEPTO 60CC 2OZ

## (undated) DEVICE — SUTURE SILK 2-0 SH 30 K833H

## (undated) DEVICE — SUTURE VICRYL 2-0 27 SH VCP417H

## (undated) DEVICE — UNDERGLOVE BIOGEL PI MICRO BLUE SZ 8

## (undated) DEVICE — STRAP OR TABLE 5IN X 72IN

## (undated) DEVICE — SUTURE VICRYL 3-0 SH 27 VCP416H

## (undated) DEVICE — ELECTRODE REM PLYHSV RETURN 9

## (undated) DEVICE — GLOVE BIOGEL PI ULTRA TOUCH GRAY SZ7.5

## (undated) DEVICE — ELECTRODE ELECSURG NDL